# Patient Record
Sex: MALE | Race: WHITE | NOT HISPANIC OR LATINO | Employment: OTHER | URBAN - METROPOLITAN AREA
[De-identification: names, ages, dates, MRNs, and addresses within clinical notes are randomized per-mention and may not be internally consistent; named-entity substitution may affect disease eponyms.]

---

## 2017-02-17 ENCOUNTER — COMMUNICATION - HEALTHEAST (OUTPATIENT)
Dept: INTERNAL MEDICINE | Facility: CLINIC | Age: 72
End: 2017-02-17

## 2017-06-16 ENCOUNTER — COMMUNICATION - HEALTHEAST (OUTPATIENT)
Dept: INTERNAL MEDICINE | Facility: CLINIC | Age: 72
End: 2017-06-16

## 2017-12-29 ENCOUNTER — OFFICE VISIT - HEALTHEAST (OUTPATIENT)
Dept: INTERNAL MEDICINE | Facility: CLINIC | Age: 72
End: 2017-12-29

## 2017-12-29 ENCOUNTER — COMMUNICATION - HEALTHEAST (OUTPATIENT)
Dept: INTERNAL MEDICINE | Facility: CLINIC | Age: 72
End: 2017-12-29

## 2017-12-29 DIAGNOSIS — R79.89 LOW VITAMIN B12 LEVEL: ICD-10-CM

## 2017-12-29 DIAGNOSIS — Z00.00 ANNUAL PHYSICAL EXAM: ICD-10-CM

## 2017-12-29 DIAGNOSIS — Z12.5 ENCOUNTER FOR SCREENING FOR MALIGNANT NEOPLASM OF PROSTATE: ICD-10-CM

## 2017-12-29 DIAGNOSIS — E55.9 VITAMIN D DEFICIENCY: ICD-10-CM

## 2017-12-29 DIAGNOSIS — R07.9 CHEST PAIN: ICD-10-CM

## 2017-12-29 DIAGNOSIS — E11.9 DM TYPE 2 (DIABETES MELLITUS, TYPE 2) (H): ICD-10-CM

## 2017-12-29 DIAGNOSIS — I25.10 CORONARY ATHEROSCLEROSIS DUE TO CALCIFIED CORONARY LESION: ICD-10-CM

## 2017-12-29 DIAGNOSIS — K57.30 DIVERTICULOSIS OF LARGE INTESTINE WITHOUT HEMORRHAGE: ICD-10-CM

## 2017-12-29 DIAGNOSIS — R10.9 RIGHT FLANK PAIN: ICD-10-CM

## 2017-12-29 DIAGNOSIS — I25.84 CORONARY ATHEROSCLEROSIS DUE TO CALCIFIED CORONARY LESION: ICD-10-CM

## 2017-12-29 DIAGNOSIS — I25.10 CORONARY ARTERY DISEASE INVOLVING NATIVE CORONARY ARTERY OF NATIVE HEART WITHOUT ANGINA PECTORIS: ICD-10-CM

## 2017-12-29 DIAGNOSIS — Z12.11 SCREEN FOR COLON CANCER: ICD-10-CM

## 2017-12-29 LAB
CHOLEST SERPL-MCNC: 215 MG/DL
FASTING STATUS PATIENT QL REPORTED: YES
HBA1C MFR BLD: 5.9 % (ref 3.5–6)
HDLC SERPL-MCNC: 37 MG/DL
LDLC SERPL CALC-MCNC: 131 MG/DL
PSA SERPL-MCNC: 0.3 NG/ML (ref 0–6.5)
TRIGL SERPL-MCNC: 237 MG/DL

## 2017-12-29 ASSESSMENT — MIFFLIN-ST. JEOR: SCORE: 1939.41

## 2018-01-04 ENCOUNTER — COMMUNICATION - HEALTHEAST (OUTPATIENT)
Dept: INTERNAL MEDICINE | Facility: CLINIC | Age: 73
End: 2018-01-04

## 2018-01-09 ENCOUNTER — RECORDS - HEALTHEAST (OUTPATIENT)
Dept: ADMINISTRATIVE | Facility: OTHER | Age: 73
End: 2018-01-09

## 2018-08-14 ENCOUNTER — TRANSFERRED RECORDS (OUTPATIENT)
Dept: HEALTH INFORMATION MANAGEMENT | Facility: CLINIC | Age: 73
End: 2018-08-14

## 2018-08-14 ENCOUNTER — OFFICE VISIT - HEALTHEAST (OUTPATIENT)
Dept: INTERNAL MEDICINE | Facility: CLINIC | Age: 73
End: 2018-08-14

## 2018-08-14 ENCOUNTER — MEDICAL CORRESPONDENCE (OUTPATIENT)
Dept: HEALTH INFORMATION MANAGEMENT | Facility: CLINIC | Age: 73
End: 2018-08-14

## 2018-08-14 DIAGNOSIS — H35.351 CYSTOID MACULAR EDEMA OF RIGHT EYE: ICD-10-CM

## 2018-08-14 DIAGNOSIS — E11.59 TYPE 2 DIABETES MELLITUS WITH OTHER CIRCULATORY COMPLICATION, WITHOUT LONG-TERM CURRENT USE OF INSULIN (H): ICD-10-CM

## 2018-08-14 DIAGNOSIS — I25.119 ATHEROSCLEROSIS OF NATIVE CORONARY ARTERY OF NATIVE HEART WITH ANGINA PECTORIS (H): ICD-10-CM

## 2018-08-14 DIAGNOSIS — E11.9 TYPE 2 DIABETES MELLITUS WITHOUT COMPLICATION, WITHOUT LONG-TERM CURRENT USE OF INSULIN (H): ICD-10-CM

## 2018-08-14 DIAGNOSIS — F10.21 HISTORY OF ALCOHOL DEPENDENCE (H): ICD-10-CM

## 2018-08-14 DIAGNOSIS — R79.89 LOW VITAMIN B12 LEVEL: ICD-10-CM

## 2018-08-14 DIAGNOSIS — R41.3 MEMORY LOSS: ICD-10-CM

## 2018-08-14 DIAGNOSIS — E66.01 SEVERE OBESITY (BMI 35.0-39.9): ICD-10-CM

## 2018-08-14 DIAGNOSIS — R51.9 BILATERAL HEADACHES: ICD-10-CM

## 2018-08-14 DIAGNOSIS — R35.0 URINARY FREQUENCY: ICD-10-CM

## 2018-08-14 LAB — HBA1C MFR BLD: 5.9 % (ref 3.5–6)

## 2018-08-14 ASSESSMENT — MIFFLIN-ST. JEOR: SCORE: 1894.51

## 2018-08-17 ENCOUNTER — COMMUNICATION - HEALTHEAST (OUTPATIENT)
Dept: INTERNAL MEDICINE | Facility: CLINIC | Age: 73
End: 2018-08-17

## 2018-09-12 ENCOUNTER — COMMUNICATION - HEALTHEAST (OUTPATIENT)
Dept: INTERNAL MEDICINE | Facility: CLINIC | Age: 73
End: 2018-09-12

## 2018-09-13 ENCOUNTER — TELEPHONE (OUTPATIENT)
Dept: OPHTHALMOLOGY | Facility: CLINIC | Age: 73
End: 2018-09-13

## 2018-09-13 NOTE — TELEPHONE ENCOUNTER
Past 4-5 months decrease vision in right eye  No new sudden changes per pt    H/o retina detachment 4 years ago    No new floaters/flashing    Concern of new RD vs cystoid macular per message    No eye pain    [place] [facility]    [place] satisfied with appt    Note to dr. Shahida Wise RN 3:27 PM 09/13/18

## 2018-09-13 NOTE — TELEPHONE ENCOUNTER
[facility] Call Center    Phone Message    May a detailed message be left on voicemail: yes    Reason for Call: Other: Dorothea from [facility], Dr. Dillon Goldsmith's office called to schedule appt for pt for Cystoid Macular Edema in RE, however pt feels it may be a Detatched Retina. Cystoid Macular Edema does not come up on Dx List. Scheduled appt w/ Dr. Gonsales 9/20/18, gave fax for recs to be sent over. Dorothea stated she did not know if there was any rush for this as pt had been dealing with this for over a month before he went to their clinic.     Action Taken: Message routed to:  [facility] Eye

## 2018-09-15 ENCOUNTER — OFFICE VISIT (OUTPATIENT)
Dept: OPHTHALMOLOGY | Facility: CLINIC | Age: 73
End: 2018-09-15
Payer: MEDICARE

## 2018-09-15 DIAGNOSIS — H25.12 AGE-RELATED NUCLEAR CATARACT OF LEFT EYE: ICD-10-CM

## 2018-09-15 DIAGNOSIS — H35.30 AMD (AGE RELATED MACULAR DEGENERATION): Primary | ICD-10-CM

## 2018-09-15 ASSESSMENT — TONOMETRY
IOP_METHOD: ICARE
OS_IOP_MMHG: 15
OD_IOP_MMHG: 16

## 2018-09-15 ASSESSMENT — EXTERNAL EXAM - RIGHT EYE: OD_EXAM: NORMAL

## 2018-09-15 ASSESSMENT — VISUAL ACUITY
OD_PH_SC: 20/80 SLOW
METHOD: SNELLEN - LINEAR
OS_SC+: -2
OD_SC: J10
OD_SC: 20/150
OS_SC: J10
OS_SC: 20/70

## 2018-09-15 ASSESSMENT — REFRACTION_MANIFEST
OD_SPHERE: +1.00
OS_SPHERE: PLANO
OD_ADD: +3.25
OS_ADD: +3.25
OS_CYLINDER: +1.00
OD_AXIS: 165
OS_AXIS: 027
OD_CYLINDER: +1.00

## 2018-09-15 ASSESSMENT — CONF VISUAL FIELD
OS_INFERIOR_NASAL_RESTRICTION: 3
OS_INFERIOR_TEMPORAL_RESTRICTION: 3
METHOD: COUNTING FINGERS
OS_SUPERIOR_NASAL_RESTRICTION: 3
OD_SUPERIOR_TEMPORAL_RESTRICTION: 3
OD_SUPERIOR_NASAL_RESTRICTION: 3

## 2018-09-15 ASSESSMENT — EXTERNAL EXAM - LEFT EYE: OS_EXAM: NORMAL

## 2018-09-15 ASSESSMENT — SLIT LAMP EXAM - LIDS
COMMENTS: NORMAL
COMMENTS: NORMAL

## 2018-09-15 ASSESSMENT — CUP TO DISC RATIO
OD_RATIO: 0.2
OS_RATIO: 0.2

## 2018-09-15 NOTE — MR AVS SNAPSHOT
After Visit Summary   9/15/2018    Jonas Hyman    MRN: [id number]           Patient Information     Date Of Birth          1945        Visit Information        Provider Department      9/15/2018 8:20 AM Trav Pinedo MD [facility] Ophthalmology         Follow-ups after your visit        Your next 10 appointments already scheduled     Sep 17, 2018  9:00 AM CDT   RETURN RETINA with Anu Wilson MD   Eye Clinic ([facility])    [address]   [address]  [facility] [address]   [phone]              Who to contact     Please call your clinic at [phone] to:    Ask questions about your health    Make or cancel appointments    Discuss your medicines    Learn about your test results    Speak to your doctor            Additional Information About Your Visit        MyChart Information     [employer] is an electronic gateway that provides easy, online access to your medical records. With [employer], you can request a clinic appointment, read your test results, renew a prescription or communicate with your care team.     To sign up for [employer] visit the website at [email or web address]   You will be asked to enter the access code listed below, as well as some personal information. Please follow the directions to create your username and password.     Your access code is: [email or web address]  Expires: 2018  6:30 AM     Your access code will  in 90 days. If you need help or a new code, please contact your [facility] or call [phone] for assistance.        Care EveryWhere ID     This is your Care EveryWhere ID. This could be used by other organizations to access your [place] medical records  [id number]         Blood Pressure from Last 3 Encounters:   13 126/76    Weight from Last 3 Encounters:   13 126.3 kg (278 lb 8 oz)              Today, you had the following     No orders found for  display       Primary Care Provider Office Phone # Fax #    Dillon Goldsmith [phone]       [facility] [address] [id number]        Equal Access to Services     LEWISJOSE CHILANGO : Yunier jones rickey Davis, waaxda luqadaha, qaybta kaalmada susan, monalisa welsh laKvngaura kamara. So [facility] [phone].    ATENCIÓN: Si habla español, tiene a barrera disposición servicios gratuitos de asistencia lingüística. Llame al [phone].    We comply with applicable federal civil rights laws and [place] laws. We do not discriminate on the basis of race, color, national origin, age, disability, sex, sexual orientation, or gender identity.            Thank you!     Thank you for choosing [facility] OPHTHALMOLOGY  for your care. Our goal is always to provide you with excellent care. Hearing back from our patients is one way we can continue to improve our services. Please take a few minutes to complete the written survey that you may receive in the mail after your visit with us. Thank you!             Your Updated Medication List - Protect others around you: Learn how to safely use, store and throw away your medicines at www.disposemymeds.org.          This list is accurate as of 9/15/18 10:27 AM.  Always use your most recent med list.                   Brand Name Dispense Instructions for use Diagnosis    AMOXICILLIN PO      Take 500 mg by mouth        ASPIRIN PO      Take 325 mg by mouth daily        AZITHROMYCIN PO      Take 250 mg by mouth daily Take 2 tablets day one then one tablet daily        buPROPion 150 MG 12 hr tablet    ZYBAN     Take 150 mg by mouth 2 times daily        CARVEDILOL PO      Take 25 mg by mouth 2 times daily (with meals)        guaiFENesin 400 MG Tabs      Take 2 tablets twice daily        IBUPROFEN PO      Take 400 mg by mouth as needed        LISINOPRIL PO      Take 10 mg by mouth        NITROSTAT SL      Place 0.4 mg under the tongue         PRAVASTATIN SODIUM PO      Take 20 mg by mouth daily        VITAMIN B-12 ER PO      Take 1,000 mcg by mouth daily        VITAMIN D (CHOLECALCIFEROL) PO      Take 2,000 Units by mouth daily

## 2018-09-15 NOTE — NURSING NOTE
"Chief Complaints and History of Present Illnesses   Patient presents with     Eye Problem Both Eyes     vision changes OS x4mo, lost gls      HPI    Last Eye Exam:  6/23/14   Additional Referring Providers:  Jena Magdaleno   Affected eye(s):  Left   Symptoms:     Blurred vision   Decreased vision   No distorted vision   No floaters   No flashes   Glare   Halos   No foreign body sensation   Tearing   No Dryness   No itching   No burning   Photophobia      Duration:  4 months   Frequency:  Seldom       Do you have eye pain now?:  No      Comments:  Vision in the LE has been getting worse in the distance and near over past few months. Vision \"seems opaque, like a filminess over the eyes\" RE<LE.   C/o halo & glare to lights and \"the resolution is off.\"     Ocular meds: none  PoH (per EHR chart notes): ARMD, RD right eye (s/p PPV/SB/Membrane peel)    DM2, tx: diet controlled  Unknown last A1C or BS    Nita Vega COT 8:50 AM September 15, 2018                  "

## 2018-09-15 NOTE — PROGRESS NOTES
CC -   Blurry vision left eye    HPI -   September 15, 2018 - First visit: Jonas Hyman is a 73 year old year old male presenting for blurry vision left eye. He had surgery in the right eye for retinal detachment. Now vision worsening in the left eye. He was a pt of Dr. 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   - will refer to retina clinic in asap for FA and possible treatment  - no smoking, healthy diet  - will discuss AREDs next visit    2- non-neovascular AMD left eye   - no smoking, healthy diet  - will discuss AREDs next visit      3- cataract left eye   - visually significant   - will address after management of #1    4- history of RD repair right eye   - retina attached, follow    5- ACIOL right eye      return to clinic: 2 days retina clinic with dilated fundus exam, FA/ICG 55 transit right eye        Complete documentation of historical and exam elements from today's encounter can be found in the full encounter summary report (not reduplicated in this progress note).  I personally obtained the chief complaint(s) and history of present illness.  I confirmed and edited as necessary the review of systems, past medical/surgical history, family history, social history, and examination findings as documented by others; and I examined the patient myself.  I personally reviewed the relevant tests, images, and reports as documented above.  I formulated and edited as necessary the assessment and plan and discussed the findings and management plan with the patient and family      Trav Pinedo MD PhD  Vitreoretinal Surgery Fellow  [facility]

## 2018-09-17 ENCOUNTER — OFFICE VISIT (OUTPATIENT)
Dept: OPHTHALMOLOGY | Facility: CLINIC | Age: 73
End: 2018-09-17
Attending: OPHTHALMOLOGY
Payer: MEDICARE

## 2018-09-17 DIAGNOSIS — H35.3210 EXUDATIVE AGE-RELATED MACULAR DEGENERATION, RIGHT EYE, STAGE UNSPECIFIED (H): Primary | ICD-10-CM

## 2018-09-17 DIAGNOSIS — H35.3211 EXUDATIVE AGE-RELATED MACULAR DEGENERATION OF RIGHT EYE WITH ACTIVE CHOROIDAL NEOVASCULARIZATION (H): ICD-10-CM

## 2018-09-17 PROCEDURE — 92240 ICG ANGIOGRAPHY I&R UNI/BI: CPT | Mod: ZF | Performed by: OPHTHALMOLOGY

## 2018-09-17 PROCEDURE — C9257 BEVACIZUMAB INJECTION: HCPCS | Mod: ZF | Performed by: OPHTHALMOLOGY

## 2018-09-17 PROCEDURE — G0463 HOSPITAL OUTPT CLINIC VISIT: HCPCS | Mod: 25

## 2018-09-17 PROCEDURE — 25000128 H RX IP 250 OP 636: Mod: ZF | Performed by: OPHTHALMOLOGY

## 2018-09-17 PROCEDURE — 92242 FLUORESCEIN&ICG ANGIOGRAPHY: CPT

## 2018-09-17 PROCEDURE — 92134 CPTRZ OPH DX IMG PST SGM RTA: CPT | Mod: ZF | Performed by: OPHTHALMOLOGY

## 2018-09-17 PROCEDURE — 92235 FLUORESCEIN ANGRPH MLTIFRAME: CPT | Mod: ZF | Performed by: OPHTHALMOLOGY

## 2018-09-17 PROCEDURE — 67028 INJECTION EYE DRUG: CPT | Mod: ZF | Performed by: OPHTHALMOLOGY

## 2018-09-17 RX ORDER — TAMSULOSIN HYDROCHLORIDE 0.4 MG/1
CAPSULE ORAL
COMMUNITY
Start: 2018-08-14 | End: 2022-04-08

## 2018-09-17 RX ORDER — TAMSULOSIN HYDROCHLORIDE 0.4 MG/1
0.4 CAPSULE ORAL DAILY
Status: CANCELLED | OUTPATIENT
Start: 2018-09-17 | End: 2019-09-17

## 2018-09-17 RX ADMIN — Medication 1.25 MG: at 11:46

## 2018-09-17 ASSESSMENT — EXTERNAL EXAM - RIGHT EYE: OD_EXAM: NORMAL

## 2018-09-17 ASSESSMENT — CONF VISUAL FIELD
OS_NORMAL: 1
OD_NORMAL: 1

## 2018-09-17 ASSESSMENT — CUP TO DISC RATIO
OS_RATIO: 0.3
OD_RATIO: 0.45

## 2018-09-17 ASSESSMENT — VISUAL ACUITY
OS_SC+: -1
OS_SC: 20/125
METHOD: SNELLEN - LINEAR
OS_PH_SC: 20/70 -1
OD_SC: 20/100

## 2018-09-17 ASSESSMENT — TONOMETRY
OD_IOP_MMHG: 12
IOP_METHOD: TONOPEN
OS_IOP_MMHG: 12

## 2018-09-17 ASSESSMENT — EXTERNAL EXAM - LEFT EYE: OS_EXAM: NORMAL

## 2018-09-17 ASSESSMENT — SLIT LAMP EXAM - LIDS
COMMENTS: NORMAL
COMMENTS: NORMAL

## 2018-09-17 NOTE — NURSING NOTE
Chief Complaints and History of Present Illnesses   Patient presents with     Follow Up For     3 day check of AMD (age related macular degeneration) - Right Eye     HPI    Symptoms:     Glare   Halos   Tearing   No Dryness   Photophobia         Do you have eye pain now?:  No      Comments:  Pt reports no changes in right eye since last visit. Pt says he has blurry vision in the left eye. Pt still experiencing chronic headaches in an area between and above both eyes, which he's had for more than a year. He says he noticed a small floater in his right eye for the first time yesterday. He says it was moving right to left.     Donato NOBLES   September 17, 2018    9:37 AM  RAHUL Starkey 10:10 AM 09/17/2018

## 2018-09-17 NOTE — PROGRESS NOTES
CC -   ARMD    INTERVAL HISTORY - Initial visit with me.  Gradual progressive loss OD many months, no sudden changes  SISSY prior to 9/15/18 had been several years d/t lack of insuance  Caring for grandson  VA has worsened OU over years, now trouble with reading/drivign, ADLs    REGAN -   Jonas Hyman is a  73 year old year-old patient referred by Dr Pinedo for wet  AMD OD.  Had gone few years without eye exam d/t insurance lack, gradual progressive loss OU.  Prior RD repair OD 2014, unsure what BCVA was after repair.      PAST OCULAR SURGERY  PPV/SBP/FGx OD 2014 ([facility])  ACIOL OD     RETINAL IMAGING:  OCT  9-15-18  OD - SR scar with SRF  OS - mild RPE elevations, PHF attached    FA 9-17-18  OD - central leakage c/w occult CNVM  OS - no leakage    ICG 9-17-18  OD - central leakage c/w CNMV, no polyps  OS - no leakage        ASSESSMENT & PLAN    1.  Wet AMD OD   - uncertain duration by history   - uncertain vision potential given prior h/o RD   - advise Avastin today   - recheck 4 weeks     - r/b/a IVT anti-VEGF d/w patient   - infection, blindness, need for surgery   - off-label use of Avastin, resident participation      2. H/o RD repair OD   - s/p PPV/SBP/FGx  2014   - retina flat      3. syneresis OS   - advised s/sx RD    4. OAG suspect OD   - d/t CDR asymmetry   - RNFL OK       5. NS OS   - likely visually significant   - refer for eval      return to clinic: 4 weeks, OCT OU    ATTESTATION     Attending Attestation:     Complete documentation of historical and exam elements from today's encounter can be found in the full encounter summary report (not reduplicated in this progress note).  I personally obtained the chief complaint(s) and history of present illness.  I confirmed and edited as necessary the review of systems, past medical/surgical history, family history, social history, and examination findings as documented by others; and I examined the patient myself.  I personally reviewed the relevant tests,  images, and reports as documented above.  I formulated and edited as necessary the assessment and plan and discussed the findings and management plan with the patient and family    Anu Wilson MD, PhD  , Vitreoretinal Surgery  Department of Ophthalmology  [facility]

## 2018-09-17 NOTE — MR AVS SNAPSHOT
After Visit Summary   2018    Jonas Hyman    MRN: [id number]           Patient Information     Date Of Birth          1945        Visit Information        Provider Department      2018 9:00 AM Anu Wilson MD [facility] Clinic        Today's Diagnoses     Exudative age-related macular degeneration, right eye, stage unspecified (H)    -  1       Follow-ups after your visit        Follow-up notes from your care team     Return in about 4 weeks (around 10/15/2018) for OCT OU.      Your next 10 appointments already scheduled     Oct 16, 2018  1:00 PM CDT   RETURN RETINA with Anu Wilson MD   Eye Clinic ([facility])    [address]  [facility] [id number] [phone]              Who to contact     Please call your clinic at [phone] to:    Ask questions about your health    Make or cancel appointments    Discuss your medicines    Learn about your test results    Speak to your doctor            Additional Information About Your Visit        MyChart Information     [employer] is an electronic gateway that provides easy, online access to your medical records. With [employer], you can request a clinic appointment, read your test results, renew a prescription or communicate with your care team.     To sign up for [employer] visit the website at [email or web address]   You will be asked to enter the access code listed below, as well as some personal information. Please follow the directions to create your username and password.     Your access code is: [email or web address]  Expires: 2018  6:30 AM     Your access code will  in 90 days. If you need help or a new code, please contact your [facility] or call [phone] for assistance.        Care EveryWhere ID     This is your Care EveryWhere ID. This could be used by other organizations to access your [facility]  records  [id number]         Blood Pressure from Last 3 Encounters:   09/05/13 126/76    Weight from Last 3 Encounters:   08/30/13 126.3 kg (278 lb 8 oz)              We Performed the Following     Avastin (Bevacizumab) 1.25MG Intravitreal Injection OD (right eye)     Fluorescein Angiography OU (both eyes)     ICG Angiography OU (both eyes)        Primary Care Provider Office Phone # Fax #    Dillon Goldsmith [phone]       [address] [place] [address]        Equal Access to Services     [facility] : Hadii felicia jones hadasho Soomaali, waaxda luqadaha, qaybta kaalmada adelexiiyahaseeb, monalisa burns . So [facility] [phone].    ATENCIÓN: Si habla español, tiene a barrera disposición servicios gratuitos de asistencia lingüística. [facility] [phone].    We comply with applicable federal civil rights laws and [place] laws. We do not discriminate on the basis of race, color, national origin, age, disability, sex, sexual orientation, or gender identity.            Thank you!     Thank you for choosing EYE CLINIC  for your care. Our goal is always to provide you with excellent care. Hearing back from our patients is one way we can continue to improve our services. Please take a few minutes to complete the written survey that you may receive in the mail after your visit with us. Thank you!             Your Updated Medication List - Protect others around you: Learn how to safely use, store and throw away your medicines at www.disposemymeds.org.          This list is accurate as of 9/17/18 11:52 AM.  Always use your most recent med list.                   Brand Name Dispense Instructions for use Diagnosis    AMOXICILLIN PO      Take 500 mg by mouth        ASPIRIN PO      Take 325 mg by mouth daily        AZITHROMYCIN PO      Take 250 mg by mouth daily Take 2 tablets day one then one tablet daily        buPROPion 150 MG 12 hr tablet    ZYBAN     Take 150 mg by  mouth 2 times daily        CARVEDILOL PO      Take 25 mg by mouth 2 times daily (with meals)        guaiFENesin 400 MG Tabs      Take 2 tablets twice daily        IBUPROFEN PO      Take 400 mg by mouth as needed        LISINOPRIL PO      Take 10 mg by mouth        NITROSTAT SL      Place 0.4 mg under the tongue        PRAVASTATIN SODIUM PO      Take 20 mg by mouth daily        tamsulosin 0.4 MG capsule    FLOMAX          VITAMIN B-12 ER PO      Take 1,000 mcg by mouth daily        VITAMIN D (CHOLECALCIFEROL) PO      Take 2,000 Units by mouth daily

## 2018-09-19 ENCOUNTER — COMMUNICATION - HEALTHEAST (OUTPATIENT)
Dept: INTERNAL MEDICINE | Facility: CLINIC | Age: 73
End: 2018-09-19

## 2018-09-19 ENCOUNTER — HOSPITAL ENCOUNTER (OUTPATIENT)
Dept: CT IMAGING | Facility: HOSPITAL | Age: 73
Discharge: HOME | End: 2018-09-19
Attending: INTERNAL MEDICINE

## 2018-09-19 DIAGNOSIS — R10.9 FLANK PAIN: ICD-10-CM

## 2018-09-20 ENCOUNTER — COMMUNICATION - HEALTHEAST (OUTPATIENT)
Dept: INTERNAL MEDICINE | Facility: CLINIC | Age: 73
End: 2018-09-20

## 2018-09-20 DIAGNOSIS — K86.9 PANCREATIC LESION: ICD-10-CM

## 2018-09-26 ENCOUNTER — HOSPITAL ENCOUNTER (OUTPATIENT)
Dept: MRI IMAGING | Facility: HOSPITAL | Age: 73
Discharge: HOME | End: 2018-09-26
Attending: INTERNAL MEDICINE

## 2018-09-26 DIAGNOSIS — K86.9 PANCREATIC LESION: ICD-10-CM

## 2018-09-26 LAB
CREAT BLD-MCNC: 0.8 MG/DL
CREAT BLD-MCNC: 0.8 MG/DL (ref 0.7–1.3)
POC GFR AMER AF HE - HISTORICAL: >60 ML/MIN/1.73M2
POC GFR NON AMER AF HE - HISTORICAL: >60 ML/MIN/1.73M2

## 2018-09-28 ENCOUNTER — COMMUNICATION - HEALTHEAST (OUTPATIENT)
Dept: INTERNAL MEDICINE | Facility: CLINIC | Age: 73
End: 2018-09-28

## 2018-10-11 ENCOUNTER — RECORDS - HEALTHEAST (OUTPATIENT)
Dept: ADMINISTRATIVE | Facility: OTHER | Age: 73
End: 2018-10-11

## 2018-10-15 ENCOUNTER — COMMUNICATION - HEALTHEAST (OUTPATIENT)
Dept: INTERNAL MEDICINE | Facility: CLINIC | Age: 73
End: 2018-10-15

## 2018-10-16 ENCOUNTER — OFFICE VISIT (OUTPATIENT)
Dept: OPHTHALMOLOGY | Facility: CLINIC | Age: 73
End: 2018-10-16
Attending: OPHTHALMOLOGY
Payer: MEDICARE

## 2018-10-16 DIAGNOSIS — H35.3211 EXUDATIVE AGE-RELATED MACULAR DEGENERATION OF RIGHT EYE WITH ACTIVE CHOROIDAL NEOVASCULARIZATION (H): Primary | ICD-10-CM

## 2018-10-16 DIAGNOSIS — H35.3211 EXUDATIVE AGE-RELATED MACULAR DEGENERATION OF RIGHT EYE WITH ACTIVE CHOROIDAL NEOVASCULARIZATION (H): ICD-10-CM

## 2018-10-16 PROCEDURE — C9257 BEVACIZUMAB INJECTION: HCPCS | Mod: ZF | Performed by: OPHTHALMOLOGY

## 2018-10-16 PROCEDURE — 25000128 H RX IP 250 OP 636: Mod: ZF | Performed by: OPHTHALMOLOGY

## 2018-10-16 PROCEDURE — G0463 HOSPITAL OUTPT CLINIC VISIT: HCPCS | Mod: ZF

## 2018-10-16 PROCEDURE — 67028 INJECTION EYE DRUG: CPT | Mod: ZF,RT | Performed by: OPHTHALMOLOGY

## 2018-10-16 PROCEDURE — 92134 CPTRZ OPH DX IMG PST SGM RTA: CPT | Mod: ZF | Performed by: OPHTHALMOLOGY

## 2018-10-16 RX ADMIN — Medication 1.25 MG: at 14:03

## 2018-10-16 ASSESSMENT — CUP TO DISC RATIO
OD_RATIO: 0.45
OS_RATIO: 0.3

## 2018-10-16 ASSESSMENT — VISUAL ACUITY
OD_SC: 20/150
METHOD: SNELLEN - LINEAR
CORRECTION_TYPE: GLASSES
OS_SC: 20/70
OS_SC+: -2

## 2018-10-16 ASSESSMENT — TONOMETRY
OD_IOP_MMHG: 16
OS_IOP_MMHG: 17
IOP_METHOD: TONOPEN

## 2018-10-16 ASSESSMENT — EXTERNAL EXAM - LEFT EYE: OS_EXAM: NORMAL

## 2018-10-16 ASSESSMENT — SLIT LAMP EXAM - LIDS
COMMENTS: NORMAL
COMMENTS: NORMAL

## 2018-10-16 ASSESSMENT — CONF VISUAL FIELD
OS_NORMAL: 1
OD_NORMAL: 1

## 2018-10-16 ASSESSMENT — EXTERNAL EXAM - RIGHT EYE: OD_EXAM: NORMAL

## 2018-10-16 NOTE — MR AVS SNAPSHOT
After Visit Summary   10/16/2018    Jonas Hyman    MRN: [id number]           Patient Information     Date Of Birth          1945        Visit Information        Provider Department      10/16/2018 1:00 PM Anu Wilson MD Eye Clinic        Today's Diagnoses     Exudative age-related macular degeneration of right eye with active choroidal neovascularization (H)           Follow-ups after your visit        Follow-up notes from your care team     Return in about 4 weeks (around 11/13/2018) for Injection Only - No dilation, No imaging, Injection OD, Avastin.      Your next 10 appointments already scheduled     Nov 20, 2018  8:30 AM CST   NEW GENERAL with Suhail Johnson MD   Eye Clinic ([facility])    [facility]  [address] [place] [address] Clin [address] [place] [address]   [phone]            Nov 20, 2018  9:00 AM CST   RETURN RETINA with Anu Wilson MD   Eye Clinic ([facility])    [address] [place] [address]   [phone]              Who to contact     Please call your clinic at [phone] to:    Ask questions about your health    Make or cancel appointments    Discuss your medicines    Learn about your test results    Speak to your doctor            Additional Information About Your Visit        Care EveryWhere ID     This is your Care EveryWhere ID. This could be used by other organizations to access your [place] medical records  [id number]         Blood Pressure from Last 3 Encounters:   09/05/13 126/76    Weight from Last 3 Encounters:   08/30/13 126.3 kg (278 lb 8 oz)              We Performed the Following     Avastin (Bevacizumab) 1.25MG Intravitreal Injection OD (right eye)     OCT Retina Spectralis OU (both eyes)          Today's Medication Changes          These changes are accurate as of 10/16/18  2:08 PM.  If you have any questions, ask your nurse or  doctor.               Stop taking these medicines if you haven't already. Please contact your care team if you have questions.     PRAVASTATIN SODIUM PO   Stopped by:  Anu Wilson MD                    Primary Care Provider Office Phone # Fax #    Dillon Goldsmith [phone]       [facility] [address] [facility] [address]        Equal Access to Services     [facility]: Hadii aad ku hadasho Soomaali, waaxda luqadaha, qaybta kaalmada adeegyada, waxay idiin hayaan adeeg kharash la'aan ah. So [facility] [phone].    ATENCIÓN: Si cassy moise, tiene a barrera disposición servicios gratuitos de asistencia lingüística. Cesar al [phone].    We comply with applicable federal civil rights laws and [place] laws. We do not discriminate on the basis of race, color, national origin, age, disability, sex, sexual orientation, or gender identity.            Thank you!     Thank you for choosing EYE CLINIC  for your care. Our goal is always to provide you with excellent care. Hearing back from our patients is one way we can continue to improve our services. Please take a few minutes to complete the written survey that you may receive in the mail after your visit with us. Thank you!             Your Updated Medication List - Protect others around you: Learn how to safely use, store and throw away your medicines at www.disposemymeds.org.          This list is accurate as of 10/16/18  2:08 PM.  Always use your most recent med list.                   Brand Name Dispense Instructions for use Diagnosis    AMOXICILLIN PO      Take 500 mg by mouth        ASPIRIN PO      Take 325 mg by mouth daily        AZITHROMYCIN PO      Take 250 mg by mouth daily Take 2 tablets day one then one tablet daily        buPROPion 150 MG 12 hr tablet    ZYBAN     Take 150 mg by mouth 2 times daily        CARVEDILOL PO      Take 25 mg by mouth 2 times daily (with meals)        guaiFENesin 400 MG Tabs      Take 2  tablets twice daily        IBUPROFEN PO      Take 400 mg by mouth as needed        LISINOPRIL PO      Take 10 mg by mouth        NITROSTAT SL      Place 0.4 mg under the tongue        tamsulosin 0.4 MG capsule    FLOMAX          VITAMIN B-12 ER PO      Take 1,000 mcg by mouth daily        VITAMIN D (CHOLECALCIFEROL) PO      Take 2,000 Units by mouth daily

## 2018-10-16 NOTE — NURSING NOTE
Chief Complaints and History of Present Illnesses   Patient presents with     Macular Degeneration Follow Up     HPI    Affected eye(s):  Both   Symptoms:     Blurred vision   No decreased vision   No floaters   No flashes      Duration:  1 month      Do you have eye pain now?:  No      Comments:  Follow up for macular degeneration.    Patient notes he has a new dx of an aortic abdominal aneurysm and a pancreatic cyst.  RAHUL Starkey 12:59 PM 10/16/2018

## 2018-10-16 NOTE — PROGRESS NOTES
CC -   ARMD    INTERVAL HISTORY - No change after last injection vs worse  Caring for grandson  VA has worsened OU over years, now trouble with reading/drivign, ADLs    HPI -   Jonas Hyman is a  73 year old year-old patient referred by Dr Pinedo for wet  AMD OD.  Had gone few years without eye exam d/t insurance lack, gradual progressive loss OU.  Prior RD repair OD 2014, unsure what BCVA was after repair.      PAST OCULAR SURGERY  PPV/SBP/FGx OD 2014 ([facility])  ACIOL OD     RETINAL IMAGING:  OCT  10-16-18  OD - SR scar with SRF mild increase  OS - mild RPE elevations,no fluid PHF attached    FA 9-17-18  OD - central leakage c/w occult CNVM  OS - no leakage    ICG 9-17-18  OD - central leakage c/w CNMV, no polyps  OS - no leakage        ASSESSMENT & PLAN    1.  Wet AMD OD   - uncertain duration by history   - uncertain vision potential given prior h/o RD   - started Avastin 9/17/18     - last injection Avastin (#1) 9/17/18 (4 weeks)   - OCT SRF stable vs worse   - VA stable    - inject AVastin     - RTC 1 month injection only #1 of 3     - r/b/a IVT anti-VEGF d/w patient   - infection, blindness, need for surgery   - off-label use of Avastin, resident participation      2. H/o RD repair OD   - s/p PPV/SBP/FGx  2014   - retina flat      3. syneresis OS   - advised s/sx RD    4. OAG suspect OD   - d/t CDR asymmetry   - RNFL OK    - refer for eval      5. NS OS   - likely visually significant   - refer for eval      return to clinic: 4 weeks, injection only OD Avastin    ATTESTATION     Attending Attestation:     Complete documentation of historical and exam elements from today's encounter can be found in the full encounter summary report (not reduplicated in this progress note).  I personally obtained the chief complaint(s) and history of present illness.  I confirmed and edited as necessary the review of systems, past medical/surgical history, family history, social history, and examination findings as documented  by others; and I examined the patient myself.  I personally reviewed the relevant tests, images, and reports as documented above.  I formulated and edited as necessary the assessment and plan and discussed the findings and management plan with the patient and family    Anu Wilson MD, PhD  , Vitreoretinal Surgery  Department of Ophthalmology  [facility]

## 2018-10-17 ENCOUNTER — RECORDS - HEALTHEAST (OUTPATIENT)
Dept: ADMINISTRATIVE | Facility: OTHER | Age: 73
End: 2018-10-17

## 2018-10-30 ENCOUNTER — OFFICE VISIT - HEALTHEAST (OUTPATIENT)
Dept: INTERNAL MEDICINE | Facility: CLINIC | Age: 73
End: 2018-10-30

## 2018-10-30 DIAGNOSIS — Z23 FLU VACCINE NEED: ICD-10-CM

## 2018-10-30 DIAGNOSIS — R79.89 LOW VITAMIN B12 LEVEL: ICD-10-CM

## 2018-10-30 DIAGNOSIS — I71.40 ABDOMINAL AORTIC ANEURYSM (AAA) WITHOUT RUPTURE (H): ICD-10-CM

## 2018-10-30 DIAGNOSIS — K86.2 PANCREATIC CYST: ICD-10-CM

## 2018-10-30 DIAGNOSIS — E11.59 TYPE 2 DIABETES MELLITUS WITH OTHER CIRCULATORY COMPLICATION, WITHOUT LONG-TERM CURRENT USE OF INSULIN (H): ICD-10-CM

## 2018-10-30 DIAGNOSIS — H35.3211 EXUDATIVE AGE-RELATED MACULAR DEGENERATION OF RIGHT EYE WITH ACTIVE CHOROIDAL NEOVASCULARIZATION (H): ICD-10-CM

## 2018-10-30 DIAGNOSIS — K57.30 DIVERTICULOSIS OF LARGE INTESTINE WITHOUT HEMORRHAGE: ICD-10-CM

## 2018-10-30 ASSESSMENT — MIFFLIN-ST. JEOR: SCORE: 1894.05

## 2018-11-08 ENCOUNTER — TRANSFERRED RECORDS (OUTPATIENT)
Dept: HEALTH INFORMATION MANAGEMENT | Facility: CLINIC | Age: 73
End: 2018-11-08

## 2018-11-08 ENCOUNTER — RECORDS - HEALTHEAST (OUTPATIENT)
Dept: ADMINISTRATIVE | Facility: OTHER | Age: 73
End: 2018-11-08

## 2018-11-20 ENCOUNTER — OFFICE VISIT (OUTPATIENT)
Dept: OPHTHALMOLOGY | Facility: CLINIC | Age: 73
End: 2018-11-20
Attending: OPHTHALMOLOGY
Payer: MEDICARE

## 2018-11-20 DIAGNOSIS — H35.3211 EXUDATIVE AGE-RELATED MACULAR DEGENERATION OF RIGHT EYE WITH ACTIVE CHOROIDAL NEOVASCULARIZATION (H): Primary | ICD-10-CM

## 2018-11-20 DIAGNOSIS — H25.12 NUCLEAR SENILE CATARACT OF LEFT EYE: Primary | ICD-10-CM

## 2018-11-20 DIAGNOSIS — H35.30 AMD (AGE RELATED MACULAR DEGENERATION): ICD-10-CM

## 2018-11-20 DIAGNOSIS — H40.003 GLAUCOMA SUSPECT OF BOTH EYES: ICD-10-CM

## 2018-11-20 DIAGNOSIS — Z86.69 HISTORY OF RETINAL DETACHMENT: ICD-10-CM

## 2018-11-20 PROCEDURE — C9257 BEVACIZUMAB INJECTION: HCPCS | Mod: ZF | Performed by: OPHTHALMOLOGY

## 2018-11-20 PROCEDURE — G0463 HOSPITAL OUTPT CLINIC VISIT: HCPCS | Mod: ZF

## 2018-11-20 PROCEDURE — 40000269 ZZH STATISTIC NO CHARGE FACILITY FEE: Mod: ZF

## 2018-11-20 PROCEDURE — G0463 HOSPITAL OUTPT CLINIC VISIT: HCPCS | Mod: 25,ZF

## 2018-11-20 PROCEDURE — 76519 ECHO EXAM OF EYE: CPT | Mod: ZF | Performed by: OPHTHALMOLOGY

## 2018-11-20 PROCEDURE — 92133 CPTRZD OPH DX IMG PST SGM ON: CPT | Mod: ZF | Performed by: OPHTHALMOLOGY

## 2018-11-20 PROCEDURE — 76514 ECHO EXAM OF EYE THICKNESS: CPT | Mod: ZF | Performed by: OPHTHALMOLOGY

## 2018-11-20 PROCEDURE — 25000128 H RX IP 250 OP 636: Mod: ZF | Performed by: OPHTHALMOLOGY

## 2018-11-20 PROCEDURE — 67028 INJECTION EYE DRUG: CPT | Mod: ZF | Performed by: OPHTHALMOLOGY

## 2018-11-20 RX ADMIN — Medication 1.25 MG: at 11:05

## 2018-11-20 ASSESSMENT — TONOMETRY
IOP_METHOD: TONOPEN
IOP_METHOD: TONOPEN
OS_IOP_MMHG: 10
OD_IOP_MMHG: 11
OS_IOP_MMHG: 10
OD_IOP_MMHG: 11

## 2018-11-20 ASSESSMENT — VISUAL ACUITY
METHOD: SNELLEN - LINEAR
OS_SC: 20/
OS_PH_SC: 20/60
OD_SC: 20/100
OD_SC: 20/100
METHOD: SNELLEN - LINEAR
OS_SC: 20/80
OS_PH_SC: 20/60
OS_SC+: +2
OS_SC+: +2

## 2018-11-20 ASSESSMENT — CONF VISUAL FIELD
METHOD: COUNTING FINGERS
METHOD: COUNTING FINGERS
OD_NORMAL: 1
OS_NORMAL: 1
OS_NORMAL: 1
OD_NORMAL: 1

## 2018-11-20 ASSESSMENT — CUP TO DISC RATIO
OS_RATIO: 0.3
OD_RATIO: 0.5

## 2018-11-20 ASSESSMENT — PACHYMETRY
OS_CT(UM): 583
OD_CT(UM): 574

## 2018-11-20 ASSESSMENT — REFRACTION_MANIFEST
OS_AXIS: 030
OS_CYLINDER: +1.25
OS_SPHERE: PLANO

## 2018-11-20 ASSESSMENT — SLIT LAMP EXAM - LIDS
COMMENTS: NORMAL
COMMENTS: NORMAL

## 2018-11-20 ASSESSMENT — EXTERNAL EXAM - RIGHT EYE: OD_EXAM: NORMAL

## 2018-11-20 ASSESSMENT — EXTERNAL EXAM - LEFT EYE: OS_EXAM: NORMAL

## 2018-11-20 NOTE — PROGRESS NOTES
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  Had gone few years without eye exam d/t insurance lack, gradual progressive loss OU.  Prior RD repair OD 2014, unsure what BCVA was after repair.      PAST OCULAR SURGERY  PPV/SBP/FGx OD 2014 ([facility])  ACIOL OD     RETINAL IMAGING:  OCT  10-16-18  OD - SR scar with SRF mild increase  OS - mild RPE elevations,no fluid PHF attached    FA 9-17-18  OD - central leakage c/w occult CNVM  OS - no leakage    ICG 9-17-18  OD - central leakage c/w CNMV, no polyps  OS - no leakage        ASSESSMENT & PLAN    1.  Wet AMD OD   - uncertain duration by history   - uncertain vision potential given prior h/o RD   - started Avastin 9/17/18     - last injection Avastin (#2) 10/16/18 (4 weeks)   - PRIOR - OCT SRF stable vs worse   - VA stable vs better   - inject AVastin today injection only #1 of 3     - RTC 1 month injection only     - r/b/a IVT anti-VEGF d/w patient   - infection, blindness, need for surgery   - off-label use of Avastin, resident participation      2. H/o RD repair OD   - s/p PPV/SBP/FGx  2014   - retina flat      3. syneresis OS   - advised s/sx RD    4. 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 NS OS   - likely visually significant   - saw Elizabeth      return to clinic: 4 weeks, injection only OD Avastin    ATTESTATION     Attending Attestation:     Complete documentation of historical and exam elements from today's encounter can be found in the full encounter summary report (not reduplicated in this progress note).  I personally obtained the chief complaint(s) and history of present illness.  I confirmed and edited as necessary the review of systems, past medical/surgical history, family history, social history, and examination  findings as documented by others; and I examined the patient myself.  I personally reviewed the relevant tests, images, and reports as documented above.  I formulated and edited as necessary the assessment and plan and discussed the findings and management plan with the patient and family    Anu Wilson MD, PhD  , Vitreoretinal Surgery  Department of Ophthalmology  [facility]

## 2018-11-20 NOTE — PROGRESS NOTES
HPI:  Referral from Dr. Wilson for cataract left eye and possible glaucoma.  States that he recently started driving 2/2 worsening vision in his left eye. Previous complicated surgery right eye with anterior chamber intraocular lens (ACIOL) and retinal detachment after.  Now s/p buckle.  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 SALAS      - patient with history of RD in RIGHT eye, s/p RD repair and CE+ACIOL (~2013)  - Wet AMD RIGHT eye, follows with retina for injections     Assessment & Plan      Jonas Hyman is a 73 year old male with the following diagnoses:   1. Nuclear senile cataract of left eye    2. Glaucoma suspect of both eyes    3. AMD (age related macular degeneration) - Right Eye    4. History of retinal detachment         Open angle glaucoma suspect, cupping right eye > left eye   - 2/2 C:D ratio  - IOP max: unknown (16/17 in our clinic)  - IOP today: 11/10  - C:D: 0.5/0.3  - OCT RNFL:    - Today (11/20/18): no signs of nerve thinning, unchanged from last OCT RNFL   - OCT BMO   - Today (11/20/18): no signs of thinning   - Pachy: 574/583    - Plan: No glaucoma at this time.  Monitor with repeat OCT in 1 year    Cataract LEFT eye:   - BCVA 20/60, bothering bitty to drive  -Risks, benefits and alternatives to cataract extraction/IOL implantation discussed; consent obtained.  Will schedule surgery today  - IOL calcs today    Special equipment/needs:    Anesthesia:Topical  Dilation:Moderate  Iris expansion:IFIS  Pseudoexfoliation: No pseudoexfoliation  Trypan Blue: Yes  Obtain outside records to determine what happened in right eye surgery   Goal emmetropia  Dex       Humberto Zelaya MD  Ophthalmology Resident  PGY-2          Attending Physician Attestation:  Complete documentation of historical and exam elements from today's encounter can be found in the full encounter summary report (not reduplicated in this progress note).  I personally obtained the chief complaint(s) and  history of present illness.  I confirmed and edited as necessary the review of systems, past medical/surgical history, family history, social history, and examination findings as documented by others; and I examined the patient myself.  I personally reviewed the relevant tests, images, and reports as documented above.  I formulated and edited as necessary the assessment and plan and discussed the findings and management plan with the patient and family. Attending Physician Image/Tesing Attestation: I personally reviewed the ophthalmic test(s) associated with this encounter, agree with the interpretation(s) as documented by the resident/fellow, and have edited the corresponding report(s) as necessary.  . - Suhail Johnson MD

## 2018-11-20 NOTE — MR AVS SNAPSHOT
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  4th Floor  [facility] [id number] [phone]            Dec 18, 2018 12:45 PM CST   Post-Op with Suhail Johnson MD   [facility] Clinic ([facility])    [address] [address] [phone]            Dec 18, 2018  2:00 PM CST   RETURN RETINA with Anu Wilson MD   Eye Clinic ([facility])    [address] [address] [phone]              Who to contact     Please call your clinic at [phone] to:    Ask questions about your health    Make or cancel appointments    Discuss your medicines    Learn about your test results    Speak to your doctor            Additional Information About Your Visit        Care EveryWhere ID     This is your Care EveryWhere ID. This could be used by other organizations to access your [place] medical records  [id number]         Blood Pressure from Last 3 Encounters:   09/05/13 126/76    Weight from Last 3 Encounters:   08/30/13 126.3 kg (278 lb 8 oz)              We Performed the Following     IOL Biometry w/ IOL calc OU (both eye)     OCT Optic Nerve RNFL Spectralis OU (both eyes)      Sandy-Operative Worksheet        Primary Care Provider Office Phone # Fax #    Dillon Goldsmith [phone]       [facility] [address] [address]        Equal Access to Services     AHSAN KEE : Hadii aad ku hadgianlucao Sosonnyali, waaxda luqadaha, qaybta kaalmada adelexiiyada, monalisa welsh laKvngaura kamara. So [facility] [phone].    ATENCIÓN: Si habla español, tiene a barrera disposición servicios gratuitos de asistencia lingüística. Llame al [phone].    We comply with applicable federal civil rights laws and [place] laws. We do not discriminate on the basis of race, color, national origin, age, disability, sex, sexual orientation, or gender identity.            Thank you!     Thank you for choosing EYE CLINIC  for your care. Our goal is always to provide you with excellent care. Hearing back from our patients is one way we can continue to improve our services. Please take a few minutes to complete the written survey that you may receive in the mail after your visit with us. Thank you!             Your Updated Medication List - Protect others around you: Learn how to safely use, store and throw away your medicines at www.disposemymeds.org.          This list is accurate as of 11/20/18 12:48 PM.  Always use your most recent med list.                   Brand Name Dispense Instructions for use Diagnosis    AMOXICILLIN PO      Take 500 mg by mouth        ASPIRIN PO      Take 325 mg by mouth daily        AZITHROMYCIN PO      Take 250 mg by mouth daily Take 2 tablets day one then one tablet daily        buPROPion 150 MG 12 hr tablet    ZYBAN     Take 150 mg by mouth 2 times daily        CARVEDILOL PO      Take 25 mg by mouth 2 times daily (with meals)        guaiFENesin 400 MG Tabs      Take 2 tablets twice daily        IBUPROFEN PO      Take 400 mg by mouth as needed        LISINOPRIL PO      Take 10 mg by mouth        NITROSTAT SL      Place 0.4 mg under the tongue         tamsulosin 0.4 MG capsule    FLOMAX          VITAMIN B-12 ER PO      Take 1,000 mcg by mouth daily        VITAMIN D (CHOLECALCIFEROL) PO      Take 2,000 Units by mouth daily

## 2018-11-20 NOTE — MR AVS SNAPSHOT
After Visit Summary   11/20/2018    Jonas Hyman    MRN: [id number]           Patient Information     Date Of Birth          1945        Visit Information        Provider Department      11/20/2018 9:00 AM Anu Wilson MD [facility]        Today's Diagnoses     Exudative age-related macular degeneration of right eye with active choroidal neovascularization (H)    -  1       Follow-ups after your visit        Follow-up notes from your care team     Return in about 4 weeks (around 12/18/2018) for Injection Only - No dilation, No imaging, Avastin.      Your next 10 appointments already scheduled     Dec 03, 2018 12:30 PM CST   (Arrive by 12:15 PM)   Post-Op with Suhail Johnson MD   [facility] ([facility])    [address]  [address]th [facility] [address] [phone]            Dec 18, 2018 12:45 PM CST   Post-Op with Suhail Johnson MD   Eye Clinic ([facility])    [address] [address] [phone]            Dec 18, 2018  2:00 PM CST   RETURN RETINA with Anu Wilson MD   Eye Clinic ([facility])    [address] [address] [phone]              Who to contact     Please call your clinic at [phone] to:    Ask questions about your health    Make or cancel appointments    Discuss your medicines    Learn about your test results    Speak to your doctor            Additional Information About Your Visit        Care EveryWhere ID     This is your Care EveryWhere ID. This could be used by other organizations to access your [place] medical records  [id number]         Blood Pressure from Last 3 Encounters:   09/05/13 126/76    Weight from Last 3 Encounters:   08/30/13 126.3 kg (278 lb 8 oz)              We Performed the Following     Avastin (Bevacizumab)  1.25MG Intravitreal Injection OD (right eye)        Primary Care Provider Office Phone # Fax #    Dillon oGldsmith [phone]       [facility] [address] [address]        Equal Access to Services     AHSAN KEE : Hadii felicia jones anao Soomaali, waaxda luqadaha, qaybta kaalmada adelouie, monalisa holcombdaljit sealslexii welsh grant kamara. So [facility] [phone].    ATENCIÓN: Si habla español, tiene a barrera disposición servicios gratuitos de asistencia lingüística. Llame al [phone].    We comply with applicable federal civil rights laws and [place] laws. We do not discriminate on the basis of race, color, national origin, age, disability, sex, sexual orientation, or gender identity.            Thank you!     Thank you for choosing EYE CLINIC  for your care. Our goal is always to provide you with excellent care. Hearing back from our patients is one way we can continue to improve our services. Please take a few minutes to complete the written survey that you may receive in the mail after your visit with us. Thank you!             Your Updated Medication List - Protect others around you: Learn how to safely use, store and throw away your medicines at www.disposemymeds.org.          This list is accurate as of 11/20/18 11:46 AM.  Always use your most recent med list.                   Brand Name Dispense Instructions for use Diagnosis    AMOXICILLIN PO      Take 500 mg by mouth        ASPIRIN PO      Take 325 mg by mouth daily        AZITHROMYCIN PO      Take 250 mg by mouth daily Take 2 tablets day one then one tablet daily        buPROPion 150 MG 12 hr tablet    ZYBAN     Take 150 mg by mouth 2 times daily        CARVEDILOL PO      Take 25 mg by mouth 2 times daily (with meals)        guaiFENesin 400 MG Tabs      Take 2 tablets twice daily        IBUPROFEN PO      Take 400 mg by mouth as needed        LISINOPRIL PO      Take 10 mg by mouth        NITROSTAT SL      Place 0.4 mg  under the tongue        tamsulosin 0.4 MG capsule    FLOMAX          VITAMIN B-12 ER PO      Take 1,000 mcg by mouth daily        VITAMIN D (CHOLECALCIFEROL) PO      Take 2,000 Units by mouth daily

## 2018-11-20 NOTE — NURSING NOTE
Chief Complaints and History of Present Illnesses   Patient presents with     Consult For     cataracts     HPI    Affected eye(s):  Both   Symptoms:        Frequency:  Constant       Do you have eye pain now?:  No      Comments:  States va is the same since last visit  +watery occ  No F&F  Bailey Hussein COT 8:41 AM November 20, 2018

## 2018-11-20 NOTE — NURSING NOTE
Chief Complaints and History of Present Illnesses   Patient presents with     Follow Up For     Exudative age-related macular degeneration      HPI    Affected eye(s):  Both   Symptoms:        Frequency:  Constant       Do you have eye pain now?:  No      Comments:  States va is the same since last visit  +watery occ  No F&F  Bailey Hussein COT 8:41 AM November 20, 2018

## 2018-11-21 ENCOUNTER — TRANSFERRED RECORDS (OUTPATIENT)
Dept: HEALTH INFORMATION MANAGEMENT | Facility: CLINIC | Age: 73
End: 2018-11-21

## 2018-11-21 ENCOUNTER — RECORDS - HEALTHEAST (OUTPATIENT)
Dept: ADMINISTRATIVE | Facility: OTHER | Age: 73
End: 2018-11-21

## 2018-11-30 ENCOUNTER — ANESTHESIA EVENT (OUTPATIENT)
Dept: SURGERY | Facility: AMBULATORY SURGERY CENTER | Age: 73
End: 2018-11-30

## 2018-11-30 ENCOUNTER — OFFICE VISIT - HEALTHEAST (OUTPATIENT)
Dept: INTERNAL MEDICINE | Facility: CLINIC | Age: 73
End: 2018-11-30

## 2018-11-30 DIAGNOSIS — I10 ESSENTIAL HYPERTENSION: ICD-10-CM

## 2018-11-30 DIAGNOSIS — H25.9 AGE-RELATED CATARACT OF BOTH EYES, UNSPECIFIED AGE-RELATED CATARACT TYPE: ICD-10-CM

## 2018-11-30 DIAGNOSIS — I25.119 ATHEROSCLEROSIS OF NATIVE CORONARY ARTERY OF NATIVE HEART WITH ANGINA PECTORIS (H): ICD-10-CM

## 2018-11-30 DIAGNOSIS — E11.59 TYPE 2 DIABETES MELLITUS WITH OTHER CIRCULATORY COMPLICATION, WITHOUT LONG-TERM CURRENT USE OF INSULIN (H): ICD-10-CM

## 2018-11-30 DIAGNOSIS — E08.8 DIABETES MELLITUS DUE TO UNDERLYING CONDITION WITH COMPLICATION, WITHOUT LONG-TERM CURRENT USE OF INSULIN (H): ICD-10-CM

## 2018-11-30 DIAGNOSIS — E55.9 VITAMIN D DEFICIENCY: ICD-10-CM

## 2018-11-30 DIAGNOSIS — Z12.5 PROSTATE CANCER SCREENING: ICD-10-CM

## 2018-11-30 DIAGNOSIS — I71.40 ABDOMINAL AORTIC ANEURYSM (AAA) WITHOUT RUPTURE (H): ICD-10-CM

## 2018-11-30 DIAGNOSIS — Z01.818 PRE-OPERATIVE EXAMINATION: ICD-10-CM

## 2018-11-30 LAB
ALBUMIN SERPL-MCNC: 3.6 G/DL (ref 3.5–5)
ALP SERPL-CCNC: 72 U/L (ref 45–120)
ALT SERPL W P-5'-P-CCNC: 18 U/L (ref 0–45)
ANION GAP SERPL CALCULATED.3IONS-SCNC: 9 MMOL/L (ref 5–18)
AST SERPL W P-5'-P-CCNC: 12 U/L (ref 0–40)
ATRIAL RATE - MUSE: 84 BPM
BILIRUB SERPL-MCNC: 0.5 MG/DL (ref 0–1)
BUN SERPL-MCNC: 17 MG/DL (ref 8–28)
CALCIUM SERPL-MCNC: 9 MG/DL (ref 8.5–10.5)
CHLORIDE BLD-SCNC: 107 MMOL/L (ref 98–107)
CHOLEST SERPL-MCNC: 186 MG/DL
CO2 SERPL-SCNC: 22 MMOL/L (ref 22–31)
CREAT SERPL-MCNC: 1.3 MG/DL (ref 0.7–1.3)
DIASTOLIC BLOOD PRESSURE - MUSE: NORMAL MMHG
ERYTHROCYTE [DISTWIDTH] IN BLOOD BY AUTOMATED COUNT: 10.5 % (ref 11–14.5)
FASTING STATUS PATIENT QL REPORTED: ABNORMAL
GFR SERPL CREATININE-BSD FRML MDRD: 54 ML/MIN/1.73M2
GLUCOSE BLD-MCNC: 84 MG/DL (ref 70–125)
HCT VFR BLD AUTO: 43.2 % (ref 40–54)
HDLC SERPL-MCNC: 35 MG/DL
HGB BLD-MCNC: 14.6 G/DL (ref 14–18)
INTERPRETATION ECG - MUSE: NORMAL
LDLC SERPL CALC-MCNC: 97 MG/DL
MCH RBC QN AUTO: 32.2 PG (ref 27–34)
MCHC RBC AUTO-ENTMCNC: 33.7 G/DL (ref 32–36)
MCV RBC AUTO: 95 FL (ref 80–100)
P AXIS - MUSE: 73 DEGREES
PLATELET # BLD AUTO: 258 THOU/UL (ref 140–440)
PMV BLD AUTO: 7.7 FL (ref 7–10)
POTASSIUM BLD-SCNC: 4.3 MMOL/L (ref 3.5–5)
PR INTERVAL - MUSE: 176 MS
PROT SERPL-MCNC: 6.5 G/DL (ref 6–8)
PSA SERPL-MCNC: 0.2 NG/ML (ref 0–6.5)
QRS DURATION - MUSE: 140 MS
QT - MUSE: 390 MS
QTC - MUSE: 460 MS
R AXIS - MUSE: 44 DEGREES
RBC # BLD AUTO: 4.53 MILL/UL (ref 4.4–6.2)
SODIUM SERPL-SCNC: 138 MMOL/L (ref 136–145)
SYSTOLIC BLOOD PRESSURE - MUSE: NORMAL MMHG
T AXIS - MUSE: 37 DEGREES
TRIGL SERPL-MCNC: 270 MG/DL
TSH SERPL DL<=0.005 MIU/L-ACNC: 1.45 UIU/ML (ref 0.3–5)
VENTRICULAR RATE- MUSE: 84 BPM
WBC: 9.7 THOU/UL (ref 4–11)

## 2018-11-30 ASSESSMENT — MIFFLIN-ST. JEOR: SCORE: 1885.44

## 2018-12-03 ENCOUNTER — OFFICE VISIT (OUTPATIENT)
Dept: OPHTHALMOLOGY | Facility: CLINIC | Age: 73
End: 2018-12-03
Payer: MEDICARE

## 2018-12-03 ENCOUNTER — HOSPITAL ENCOUNTER (OUTPATIENT)
Facility: AMBULATORY SURGERY CENTER | Age: 73
End: 2018-12-03
Attending: OPHTHALMOLOGY
Payer: MEDICARE

## 2018-12-03 ENCOUNTER — SURGERY (OUTPATIENT)
Age: 73
End: 2018-12-03

## 2018-12-03 ENCOUNTER — PATIENT OUTREACH (OUTPATIENT)
Dept: CARE COORDINATION | Facility: CLINIC | Age: 73
End: 2018-12-03

## 2018-12-03 ENCOUNTER — ANESTHESIA (OUTPATIENT)
Dept: SURGERY | Facility: AMBULATORY SURGERY CENTER | Age: 73
End: 2018-12-03

## 2018-12-03 VITALS
DIASTOLIC BLOOD PRESSURE: 83 MMHG | SYSTOLIC BLOOD PRESSURE: 134 MMHG | BODY MASS INDEX: 39.91 KG/M2 | HEIGHT: 70 IN | OXYGEN SATURATION: 95 % | TEMPERATURE: 97.8 F | RESPIRATION RATE: 16 BRPM | WEIGHT: 278.8 LBS

## 2018-12-03 DIAGNOSIS — Z96.1 PSEUDOPHAKIA: Primary | ICD-10-CM

## 2018-12-03 DIAGNOSIS — H26.9 NUCLEAR CATARACT OF LEFT EYE, NONSENILE: Primary | ICD-10-CM

## 2018-12-03 LAB
25(OH)D3 SERPL-MCNC: 25.5 NG/ML (ref 30–80)
25(OH)D3 SERPL-MCNC: 25.5 NG/ML (ref 30–80)
GLUCOSE BLDC GLUCOMTR-MCNC: 118 MG/DL (ref 70–99)

## 2018-12-03 DEVICE — EYE IMP IOL AMO PCL TECNIS ZCB00 21.5: Type: IMPLANTABLE DEVICE | Site: EYE | Status: FUNCTIONAL

## 2018-12-03 RX ORDER — CYCLOPENTOLATE HYDROCHLORIDE 10 MG/ML
1 SOLUTION/ DROPS OPHTHALMIC
Status: COMPLETED | OUTPATIENT
Start: 2018-12-03 | End: 2018-12-03

## 2018-12-03 RX ORDER — BALANCED SALT SOLUTION 6.4; .75; .48; .3; 3.9; 1.7 MG/ML; MG/ML; MG/ML; MG/ML; MG/ML; MG/ML
SOLUTION OPHTHALMIC PRN
Status: DISCONTINUED | OUTPATIENT
Start: 2018-12-03 | End: 2018-12-03 | Stop reason: HOSPADM

## 2018-12-03 RX ORDER — TETRACAINE HYDROCHLORIDE 5 MG/ML
SOLUTION OPHTHALMIC PRN
Status: DISCONTINUED | OUTPATIENT
Start: 2018-12-03 | End: 2018-12-03 | Stop reason: HOSPADM

## 2018-12-03 RX ORDER — OFLOXACIN 3 MG/ML
1 SOLUTION/ DROPS OPHTHALMIC 3 TIMES DAILY
Qty: 5 ML | Refills: 0 | Status: SHIPPED | OUTPATIENT
Start: 2018-12-03 | End: 2019-03-12

## 2018-12-03 RX ORDER — SODIUM CHLORIDE, SODIUM LACTATE, POTASSIUM CHLORIDE, CALCIUM CHLORIDE 600; 310; 30; 20 MG/100ML; MG/100ML; MG/100ML; MG/100ML
500 INJECTION, SOLUTION INTRAVENOUS CONTINUOUS
Status: DISCONTINUED | OUTPATIENT
Start: 2018-12-03 | End: 2018-12-03 | Stop reason: HOSPADM

## 2018-12-03 RX ORDER — TROPICAMIDE 10 MG/ML
1 SOLUTION/ DROPS OPHTHALMIC
Status: COMPLETED | OUTPATIENT
Start: 2018-12-03 | End: 2018-12-03

## 2018-12-03 RX ORDER — PHENYLEPHRINE HYDROCHLORIDE 25 MG/ML
1 SOLUTION/ DROPS OPHTHALMIC
Status: COMPLETED | OUTPATIENT
Start: 2018-12-03 | End: 2018-12-03

## 2018-12-03 RX ORDER — TIMOLOL 5 MG/ML
SOLUTION/ DROPS OPHTHALMIC PRN
Status: DISCONTINUED | OUTPATIENT
Start: 2018-12-03 | End: 2018-12-03 | Stop reason: HOSPADM

## 2018-12-03 RX ORDER — ACETAMINOPHEN 325 MG/1
975 TABLET ORAL ONCE
Status: COMPLETED | OUTPATIENT
Start: 2018-12-03 | End: 2018-12-03

## 2018-12-03 RX ORDER — KETOROLAC TROMETHAMINE 4 MG/ML
1 SOLUTION/ DROPS OPHTHALMIC 4 TIMES DAILY
Qty: 5 ML | Refills: 0 | Status: SHIPPED | OUTPATIENT
Start: 2018-12-03 | End: 2019-03-12

## 2018-12-03 RX ORDER — DEXAMETHASONE SODIUM PHOSPHATE 4 MG/ML
INJECTION, SOLUTION INTRA-ARTICULAR; INTRALESIONAL; INTRAMUSCULAR; INTRAVENOUS; SOFT TISSUE PRN
Status: DISCONTINUED | OUTPATIENT
Start: 2018-12-03 | End: 2018-12-03 | Stop reason: HOSPADM

## 2018-12-03 RX ORDER — LIDOCAINE HYDROCHLORIDE 10 MG/ML
INJECTION, SOLUTION EPIDURAL; INFILTRATION; INTRACAUDAL; PERINEURAL PRN
Status: DISCONTINUED | OUTPATIENT
Start: 2018-12-03 | End: 2018-12-03 | Stop reason: HOSPADM

## 2018-12-03 RX ORDER — GABAPENTIN 300 MG/1
300 CAPSULE ORAL ONCE
Status: DISCONTINUED | OUTPATIENT
Start: 2018-12-03 | End: 2018-12-03 | Stop reason: HOSPADM

## 2018-12-03 RX ORDER — PREDNISOLONE ACETATE 10 MG/ML
1 SUSPENSION/ DROPS OPHTHALMIC 4 TIMES DAILY
Qty: 5 ML | Refills: 0 | Status: SHIPPED | OUTPATIENT
Start: 2018-12-03 | End: 2019-03-12

## 2018-12-03 RX ORDER — PRAVASTATIN SODIUM 40 MG
TABLET ORAL
COMMUNITY
Start: 2017-12-29 | End: 2021-12-22

## 2018-12-03 RX ORDER — PROPARACAINE HYDROCHLORIDE 5 MG/ML
1 SOLUTION/ DROPS OPHTHALMIC ONCE
Status: COMPLETED | OUTPATIENT
Start: 2018-12-03 | End: 2018-12-03

## 2018-12-03 RX ADMIN — SODIUM CHLORIDE, SODIUM LACTATE, POTASSIUM CHLORIDE, CALCIUM CHLORIDE 500 ML: 600; 310; 30; 20 INJECTION, SOLUTION INTRAVENOUS at 09:14

## 2018-12-03 RX ADMIN — CYCLOPENTOLATE HYDROCHLORIDE 1 DROP: 10 SOLUTION/ DROPS OPHTHALMIC at 08:55

## 2018-12-03 RX ADMIN — LIDOCAINE HYDROCHLORIDE 1 ML: 10 INJECTION, SOLUTION EPIDURAL; INFILTRATION; INTRACAUDAL; PERINEURAL at 10:19

## 2018-12-03 RX ADMIN — PHENYLEPHRINE HYDROCHLORIDE 1 DROP: 25 SOLUTION/ DROPS OPHTHALMIC at 09:10

## 2018-12-03 RX ADMIN — TROPICAMIDE 1 DROP: 10 SOLUTION/ DROPS OPHTHALMIC at 08:57

## 2018-12-03 RX ADMIN — CYCLOPENTOLATE HYDROCHLORIDE 1 DROP: 10 SOLUTION/ DROPS OPHTHALMIC at 09:09

## 2018-12-03 RX ADMIN — CYCLOPENTOLATE HYDROCHLORIDE 1 DROP: 10 SOLUTION/ DROPS OPHTHALMIC at 09:01

## 2018-12-03 RX ADMIN — DEXAMETHASONE SODIUM PHOSPHATE 0.5 ML: 4 INJECTION, SOLUTION INTRA-ARTICULAR; INTRALESIONAL; INTRAMUSCULAR; INTRAVENOUS; SOFT TISSUE at 10:28

## 2018-12-03 RX ADMIN — ACETAMINOPHEN 975 MG: 325 TABLET ORAL at 08:52

## 2018-12-03 RX ADMIN — PROPARACAINE HYDROCHLORIDE 1 DROP: 5 SOLUTION/ DROPS OPHTHALMIC at 08:53

## 2018-12-03 RX ADMIN — TETRACAINE HYDROCHLORIDE 2 DROP: 5 SOLUTION OPHTHALMIC at 10:08

## 2018-12-03 RX ADMIN — TROPICAMIDE 1 DROP: 10 SOLUTION/ DROPS OPHTHALMIC at 09:03

## 2018-12-03 RX ADMIN — BALANCED SALT SOLUTION 15 ML: 6.4; .75; .48; .3; 3.9; 1.7 SOLUTION OPHTHALMIC at 10:18

## 2018-12-03 RX ADMIN — PHENYLEPHRINE HYDROCHLORIDE 1 DROP: 25 SOLUTION/ DROPS OPHTHALMIC at 08:56

## 2018-12-03 RX ADMIN — Medication 500 ML: at 10:18

## 2018-12-03 RX ADMIN — PHENYLEPHRINE HYDROCHLORIDE 1 DROP: 25 SOLUTION/ DROPS OPHTHALMIC at 09:02

## 2018-12-03 RX ADMIN — TROPICAMIDE 1 DROP: 10 SOLUTION/ DROPS OPHTHALMIC at 09:11

## 2018-12-03 RX ADMIN — TIMOLOL 1 DROP: 5 SOLUTION/ DROPS OPHTHALMIC at 10:20

## 2018-12-03 ASSESSMENT — VISUAL ACUITY
OS_SC: 20/125
OD_SC: 20/150
METHOD: SNELLEN - LINEAR

## 2018-12-03 ASSESSMENT — SLIT LAMP EXAM - LIDS
COMMENTS: NORMAL
COMMENTS: NORMAL

## 2018-12-03 ASSESSMENT — ENCOUNTER SYMPTOMS: DYSRHYTHMIAS: 1

## 2018-12-03 ASSESSMENT — TONOMETRY
OD_IOP_MMHG: 13
OS_IOP_MMHG: 14
IOP_METHOD: TONOPEN

## 2018-12-03 ASSESSMENT — EXTERNAL EXAM - RIGHT EYE: OD_EXAM: NORMAL

## 2018-12-03 ASSESSMENT — LIFESTYLE VARIABLES: TOBACCO_USE: 1

## 2018-12-03 ASSESSMENT — EXTERNAL EXAM - LEFT EYE: OS_EXAM: NORMAL

## 2018-12-03 NOTE — ANESTHESIA POSTPROCEDURE EVALUATION
Anesthesia POST Procedure Evaluation    Patient: Jonas Hyman   MRN:     [id number] Gender:   male   Age:    73 year old :      1945        Preoperative Diagnosis: Cataract   Procedure(s):  Left Eye Phacoemulsification with Intraocular Lens   Postop Comments: No value filed.       Anesthesia Type:  MAC    Reportable Event: NO     PAIN: Uncomplicated   Sign Out status: Comfortable, Well controlled pain     PONV: No PONV   Sign Out status:  No Nausea or Vomiting     Neuro/Psych: Uneventful perioperative course   Sign Out Status: Preoperative baseline; Age appropriate mentation     Airway/Resp.: Uneventful perioperative course   Sign Out Status: Non labored breathing, age appropriate RR; Resp. Status within EXPECTED Parameters     CV: Uneventful perioperative course   Sign Out status: Appropriate BP and perfusion indices; Appropriate HR/Rhythm     Disposition:   Sign Out in:  PACU  Disposition:  Phase II; Home  Recovery Course: Uneventful  Follow-Up: Not required           Last Anesthesia Record Vitals:  CRNA VITALS  12/3/2018 1000 - 12/3/2018 1100      12/3/2018             Pulse: 71    Ht Rate: 71    SpO2: 97 %    Resp Rate (set): 10          Last PACU/Preop Vitals:  Vitals:    18 0841 18 1035   BP: (!) 148/92 134/83   Resp: 16 16   Temp: 36.9  C (98.4  F) 36.6  C (97.8  F)   SpO2: 95% 95%         Electronically Signed By: Cristian Ervin MD, December 3, 2018, 11:04 AM

## 2018-12-03 NOTE — DISCHARGE INSTRUCTIONS
[facility]       Home Care Following Cataract Surgery    If you have a gauze eye patch on, please do not remove it until it is removed by your doctor at your first appointment after your surgery.  You will start your eye drops the next day.    OR    If you only have a clear eye shield on, you may remove the eye shield when you get home and begin eye drops today as directed by your doctor.      Wear the clear eye shield for protection when sleeping for the next 5 days.      Do not rub the eye that had the operation.      Your eye might be sensitive to light.  Wearing sunglasses may be more comfortable for you.      You may have some discomfort and irritation.  Acetaminophen (Tylenol) or Ibuprofen (Advil) may be taken for discomfort. If pain persists please call your doctor s office.      Keep the eye that had the surgery dry. You may wash your hair, bathe or shower, but keep your eye closed while doing so.       Avoid bending over, strenuous activity or heavy lifting until this activity has been approved by your doctor.      You have a follow-up appointment with your doctor tomorrow at the [facility] ([phone]).  Bring all your prescribed eye drops with you to this follow-up appointment.        If you take glaucoma medications, bring them with you to your follow-up appointment tomorrow.      Use medication exactly as prescribed by your doctor. You may restart your regular home medications.       Occasional blood-tinged tears are normal the day or two after surgery. However, if there is large or persistent bleeding from the eye, that is not normal, and you should contact the clinic.      Call your doctor s office at [phone] if any of the following should occur:    - Any sudden vision changes, including decreased vision  - Nausea or severe headache  - Increase in pain that is not controlled with Acetaminophen (Tylenol) or Ibuprofen (Advil)  - Signs  of infection (pus, increasing redness or tenderness)  - Severe sensitivity to light  An increase in floaters (black spots in front of your vision)[facility]  Home Care Following Anesthesia  For 24 hours after surgery:  1. Get plenty of rest.  A responsible adult must stay with you for at least 24 hours after you leave the surgery center.  2. Do not drive or use heavy equipment.  If you have weakness or tingling, don't drive or use heavy equipment until this feeling goes away.   3. Do not drink alcohol.   4. Avoid strenuous or risky activities.  Ask for help when climbing stairs.  5. You may feel lightheaded.  IF so, sit for a few minutes before standing.  Have someone help you get up.   6. If you have nausea (feel sick to your stomach): Drink only clear liquids such as apple juice, ginger ale, broth or 7-Up.  Rest may also help.  Be sure to drink enough fluids.  Move to a regular diet as you feel able.   7. You may have a slight fever.  Call the doctor if your fever is over 100 F (37.7 C) (taken under the tongue) or lasts longer than 24 hours.  8. You may have a dry mouth, a sore throat, muscle aches or trouble sleeping. These should go away after 24 hours.  9. Do not make important or legal decisions.               Tips for taking pain medications  To get the best pain relief possible, remember these points:    Take pain medications as directed, before pain becomes severe.    Pain medication can upset your stomach: taking it with food may help.    Constipation is a common side effect of pain medication. Drink plenty of  fluids.    Eat foods high in fiber. Take a stool softener if recommended by your doctor or pharmacist.    Do not drink alcohol, drive or operate machinery while taking pain medications.    Ask about other ways to control pain, such as with heat, ice or relaxation.    Tylenol/Acetaminophen Consumption  To help encourage the safe use of acetaminophen, the makers  of TYLENOL  have lowered the maximum daily dose for single-ingredient Extra Strength TYLENOL  (acetaminophen) products sold in the [place] from 8 pills per day (4,000 mg) to 6 pills per day (3,000 mg). The dosing interval has also changed from 2 pills every 4-6 hours to 2 pills every 6 hours.    If you feel your pain relief is insufficient, you may take Tylenol/Acetaminophen in addition to your narcotic pain medication.     Be careful not to exceed 3,000 mg of Tylenol/Acetaminophen in a 24 hour period from all sources.    If you are taking extra strength Tylenol/acetaminophen (500 mg), the maximum dose is 6 tablets in 24 hours.    If you are taking regular strength acetaminophen (325 mg), the maximum dose is 9 tablets in 24 hours.    Call a doctor for any of the followin. Signs of infection (fever, growing tenderness at the surgery site, a large amount of drainage or bleeding, severe pain, foul-smelling drainage, redness, swelling).  2. It has been over 8 to 10 hours since surgery and you are still not able to urinate (pass water).  3. Headache for over 24 hours.  4. Numbness, tingling or weakness the day after surgery (if you had spinal anesthesia).  Your doctor is:       Dr. Suhail Johnson, Ophthalmology: [phone]               Or dial [phone] and ask for the resident on call for:  Ophthalmology  For emergency care, call the:  [facility]:  [phone] (TTY for hearing impaired: [phone])

## 2018-12-03 NOTE — NURSING NOTE
Chief Complaints and History of Present Illnesses   Patient presents with     Post Op (Ophthalmology) Left Eye     Left Eye Phacoemulsification with Intraocular Lens     HPI    Affected eye(s):  Left   Symptoms:     Blurred vision      Frequency:  Constant       Do you have eye pain now?:  No      Comments:  S/p Left Eye Phacoemulsification with Intraocular Lens same day. No eye pain today. Vision is blurry. Patient states they have their post op drops from the pharmacy.       Theresa Keene COT 11:56 AM December 3, 2018

## 2018-12-03 NOTE — OP NOTE
PREOPERATIVE DIAGNOSIS: Visually significant cataract, Left eye   POSTOPERATIVE DIAGNOSIS: Same   PROCEDURES:   1. Cataract extraction with intraocular lens implant Left eye.  SURGEON: Suhail Johnson M.D.  Assistant: Humberto Zelaya MD       INDICATIONS: The patient Jonas Hyman presented to the eye clinic with decreased vision secondary to cataract in the Left eye. The risks, benefits and alternatives to cataract extraction were discussed. The patient elected to proceed. All questions were answered to the patient's satisfaction.   DESCRIPTION OF PROCEDURE:   Prior to the procedure, appropriate cardiac and respiratory monitors were applied to the patient.  In the pre-operative holding area, a drop of topical tetracaine followed by lidocaine gel followed by povidone iodine.  The patient was brought to the operating room where a surgical pause was carried out to identify with all members of the surgical team the correct surgical site.  With adequate anesthesia, the Left eye was prepped and draped in the usual sterile fashion. A lid speculum was placed, and the operating microscope was rotated into position. A paracentesis was created.  Through this limbal paracentesis, the anterior chamber was filled with preservative-free lidocaine + epinephrine, followed by viscoelastic.  A temporal wound was created at the limbus using a 2.6 mm blade. A capsulorrhexis was initiated using a bent 25-gauge needle and was completed in continuous and circular fashion using the capsulorrhexis forceps. The lens nucleus was hydrodissected using balanced salt solution.  The lens nucleus was rotated and removed using phacoemulsification in a stop and chop technique.  Residual cortical material was removed using irrigation-aspiration.  The capsular bag was reinflated to its maximal extent with cohesive viscoelastic.  A 21.5 diopter ZCBOO inserted into the capsular bag.  The lens power selected was reviewed using the intraocular lens power  measurements that were obtained preoperatively to confirm that the correct lens was selected for the desired post-operative refractive state. The residual viscoelastic was removed in its entirety, the wound were hydrated and found to be self-sealing.  Tactile pressure was confirmed to be in a normal range.  Subconjunctival Dexamethasone (2 mg) was injected..  The lid speculum was removed, Maxitrol ointment followed by a patch and shield were applied.  The patient tolerated the procedure well, and there were no complications.    PLAN: The patient will be discharged to home and will follow up tomorrow morning in the eye clinic.  EBL:  None  Complications:  None    Implant Name Type Inv. Item Serial No.  Lot No. LRB No. Used   EYE IMP IOL MAKENNA PCL TECNIS ZCB00 21.5 Lens/Eye Implant EYE IMP IOL MAKENNA PCL TECNIS ZCB00 21.5 [id number] [employer]   Left 1       Attending Physician Procedure Attestation: I was present for the entire procedure     Suhail Johnson MD  , Comprehensive Ophthalmology  Department of Ophthalmology and Visual Neurosciences  [facility]

## 2018-12-03 NOTE — IP AVS SNAPSHOT
[facility]    [address] [place] [address]    Phone:  [phone]    Fax:  [phone]                                       After Visit Summary   12/3/2018    Jonas Hyman    MRN: [id number]           After Visit Summary Signature Page     I have received my discharge instructions, and my questions have been answered. I have discussed any challenges I see with this plan with the nurse or doctor.    ..........................................................................................................................................  Patient/Patient Representative Signature      ..........................................................................................................................................  Patient Representative Print Name and Relationship to Patient    ..................................................               ................................................  Date                                   Time    ..........................................................................................................................................  Reviewed by Signature/Title    ...................................................              ..............................................  Date                                               Time          22EPIC Rev 08/18

## 2018-12-03 NOTE — MR AVS SNAPSHOT
After Visit Summary   12/3/2018    Jonas Hyman    MRN: [id number]           Patient Information     Date Of Birth          1945        Visit Information        Provider Department      12/3/2018 12:30 PM Suhail Johnson MD [facility] Ophthalmology        Today's Diagnoses     Pseudophakia - Both Eyes    -  1       Follow-ups after your visit        Your next 10 appointments already scheduled     Dec 18, 2018 12:45 PM CST   Post-Op with Suhail Johnson MD   Eye Clinic ([facility])    [address] [address]   [phone]            Dec 18, 2018  2:00 PM CST   RETURN RETINA with Anu Wilson MD   Eye Clinic ([facility])    [address] [address]   [phone]              Who to contact     Please call your clinic at [phone] to:    Ask questions about your health    Make or cancel appointments    Discuss your medicines    Learn about your test results    Speak to your doctor            Additional Information About Your Visit        Care EveryWhere ID     This is your Care EveryWhere ID. This could be used by other organizations to access your [facility] medical records  [id number]         Blood Pressure from Last 3 Encounters:   12/03/18 134/83   09/05/13 126/76    Weight from Last 3 Encounters:   12/03/18 126.5 kg (278 lb 12.8 oz)   08/30/13 126.3 kg (278 lb 8 oz)              Today, you had the following     No orders found for display       Primary Care Provider Office Phone # Fax #    Dillon Goldsmith [phone]       [facility] [address] [place] [address]        Equal Access to Services     [facility]: Hadii felicia Davis, stephanie eisenberg, qamonalisa khan. [facility] [phone].    ATENCIÓN: leo Boswell  barrera disposición servicios gratuitos de asistencia lingüística. Cesar partida [phone].    We comply with applicable federal civil rights laws and [place] laws. We do not discriminate on the basis of race, color, national origin, age, disability, sex, sexual orientation, or gender identity.            Thank you!     Thank you for choosing [facility] OPHTHALMOLOGY  for your care. Our goal is always to provide you with excellent care. Hearing back from our patients is one way we can continue to improve our services. Please take a few minutes to complete the written survey that you may receive in the mail after your visit with us. Thank you!             Your Updated Medication List - Protect others around you: Learn how to safely use, store and throw away your medicines at www.disposemymeds.org.          This list is accurate as of 12/3/18  4:58 PM.  Always use your most recent med list.                   Brand Name Dispense Instructions for use Diagnosis    ASPIRIN PO      Take 325 mg by mouth daily        buPROPion 150 MG 12 hr tablet    ZYBAN     Take 150 mg by mouth 2 times daily        guaiFENesin 400 MG Tabs      Take 2 tablets twice daily        IBUPROFEN PO      Take 400 mg by mouth as needed        ketorolac tromethamine 0.4 % Soln ophthalmic solution    ACULAR-LS    5 mL    Apply 1 drop to eye 4 times daily Instill into operative eye(s) per physician instructions.    Nuclear cataract of left eye, nonsenile       LISINOPRIL PO      Take 10 mg by mouth        NITROSTAT SL      Place 0.4 mg under the tongue        ofloxacin 0.3 % ophthalmic solution    OCUFLOX    5 mL    Apply 1 drop to eye 3 times daily Instill into operative eye(s) per physician instructions.    Nuclear cataract of left eye, nonsenile       pravastatin 40 MG tablet    PRAVACHOL     TAKE 1 TABLET (40 MG) BY MOUTH ONCE DAILY        prednisoLONE acetate 1 % ophthalmic suspension    PRED FORTE    5 mL    Apply 1 drop to eye 4 times daily Instill into  operative eye(s) per physician instructions.    Nuclear cataract of left eye, nonsenile       tamsulosin 0.4 MG capsule    FLOMAX          VITAMIN B-12 ER PO      Take 1,000 mcg by mouth daily        VITAMIN D (CHOLECALCIFEROL) PO      Take 2,000 Units by mouth daily

## 2018-12-03 NOTE — ANESTHESIA CARE TRANSFER NOTE
Patient: Jonas Hyman    Procedure(s):  Left Eye Phacoemulsification with Intraocular Lens    Diagnosis: Cataract  Diagnosis Additional Information: No value filed.    Anesthesia Type:   MAC     Note:  Airway :Room Air  Patient transferred to:Phase II  Comments: Report to rN    134/83, 16, 77, 98%Handoff Report: Identifed the Patient, Identified the Reponsible Provider, Reviewed the pertinent medical history, Discussed the surgical course, Reviewed Intra-OP anesthesia mangement and issues during anesthesia, Set expectations for post-procedure period and Allowed opportunity for questions and acknowledgement of understanding      Vitals: (Last set prior to Anesthesia Care Transfer)    CRNA VITALS  12/3/2018 1000 - 12/3/2018 1037      12/3/2018             Pulse: 71    Ht Rate: 71    SpO2: 97 %    Resp Rate (set): 10                Electronically Signed By: SANTINO Mae CRNA  December 3, 2018  10:37 AM

## 2018-12-03 NOTE — IP AVS SNAPSHOT
MRN:[id number]                      After Visit Summary   12/3/2018    Jonas Hyman    MRN: [id number]           Thank you!     Thank you for choosing [facility] for your care. Our goal is always to provide you with excellent care. Hearing back from our patients is one way we can continue to improve our services. Please take a few minutes to complete the written survey that you may receive in the mail after you visit with us. Thank you!        Patient Information     Date Of Birth          1945        About your hospital stay     You were admitted on:  December 3, 2018 You last received care in the:  [facility]    You were discharged on:  December 3, 2018       Who to Call     For medical emergencies, please call 911.  For non-urgent questions about your medical care, please call your primary care provider or clinic, [phone]  For questions related to your surgery, please call your surgery clinic        Attending Provider     Provider Specialty    Suhail Johnson MD Ophthalmology       Primary Care Provider Office Phone # Fax #    Dillon Goldsmith [phone]      After Care Instructions     Activity       Avoid strenuous activities the next several days.                  Your next 10 appointments already scheduled     Dec 03, 2018 12:30 PM CST   (Arrive by 12:15 PM)   Post-Op with Suhail Johnson MD   [facility] ([facility] [place])    [address] [facility]  [address] [facility] [id number] [phone]            Dec 18, 2018 12:45 PM CST   Post-Op with Suhail Johnson MD   Eye Clinic ([facility])    [address] [address] [phone]            Dec 18, 2018  2:00 PM CST   RETURN RETINA with Anu Wilson MD   Eye Clinic ([facility])    [address]  [facility] [address]   [phone]              Further instructions from your care team       [facility]       Home Care Following Cataract Surgery    If you have a gauze eye patch on, please do not remove it until it is removed by your doctor at your first appointment after your surgery.  You will start your eye drops the next day.    OR    If you only have a clear eye shield on, you may remove the eye shield when you get home and begin eye drops today as directed by your doctor.      Wear the clear eye shield for protection when sleeping for the next 5 days.      Do not rub the eye that had the operation.      Your eye might be sensitive to light.  Wearing sunglasses may be more comfortable for you.      You may have some discomfort and irritation.  Acetaminophen (Tylenol) or Ibuprofen (Advil) may be taken for discomfort. If pain persists please call your doctor s office.      Keep the eye that had the surgery dry. You may wash your hair, bathe or shower, but keep your eye closed while doing so.       Avoid bending over, strenuous activity or heavy lifting until this activity has been approved by your doctor.      You have a follow-up appointment with your doctor tomorrow at the [facility] ([phone]).  Bring all your prescribed eye drops with you to this follow-up appointment.        If you take glaucoma medications, bring them with you to your follow-up appointment tomorrow.      Use medication exactly as prescribed by your doctor. You may restart your regular home medications.       Occasional blood-tinged tears are normal the day or two after surgery. However, if there is large or persistent bleeding from the eye, that is not normal, and you should contact the clinic.      Call your doctor s office at [phone] if any of the following should occur:    - Any sudden vision changes, including decreased vision  - Nausea or severe  headache  - Increase in pain that is not controlled with Acetaminophen (Tylenol) or Ibuprofen (Advil)  - Signs of infection (pus, increasing redness or tenderness)  - Severe sensitivity to light  An increase in floaters (black spots in front of your vision)[facility]  Home Care Following Anesthesia  For 24 hours after surgery:  1. Get plenty of rest.  A responsible adult must stay with you for at least 24 hours after you leave the surgery center.  2. Do not drive or use heavy equipment.  If you have weakness or tingling, don't drive or use heavy equipment until this feeling goes away.   3. Do not drink alcohol.   4. Avoid strenuous or risky activities.  Ask for help when climbing stairs.  5. You may feel lightheaded.  IF so, sit for a few minutes before standing.  Have someone help you get up.   6. If you have nausea (feel sick to your stomach): Drink only clear liquids such as apple juice, ginger ale, broth or 7-Up.  Rest may also help.  Be sure to drink enough fluids.  Move to a regular diet as you feel able.   7. You may have a slight fever.  Call the doctor if your fever is over 100 F (37.7 C) (taken under the tongue) or lasts longer than 24 hours.  8. You may have a dry mouth, a sore throat, muscle aches or trouble sleeping. These should go away after 24 hours.  9. Do not make important or legal decisions.               Tips for taking pain medications  To get the best pain relief possible, remember these points:    Take pain medications as directed, before pain becomes severe.    Pain medication can upset your stomach: taking it with food may help.    Constipation is a common side effect of pain medication. Drink plenty of  fluids.    Eat foods high in fiber. Take a stool softener if recommended by your doctor or pharmacist.    Do not drink alcohol, drive or operate machinery while taking pain medications.    Ask about other ways to control pain, such as with heat, ice or  relaxation.    Tylenol/Acetaminophen Consumption  To help encourage the safe use of acetaminophen, the makers of TYLENOL  have lowered the maximum daily dose for single-ingredient Extra Strength TYLENOL  (acetaminophen) products sold in the [place] from 8 pills per day (4,000 mg) to 6 pills per day (3,000 mg). The dosing interval has also changed from 2 pills every 4-6 hours to 2 pills every 6 hours.    If you feel your pain relief is insufficient, you may take Tylenol/Acetaminophen in addition to your narcotic pain medication.     Be careful not to exceed 3,000 mg of Tylenol/Acetaminophen in a 24 hour period from all sources.    If you are taking extra strength Tylenol/acetaminophen (500 mg), the maximum dose is 6 tablets in 24 hours.    If you are taking regular strength acetaminophen (325 mg), the maximum dose is 9 tablets in 24 hours.    Call a doctor for any of the followin. Signs of infection (fever, growing tenderness at the surgery site, a large amount of drainage or bleeding, severe pain, foul-smelling drainage, redness, swelling).  2. It has been over 8 to 10 hours since surgery and you are still not able to urinate (pass water).  3. Headache for over 24 hours.  4. Numbness, tingling or weakness the day after surgery (if you had spinal anesthesia).  Your doctor is:       Dr. Suhail Johnson, Ophthalmology: [phone]               Or dial [phone] and ask for the resident on call for:  Ophthalmology  For emergency care, call the:  [facility]:  [phone] (TTY for hearing impaired: [phone])                Pending Results     No orders found from 2018 to 2018.            Admission Information     Date & Time Provider Department Dept. Phone    12/3/2018 Suhail Johnson MD [facility] [phone]      Your Vitals Were     Blood Pressure Temperature Respirations Height Weight Pulse Oximetry    134/83 97.8  F (36.6  C) (Temporal) 16 1.778  "m (5' 10\") 126.5 kg (278 lb 12.8 oz) 95%    BMI (Body Mass Index)                   40 kg/m2           Care EveryWhere ID     This is your Care EveryWhere ID. This could be used by other organizations to access your [facility] medical records  [id number]        Equal Access to Services     [facility]: Hadii felicia jones hadgianlucao Soomaali, waaxda luqadaha, qaybta kaalmada adebarneyhaseeb, monalisa humphrieskeonlarry kamara. So [facility] [phone].    ATENCIÓN: Si habla español, tiene a barrera disposición servicios gratuitos de asistencia lingüística. Margieame al [phone].    We comply with applicable federal civil rights laws and [place] laws. We do not discriminate on the basis of race, color, national origin, age, disability, sex, sexual orientation, or gender identity.               Review of your medicines      START taking        Dose / Directions    ketorolac tromethamine 0.4 % Soln ophthalmic solution   Commonly known as:  ACULAR-LS   Used for:  Nuclear cataract of left eye, nonsenile        Dose:  1 drop   Apply 1 drop to eye 4 times daily Instill into operative eye(s) per physician instructions.   Quantity:  5 mL   Refills:  0       ofloxacin 0.3 % ophthalmic solution   Commonly known as:  OCUFLOX   Used for:  Nuclear cataract of left eye, nonsenile        Dose:  1 drop   Apply 1 drop to eye 3 times daily Instill into operative eye(s) per physician instructions.   Quantity:  5 mL   Refills:  0       prednisoLONE acetate 1 % ophthalmic suspension   Commonly known as:  PRED FORTE   Used for:  Nuclear cataract of left eye, nonsenile        Dose:  1 drop   Apply 1 drop to eye 4 times daily Instill into operative eye(s) per physician instructions.   Quantity:  5 mL   Refills:  0         CONTINUE these medicines which have NOT CHANGED        Dose / Directions    ASPIRIN PO        Dose:  325 mg   Take 325 mg by mouth daily   Refills:  0       buPROPion 150 MG 12 hr tablet   Commonly known as:  ZYBAN        Dose:  150 mg "   Take 150 mg by mouth 2 times daily   Refills:  0       guaiFENesin 400 MG Tabs        Take 2 tablets twice daily   Refills:  0       IBUPROFEN PO        Dose:  400 mg   Take 400 mg by mouth as needed   Refills:  0       LISINOPRIL PO        Dose:  10 mg   Take 10 mg by mouth   Refills:  0       NITROSTAT SL        Dose:  0.4 mg   Place 0.4 mg under the tongue   Refills:  0       pravastatin 40 MG tablet   Commonly known as:  PRAVACHOL        TAKE 1 TABLET (40 MG) BY MOUTH ONCE DAILY   Refills:  0       tamsulosin 0.4 MG capsule   Commonly known as:  FLOMAX        Refills:  0       VITAMIN B-12 ER PO        Dose:  1000 mcg   Take 1,000 mcg by mouth daily   Refills:  0       VITAMIN D (CHOLECALCIFEROL) PO        Dose:  2000 Units   Take 2,000 Units by mouth daily   Refills:  0            Where to get your medicines      These medications were sent to [facility] [address] [address]    Hours:  TRANSPLANT PHONE NUMBER [phone] Phone:  [phone]     ketorolac tromethamine 0.4 % Soln ophthalmic solution    ofloxacin 0.3 % ophthalmic solution    prednisoLONE acetate 1 % ophthalmic suspension                Protect others around you: Learn how to safely use, store and throw away your medicines at www.disposemymeds.org.             Medication List: This is a list of all your medications and when to take them. Check marks below indicate your daily home schedule. Keep this list as a reference.      Medications           Morning Afternoon Evening Bedtime As Needed    ASPIRIN PO   Take 325 mg by mouth daily                                buPROPion 150 MG 12 hr tablet   Commonly known as:  ZYBAN   Take 150 mg by mouth 2 times daily                                guaiFENesin 400 MG Tabs   Take 2 tablets twice daily                                IBUPROFEN PO   Take 400 mg by mouth as needed                                 ketorolac tromethamine 0.4 % Soln ophthalmic solution   Commonly known as:  ACULAR-LS   Apply 1 drop to eye 4 times daily Instill into operative eye(s) per physician instructions.                                LISINOPRIL PO   Take 10 mg by mouth                                NITROSTAT SL   Place 0.4 mg under the tongue                                ofloxacin 0.3 % ophthalmic solution   Commonly known as:  OCUFLOX   Apply 1 drop to eye 3 times daily Instill into operative eye(s) per physician instructions.                                pravastatin 40 MG tablet   Commonly known as:  PRAVACHOL   TAKE 1 TABLET (40 MG) BY MOUTH ONCE DAILY                                prednisoLONE acetate 1 % ophthalmic suspension   Commonly known as:  PRED FORTE   Apply 1 drop to eye 4 times daily Instill into operative eye(s) per physician instructions.                                tamsulosin 0.4 MG capsule   Commonly known as:  FLOMAX                                VITAMIN B-12 ER PO   Take 1,000 mcg by mouth daily                                VITAMIN D (CHOLECALCIFEROL) PO   Take 2,000 Units by mouth daily

## 2018-12-03 NOTE — PROGRESS NOTES
#1 Pseudophakia, left eye, day 0    Doing well  Keep patch in place at night for 5 days  Start post-operative drops and taper according to instructions  Post-operative do's and don'ts reviewed, questions answered    Recheck 2-3 weeks with refraction    Humberto Zelaya MD  Ophthalmology Resident  PGY-2      Attending Physician Attestation:  Complete documentation of historical and exam elements from today's encounter can be found in the full encounter summary report (not reduplicated in this progress note).  I personally obtained the chief complaint(s) and history of present illness.  I confirmed and edited as necessary the review of systems, past medical/surgical history, family history, social history, and examination findings as documented by others; and I examined the patient myself.  I personally reviewed the relevant tests, images, and reports as documented above.  I formulated and edited as necessary the assessment and plan and discussed the findings and management plan with the patient and family. . - Suhail Johnson MD

## 2018-12-03 NOTE — ANESTHESIA PREPROCEDURE EVALUATION
Anesthesia Pre-Procedure Evaluation    Patient: Jonas Hyman   MRN:     [id number] Gender:   male   Age:    73 year old :      1945        Preoperative Diagnosis: Cataract   Procedure(s):  Left Eye Phacoemulsification with Intraocular Lens, Trypan, Epinephrine, Dexamethasone     Past Medical History:   Diagnosis Date     Acute MI (H)      Bronchitis      CAD (coronary artery disease)      Cardiac arrest (H)      Cardiac arrest (H)     s/p     Coronary artery disease      Diabetes mellitus (H)     type II     Heart attack (H)      Hypertension      Mixed hyperlipidemia      Pancreatic lesion      Retinal detachment 2014    Vitrectomy Posterior 23 guage, scleral buckle, membrane peel, endolaser gase     Stented coronary artery     stints times 2      Past Surgical History:   Procedure Laterality Date     C MUSCLE-SKIN FLAP,LEG       CATARACT IOL, RT/LT Bilateral      HEART CATH STENT COR W/WO PTCA       HERNIA REPAIR       LASER YAG IRIDOTOMY  2013    Procedure: LASER YAG IRIDOTOMY;  peripheral irridotomy ;  Surgeon: Doroteo Andres MD;  Location: [facility]     PHACOEMULSIFICATION CLEAR CORNEA WITH STANDARD INTRAOCULAR LENS IMPLANT  2013    Procedure: PHACOEMULSIFICATION CLEAR CORNEA WITH STANDARD INTRAOCULAR LENS IMPLANT;   COMPLEX RIGHT PHACOEMULSIFICATION CLEAR CORNEA WITH ANTERIOR CHAMBER INTRAOCULAR LENS IMPLANT, ANTERIOR VITRECTOMY;  Surgeon: Doroteo Andres MD;  Location: [facility]     TONSILLECTOMY       VITRECTOMY ANTERIOR  2013    Procedure: VITRECTOMY ANTERIOR;;  Surgeon: Doroteo Andres MD;  Location: [facility]          Anesthesia Evaluation     .             ROS/MED HX    ENT/Pulmonary:     (+)tobacco use, , . .    Neurologic:       Cardiovascular: Comment: History of v fib arrest  in setting of acute anterior MI.  Had REG placed in LAD and CIrcumflex at that time.  No futher testing available.    (+) Dyslipidemia, hypertension--CAD, angina--stent,Drug Eluting Stent .. : . . . :.  dysrhythmias . Previous cardiac testing Echodate:2009results:Conclusions  Mild increase in left ventricular size.    LV systolic function is mildly reduced with visually estimated   ejection fraction of 45%.    Multiple regional wall motion abnormalities detected (see   comments below).  Pulmonary artery pressure is estimated at 16 mmHg + RAP.    Mild left atrial enlargement.date: results: date: results:Cath date: 5/7/2009 results:Summary/Conclusions  DIAGNOSTIC - CORONARY    90% stenosis in the proximal LAD    80% stenosis in the proximal Circumflex    The non-dominant RCA has moderate disease.  LEFT VENTRICULAR FUNCTION    Left ventricular function is mildly reduced with EF of 50%    Anterior and anterior apical wqll motion abnormality  INTERVENTION    Successful angioplasty and stenting (drug eluting) of the   proximal LAD    Successful angioplasty and stenting (drug eluting) of the   proximal circumflex  RECOMMENDATIONS & PLAN    Plavix for 1 year - minimum        Indications    Unstable Angina     Arrhythmia-VF           METS/Exercise Tolerance:     Hematologic:         Musculoskeletal:         GI/Hepatic:         Renal/Genitourinary:         Endo:     (+) Obesity, .      Psychiatric:         Infectious Disease:         Malignancy:         Other:                     JZG FV AN PHYSICAL EXAM    No results found for: WBC, HGB, HCT, PLT, CRP, SED, NA, POTASSIUM, CHLORIDE, CO2, BUN, CR, GLC, MACIE, PHOS, MAG, ALBUMIN, PROTTOTAL, ALT, AST, GGT, ALKPHOS, BILITOTAL, BILIDIRECT, LIPASE, AMYLASE, CHERRI, PTT, INR, FIBR, TSH, T4, T3, HCG, HCGS, CKTOTAL, CKMB, TROPN    Preop Vitals  BP Readings from Last 3 Encounters:   09/05/13 126/76    Pulse Readings from Last 3 Encounters:   No data found for Pulse      Resp Readings from Last 3 Encounters:   09/05/13 16    SpO2 Readings from Last 3 Encounters:   09/05/13 97%      Temp Readings from Last 1 Encounters:   09/05/13 36.6  C (97.8  F) (Tympanic)    Ht Readings from Last 1  Encounters:   No data found for Ht      Wt Readings from Last 1 Encounters:   08/30/13 126.3 kg (278 lb 8 oz)    There is no height or weight on file to calculate BMI.     LDA:            Assessment:   ASA SCORE: 4    NPO Status: > 6 hours since completed [employer]   Documentation: H&P complete; Preop Testing complete; Consents complete   Proceeding: Proceed without further delay  Tobacco Use:  NO Active use of Tobacco/UNKNOWN Tobacco use status     Plan:   Anes. Type:  MAC   Pre-Induction: Midazolam IV   Induction:  Not applicable   Airway: Native Airway   Access/Monitoring: PIV   Maintenance: N/a   Emergence: N/a   Logistics: Same Day Surgery     Postop Pain/Sedation Strategy:  Standard-Options: Opioids PRN     PONV Management:  Adult Risk Factors:, Non-Smoker, Postop Opioids  Prevention: Ondansetron; Dexamethasone     CONSENT: Direct conversation   Plan and risks discussed with: Patient   Blood Products: Consent Deferred (Minimal Blood Loss)     Comments for Plan/Consent:  Plan:  Very light sedation, routine monitors    MD Cristian Brock MD

## 2018-12-18 ENCOUNTER — OFFICE VISIT (OUTPATIENT)
Dept: OPHTHALMOLOGY | Facility: CLINIC | Age: 73
End: 2018-12-18
Attending: OPHTHALMOLOGY
Payer: MEDICARE

## 2018-12-18 DIAGNOSIS — H35.30 AMD (AGE RELATED MACULAR DEGENERATION): ICD-10-CM

## 2018-12-18 DIAGNOSIS — Z96.1 PSEUDOPHAKIA: Primary | ICD-10-CM

## 2018-12-18 DIAGNOSIS — H35.3211 EXUDATIVE AGE-RELATED MACULAR DEGENERATION OF RIGHT EYE WITH ACTIVE CHOROIDAL NEOVASCULARIZATION (H): Primary | ICD-10-CM

## 2018-12-18 DIAGNOSIS — H35.3211 EXUDATIVE AGE-RELATED MACULAR DEGENERATION OF RIGHT EYE WITH ACTIVE CHOROIDAL NEOVASCULARIZATION (H): ICD-10-CM

## 2018-12-18 PROCEDURE — G0463 HOSPITAL OUTPT CLINIC VISIT: HCPCS | Mod: ZF

## 2018-12-18 PROCEDURE — 25000128 H RX IP 250 OP 636: Mod: ZF | Performed by: OPHTHALMOLOGY

## 2018-12-18 PROCEDURE — 92015 DETERMINE REFRACTIVE STATE: CPT | Mod: GY,ZF

## 2018-12-18 PROCEDURE — 67028 INJECTION EYE DRUG: CPT | Mod: ZF | Performed by: OPHTHALMOLOGY

## 2018-12-18 PROCEDURE — C9257 BEVACIZUMAB INJECTION: HCPCS | Mod: ZF | Performed by: OPHTHALMOLOGY

## 2018-12-18 RX ADMIN — Medication 1.25 MG: at 14:50

## 2018-12-18 ASSESSMENT — EXTERNAL EXAM - LEFT EYE
OS_EXAM: NORMAL
OS_EXAM: NORMAL

## 2018-12-18 ASSESSMENT — REFRACTION_MANIFEST
OD_ADD: +2.75
OS_CYLINDER: +0.75
OS_SPHERE: -0.75
OD_AXIS: 164
OS_ADD: +2.75
OD_CYLINDER: +0.75
OD_SPHERE: +0.25
OS_AXIS: 026

## 2018-12-18 ASSESSMENT — SLIT LAMP EXAM - LIDS
COMMENTS: NORMAL

## 2018-12-18 ASSESSMENT — TONOMETRY
IOP_METHOD: TONOPEN
OD_IOP_MMHG: 16
OS_IOP_MMHG: 10
OD_IOP_MMHG: 16
OS_IOP_MMHG: 10
IOP_METHOD: TONOPEN

## 2018-12-18 ASSESSMENT — EXTERNAL EXAM - RIGHT EYE
OD_EXAM: NORMAL
OD_EXAM: NORMAL

## 2018-12-18 ASSESSMENT — VISUAL ACUITY
METHOD: SNELLEN - LINEAR
OS_SC: 20/25
OD_SC: 20/150
OD_SC: 20/150
METHOD: SNELLEN - LINEAR
OS_SC: 20/25
OS_SC+: -2
OS_SC+: -2

## 2018-12-18 ASSESSMENT — CUP TO DISC RATIO: OS_RATIO: 0.3

## 2018-12-18 NOTE — PROGRESS NOTES
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  Had gone few years without eye exam d/t insurance lack, gradual progressive loss OU.  Prior RD repair OD 2014, unsure what BCVA was after repair.      PAST OCULAR SURGERY  PPV/SBP/FGx OD 2014 ([facility])  ACIOL OD   CE/IOL OS 12/3/17    RETINAL IMAGING:  OCT  10-16-18  OD - SR scar with SRF mild increase  OS - mild RPE elevations,no fluid PHF attached    FA 9-17-18  OD - central leakage c/w occult CNVM  OS - no leakage    ICG 9-17-18  OD - central leakage c/w CNMV, no polyps  OS - no leakage        ASSESSMENT & PLAN  1.  Wet AMD OD   - uncertain duration by history   - uncertain vision potential given prior h/o RD   - started Avastin 9/17/18     - last injection Avastin (#3) 11/20/18 (4 weeks)   - PRIOR - OCT SRF stable vs worse   - VA stable vs better   - inject AVastin today injection only #2 of 3     - RTC 1 month injection only     - r/b/a IVT anti-VEGF d/w patient   - infection, blindness, need for surgery   - off-label use of Avastin, resident participation      2. H/o RD repair OD   - s/p PPV/SBP/FGx  2014   - retina flat      3. syneresis OS   - advised s/sx RD    4. 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 Pseudophakia OU   - ACIOL OD   - CE/IOL OS 12/7/18 (Elizabeth)      return to clinic: 4 weeks, injection only OD Avastin    Gamaliel Gonzalez M.D.  PGY-3, Ophthalmology      ATTESTATION     Attending Attestation:     Complete documentation of historical and exam elements from today's encounter can be found in the full encounter summary report (not reduplicated in this progress note).  I personally obtained the chief complaint(s) and history of present illness.  I confirmed and edited as necessary the review of systems, past  medical/surgical history, family history, social history, and examination findings as documented by others; and I examined the patient myself.  I personally reviewed the relevant tests, images, and reports as documented above.  I formulated and edited as necessary the assessment and plan and discussed the findings and management plan with the patient and family and I was present for the entire procedure performed by the resident/fellow.    Anu Wilson MD, PhD  , Vitreoretinal Surgery  Department of Ophthalmology  [facility]

## 2018-12-18 NOTE — NURSING NOTE
Chief Complaints and History of Present Illnesses   Patient presents with     Post Op (Ophthalmology) Both Eyes

## 2018-12-18 NOTE — PROGRESS NOTES
Chief Complaint(s) and History of Present Illness(es)     Post Op (Ophthalmology) Both Eyes     Laterality: both eyes              Comments     Pt. states that VA has improved since surgery.  No pain BE.  Dianna Pavon COT 1:00 PM December 18, 2018               Review of systems for the eyes was negative other than the pertinent positives/negatives listed in the HPI.      Assessment & Plan      Jonas Hyman is a 73 year old male with the following diagnoses:   1. Pseudophakia - Both Eyes    2. AMD (age related macular degeneration) - Right Eye         Very happy with outcome.  Has resumed driving  Doing well  Ok to resume normal activities  Taper Predforte as directed  Glasses prescription updated  Artificial tears as needed      Continue to follow with Dr. Crowell as scheduled.  Return to [facility] as needed          Attending Physician Attestation:  Complete documentation of historical and exam elements from today's encounter can be found in the full encounter summary report (not reduplicated in this progress note).  I personally obtained the chief complaint(s) and history of present illness.  I confirmed and edited as necessary the review of systems, past medical/surgical history, family history, social history, and examination findings as documented by others; and I examined the patient myself.  I personally reviewed the relevant tests, images, and reports as documented above.  I formulated and edited as necessary the assessment and plan and discussed the findings and management plan with the patient and family. . - Suhail Johnson MD

## 2018-12-18 NOTE — PATIENT INSTRUCTIONS
Drop instructions    Ofloxacin (tan) STOP    Prednisolone (pink) twice a day for 1 week, then daily for 1 week, then stop    Ketorolac (gray) 2-3x daily until bottle is empty

## 2019-01-03 ENCOUNTER — TRANSFERRED RECORDS (OUTPATIENT)
Dept: HEALTH INFORMATION MANAGEMENT | Facility: CLINIC | Age: 74
End: 2019-01-03

## 2019-01-07 ENCOUNTER — COMMUNICATION - HEALTHEAST (OUTPATIENT)
Dept: INTERNAL MEDICINE | Facility: CLINIC | Age: 74
End: 2019-01-07

## 2019-01-11 ENCOUNTER — TRANSFERRED RECORDS (OUTPATIENT)
Dept: HEALTH INFORMATION MANAGEMENT | Facility: CLINIC | Age: 74
End: 2019-01-11

## 2019-01-14 ENCOUNTER — COMMUNICATION - HEALTHEAST (OUTPATIENT)
Dept: INTERNAL MEDICINE | Facility: CLINIC | Age: 74
End: 2019-01-14

## 2019-01-14 ENCOUNTER — TELEPHONE (OUTPATIENT)
Dept: OPHTHALMOLOGY | Facility: CLINIC | Age: 74
End: 2019-01-14

## 2019-01-14 DIAGNOSIS — I71.40 ABDOMINAL AORTIC ANEURYSM (AAA) WITHOUT RUPTURE (H): ICD-10-CM

## 2019-01-14 DIAGNOSIS — I25.119 ATHEROSCLEROSIS OF NATIVE CORONARY ARTERY OF NATIVE HEART WITH ANGINA PECTORIS (H): ICD-10-CM

## 2019-01-14 NOTE — TELEPHONE ENCOUNTER
[facility] Call Center    Phone Message    May a detailed message be left on voicemail: yes    Reason for Call: Other: Pt was not able to make it to his appt this morning. The next available appt is not until mid February. He said he cannot wait that long for his injection. Please give him a call back.     Action Taken: Message routed to:  [facility] Eye

## 2019-01-15 ENCOUNTER — OFFICE VISIT (OUTPATIENT)
Dept: OPHTHALMOLOGY | Facility: CLINIC | Age: 74
End: 2019-01-15
Attending: OPHTHALMOLOGY
Payer: MEDICARE

## 2019-01-15 DIAGNOSIS — H35.3211 EXUDATIVE AGE-RELATED MACULAR DEGENERATION OF RIGHT EYE WITH ACTIVE CHOROIDAL NEOVASCULARIZATION (H): Primary | ICD-10-CM

## 2019-01-15 PROCEDURE — 67028 INJECTION EYE DRUG: CPT | Mod: RT,ZF | Performed by: OPHTHALMOLOGY

## 2019-01-15 PROCEDURE — C9257 BEVACIZUMAB INJECTION: HCPCS | Mod: ZF | Performed by: OPHTHALMOLOGY

## 2019-01-15 PROCEDURE — 25000128 H RX IP 250 OP 636: Mod: ZF | Performed by: OPHTHALMOLOGY

## 2019-01-15 PROCEDURE — 40000269 ZZH STATISTIC NO CHARGE FACILITY FEE: Mod: ZF

## 2019-01-15 PROCEDURE — G0463 HOSPITAL OUTPT CLINIC VISIT: HCPCS | Mod: ZF,25

## 2019-01-15 RX ADMIN — Medication 1.25 MG: at 11:25

## 2019-01-15 ASSESSMENT — VISUAL ACUITY
OD_SC: 20/150
OS_SC+: +2
OS_SC: 20/30
OD_SC+: ECC
METHOD: SNELLEN - LINEAR

## 2019-01-15 ASSESSMENT — TONOMETRY
OD_IOP_MMHG: 17
IOP_METHOD: TONOPEN
OS_IOP_MMHG: 15

## 2019-01-15 NOTE — NURSING NOTE
Patient presents for Exudative age-related macular degeneration of right eye with active choroidal neovascularization. Since the last visit the vision has remained stable. Using OTC readers only. No pain or discomfort, no redness itching or tears. NO flashes or floaters. Taking pred and steroid, no antibiotic drops. Avastin injection only. [place] JULIAN 10:35 AM January 15, 2019

## 2019-01-15 NOTE — PROGRESS NOTES
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  Had gone few years without eye exam d/t insurance lack, gradual progressive loss OU.  Prior RD repair OD 2014, unsure what BCVA was after repair.      PAST OCULAR SURGERY  PPV/SBP/FGx OD 2014 ([facility])  ACIOL OD   CE/IOL OS 12/3/17    RETINAL IMAGING:  OCT  10-16-18  OD - SR scar with SRF mild increase  OS - mild RPE elevations,no fluid PHF attached    FA 9-17-18  OD - central leakage c/w occult CNVM  OS - no leakage    ICG 9-17-18  OD - central leakage c/w CNMV, no polyps  OS - no leakage        ASSESSMENT & PLAN  1.  Wet AMD OD   - uncertain duration by history   - uncertain vision potential given prior h/o RD   - started Avastin 9/17/18     - last injection Avastin (#4) 12/18/18 (4 weeks)   - PRIOR - OCT SRF stable vs worse   - VA stable vs better   - inject AVastin today injection only #3 of 3     - RTC 1 month DFE + OCT     - r/b/a IVT anti-VEGF d/w patient   - infection, blindness, need for surgery   - off-label use of Avastin, resident participation      2. H/o RD repair OD   - s/p PPV/SBP/FGx  2014   - retina flat      3. syneresis OS   - advised s/sx RD    4. 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 Pseudophakia OU   - ACIOL OD   - CE/IOL OS 12/7/18 (Elizabeth)      return to clinic: 4 weeks, DFE +OCT     Gamaliel Gonzalez M.D.  PGY-3, Ophthalmology      ATTESTATION     Attending Attestation:     Complete documentation of historical and exam elements from today's encounter can be found in the full encounter summary report (not reduplicated in this progress note).  I personally obtained the chief complaint(s) and history of present illness.  I confirmed and edited as necessary the review of systems, past medical/surgical history,  family history, social history, and examination findings as documented by others; and I examined the patient myself.  I personally reviewed the relevant tests, images, and reports as documented above.  I formulated and edited as necessary the assessment and plan and discussed the findings and management plan with the patient and family and I was present for the entire procedure performed by the resident/fellow.    Anu Wilson MD, PhD  , Vitreoretinal Surgery  Department of Ophthalmology  [facility]

## 2019-02-06 ENCOUNTER — RECORDS - HEALTHEAST (OUTPATIENT)
Dept: ADMINISTRATIVE | Facility: OTHER | Age: 74
End: 2019-02-06

## 2019-02-06 ENCOUNTER — TRANSFERRED RECORDS (OUTPATIENT)
Dept: HEALTH INFORMATION MANAGEMENT | Facility: CLINIC | Age: 74
End: 2019-02-06

## 2019-02-07 ENCOUNTER — RECORDS - HEALTHEAST (OUTPATIENT)
Dept: RADIOLOGY | Facility: CLINIC | Age: 74
End: 2019-02-07

## 2019-02-11 DIAGNOSIS — H35.3211 EXUDATIVE AGE-RELATED MACULAR DEGENERATION OF RIGHT EYE WITH ACTIVE CHOROIDAL NEOVASCULARIZATION (H): Primary | ICD-10-CM

## 2019-02-12 ENCOUNTER — AMBULATORY - HEALTHEAST (OUTPATIENT)
Dept: MULTI SPECIALTY CLINIC | Facility: CLINIC | Age: 74
End: 2019-02-12

## 2019-02-12 ENCOUNTER — OFFICE VISIT (OUTPATIENT)
Dept: OPHTHALMOLOGY | Facility: CLINIC | Age: 74
End: 2019-02-12
Attending: OPHTHALMOLOGY
Payer: MEDICARE

## 2019-02-12 DIAGNOSIS — H35.3211 EXUDATIVE AGE-RELATED MACULAR DEGENERATION OF RIGHT EYE WITH ACTIVE CHOROIDAL NEOVASCULARIZATION (H): ICD-10-CM

## 2019-02-12 PROCEDURE — 67028 INJECTION EYE DRUG: CPT | Mod: RT,ZF | Performed by: OPHTHALMOLOGY

## 2019-02-12 PROCEDURE — G0463 HOSPITAL OUTPT CLINIC VISIT: HCPCS | Mod: ZF

## 2019-02-12 PROCEDURE — 92134 CPTRZ OPH DX IMG PST SGM RTA: CPT | Mod: ZF | Performed by: OPHTHALMOLOGY

## 2019-02-12 PROCEDURE — C9257 BEVACIZUMAB INJECTION: HCPCS | Mod: ZF | Performed by: OPHTHALMOLOGY

## 2019-02-12 PROCEDURE — 25000128 H RX IP 250 OP 636: Mod: ZF | Performed by: OPHTHALMOLOGY

## 2019-02-12 RX ADMIN — Medication 1.25 MG: at 12:16

## 2019-02-12 ASSESSMENT — TONOMETRY
OS_IOP_MMHG: 10
OD_IOP_MMHG: 16
IOP_METHOD: TONOPEN

## 2019-02-12 ASSESSMENT — CUP TO DISC RATIO
OS_RATIO: 0.3
OD_RATIO: 0.5

## 2019-02-12 ASSESSMENT — CONF VISUAL FIELD
OS_NORMAL: 1
OD_NORMAL: 1

## 2019-02-12 ASSESSMENT — VISUAL ACUITY
METHOD: SNELLEN - LINEAR
OD_SC: 20/200
OS_SC: 20/20

## 2019-02-12 ASSESSMENT — SLIT LAMP EXAM - LIDS
COMMENTS: NORMAL
COMMENTS: NORMAL

## 2019-02-12 ASSESSMENT — EXTERNAL EXAM - LEFT EYE: OS_EXAM: NORMAL

## 2019-02-12 ASSESSMENT — EXTERNAL EXAM - RIGHT EYE: OD_EXAM: NORMAL

## 2019-02-12 NOTE — NURSING NOTE
Chief Complaints and History of Present Illnesses   Patient presents with     Macular Degeneration Follow Up     Chief Complaint(s) and History of Present Illness(es)     Macular Degeneration Follow Up     Laterality: both eyes              Comments     Pt. States that VA seems to have worsened BE.  No pain BE.  Dianna Pavon COT 10:40 AM February 12, 2019

## 2019-02-12 NOTE — PROGRESS NOTES
CC -   ARMD    INTERVAL HISTORY - [place] that vision in right eye was getting worse.   Just finishing Pred forte and ketorolac left eye after cataract surgery.  Being treated for Aortic aneurysm at [place] artery? As well as liposarcoma    HPI -   Jonas Hyman is a  73 year old year-old patient referred by Dr Pinedo for wet  AMD OD.  Had gone few years without eye exam d/t insurance lack, gradual progressive loss OU.  Prior RD repair OD 2014, unsure what BCVA was after repair.  Caring for grandson  VA has worsened OU over years, now trouble with reading/drivign, ADLs.    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  Wet AMD OD   - uncertain duration by history   - uncertain vision potential given prior h/o RD   - started Avastin 9/17/18     - last injection Avastin (#5)  1/15/19 (4 weeks)    - OCT SRF stable vs worse   - VA slightly worse today   - inject Avastin today      - RTC 1 month injection only #1 of 3  Avastin vs Eylea   - will try to get Eylea approval        2. H/o RD repair OD   - s/p PPV/SBP/FGx  2014   - retina flat      3. syneresis OS   - advised s/sx RD    4. 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 Pseudophakia OU   - ACIOL OD   - CE/IOL OS 12/7/18 ([facility])      return to clinic: 4 weeks, injection only Avastin vs Eylea    Jennie Corey MD  PGY-3 Ophthalmology        ATTESTATION     Attending Attestation:     Complete documentation of historical and exam elements from today's encounter can be found in the full encounter summary report (not reduplicated in this progress note).  I personally obtained the chief complaint(s) and  history of present illness.  I confirmed and edited as necessary the review of systems, past medical/surgical history, family history, social history, and examination findings as documented by others; and I examined the patient myself.  I personally reviewed the relevant tests, images, and reports as documented above.  I personally reviewed the ophthalmic test(s) associated with this encounter, agree with the interpretation(s) as documented by the resident/fellow, and have edited the corresponding report(s) as necessary.   I formulated and edited as necessary the assessment and plan and discussed the findings and management plan with the patient and family and I was present for the entire procedure performed by the resident/fellow.    Anu Wilson MD, PhD  , Vitreoretinal Surgery  Department of Ophthalmology  [facility]

## 2019-02-20 ENCOUNTER — COMMUNICATION - HEALTHEAST (OUTPATIENT)
Dept: INTERNAL MEDICINE | Facility: CLINIC | Age: 74
End: 2019-02-20

## 2019-02-20 DIAGNOSIS — I10 ESSENTIAL HYPERTENSION: ICD-10-CM

## 2019-03-01 ENCOUNTER — RECORDS - HEALTHEAST (OUTPATIENT)
Dept: ADMINISTRATIVE | Facility: OTHER | Age: 74
End: 2019-03-01

## 2019-03-04 ENCOUNTER — HOSPITAL ENCOUNTER (OUTPATIENT)
Dept: MRI IMAGING | Facility: CLINIC | Age: 74
Discharge: HOME | End: 2019-03-04
Attending: SURGERY

## 2019-03-04 ENCOUNTER — TRANSFERRED RECORDS (OUTPATIENT)
Dept: HEALTH INFORMATION MANAGEMENT | Facility: CLINIC | Age: 74
End: 2019-03-04

## 2019-03-04 DIAGNOSIS — R19.00 MASS OF PELVIS: ICD-10-CM

## 2019-03-04 LAB
CREAT BLD-MCNC: 0.9 MG/DL
POC GFR AMER AF HE - HISTORICAL: >60 ML/MIN/1.73M2
POC GFR NON AMER AF HE - HISTORICAL: >60 ML/MIN/1.73M2

## 2019-03-06 ENCOUNTER — OFFICE VISIT - HEALTHEAST (OUTPATIENT)
Dept: INTERNAL MEDICINE | Facility: CLINIC | Age: 74
End: 2019-03-06

## 2019-03-06 DIAGNOSIS — E66.01 SEVERE OBESITY WITH BODY MASS INDEX (BMI) OF 35.0 TO 39.9 WITH SERIOUS COMORBIDITY (H): ICD-10-CM

## 2019-03-06 DIAGNOSIS — E55.9 VITAMIN D DEFICIENCY: ICD-10-CM

## 2019-03-06 DIAGNOSIS — I71.40 ABDOMINAL AORTIC ANEURYSM (AAA) WITHOUT RUPTURE (H): ICD-10-CM

## 2019-03-06 DIAGNOSIS — I25.2 HISTORY OF ACUTE ANTERIOR WALL MI: ICD-10-CM

## 2019-03-06 DIAGNOSIS — C48.2 LIPOSARCOMA OF PERITONEUM (H): ICD-10-CM

## 2019-03-06 DIAGNOSIS — Z01.818 PRE-OP EXAM: ICD-10-CM

## 2019-03-06 DIAGNOSIS — F10.21 HISTORY OF ALCOHOL DEPENDENCE (H): ICD-10-CM

## 2019-03-06 DIAGNOSIS — E11.59 TYPE 2 DIABETES MELLITUS WITH OTHER CIRCULATORY COMPLICATION, WITHOUT LONG-TERM CURRENT USE OF INSULIN (H): ICD-10-CM

## 2019-03-06 DIAGNOSIS — H35.3210 EXUDATIVE AGE-RELATED MACULAR DEGENERATION OF RIGHT EYE, UNSPECIFIED STAGE (H): ICD-10-CM

## 2019-03-06 DIAGNOSIS — I25.119 ATHEROSCLEROSIS OF NATIVE CORONARY ARTERY OF NATIVE HEART WITH ANGINA PECTORIS (H): ICD-10-CM

## 2019-03-06 LAB
ALBUMIN SERPL-MCNC: 3.9 G/DL (ref 3.5–5)
ALBUMIN UR-MCNC: NEGATIVE MG/DL
ALP SERPL-CCNC: 71 U/L (ref 45–120)
ALT SERPL W P-5'-P-CCNC: 13 U/L (ref 0–45)
ANION GAP SERPL CALCULATED.3IONS-SCNC: 9 MMOL/L (ref 5–18)
APPEARANCE UR: CLEAR
AST SERPL W P-5'-P-CCNC: 11 U/L (ref 0–40)
BILIRUB SERPL-MCNC: 0.7 MG/DL (ref 0–1)
BILIRUB UR QL STRIP: NEGATIVE
BUN SERPL-MCNC: 19 MG/DL (ref 8–28)
CALCIUM SERPL-MCNC: 9.5 MG/DL (ref 8.5–10.5)
CHLORIDE BLD-SCNC: 106 MMOL/L (ref 98–107)
CO2 SERPL-SCNC: 25 MMOL/L (ref 22–31)
COLOR UR AUTO: YELLOW
CREAT SERPL-MCNC: 1.01 MG/DL (ref 0.7–1.3)
CREAT UR-MCNC: 56.6 MG/DL
ERYTHROCYTE [DISTWIDTH] IN BLOOD BY AUTOMATED COUNT: 10.5 % (ref 11–14.5)
GFR SERPL CREATININE-BSD FRML MDRD: >60 ML/MIN/1.73M2
GLUCOSE BLD-MCNC: 102 MG/DL (ref 70–125)
GLUCOSE UR STRIP-MCNC: NEGATIVE MG/DL
HBA1C MFR BLD: 5.9 % (ref 3.5–6)
HCT VFR BLD AUTO: 47.4 % (ref 40–54)
HGB BLD-MCNC: 15.7 G/DL (ref 14–18)
HGB UR QL STRIP: NEGATIVE
KETONES UR STRIP-MCNC: NEGATIVE MG/DL
LEUKOCYTE ESTERASE UR QL STRIP: NEGATIVE
MCH RBC QN AUTO: 32.9 PG (ref 27–34)
MCHC RBC AUTO-ENTMCNC: 33.2 G/DL (ref 32–36)
MCV RBC AUTO: 99 FL (ref 80–100)
MICROALBUMIN UR-MCNC: 1.01 MG/DL (ref 0–1.99)
MICROALBUMIN/CREAT UR: 17.8 MG/G
NITRATE UR QL: NEGATIVE
PH UR STRIP: 5.5 [PH] (ref 5–8)
PLATELET # BLD AUTO: 264 THOU/UL (ref 140–440)
PMV BLD AUTO: 8.1 FL (ref 7–10)
POTASSIUM BLD-SCNC: 5.1 MMOL/L (ref 3.5–5)
PROT SERPL-MCNC: 6.9 G/DL (ref 6–8)
RBC # BLD AUTO: 4.77 MILL/UL (ref 4.4–6.2)
SODIUM SERPL-SCNC: 140 MMOL/L (ref 136–145)
SP GR UR STRIP: 1.01 (ref 1–1.03)
UROBILINOGEN UR STRIP-ACNC: NORMAL
WBC: 9.3 THOU/UL (ref 4–11)

## 2019-03-06 ASSESSMENT — MIFFLIN-ST. JEOR: SCORE: 1857.77

## 2019-03-08 ENCOUNTER — COMMUNICATION - HEALTHEAST (OUTPATIENT)
Dept: INTERNAL MEDICINE | Facility: CLINIC | Age: 74
End: 2019-03-08

## 2019-03-12 ENCOUNTER — OFFICE VISIT (OUTPATIENT)
Dept: OPHTHALMOLOGY | Facility: CLINIC | Age: 74
End: 2019-03-12
Attending: OPHTHALMOLOGY
Payer: MEDICARE

## 2019-03-12 DIAGNOSIS — H35.3211 EXUDATIVE AGE-RELATED MACULAR DEGENERATION OF RIGHT EYE WITH ACTIVE CHOROIDAL NEOVASCULARIZATION (H): Primary | ICD-10-CM

## 2019-03-12 PROCEDURE — 40000269 ZZH STATISTIC NO CHARGE FACILITY FEE: Mod: ZF

## 2019-03-12 PROCEDURE — 67028 INJECTION EYE DRUG: CPT | Mod: RT,ZF | Performed by: OPHTHALMOLOGY

## 2019-03-12 PROCEDURE — 25000128 H RX IP 250 OP 636: Mod: ZF | Performed by: OPHTHALMOLOGY

## 2019-03-12 RX ORDER — NITROGLYCERIN 0.4 MG/1
TABLET SUBLINGUAL PRN
COMMUNITY
Start: 2017-12-31 | End: 2021-09-21

## 2019-03-12 RX ORDER — CYANOCOBALAMIN 1000 UG/ML
1000 INJECTION, SOLUTION INTRAMUSCULAR; SUBCUTANEOUS
COMMUNITY
Start: 2018-08-14 | End: 2019-03-12

## 2019-03-12 RX ORDER — LISINOPRIL 10 MG/1
TABLET ORAL
COMMUNITY
Start: 2016-10-21 | End: 2022-02-28

## 2019-03-12 RX ORDER — UBIDECARENONE 75 MG
2000 CAPSULE ORAL DAILY
COMMUNITY

## 2019-03-12 RX ORDER — PRAVASTATIN SODIUM 40 MG
TABLET ORAL
COMMUNITY
Start: 2017-12-29 | End: 2019-03-12

## 2019-03-12 RX ORDER — IBUPROFEN 200 MG
200 TABLET ORAL
COMMUNITY
Start: 2017-12-29 | End: 2022-04-08

## 2019-03-12 RX ORDER — ASPIRIN 325 MG
325 TABLET ORAL
COMMUNITY
Start: 2017-12-29 | End: 2019-07-22 | Stop reason: ALTCHOICE

## 2019-03-12 RX ADMIN — AFLIBERCEPT 2 MG: 40 INJECTION, SOLUTION INTRAVITREAL at 11:33

## 2019-03-12 ASSESSMENT — TONOMETRY
IOP_METHOD: TONOPEN
OS_IOP_MMHG: 15
OD_IOP_MMHG: 15

## 2019-03-12 ASSESSMENT — VISUAL ACUITY
OS_SC: 20/20-3
METHOD: SNELLEN - LINEAR
OD_PH_SC: 20/70
OD_SC: 20/150

## 2019-03-12 ASSESSMENT — CONF VISUAL FIELD
OS_NORMAL: 1
OD_NORMAL: 1
METHOD: COUNTING FINGERS

## 2019-03-12 NOTE — PROGRESS NOTES
CC -   ARMD    INTERVAL HISTORY - [place] that vision in right eye was getting worse.   Just finishing Pred forte and ketorolac left eye after cataract surgery.  Being treated [facility] [place] artery? As well as liposarcoma    HPI -   Jonas Hyman is a  73 year old year-old patient referred by Dr Pinedo for wet  AMD OD.  Had gone few years without eye exam d/t insurance lack, gradual progressive loss OU.  Prior RD repair OD 2014, unsure what BCVA was after repair.  Caring for grandson  VA has worsened OU over years, now trouble with reading/drivign, ADLs.    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  Wet AMD OD   - uncertain duration by history   - uncertain vision potential given prior h/o RD   - started Avastin 9/17/18   - changed to Eylea 3/12/19     - last injection Avastin (#6)  2/12/19 (4 weeks)     - PRIOR - OCT SRF stable vs worse   - VA stable today   - inject Eylea today injection only #1 of 3 (changing from Avastin)     - RTC 1 month injection only #1 of 3   Eylea   - will try to get Eylea approval        2. H/o RD repair OD   - s/p PPV/SBP/FGx  2014   - retina flat      3. syneresis OS   - advised s/sx RD    4. 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 Pseudophakia OU   - ACIOL OD   - CE/IOL OS 12/7/18 (Elizabeth)      return to clinic: 4 weeks, injection only Eylea            ATTESTATION     Attending Attestation:     Complete documentation of historical and exam elements from today's encounter can be found in the full encounter summary report (not reduplicated in this progress note).  I personally obtained the chief  complaint(s) and history of present illness.  I confirmed and edited as necessary the review of systems, past medical/surgical history, family history, social history, and examination findings as documented by others; and I examined the patient myself.  I personally reviewed the relevant tests, images, and reports as documented above.  I personally reviewed the ophthalmic test(s) associated with this encounter, agree with the interpretation(s) as documented by the resident/fellow, and have edited the corresponding report(s) as necessary.   I formulated and edited as necessary the assessment and plan and discussed the findings and management plan with the patient and family and I was present for the entire procedure performed by the resident/fellow.    Anu Wilson MD, PhD  , Vitreoretinal Surgery  Department of Ophthalmology  [facility]

## 2019-03-13 ENCOUNTER — DOCUMENTATION ONLY (OUTPATIENT)
Dept: ONCOLOGY | Facility: CLINIC | Age: 74
End: 2019-03-13

## 2019-03-13 NOTE — PROGRESS NOTES
RECORDS STATUS - ALL OTHER DIAGNOSIS      [facility], [facility].    DATE RECEIVED: Ongoing   NOTES STATUS DETAILS   OFFICE NOTE from referring provider None    OFFICE NOTE from medical oncologist None    DISCHARGE SUMMARY from hospital Received CE/[facility]   DISCHARGE REPORT from the ER Received CE/[facility]   OPERATIVE REPORT Received CE/[facility]   MEDICATION LIST Received /[facility]   CLINICAL TRIAL TREATMENTS TO DATE     LABS     PATHOLOGY REPORTS none    ANYTHING RELATED TO DIAGNOSIS Received /[facility]   GENONOMIC TESTING     TYPE:     IMAGING (NEED IMAGES & REPORT)     CT SCANS Received PACS   MRI REceived PACS   MAMMO     ULTRASOUND Received PACS   PET

## 2019-03-13 NOTE — PROGRESS NOTES
Spoke with Pt has quite a few outside images and a few outside records. I have initiated the gathering process from [place], [facility], and [facility].     Staff Message was sent to Nurse Jennie detailing above information.     Requests for Imaging was sent to [place] and [facility].

## 2019-03-19 ENCOUNTER — TELEPHONE (OUTPATIENT)
Dept: ONCOLOGY | Facility: CLINIC | Age: 74
End: 2019-03-19

## 2019-03-22 NOTE — PROGRESS NOTES
Spoke w/ Micaela @  [facility], they will push images to PACS soon    Images resolved to PACS.    8 pages (including cover sheet) rec'd from [facility] reports &  14 pages (including cover sheet) of img reports [facility] - both faxed to HIM  10:16 AM

## 2019-03-25 ENCOUNTER — ONCOLOGY VISIT (OUTPATIENT)
Dept: ONCOLOGY | Facility: CLINIC | Age: 74
End: 2019-03-25
Attending: SURGERY
Payer: MEDICARE

## 2019-03-25 VITALS
WEIGHT: 249.9 LBS | HEART RATE: 95 BPM | SYSTOLIC BLOOD PRESSURE: 159 MMHG | DIASTOLIC BLOOD PRESSURE: 96 MMHG | OXYGEN SATURATION: 96 % | HEIGHT: 70 IN | RESPIRATION RATE: 16 BRPM | TEMPERATURE: 98 F | BODY MASS INDEX: 35.78 KG/M2

## 2019-03-25 DIAGNOSIS — R19.00 PELVIC MASS: ICD-10-CM

## 2019-03-25 PROCEDURE — G0463 HOSPITAL OUTPT CLINIC VISIT: HCPCS | Mod: ZF

## 2019-03-25 ASSESSMENT — PAIN SCALES - GENERAL: PAINLEVEL: NO PAIN (0)

## 2019-03-25 ASSESSMENT — MIFFLIN-ST. JEOR: SCORE: 1879.79

## 2019-03-25 NOTE — NURSING NOTE
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    Weight as of this encounter: 113.4 kg (249 lb 14.4 oz). Body surface area is 2.37 meters squared.  No Pain (0) Comment: Data Unavailable   No LMP for male patient.  Allergies reviewed: Yes  Medications reviewed: Yes    Medications: Medication refills not needed today.  Pharmacy name entered into [facility]: [id number] - [place], [place] - [facility] [address]    Clinical concerns: No new concerns. Tanisha was notified.      Kehinde Fonseca LPN            "

## 2019-03-25 NOTE — PROGRESS NOTES
Jonas Hyman is a 73-year-old man I was asked to see at the request of Dr. Darren Fernández for evaluation of a right pelvic mass.  The patient initially complained of some vague right-sided abdominal pain.  This led to an MRI which demonstrated a large aortic aneurysm.  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  He was asymptomatic.  He has gone to the [facility] and he is getting a special graft made for his aneurysm and he is here now for me to evaluate this mass in his right pelvis.  He has not had a biopsy of this mass. He does not have any symptoms from this mass.  He tells me he is scheduled for his aneurysm repair sometime next month.      PAST MEDICAL HISTORY:  Coronary artery disease that led to an MI and cardiac arrest, hypertension, cigarette smoking and detached retina.      PHYSICAL EXAMINATION:  Not performed.      IMPRESSION:  A 6 cm mass in his right pelvis.  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  I am going to speak to his vascular surgeon, Dr. Stevenson, today to talk about the timing and sequence of his aneurysm repair in regards to a biopsy and possible resection of this pelvic mass.  I told Jonas that the most life-threatening problem is his aneurysm.  If his aneurysm is going to be delayed until we fully address his pelvic mass, then I would probably get a CT-guided needle biopsy if this is feasible by Interventional Radiology.  I talked about the differential diagnosis which could include liposarcoma, lipoma, lymphoma or other entities.      TT:  50 minutes.  CT:  50 minutes.      cc:   Darren Fernández MD   [facility]    [address] [place] [address], [place] [phone]

## 2019-03-29 DIAGNOSIS — R19.00 PELVIC MASS: Primary | ICD-10-CM

## 2019-04-01 ENCOUNTER — HOSPITAL ENCOUNTER (INPATIENT)
Dept: GENERAL RADIOLOGY | Facility: CLINIC | Age: 74
End: 2019-04-01
Attending: CLINICAL NURSE SPECIALIST

## 2019-04-01 ENCOUNTER — OFFICE VISIT (OUTPATIENT)
Dept: INTERVENTIONAL RADIOLOGY/VASCULAR | Facility: CLINIC | Age: 74
End: 2019-04-01
Payer: MEDICARE

## 2019-04-01 ENCOUNTER — RECORDS - HEALTHEAST (OUTPATIENT)
Dept: ADMINISTRATIVE | Facility: OTHER | Age: 74
End: 2019-04-01

## 2019-04-01 VITALS
DIASTOLIC BLOOD PRESSURE: 89 MMHG | WEIGHT: 250.3 LBS | SYSTOLIC BLOOD PRESSURE: 148 MMHG | HEART RATE: 71 BPM | OXYGEN SATURATION: 96 % | BODY MASS INDEX: 35.91 KG/M2

## 2019-04-01 DIAGNOSIS — R19.00 PELVIC MASS: ICD-10-CM

## 2019-04-01 DIAGNOSIS — D49.9 NEOPLASM: ICD-10-CM

## 2019-04-01 DIAGNOSIS — R19.00 PELVIC MASS: Primary | ICD-10-CM

## 2019-04-01 PROBLEM — K76.89 HEPATIC CYST: Status: ACTIVE | Noted: 2019-04-01

## 2019-04-01 PROBLEM — E66.812 CLASS 2 SEVERE OBESITY DUE TO EXCESS CALORIES WITH SERIOUS COMORBIDITY IN ADULT (H): Status: ACTIVE | Noted: 2019-04-01

## 2019-04-01 PROBLEM — F17.200 TOBACCO DEPENDENCE SYNDROME: Status: ACTIVE | Noted: 2019-04-01

## 2019-04-01 PROBLEM — I45.10 RIGHT BUNDLE BRANCH BLOCK: Status: ACTIVE | Noted: 2019-04-01

## 2019-04-01 PROBLEM — E66.01 CLASS 2 SEVERE OBESITY DUE TO EXCESS CALORIES WITH SERIOUS COMORBIDITY IN ADULT (H): Status: ACTIVE | Noted: 2019-04-01

## 2019-04-01 PROBLEM — I71.40 ABDOMINAL AORTIC ANEURYSM (AAA) WITHOUT RUPTURE (H): Status: ACTIVE | Noted: 2019-04-01

## 2019-04-01 PROBLEM — K86.2 PANCREATIC CYST: Status: ACTIVE | Noted: 2019-04-01

## 2019-04-01 LAB
ERYTHROCYTE [DISTWIDTH] IN BLOOD BY AUTOMATED COUNT: 12.3 % (ref 10–15)
HCT VFR BLD AUTO: 48.8 % (ref 40–53)
HGB BLD-MCNC: 15.5 G/DL (ref 13.3–17.7)
INR PPP: 1.1 (ref 0.86–1.14)
MCH RBC QN AUTO: 31.3 PG (ref 26.5–33)
MCHC RBC AUTO-ENTMCNC: 31.8 G/DL (ref 31.5–36.5)
MCV RBC AUTO: 99 FL (ref 78–100)
PLATELET # BLD AUTO: 227 10E9/L (ref 150–450)
RBC # BLD AUTO: 4.95 10E12/L (ref 4.4–5.9)
WBC # BLD AUTO: 10.3 10E9/L (ref 4–11)

## 2019-04-01 ASSESSMENT — ENCOUNTER SYMPTOMS
HEMATURIA: 0
DYSURIA: 0
DIFFICULTY URINATING: 1

## 2019-04-01 NOTE — PROGRESS NOTES
First Name: Jonas   Age: 73 year old   Referring Physician: Dr. Gallegos  REASON FOR REFERRAL: pelvic mass biopsy    Assessment:  Jonas is a 73 yr old with a significant cardiac hx, who underwent a CT in September 2018 to evaluate for kidney stones was found to have a 6.2 cm AAA and a right pelvic sidewall mass.  This mass is undergoing further evaluation prior to treating the AAA.  Biopsy is requested to evaluate for malignancy.    Plan  Image guided biopsy of right pelvic sidewall mass  Hold aspirin for 5 days (last dose 3/31/19)  Blood work today    HPI: This is a pt with a PMH of coronary artery disease, cardiac arrest, MI with heart stents in 2009, hypertension, hyperlipidemia, tobacco dependence since age 11, no alcohol since 1975 and obesity.  The patient was sent for a CT scan of the abdomen without contrast for suspected kidney stones in September 2018.  He was found to have a 6.2 cm infrarenal AAA, ?pancreatic lesion, ?heaptic lesion, and right pelvic sidewall mass.  Pancreatic lesions is a cyst and hepatic lesions are cysts.  The patient was referred to a surgeon in [place] for the AAA, and he was referred to [place] and he is part of a research study and the graft is being made.  They would like the right pelvic sidewall mass evaluated first.  So the surgeon from [place] referred the patient to Dr. Garay.  Dr. Garay is requesting a biopsy.  Dr. Linder from  reviewed the imaging and approved the biopsy.    PAST MEDICAL HISTORY:   Past Medical History:   Diagnosis Date     Acute MI (H) 2009     Bronchitis      Cardiac arrest (H) 2009     Chemical dependency (H)     Has been in recovery 44 years     Coronary artery disease      Diabetes mellitus (H)     type II     Diverticulosis of large intestine      Hemorrhoids      Hypertension      Macular degeneration of right eye      Mixed hyperlipidemia      Retinal detachment 06/23/2014    Vitrectomy Posterior 23 guage, scleral buckle, membrane peel,  endolaser gase     Stented coronary artery 2009    stints times 2     Vitamin B12 deficiency      Vitamin D deficiency      PAST SURGICAL HISTORY:   Past Surgical History:   Procedure Laterality Date     C MUSCLE-SKIN FLAP,LEG       CATARACT IOL, RT/LT Bilateral      HEART CATH STENT COR W/WO PTCA  05/07/2009    Proximal Left Anterior Descending Artery, Proximal Circumflex       HERNIA REPAIR       LASER YAG IRIDOTOMY  9/6/2013    Procedure: LASER YAG IRIDOTOMY;  peripheral irridotomy ;  Surgeon: Doroteo Andres MD;  Location: [facility]     PHACOEMULSIFICATION CLEAR CORNEA WITH STANDARD INTRAOCULAR LENS IMPLANT  9/5/2013    Procedure: PHACOEMULSIFICATION CLEAR CORNEA WITH STANDARD INTRAOCULAR LENS IMPLANT;   COMPLEX RIGHT PHACOEMULSIFICATION CLEAR CORNEA WITH ANTERIOR CHAMBER INTRAOCULAR LENS IMPLANT, ANTERIOR VITRECTOMY;  Surgeon: Doroteo Andres MD;  Location: [facility]     PHACOEMULSIFICATION WITH STANDARD INTRAOCULAR LENS IMPLANT Left 12/3/2018    Procedure: Left Eye Phacoemulsification with Intraocular Lens;  Surgeon: Suhail Johnson MD;  [facility]     TONSILLECTOMY  5/24/200/9     VITRECTOMY ANTERIOR  9/5/2013    Procedure: VITRECTOMY ANTERIOR;;  Surgeon: Doroteo Andres MD;  Location: [facility]     FAMILY HISTORY:   Family History   Problem Relation Age of Onset     Obesity Father      Glaucoma No family hx of      Macular Degeneration No family hx of      Retinal detachment No family hx of      SOCIAL HISTORY:   Social History     Tobacco Use     Smoking status: Current Every Day Smoker     Packs/day: 1.00     Years: 40.00     Pack years: 40.00     Types: Cigarettes     Start date: 6/1/1956     Smokeless tobacco: Never Used     Tobacco comment: 20 yrs ago-mid 80's, quit again 2007, started again 2012   Substance Use Topics     Alcohol use: No     Comment: from Jan 1975     PROBLEM LIST:   Patient Active Problem List    Diagnosis Date Noted     Abdominal aortic aneurysm (AAA) without rupture (H) 04/01/2019      Priority: Medium     6.2 cm, graft is being made at [place], first getting pelvic mass worked up       Pancreatic cyst 04/01/2019     Priority: Medium     Class 2 severe obesity due to excess calories with serious comorbidity in adult (H) 04/01/2019     Priority: Medium     Tobacco dependence syndrome 04/01/2019     Priority: Medium     Right bundle branch block 04/01/2019     Priority: Medium     Hepatic cyst 04/01/2019     Priority: Medium     Pelvic mass 03/25/2019     Priority: Medium     MEDICATIONS:   Prescription Medications as of 4/1/2019       Rx Number Disp Refills Start End Last Dispensed Date Next Fill Date Owning Pharmacy    aspirin (ASA) 325 MG tablet    12/29/2017        Sig: Take 325 mg by mouth    Class: Historical    Route: Oral    buPROPion (ZYBAN) 150 MG 12 hr tablet            Sig: Take 150 mg by mouth 2 times daily    Class: Historical    Route: Oral    cyanocobalamin (VITAMIN B-12) 100 MCG tablet        [id number] [address]     Sig: Take 2,000 mcg by mouth daily    Class: Historical    Route: Oral    ibuprofen (ADVIL/MOTRIN) 200 MG tablet    12/29/2017        Sig: Take 200 mg by mouth    Class: Historical    Route: Oral    lisinopril (PRINIVIL/ZESTRIL) 10 MG tablet    10/21/2016        Sig: Take 1 tablet (10 mg) by mouth once daily    Class: Historical    nitroGLYcerin (NITROSTAT) 0.4 MG sublingual tablet    12/31/2017        Sig: Place under the tongue as needed    Class: Historical    Route: Sublingual    pravastatin (PRAVACHOL) 40 MG tablet    12/29/2017        Sig: TAKE 1 TABLET (40 MG) BY MOUTH ONCE DAILY    Class: Historical    tamsulosin (FLOMAX) 0.4 MG capsule    8/14/2018        Class: Historical    vitamin B complex with vitamin C (VITAMIN  B COMPLEX) tablet            Sig: Take 1 tablet by mouth daily    Class: Historical    Route: Oral    VITAMIN D, CHOLECALCIFEROL, PO            Sig: Take 2,000 Units by mouth daily    Class: Historical     Route: Oral      Clinic-Administered Medications as of 2019       Dose Frequency Start End    Aflibercept (EYLEA) injection 2 mg 2 mg EVERY 28 DAYS 2019    Si.05 mLs (2 mg) by Intravitreal route every 28 days    Route: Intravitreal    bevacizumab (AVASTIN) intravitreal inj 1.25 mg 1.25 mg EVERY 28 DAYS 2018    Si.05 mLs (1.25 mg) by Intravitreal route every 28 days    Class: E-Prescribe    Route: Intravitreal        ALLERGIES: Patient has no known allergies.  VITALS: /89   Pulse 71   Wt 113.5 kg (250 lb 4.8 oz)   SpO2 96%   BMI 35.91 kg/m      ROS:  Answers for HPI/ROS submitted by the patient on 2019   General Symptoms: No  Skin Symptoms: No  HENT Symptoms: No  EYE SYMPTOMS: No  HEART SYMPTOMS: No  LUNG SYMPTOMS: No  INTESTINAL SYMPTOMS: No  URINARY SYMPTOMS: Yes  REPRODUCTIVE SYMPTOMS: No  SKELETAL SYMPTOMS: No  BLOOD SYMPTOMS: No  NERVOUS SYSTEM SYMPTOMS: No  MENTAL HEALTH SYMPTOMS: No  Trouble holding urine or incontinence: No  Pain or burning: No  Trouble starting or stopping: Yes  Increased frequency of urination: Yes  Blood in urine: No  Decreased frequency of urination: No  Frequent nighttime urination: No  Difficulty emptying bladder: Yes    Physical Examination: Vital signs are reviewed and they are stable  Constitutional: Pleasant, older gentleman, in no acute physical distress, came alone to his appt  Cardiovascular: negative, RRR  Respiratory: negative findings: normal respiratory rate and rhythm, lungs clear to auscultation  Musculoskeletal: extremities normal- no gross deformities noted, gait normal and normal muscle tone  Skin: no suspicious lesions or rashes  Neurologic: negative  Psychiatric: affect normal/bright and mentation appears normal.    BMP RESULTS:  No results found for: NA, POTASSIUM, CHLORIDE, CO2, ANIONGAP, GLC, BUN, CR, GFRESTIMATED, GFRESTBLACK, MACIE     CBC RESULTS:  Lab Results   Component Value Date    WBC 10.3 2019     RBC 4.95 04/01/2019    HGB 15.5 04/01/2019    HCT 48.8 04/01/2019    MCV 99 04/01/2019    MCH 31.3 04/01/2019    MCHC 31.8 04/01/2019    RDW 12.3 04/01/2019     04/01/2019       INR/PTT:  Lab Results   Component Value Date    INR 1.10 04/01/2019       Diagnostic studies: see outside MRI and CT scan    PROVIDER NOTE:  I explained the procedure to Jonas  This included:  Preparing for the procedure, the actual procedure and recovery.  I explained the risks of the  biopsy:  Bleeding, infection, hitting an unintended organ (vessel or nerve)  I explained that usually the results return after two to four business days.    I explained that he/she would be contacted by  office following this to determine a future plan.  Thank you for involving us in the care of your patient.    CC  Patient Care Team:  Dillon Goldsmith MD as PCP - General (Internal Medicine)  Anu Wilson MD as MD (Ophthalmology)  Jennie Mcelroy RN as Specialty Care Coordinator (Surgery Surgical Oncology)  ERIN CENTENO

## 2019-04-01 NOTE — LETTER
4/1/2019       RE: Jonas Hyman  [address]  [place] [address]     Dear Colleague,    Thank you for referring your patient, Jonas Hyman, to the [facility] INTERVENTIONAL RADIOLOGY at [facility]. Please see a copy of my visit note below.    First Name: Jonas   Age: 73 year old   Referring Physician: Dr. Gallegos  REASON FOR REFERRAL: pelvic mass biopsy    Assessment:  Jonas is a 73 yr old with a significant cardiac hx, who underwent a CT in September 2018 to evaluate for kidney stones was found to have a 6.2 cm AAA and a right pelvic sidewall mass.  This mass is undergoing further evaluation prior to treating the AAA.  Biopsy is requested to evaluate for malignancy.    Plan  Image guided biopsy of right pelvic sidewall mass  Hold aspirin for 5 days (last dose 3/31/19)  Blood work today    HPI: This is a pt with a PMH of coronary artery disease, cardiac arrest, MI with heart stents in 2009, hypertension, hyperlipidemia, tobacco dependence since age 11, no alcohol since 1975 and obesity.  The patient was sent for a CT scan of the abdomen without contrast for suspected kidney stones in September 2018.  He was found to have a 6.2 cm infrarenal AAA, ?pancreatic lesion, ?heaptic lesion, and right pelvic sidewall mass.  Pancreatic lesions is a cyst and hepatic lesions are cysts.  The patient was referred to a surgeon in [place] for the AAA, and he was referred to [place] and he is part of a research study and the graft is being made.  They would like the right pelvic sidewall mass evaluated first.  So the surgeon from [place] referred the patient to Dr. Garay.  Dr. Garay is requesting a biopsy.  Dr. Linder from  reviewed the imaging and approved the biopsy.    PAST MEDICAL HISTORY:   Past Medical History:   Diagnosis Date     Acute MI (H) 2009     Bronchitis      Cardiac arrest (H) 2009     Chemical dependency (H)     Has been in recovery 44 years     Coronary  artery disease      Diabetes mellitus (H)     type II     Diverticulosis of large intestine      Hemorrhoids      Hypertension      Macular degeneration of right eye      Mixed hyperlipidemia      Retinal detachment 06/23/2014    Vitrectomy Posterior 23 guage, scleral buckle, membrane peel, endolaser gase     Stented coronary artery 2009    stints times 2     Vitamin B12 deficiency      Vitamin D deficiency      PAST SURGICAL HISTORY:   Past Surgical History:   Procedure Laterality Date     C MUSCLE-SKIN FLAP,LEG       CATARACT IOL, RT/LT Bilateral      HEART CATH STENT COR W/WO PTCA  05/07/2009    Proximal Left Anterior Descending Artery, Proximal Circumflex       HERNIA REPAIR       LASER YAG IRIDOTOMY  9/6/2013    Procedure: LASER YAG IRIDOTOMY;  peripheral irridotomy ;  Surgeon: Doroteo Andres MD;  Location: [facility]     PHACOEMULSIFICATION CLEAR CORNEA WITH STANDARD INTRAOCULAR LENS IMPLANT  9/5/2013    Procedure: PHACOEMULSIFICATION CLEAR CORNEA WITH STANDARD INTRAOCULAR LENS IMPLANT;   COMPLEX RIGHT PHACOEMULSIFICATION CLEAR CORNEA WITH ANTERIOR CHAMBER INTRAOCULAR LENS IMPLANT, ANTERIOR VITRECTOMY;  Surgeon: Doroteo Andres MD;  Location: [facility]     PHACOEMULSIFICATION WITH STANDARD INTRAOCULAR LENS IMPLANT Left 12/3/2018    Procedure: Left Eye Phacoemulsification with Intraocular Lens;  Surgeon: Suhail Johnson MD;  [facility]     TONSILLECTOMY  5/24/200/9     VITRECTOMY ANTERIOR  9/5/2013    Procedure: VITRECTOMY ANTERIOR;;  Surgeon: Doroteo Andres MD;  Location: [facility]     FAMILY HISTORY:   Family History   Problem Relation Age of Onset     Obesity Father      Glaucoma No family hx of      Macular Degeneration No family hx of      Retinal detachment No family hx of      SOCIAL HISTORY:   Social History     Tobacco Use     Smoking status: Current Every Day Smoker     Packs/day: 1.00     Years: 40.00     Pack years: 40.00     Types: Cigarettes     Start date: 6/1/1956     Smokeless tobacco: Never  Used     Tobacco comment: 20 yrs ago-mid 80's, quit again 2007, started again 2012   Substance Use Topics     Alcohol use: No     Comment: from Jan 1975     PROBLEM LIST:   Patient Active Problem List    Diagnosis Date Noted     Abdominal aortic aneurysm (AAA) without rupture (H) 04/01/2019     Priority: Medium     6.2 cm, graft is being made at [place], first getting pelvic mass worked up       Pancreatic cyst 04/01/2019     Priority: Medium     Class 2 severe obesity due to excess calories with serious comorbidity in adult (H) 04/01/2019     Priority: Medium     Tobacco dependence syndrome 04/01/2019     Priority: Medium     Right bundle branch block 04/01/2019     Priority: Medium     Hepatic cyst 04/01/2019     Priority: Medium     Pelvic mass 03/25/2019     Priority: Medium     MEDICATIONS:   Prescription Medications as of 4/1/2019       Rx Number Disp Refills Start End Last Dispensed Date Next Fill Date Owning Pharmacy    aspirin (ASA) 325 MG tablet    12/29/2017        Sig: Take 325 mg by mouth    Class: Historical    Route: Oral    buPROPion (ZYBAN) 150 MG 12 hr tablet            Sig: Take 150 mg by mouth 2 times daily    Class: Historical    Route: Oral    cyanocobalamin (VITAMIN B-12) 100 MCG tablet        [id number] [address]     Sig: Take 2,000 mcg by mouth daily    Class: Historical    Route: Oral    ibuprofen (ADVIL/MOTRIN) 200 MG tablet    12/29/2017        Sig: Take 200 mg by mouth    Class: Historical    Route: Oral    lisinopril (PRINIVIL/ZESTRIL) 10 MG tablet    10/21/2016        Sig: Take 1 tablet (10 mg) by mouth once daily    Class: Historical    nitroGLYcerin (NITROSTAT) 0.4 MG sublingual tablet    12/31/2017        Sig: Place under the tongue as needed    Class: Historical    Route: Sublingual    pravastatin (PRAVACHOL) 40 MG tablet    12/29/2017        Sig: TAKE 1 TABLET (40 MG) BY MOUTH ONCE DAILY    Class: Historical    tamsulosin (FLOMAX) 0.4 MG  capsule    2018        Class: Historical    vitamin B complex with vitamin C (VITAMIN  B COMPLEX) tablet            Sig: Take 1 tablet by mouth daily    Class: Historical    Route: Oral    VITAMIN D, CHOLECALCIFEROL, PO            Sig: Take 2,000 Units by mouth daily    Class: Historical    Route: Oral      Clinic-Administered Medications as of 2019       Dose Frequency Start End    Aflibercept (EYLEA) injection 2 mg 2 mg EVERY 28 DAYS 2019    Si.05 mLs (2 mg) by Intravitreal route every 28 days    Route: Intravitreal    bevacizumab (AVASTIN) intravitreal inj 1.25 mg 1.25 mg EVERY 28 DAYS 2018    Si.05 mLs (1.25 mg) by Intravitreal route every 28 days    Class: E-Prescribe    Route: Intravitreal        ALLERGIES: Patient has no known allergies.  VITALS: /89   Pulse 71   Wt 113.5 kg (250 lb 4.8 oz)   SpO2 96%   BMI 35.91 kg/m         Physical Examination: Vital signs are reviewed and they are stable  Constitutional: Pleasant, older gentleman, in no acute physical distress, came alone to his appt  Cardiovascular: negative, RRR  Respiratory: negative findings: normal respiratory rate and rhythm, lungs clear to auscultation  Musculoskeletal: extremities normal- no gross deformities noted, gait normal and normal muscle tone  Skin: no suspicious lesions or rashes  Neurologic: negative  Psychiatric: affect normal/bright and mentation appears normal.    BMP RESULTS:  No results found for: NA, POTASSIUM, CHLORIDE, CO2, ANIONGAP, GLC, BUN, CR, GFRESTIMATED, GFRESTBLACK, MACIE     CBC RESULTS:  Lab Results   Component Value Date    WBC 10.3 2019    RBC 4.95 2019    HGB 15.5 2019    HCT 48.8 2019    MCV 99 2019    MCH 31.3 2019    MCHC 31.8 2019    RDW 12.3 2019     2019       INR/PTT:  Lab Results   Component Value Date    INR 1.10 2019       Diagnostic studies: see outside MRI and CT scan    PROVIDER  NOTE:  I explained the procedure to Jonas  This included:  Preparing for the procedure, the actual procedure and recovery.  I explained the risks of the  biopsy:  Bleeding, infection, hitting an unintended organ (vessel or nerve)  I explained that usually the results return after two to four business days.    I explained that he/she would be contacted by  office following this to determine a future plan.    Thank you for involving us in the care of your patient.    SANTINO Bagley CNS    CC  Patient Care Team:  Dillon Goldsmith MD as PCP - General (Internal Medicine)  Anu Wilson MD as MD (Ophthalmology)  Jennie Mcelroy, RN as Specialty Care Coordinator (Surgery Surgical Oncology)  ERIN CENTENO

## 2019-04-01 NOTE — PATIENT INSTRUCTIONS
You are scheduled for your biopsy on Friday, April 5, 222019  Report at 9:00 AM  [address] [facility], 2nd floor (which is street level), Imaging   Your procedure will start approximately at  10:00 AM    No solid foods or milk products for 6 hours prior to the procedure  4:00 AM  You may have clear liquids until 2 hours prior to the procedure (this includes water, apple juice, broth, coffee or tea without milk or sugar, jello-o that is not red, white grape juice)  8:00 AM    Hold the aspirin until after the biopsy    You will need a       If you have any questions you may call the nurse at [phone]

## 2019-04-04 RX ORDER — LIDOCAINE 40 MG/G
CREAM TOPICAL
Status: CANCELLED | OUTPATIENT
Start: 2019-04-04

## 2019-04-05 ENCOUNTER — HOSPITAL ENCOUNTER (OUTPATIENT)
Dept: GENERAL RADIOLOGY | Facility: CLINIC | Age: 74
End: 2019-04-05
Attending: SURGERY | Admitting: SURGERY
Payer: MEDICARE

## 2019-04-05 ENCOUNTER — HOSPITAL ENCOUNTER (OUTPATIENT)
Dept: CT IMAGING | Facility: CLINIC | Age: 74
End: 2019-04-05
Attending: CLINICAL NURSE SPECIALIST | Admitting: SURGERY
Payer: MEDICARE

## 2019-04-05 ENCOUNTER — HOSPITAL ENCOUNTER (OUTPATIENT)
Facility: CLINIC | Age: 74
Discharge: HOME | End: 2019-04-05
Attending: SURGERY | Admitting: SURGERY
Payer: MEDICARE

## 2019-04-05 VITALS
DIASTOLIC BLOOD PRESSURE: 84 MMHG | RESPIRATION RATE: 18 BRPM | BODY MASS INDEX: 35.91 KG/M2 | OXYGEN SATURATION: 97 % | TEMPERATURE: 98.2 F | WEIGHT: 250.29 LBS | SYSTOLIC BLOOD PRESSURE: 161 MMHG

## 2019-04-05 DIAGNOSIS — R19.00 PELVIC MASS: ICD-10-CM

## 2019-04-05 LAB — COPATH REPORT: NORMAL

## 2019-04-05 PROCEDURE — 88185 FLOWCYTOMETRY/TC ADD-ON: CPT | Performed by: SURGERY

## 2019-04-05 PROCEDURE — 88305 TISSUE EXAM BY PATHOLOGIST: CPT | Performed by: SURGERY

## 2019-04-05 PROCEDURE — 88342 IMHCHEM/IMCYTCHM 1ST ANTB: CPT | Mod: 26 | Performed by: SURGERY

## 2019-04-05 PROCEDURE — 25000128 H RX IP 250 OP 636

## 2019-04-05 PROCEDURE — 40001004 ZZHCL STATISTIC FLOW INT 9-15 ABY TC 88188: Performed by: SURGERY

## 2019-04-05 PROCEDURE — 88342 IMHCHEM/IMCYTCHM 1ST ANTB: CPT | Performed by: SURGERY

## 2019-04-05 PROCEDURE — 88184 FLOWCYTOMETRY/ TC 1 MARKER: CPT | Performed by: SURGERY

## 2019-04-05 PROCEDURE — 88341 IMHCHEM/IMCYTCHM EA ADD ANTB: CPT | Performed by: SURGERY

## 2019-04-05 PROCEDURE — 49180 BIOPSY ABDOMINAL MASS: CPT

## 2019-04-05 PROCEDURE — 88341 IMHCHEM/IMCYTCHM EA ADD ANTB: CPT | Mod: 26 | Performed by: SURGERY

## 2019-04-05 RX ORDER — FLUMAZENIL 0.1 MG/ML
0.2 INJECTION, SOLUTION INTRAVENOUS
Status: DISCONTINUED | OUTPATIENT
Start: 2019-04-05 | End: 2019-04-06 | Stop reason: HOSPADM

## 2019-04-05 RX ORDER — NICOTINE POLACRILEX 4 MG
15-30 LOZENGE BUCCAL
Status: CANCELLED | OUTPATIENT
Start: 2019-04-05

## 2019-04-05 RX ORDER — FENTANYL CITRATE 50 UG/ML
25-50 INJECTION, SOLUTION INTRAMUSCULAR; INTRAVENOUS EVERY 5 MIN PRN
Status: DISCONTINUED | OUTPATIENT
Start: 2019-04-05 | End: 2019-04-06 | Stop reason: HOSPADM

## 2019-04-05 RX ORDER — NALOXONE HYDROCHLORIDE 0.4 MG/ML
.1-.4 INJECTION, SOLUTION INTRAMUSCULAR; INTRAVENOUS; SUBCUTANEOUS
Status: DISCONTINUED | OUTPATIENT
Start: 2019-04-05 | End: 2019-04-06 | Stop reason: HOSPADM

## 2019-04-05 RX ORDER — DEXTROSE MONOHYDRATE 25 G/50ML
25-50 INJECTION, SOLUTION INTRAVENOUS
Status: CANCELLED | OUTPATIENT
Start: 2019-04-05

## 2019-04-05 RX ADMIN — FENTANYL CITRATE 50 MCG: 50 INJECTION INTRAMUSCULAR; INTRAVENOUS at 09:58

## 2019-04-05 RX ADMIN — MIDAZOLAM 1 MG: 1 INJECTION INTRAMUSCULAR; INTRAVENOUS at 09:58

## 2019-04-05 RX ADMIN — MIDAZOLAM 1 MG: 1 INJECTION INTRAMUSCULAR; INTRAVENOUS at 10:05

## 2019-04-05 RX ADMIN — FENTANYL CITRATE 50 MCG: 50 INJECTION INTRAMUSCULAR; INTRAVENOUS at 10:05

## 2019-04-05 NOTE — PROCEDURES
Interventional Radiology Brief Post Procedure Note    Procedure: CT guided RP mass biopsy    Proceduralist: Krishna Soliz MD and Kai Simons MD    Assistant: None    Time Out: Prior to the start of the procedure and with procedural staff participation, I verbally confirmed the patient s identity using two indicators, relevant allergies, that the procedure was appropriate and matched the consent or emergent situation, and that the correct equipment/implants were available. Immediately prior to starting the procedure I conducted the Time Out with the procedural staff and re-confirmed the patient s name, procedure, and site/side. (The Joint Commission universal protocol was followed.)  Yes    Medications   Medication Event Details Admin User Admin Time       Sedation: IR Nurse Monitored Care   Post Procedure Summary:  Prior to the start of the procedure and with procedural staff participation, I verbally confirmed the patient s identity using two indicators, relevant allergies, that the procedure was appropriate and matched the consent or emergent situation, and that the correct equipment/implants were available. Immediately prior to starting the procedure I conducted the Time Out with the procedural staff and re-confirmed the patient s name, procedure, and site/side. (The Joint Commission universal protocol was followed.)  Yes       Sedatives: Fentanyl and Midazolam (Versed)    Vital signs, airway and pulse oximetry were monitored and remained stable throughout the procedure and sedation was maintained until the procedure was complete.  The patient was monitored by staff until sedation discharge criteria were met.    Patient tolerance: Patient tolerated the procedure well with no immediate complications.    Time of sedation in minutes: 30 Minutes minutes from beginning to end of physician one to one monitoring.          Findings: Routine CT guided R RP mass biopsy.  4 18g cores in formalin and 1 in  RPMI, path not present.     Estimated Blood Loss: None    Fluoroscopy Time:  minute(s)    SPECIMENS: Core needle biopsy specimens sent for pathological analysis    Complications: 1. None     Condition: Stable    Plan:   -Pt to recover for 2 hours obs then can discharge with  if otherwise back to baseline.       Comments: See dictated procedure note for full details.    Krishna Soliz MD

## 2019-04-05 NOTE — IR NOTE
Interventional Radiology Pre-Procedure Sedation Assessment   Time of Assessment: 9:40 AM    Expected Level: Moderate Sedation    Indication: Sedation is required for the following type of Procedure: Biopsy    Sedation and procedural consent: Risks, benefits and alternatives were discussed with Patient    PO Intake: Appropriately NPO for procedure    ASA Class: Class 1 - HEALTHY PATIENT    Mallampati: Grade 2:  Soft palate, base of uvula, tonsillar pillars, and portion of posterior pharyngeal wall visible    Lungs: Lungs Clear with good breath sounds bilaterally    Heart: Normal heart sounds and rate    History and physical reviewed and no updates needed. I have reviewed the lab findings, diagnostic data, medications, and the plan for sedation. I have determined this patient to be an appropriate candidate for the planned sedation and procedure and have reassessed the patient IMMEDIATELY PRIOR to sedation and procedure.    Krishna Soliz MD

## 2019-04-05 NOTE — DISCHARGE INSTRUCTIONS
Invasive Radiology Procedures Discharge Instructions         _____________________________________________________________________    Patient Name:  Jonas Hyman  Today's Date:  April 5, 2019    The doctor who did your procedure today was Dr. Soliz and Dr Simons.    Procedure:  Biopsy of  Abdominal Peritoneal biopsy    If you have not received your results after 5 days, please call the doctor who ordered your test.  Diet and medicines    Go back to your normal diet.    You may start taking your normal medicines again (including Coumadin, or warfarin), as shown on your medicine sheet.    If you take aspirin, Plavix or other anti-platelet drugs: Start taking it tomorrow.    If take Coumadin (warfarin): Ask your doctor when to have your INR checked.    For minor pain, you may take Tylenol (acetaminophen) or Advil (ibuprofen).    Activity and puncture site     You may go back to normal activity in 24 hours. Wait 48 hours before lifting,   straining, exercise or other strenuous activity.    For the next day or two, check your puncture site often while you are awake.    Change the Band-Aid or bandage tomorrow.    You may shower tomorrow morning. No bathing or swimming until your   puncture site has fully healed.    If you received IV medicine to sedate you:  You were given: Versed  and Fentanyl  You may feel drowsy, forgetful or unsteady. For the next 24 hours, do not drive, drink alcohol or make any important decisions.    Know when to call for help  Call your doctor if you have:    A fever greater over 101 F (38.3 C), taken under the tongue.    A lot of bleeding or swelling at your puncture site.    Pain that is getting worse.     Shortness of breath.  Call 911 or go the emergency room if you have:    Severe chest pain or trouble breathing.    A tube that falls out.     Increased blood in your sputum (phlegm).    Bleeding that you cannot control.   Important phone numbers:  [facility]  [facility] at [phone]

## 2019-04-05 NOTE — IP AVS SNAPSHOT
MRN:[id number]                      After Visit Summary   4/5/2019    Jonas Hyman    MRN: [id number]           Visit Information        Provider Department      4/5/2019 10:00 AM URCT2; UR IR RAD; URIRCT [facility], [place], Radiology           Review of your medicines      Notice    This visit is during an admission. Changes to the med list made in this visit will be reflected in the After Visit Summary of the admission.           Protect others around you: Learn how to safely use, store and throw away your medicines at www.disposemymeds.org.       Follow-ups after your visit       Your next 10 appointments already scheduled    Apr 08, 2019 10:40 AM CDT  RETURN RETINA with Anu Wilson MD  Eye Clinic ([facility]) [address]  9[facility] [address] [place] [id number] [phone]      Care Instructions       Further instructions from your care team         Invasive Radiology Procedures Discharge Instructions         _____________________________________________________________________    Patient Name:  Jonas Hyman  Today's Date:  April 5, 2019    The doctor who did your procedure today was Dr. Soliz and Dr Simons.    Procedure:  Biopsy of  Abdominal Peritoneal biopsy    If you have not received your results after 5 days, please call the doctor who ordered your test.  Diet and medicines    Go back to your normal diet.    You may start taking your normal medicines again (including Coumadin, or warfarin), as shown on your medicine sheet.    If you take aspirin, Plavix or other anti-platelet drugs: Start taking it tomorrow.    If take Coumadin (warfarin): Ask your doctor when to have your INR checked.    For minor pain, you may take Tylenol (acetaminophen) or Advil (ibuprofen).    Activity and puncture site     You may go back to normal activity in 24 hours. Wait 48 hours before lifting,   straining, exercise or other strenuous  "activity.    For the next day or two, check your puncture site often while you are awake.    Change the Band-Aid or bandage tomorrow.    You may shower tomorrow morning. No bathing or swimming until your   puncture site has fully healed.    If you received IV medicine to sedate you:  You were given: Versed  and Fentanyl  You may feel drowsy, forgetful or unsteady. For the next 24 hours, do not drive, drink alcohol or make any important decisions.    Know when to call for help  Call your doctor if you have:    A fever greater over 101 F (38.3 C), taken under the tongue.    A lot of bleeding or swelling at your puncture site.    Pain that is getting worse.     Shortness of breath.  Call 911 or go the emergency room if you have:    Severe chest pain or trouble breathing.    A tube that falls out.     Increased blood in your sputum (phlegm).    Bleeding that you cannot control.   Important phone numbers:  [facility] at [phone]    Additional Information About Your Visit       [employer] Information    [employer] lets you send messages to your doctor, view your test results, renew your prescriptions, schedule appointments and more. To sign up, go to [email or web address] . Click on \"Log in\" on the left side of the screen, which will take you to the Welcome page. Then click on \"Sign up Now\" on the right side of the page.     You will be asked to enter the access code listed below, as well as some personal information. Please follow the directions to create your username and password.     Your access code is: [email or web address]  Expires: 2019  1:33 PM     Your access code will  in 60 days. If you need help or a new code, please call your [place] clinic or [phone].       Care EveryWhere ID    This is your Care EveryWhere ID. This could be used by other organizations to access your [place] medical records  [id number]       Your Vitals Were     Blood Pressure "   161/84 (BP Location: Right arm)          Temperature   98.2  F (36.8  C) (Oral)          Respirations   18          Weight   113.5 kg (250 lb 4.6 oz)          Pulse Oximetry   97%             BMI (Body Mass Index)   35.91 kg/m           Primary Care Provider Office Phone # Fax #    Dillon Goldsmith [phone]      Equal Access to Services    [facility]: Hadii aad ku hadasho Soomaali, waaxda luqadaha, qaybta kaalmada adeegyada, waxay hosseinin hayaan adeeg kristelkeonlarry lamarcon . So [facility] [phone].    ATENCIÓN: Si habla español, tiene a barrera disposición servicios gratuitos de asistencia lingüística. [facility] [phone].    We comply with applicable federal civil rights laws and [place] laws. We do not discriminate on the basis of race, color, national origin, age, disability, sex, sexual orientation, or gender identity.           Thank you!    Thank you for choosing [facility] for your care. Our goal is always to provide you with excellent care. Hearing back from our patients is one way we can continue to improve our services. Please take a few minutes to complete the written survey that you may receive in the mail after you visit with us. Thank you!         Medication List     Notice    This visit is during an admission. Changes to the med list made in this visit will be reflected in the After Visit Summary of the admission.

## 2019-04-05 NOTE — IP AVS SNAPSHOT
[facility], [place], Radiology  [address] [address]  Phone:  [phone]                                    After Visit Summary   4/5/2019    Jonas Hyman    MRN: [id number]           After Visit Summary Signature Page    I have received my discharge instructions, and my questions have been answered. I have discussed any challenges I see with this plan with the nurse or doctor.    ..........................................................................................................................................  Patient/Patient Representative Signature      ..........................................................................................................................................  Patient Representative Print Name and Relationship to Patient    ..................................................               ................................................  Date                                   Time    ..........................................................................................................................................  Reviewed by Signature/Title    ...................................................              ..............................................  Date                                               Time          22EPIC Rev 08/18

## 2019-04-08 ENCOUNTER — OFFICE VISIT (OUTPATIENT)
Dept: OPHTHALMOLOGY | Facility: CLINIC | Age: 74
End: 2019-04-08
Attending: OPHTHALMOLOGY
Payer: MEDICARE

## 2019-04-08 DIAGNOSIS — H35.3211 EXUDATIVE AGE-RELATED MACULAR DEGENERATION OF RIGHT EYE WITH ACTIVE CHOROIDAL NEOVASCULARIZATION (H): Primary | ICD-10-CM

## 2019-04-08 PROBLEM — F32.A DEPRESSIVE DISORDER: Status: ACTIVE | Noted: 2019-04-08

## 2019-04-08 PROBLEM — H33.059 TOTAL RETINAL DETACHMENT: Status: ACTIVE | Noted: 2019-04-08

## 2019-04-08 PROBLEM — Z95.5 STATUS POST CORONARY ARTERY STENT PLACEMENT: Status: ACTIVE | Noted: 2019-01-03

## 2019-04-08 PROBLEM — E78.5 HYPERLIPIDEMIA: Status: ACTIVE | Noted: 2019-04-08

## 2019-04-08 PROBLEM — F19.11 OTHER, MIXED, OR UNSPECIFIED NONDEPENDENT DRUG ABUSE, IN REMISSION: Status: ACTIVE | Noted: 2019-04-08

## 2019-04-08 PROBLEM — I25.119 ATHEROSCLEROSIS OF NATIVE CORONARY ARTERY OF NATIVE HEART WITH ANGINA PECTORIS (H): Status: ACTIVE | Noted: 2019-04-08

## 2019-04-08 PROBLEM — I10 ESSENTIAL HYPERTENSION: Status: ACTIVE | Noted: 2019-04-08

## 2019-04-08 PROBLEM — H26.9 CATARACT: Status: ACTIVE | Noted: 2019-04-08

## 2019-04-08 PROBLEM — F10.11 ALCOHOL ABUSE, IN REMISSION: Status: ACTIVE | Noted: 2019-04-08

## 2019-04-08 PROBLEM — H35.351 CYSTOID MACULAR EDEMA OF RIGHT EYE: Status: ACTIVE | Noted: 2018-08-14

## 2019-04-08 PROBLEM — H35.3210 EXUDATIVE AGE-RELATED MACULAR DEGENERATION OF RIGHT EYE (H): Status: ACTIVE | Noted: 2018-10-30

## 2019-04-08 PROBLEM — E11.59 TYPE 2 DIABETES MELLITUS WITH CIRCULATORY DISORDER, WITHOUT LONG-TERM CURRENT USE OF INSULIN (H): Status: ACTIVE | Noted: 2019-01-03

## 2019-04-08 PROBLEM — I25.10 CHRONIC CORONARY ARTERY DISEASE: Status: ACTIVE | Noted: 2019-01-03

## 2019-04-08 PROBLEM — K57.30 DIVERTICULOSIS OF LARGE INTESTINE WITHOUT HEMORRHAGE: Status: ACTIVE | Noted: 2018-10-30

## 2019-04-08 PROBLEM — E55.9 VITAMIN D DEFICIENCY: Status: ACTIVE | Noted: 2019-04-08

## 2019-04-08 LAB — COPATH REPORT: NORMAL

## 2019-04-08 PROCEDURE — 25000128 H RX IP 250 OP 636: Mod: ZF | Performed by: OPHTHALMOLOGY

## 2019-04-08 PROCEDURE — 67028 INJECTION EYE DRUG: CPT | Mod: RT,ZF | Performed by: OPHTHALMOLOGY

## 2019-04-08 PROCEDURE — 40000269 ZZH STATISTIC NO CHARGE FACILITY FEE: Mod: ZF

## 2019-04-08 RX ADMIN — AFLIBERCEPT 2 MG: 40 INJECTION, SOLUTION INTRAVITREAL at 11:30

## 2019-04-08 ASSESSMENT — TONOMETRY
IOP_METHOD: TONOPEN
OS_IOP_MMHG: 12
OD_IOP_MMHG: 15

## 2019-04-08 ASSESSMENT — VISUAL ACUITY
OD_SC: 20/150
METHOD: SNELLEN - LINEAR
OS_SC: 20/20+2
OD_PH_SC: 20/100-

## 2019-04-08 ASSESSMENT — CONF VISUAL FIELD
OS_NORMAL: 1
METHOD: COUNTING FINGERS
OD_NORMAL: 1

## 2019-04-08 NOTE — PROGRESS NOTES
CC -   Wet ARMD OD    INTERVAL HISTORY - [place] that vision in right eye was getting worse.   Just finishing Pred forte and ketorolac left eye after cataract surgery.  Being treated for Aortic aneurysm at [facility]/ [facility] artery? As well as liposarcoma    HPI -   Jonas Hyman is a  73 year old year-old patient referred by Dr Pinedo for wet  AMD OD.  Had gone few years without eye exam d/t insurance lack, gradual progressive loss OU.  Prior RD repair OD 2014, unsure what BCVA was after repair.  Caring for grandson  VA has worsened OU over years, now trouble with reading/drivign, ADLs.    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  Wet AMD OD   - uncertain duration by history   - uncertain vision potential given prior h/o RD   - started Avastin 9/17/18 (#6 total)   - changed to Eylea 3/12/19     - last injection Eylea  (#1)  3/12/19 (4 -5weeks)     - PRIOR - OCT SRF stable vs worse   - VA stable today   - inject Eylea today injection only #2 of 3 (changing from Avastin)     - RTC 1 month injection only Eylea   - will try to get Eylea approval        2. H/o RD repair OD   - s/p PPV/SBP/FGx  2014   - retina flat      3. syneresis OS   - advised s/sx RD    4. 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 Pseudophakia OU   - ACIOL OD   - CE/IOL OS 12/7/18 ([facility])      return to clinic: 4 weeks, injection only Eylea            ATTESTATION     Attending Attestation:     Complete documentation of historical and exam elements from today's encounter can be found in the full encounter summary report (not reduplicated in this progress note).  I personally obtained the  chief complaint(s) and history of present illness.  I confirmed and edited as necessary the review of systems, past medical/surgical history, family history, social history, and examination findings as documented by others; and I examined the patient myself.  I personally reviewed the relevant tests, images, and reports as documented above.  I formulated and edited as necessary the assessment and plan and discussed the findings and management plan with the patient and family    Anu Wilson MD, PhD  , Vitreoretinal Surgery  Department of Ophthalmology  [facility]

## 2019-04-25 ENCOUNTER — COMMUNICATION - HEALTHEAST (OUTPATIENT)
Dept: INTERNAL MEDICINE | Facility: CLINIC | Age: 74
End: 2019-04-25

## 2019-04-25 ENCOUNTER — TELEPHONE (OUTPATIENT)
Dept: OPHTHALMOLOGY | Facility: CLINIC | Age: 74
End: 2019-04-25

## 2019-05-06 DIAGNOSIS — H35.3211 EXUDATIVE AGE-RELATED MACULAR DEGENERATION OF RIGHT EYE WITH ACTIVE CHOROIDAL NEOVASCULARIZATION (H): Primary | ICD-10-CM

## 2019-05-20 ENCOUNTER — COMMUNICATION - HEALTHEAST (OUTPATIENT)
Dept: INTERNAL MEDICINE | Facility: CLINIC | Age: 74
End: 2019-05-20

## 2019-05-20 ENCOUNTER — OFFICE VISIT (OUTPATIENT)
Dept: OPHTHALMOLOGY | Facility: CLINIC | Age: 74
End: 2019-05-20
Attending: OPHTHALMOLOGY
Payer: MEDICARE

## 2019-05-20 DIAGNOSIS — H35.3211 EXUDATIVE AGE-RELATED MACULAR DEGENERATION OF RIGHT EYE WITH ACTIVE CHOROIDAL NEOVASCULARIZATION (H): Primary | ICD-10-CM

## 2019-05-20 PROCEDURE — 25000128 H RX IP 250 OP 636: Mod: ZF | Performed by: OPHTHALMOLOGY

## 2019-05-20 PROCEDURE — 40000269 ZZH STATISTIC NO CHARGE FACILITY FEE: Mod: ZF

## 2019-05-20 PROCEDURE — 67028 INJECTION EYE DRUG: CPT | Mod: RT,ZF | Performed by: OPHTHALMOLOGY

## 2019-05-20 RX ADMIN — AFLIBERCEPT 2 MG: 40 INJECTION, SOLUTION INTRAVITREAL at 11:50

## 2019-05-20 ASSESSMENT — REFRACTION_WEARINGRX
OS_SPHERE: -0.75
OS_CYLINDER: +0.75
OD_AXIS: 164
OS_AXIS: 026
OD_SPHERE: +0.25
OS_ADD: +2.75
OD_ADD: +2.75
OD_CYLINDER: +0.75

## 2019-05-20 ASSESSMENT — TONOMETRY
OS_IOP_MMHG: 12
OD_IOP_MMHG: 15
IOP_METHOD: TONOPEN

## 2019-05-20 ASSESSMENT — CONF VISUAL FIELD
OS_NORMAL: 1
METHOD: COUNTING FINGERS
OD_NORMAL: 1

## 2019-05-20 ASSESSMENT — VISUAL ACUITY
OS_SC: 20/20
METHOD: SNELLEN - LINEAR
OS_SC+: -3
OD_SC: 20/150
OD_PH_SC: 20/125

## 2019-05-20 NOTE — NURSING NOTE
Chief Complaints and History of Present Illnesses   Patient presents with     Follow Up     1 month follow up  Wet AMD OD     Chief Complaint(s) and History of Present Illness(es)     Follow Up     Comments: 1 month follow up  Wet AMD OD              Comments     Pt states vision is the same as last visit. No eye pain today. No flashes or floaters.  DM2 BS: Pt doesn't check sugars daily.  A1C: Unknown to pt, taken 2 months ago.  No results found for: A1C    Gerson MORALES May 20, 2019 11:25 AM

## 2019-05-20 NOTE — PROGRESS NOTES
CC -   Wet ARMD OD    INTERVAL HISTORY - VA stable since SISSY    [facility] -   Jonas Hyman is a  73 year old year-old patient referred by Dr Pinedo for wet  AMD OD.  Had gone few years without eye exam d/t insurance lack, gradual progressive loss OU.  Prior RD repair OD 2014, unsure what BCVA was after repair.  Caring for grandson  VA has worsened OU over years, now trouble with reading/drivign, ADLs.    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  Wet AMD OD   - uncertain duration by history   - uncertain vision potential given prior h/o RD   - started Avastin 9/17/18 (#6 total)   - changed to Eylea 3/12/19     - last injection Eylea  (#2)  4/8/19 (6 weeks, 4 week interval)     - PRIOR - OCT SRF stable vs worse   - VA stable today   - inject Eylea today injection only #3 of 3      - RTC 1 month OCT + DFE          2. H/o RD repair OD   - s/p PPV/SBP/FGx  2014   - retina flat      3. syneresis OS   - advised s/sx RD    4. 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 Pseudophakia OU   - ACIOL OD   - CE/IOL OS 12/7/18 (Elizabeth)      return to clinic: 4 weeks, OCT+DFE            ATTESTATION     Attending Attestation:     Complete documentation of historical and exam elements from today's encounter can be found in the full encounter summary report (not reduplicated in this progress note).  I personally obtained the chief complaint(s) and history of present illness.  I confirmed and edited as necessary the review of systems, past medical/surgical history, family history, social history, and examination findings as documented by others; and I examined the patient  myself.  I personally reviewed the relevant tests, images, and reports as documented above.  I formulated and edited as necessary the assessment and plan and discussed the findings and management plan with the patient and family    Anu Wilson MD, PhD  , Vitreoretinal Surgery  Department of Ophthalmology  [facility]

## 2019-06-17 NOTE — PROGRESS NOTES
CC -   Wet ARMD OD    INTERVAL HISTORY - VA stable since SISSY    [facility] -   Jonas Hyman is a  73 year old year-old patient referred by Dr Pinedo for wet  AMD OD.  Had gone few years without eye exam d/t insurance lack, gradual progressive loss OU.  Prior RD repair OD 2014, unsure what BCVA was after repair.  Caring for grandson  VA has worsened OU over years, now trouble with reading/drivign, ADLs.    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  Wet AMD OD   - uncertain duration by history   - uncertain vision potential given prior h/o RD   - started Avastin 9/17/18 (#6 total)   - changed to Eylea 3/12/19     - last injection Eylea  (#3)  5/20/19 (4 weeks, 4 week interval)     -- OCT SRF stable vs worse   - VA stable today   - inject Eylea today injection only #1 of 3     - RTC 1 month           2. H/o RD repair OD   - s/p PPV/SBP/FGx  2014   - retina flat      3. syneresis OS   - advised s/sx RD 6/2019    4. 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 Pseudophakia OU   - ACIOL OD   - CE/IOL OS 12/7/18 (Elizabeth)      return to clinic: 4 weeks injection only            ATTESTATION     Attending Attestation:     Complete documentation of historical and exam elements from today's encounter can be found in the full encounter summary report (not reduplicated in this progress note).  I personally obtained the chief complaint(s) and history of present illness.  I confirmed and edited as necessary the review of systems, past medical/surgical history, family history, social history, and examination findings as documented by others; and I examined the patient  myself.  I personally reviewed the relevant tests, images, and reports as documented above.  I formulated and edited as necessary the assessment and plan and discussed the findings and management plan with the patient and family    Anu Wilson MD, PhD  , Vitreoretinal Surgery  Department of Ophthalmology  [facility]

## 2019-06-18 ENCOUNTER — OFFICE VISIT (OUTPATIENT)
Dept: OPHTHALMOLOGY | Facility: CLINIC | Age: 74
End: 2019-06-18
Attending: OPHTHALMOLOGY
Payer: MEDICARE

## 2019-06-18 DIAGNOSIS — H35.3211 EXUDATIVE AGE-RELATED MACULAR DEGENERATION OF RIGHT EYE WITH ACTIVE CHOROIDAL NEOVASCULARIZATION (H): ICD-10-CM

## 2019-06-18 PROCEDURE — G0463 HOSPITAL OUTPT CLINIC VISIT: HCPCS | Mod: ZF

## 2019-06-18 PROCEDURE — 92134 CPTRZ OPH DX IMG PST SGM RTA: CPT | Mod: ZF | Performed by: OPHTHALMOLOGY

## 2019-06-18 PROCEDURE — 67028 INJECTION EYE DRUG: CPT | Mod: RT,ZF | Performed by: OPHTHALMOLOGY

## 2019-06-18 PROCEDURE — G0463 HOSPITAL OUTPT CLINIC VISIT: HCPCS | Mod: 25

## 2019-06-18 PROCEDURE — 25000128 H RX IP 250 OP 636: Mod: ZF | Performed by: OPHTHALMOLOGY

## 2019-06-18 RX ADMIN — AFLIBERCEPT 2 MG: 40 INJECTION, SOLUTION INTRAVITREAL at 11:46

## 2019-06-18 ASSESSMENT — CONF VISUAL FIELD
OD_SUPERIOR_NASAL_RESTRICTION: 3
OS_INFERIOR_NASAL_RESTRICTION: 3

## 2019-06-18 ASSESSMENT — TONOMETRY
OD_IOP_MMHG: 18
IOP_METHOD: TONOPEN
OS_IOP_MMHG: 15

## 2019-06-18 ASSESSMENT — REFRACTION_WEARINGRX
OD_ADD: +2.75
OS_SPHERE: -0.75
OS_AXIS: 026
OD_SPHERE: +0.25
OS_ADD: +2.75
OD_AXIS: 164
OD_CYLINDER: +0.75
OS_CYLINDER: +0.75

## 2019-06-18 ASSESSMENT — SLIT LAMP EXAM - LIDS
COMMENTS: NORMAL
COMMENTS: NORMAL

## 2019-06-18 ASSESSMENT — CUP TO DISC RATIO
OS_RATIO: 0.3
OD_RATIO: 0.5

## 2019-06-18 ASSESSMENT — VISUAL ACUITY
METHOD: SNELLEN - LINEAR
OD_PH_SC+: +2
OS_SC+: -2
OD_SC: 20/125
OS_SC: 20/25
OD_PH_SC: 20/80

## 2019-06-18 ASSESSMENT — EXTERNAL EXAM - LEFT EYE: OS_EXAM: NORMAL

## 2019-06-18 ASSESSMENT — EXTERNAL EXAM - RIGHT EYE: OD_EXAM: NORMAL

## 2019-06-18 NOTE — NURSING NOTE
Chief Complaints and History of Present Illnesses   Patient presents with     Follow Up     1 month follow up, Wet AMD OD     Chief Complaint(s) and History of Present Illness(es)     Follow Up     Comments: 1 month follow up, Wet AMD OD              Comments     Reports stable VA, denies pain, discharge, redness, and flashes.   DM2  BS: Unknown, Pt does not check reguraly   A1C: Unknown, April, 2019   No results found for: A1C    Rishabh NOBLES, June 18, 2019 10:33 AM  Gerson MORALES June 18, 2019 10:42 AM

## 2019-07-08 ENCOUNTER — COMMUNICATION - HEALTHEAST (OUTPATIENT)
Dept: SCHEDULING | Facility: CLINIC | Age: 74
End: 2019-07-08

## 2019-07-08 ENCOUNTER — COMMUNICATION - HEALTHEAST (OUTPATIENT)
Dept: INTERNAL MEDICINE | Facility: CLINIC | Age: 74
End: 2019-07-08

## 2019-07-08 DIAGNOSIS — I25.10 CORONARY ARTERY DISEASE INVOLVING NATIVE CORONARY ARTERY OF NATIVE HEART WITHOUT ANGINA PECTORIS: ICD-10-CM

## 2019-07-08 DIAGNOSIS — I10 ESSENTIAL HYPERTENSION WITH GOAL BLOOD PRESSURE LESS THAN 140/90: ICD-10-CM

## 2019-07-22 ENCOUNTER — ALLIED HEALTH/NURSE VISIT (OUTPATIENT)
Dept: OPHTHALMOLOGY | Facility: CLINIC | Age: 74
End: 2019-07-22
Attending: OPHTHALMOLOGY
Payer: MEDICARE

## 2019-07-22 DIAGNOSIS — H35.3211 EXUDATIVE AGE-RELATED MACULAR DEGENERATION OF RIGHT EYE WITH ACTIVE CHOROIDAL NEOVASCULARIZATION (H): Primary | ICD-10-CM

## 2019-07-22 PROCEDURE — 40000269 ZZH STATISTIC NO CHARGE FACILITY FEE: Mod: ZF

## 2019-07-22 PROCEDURE — 25000128 H RX IP 250 OP 636: Mod: ZF | Performed by: OPHTHALMOLOGY

## 2019-07-22 PROCEDURE — 67028 INJECTION EYE DRUG: CPT | Mod: RT,ZF | Performed by: OPHTHALMOLOGY

## 2019-07-22 RX ORDER — FUROSEMIDE 40 MG
40 TABLET ORAL DAILY
COMMUNITY
Start: 2019-07-08 | End: 2023-07-24

## 2019-07-22 RX ADMIN — AFLIBERCEPT 2 MG: 40 INJECTION, SOLUTION INTRAVITREAL at 11:41

## 2019-07-22 ASSESSMENT — TONOMETRY
OS_IOP_MMHG: 12
IOP_METHOD: TONOPEN
OD_IOP_MMHG: 14

## 2019-07-22 ASSESSMENT — VISUAL ACUITY
OD_PH_SC+: -1
OS_SC+: -3
OS_SC: 20/20
METHOD: SNELLEN - LINEAR
OD_SC: 20/250
OD_PH_SC: 20/125

## 2019-07-22 NOTE — PROGRESS NOTES
CC -   Wet ARMD OD    INTERVAL HISTORY - Missed last appt d/t clinic visit.  Trying to reduce smoking    HPI -   Jonas Hyman is a  73 year old year-old patient referred by Dr Pinedo for wet  AMD OD.  Had gone few years without eye exam d/t insurance lack, gradual progressive loss OU.  Prior RD repair OD 2014, unsure what BCVA was after repair.  Caring for grandson  VA has worsened OU over years, now trouble with reading/drivign, ADLs.    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  Wet AMD OD   - uncertain duration by history   - uncertain vision potential given prior h/o RD   - started Avastin 9/17/18 (#6 total)   - changed to Eylea 3/12/19     - last injection Eylea  (#4)  6/18/19 (5 weeks, 4 week interval)     -- PRIOR OCT SRF stable vs worse   - VA stable today   - inject Eylea today injection only #2 of 3   - RTC 4 weeks        2. H/o RD repair OD   - s/p PPV/SBP/FGx  2014   - retina flat      3. syneresis OS   - advised s/sx RD 6/2019    4. 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  I personally obtained the chief complaint(s) and history of present illness.  I confirmed and edited as necessary the review of systems, past medical/surgical history, family history, social history, and examination findings as documented by others; and I examined the patient myself.  I personally  reviewed the relevant tests, images, and reports as documented above.  I formulated and edited as necessary the assessment and plan and discussed the findings and management plan with the patient and family    Anu Wilson MD, PhD  , Vitreoretinal Surgery  Department of Ophthalmology  [facility]

## 2019-07-22 NOTE — NURSING NOTE
Chief Complaints and History of Present Illnesses   Patient presents with     Follow Up     Chief Complaint(s) and History of Present Illness(es)     Follow Up     Laterality: right eye    Onset: 1 month ago    Associated symptoms: Negative for flashes and floaters    Pain scale: 0/10              Comments     Pt here for 1 month f/u wet ARMD right eye  Pt did have some severe headaches a couple weeks ago - this has since resolved and is now on lasix    Cesilia KAY 11:19 AM July 22, 2019

## 2019-07-29 ENCOUNTER — COMMUNICATION - HEALTHEAST (OUTPATIENT)
Dept: INTERNAL MEDICINE | Facility: CLINIC | Age: 74
End: 2019-07-29

## 2019-07-29 DIAGNOSIS — I25.10 CORONARY ATHEROSCLEROSIS DUE TO CALCIFIED CORONARY LESION: ICD-10-CM

## 2019-07-29 DIAGNOSIS — R07.9 CHEST PAIN: ICD-10-CM

## 2019-07-29 DIAGNOSIS — I25.84 CORONARY ATHEROSCLEROSIS DUE TO CALCIFIED CORONARY LESION: ICD-10-CM

## 2019-08-02 ENCOUNTER — COMMUNICATION - HEALTHEAST (OUTPATIENT)
Dept: INTERNAL MEDICINE | Facility: CLINIC | Age: 74
End: 2019-08-02

## 2019-08-12 ENCOUNTER — COMMUNICATION - HEALTHEAST (OUTPATIENT)
Dept: INTERNAL MEDICINE | Facility: CLINIC | Age: 74
End: 2019-08-12

## 2019-08-26 ENCOUNTER — OFFICE VISIT (OUTPATIENT)
Dept: OPHTHALMOLOGY | Facility: CLINIC | Age: 74
End: 2019-08-26
Attending: OPHTHALMOLOGY
Payer: MEDICARE

## 2019-08-26 DIAGNOSIS — H35.3211 EXUDATIVE AGE-RELATED MACULAR DEGENERATION OF RIGHT EYE WITH ACTIVE CHOROIDAL NEOVASCULARIZATION (H): Primary | ICD-10-CM

## 2019-08-26 PROCEDURE — 67028 INJECTION EYE DRUG: CPT | Mod: RT,ZF | Performed by: OPHTHALMOLOGY

## 2019-08-26 PROCEDURE — 25000128 H RX IP 250 OP 636: Mod: ZF | Performed by: OPHTHALMOLOGY

## 2019-08-26 PROCEDURE — G0463 HOSPITAL OUTPT CLINIC VISIT: HCPCS | Mod: ZF,25

## 2019-08-26 RX ADMIN — AFLIBERCEPT 2 MG: 40 INJECTION, SOLUTION INTRAVITREAL at 10:55

## 2019-08-26 ASSESSMENT — VISUAL ACUITY
OS_SC+: -2
METHOD: SNELLEN - LINEAR
OD_PH_SC: 20/100
OD_PH_SC+: +1
OD_SC: 20/300
OS_SC: 20/20

## 2019-08-26 ASSESSMENT — TONOMETRY
OS_IOP_MMHG: 11
OD_IOP_MMHG: 13
IOP_METHOD: TONOPEN

## 2019-08-26 ASSESSMENT — CONF VISUAL FIELD
OS_INFERIOR_NASAL_RESTRICTION: 3
METHOD: COUNTING FINGERS
OD_SUPERIOR_NASAL_RESTRICTION: 3

## 2019-08-26 NOTE — PROGRESS NOTES
CC -   Wet AMD OD    INTERVAL HISTORY - VA stable.  Trying to reduce smoking    HPI -   Jonas Hyman is a  73 year old year-old patient referred by Dr Pinedo for wet  AMD OD.  Had gone few years without eye exam d/t insurance lack, gradual progressive loss OU.  Prior RD repair OD 2014, unsure what BCVA was after repair.  Caring for grandson  VA has worsened OU over years, now trouble with reading/drivign, ADLs.    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  Wet AMD OD   - uncertain duration by history   - uncertain vision potential given prior h/o RD   - started Avastin 9/17/18 (#6 total)   - changed to Eylea 3/12/19     - last injection Eylea  (#5) 7/22/19 ( 4 weeks, 4 week interval)     -- PRIOR OCT SRF stable vs worse   - VA stable today   - inject Eylea today injection only #3 of 3   - RTC 4 weeks OCT + DFE        2. H/o RD repair OD   - s/p PPV/SBP/FGx  2014   - retina flat      3. syneresis OS   - advised s/sx RD 6/2019    4. 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  I personally obtained the chief complaint(s) and history of present illness.  I confirmed and edited as necessary the review of systems, past medical/surgical history, family history, social history, and examination findings as documented by others; and I examined the patient myself.  I personally reviewed the relevant  tests, images, and reports as documented above.  I formulated and edited as necessary the assessment and plan and discussed the findings and management plan with the patient and family    Anu Wilson MD, PhD  , Vitreoretinal Surgery  Department of Ophthalmology  [facility]

## 2019-08-26 NOTE — NURSING NOTE
Chief Complaints and History of Present Illnesses   Patient presents with     Follow Up     1 month follow-up Wet AMD OD     Chief Complaint(s) and History of Present Illness(es)     Follow Up     Comments: 1 month follow-up Wet AMD OD              Comments     Pt denies any significant vision changes in either eye since last visit.  Complains of frequent tearing BE.  Denies any flashes, floaters, pain, pressure, irritation, and discharge.    Christi Kolb OT 10:31 AM August 26, 2019

## 2019-09-23 ENCOUNTER — OFFICE VISIT (OUTPATIENT)
Dept: OPHTHALMOLOGY | Facility: CLINIC | Age: 74
End: 2019-09-23
Attending: OPHTHALMOLOGY
Payer: MEDICARE

## 2019-09-23 DIAGNOSIS — H35.3211 EXUDATIVE AGE-RELATED MACULAR DEGENERATION OF RIGHT EYE WITH ACTIVE CHOROIDAL NEOVASCULARIZATION (H): Primary | ICD-10-CM

## 2019-09-23 DIAGNOSIS — H35.3211 EXUDATIVE AGE-RELATED MACULAR DEGENERATION OF RIGHT EYE WITH ACTIVE CHOROIDAL NEOVASCULARIZATION (H): ICD-10-CM

## 2019-09-23 PROCEDURE — 92134 CPTRZ OPH DX IMG PST SGM RTA: CPT | Mod: ZF | Performed by: OPHTHALMOLOGY

## 2019-09-23 PROCEDURE — G0463 HOSPITAL OUTPT CLINIC VISIT: HCPCS | Mod: ZF

## 2019-09-23 PROCEDURE — 25000128 H RX IP 250 OP 636: Mod: ZF | Performed by: OPHTHALMOLOGY

## 2019-09-23 PROCEDURE — 67028 INJECTION EYE DRUG: CPT | Mod: RT,ZF | Performed by: OPHTHALMOLOGY

## 2019-09-23 RX ADMIN — AFLIBERCEPT 2 MG: 40 INJECTION, SOLUTION INTRAVITREAL at 12:59

## 2019-09-23 ASSESSMENT — EXTERNAL EXAM - LEFT EYE: OS_EXAM: NORMAL

## 2019-09-23 ASSESSMENT — VISUAL ACUITY
OD_SC: 20/125 SLOW
OD_PH_SC: 20/80 SLOW
OS_SC: 20/25
OS_SC+: +1
OD_PH_SC+: -1
METHOD: SNELLEN - LINEAR

## 2019-09-23 ASSESSMENT — CUP TO DISC RATIO
OS_RATIO: 0.3
OD_RATIO: 0.5

## 2019-09-23 ASSESSMENT — CONF VISUAL FIELD
OS_SUPERIOR_TEMPORAL_RESTRICTION: 3
OD_NORMAL: 1

## 2019-09-23 ASSESSMENT — TONOMETRY
OD_IOP_MMHG: 16
IOP_METHOD: TONOPEN
OS_IOP_MMHG: 13

## 2019-09-23 ASSESSMENT — EXTERNAL EXAM - RIGHT EYE: OD_EXAM: NORMAL

## 2019-09-23 ASSESSMENT — SLIT LAMP EXAM - LIDS
COMMENTS: NORMAL
COMMENTS: NORMAL

## 2019-09-23 NOTE — NURSING NOTE
"Chief Complaints and History of Present Illnesses   Patient presents with     Follow Up     Chief Complaint(s) and History of Present Illness(es)     Follow Up     Laterality: right eye    Onset: 1 month ago    Associated symptoms: Negative for flashes and floaters    Pain scale: 0/10              Comments     Pt here for f/u wet AMD right eye - possible injection today  Pt feels there may be a little less vision in the left eye (his \"good eye\")    Cesilia Garvey, COA COA 10:24 AM September 23, 2019                 "

## 2019-09-23 NOTE — PROGRESS NOTES
CC -   Wet AMD OD    INTERVAL HISTORY - VA stable.  Trying to reduce smoking    HPI -   Jonas Hyman is a  73 year old year-old patient referred by Dr Pinedo for wet  AMD OD.  Had gone few years without eye exam d/t insurance lack, gradual progressive loss OU.  Prior RD repair OD 2014, unsure what BCVA was after repair.  Caring for grandson  VA has worsened OU over years, now trouble with reading/drivign, ADLs.    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  Wet AMD OD   - uncertain duration by history   - uncertain vision potential given prior h/o RD   - started Avastin 9/17/18 (#6 total)   - changed to Eylea 3/12/19     - last injection Eylea  (#6) 8/23/19 ( 4 weeks, 4 week interval)     -OCT SRF stable   - VA stable today vs better   - inject Eylea today    - RTC 4 weeks injection only #1 of 3     - consider extending interval in future        2. H/o RD repair OD   - s/p PPV/SBP/FGx  2014   - retina flat      3. syneresis OS   - advised s/sx RD 6/2019    4. 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  I personally obtained the chief complaint(s) and history of present illness.  I confirmed and edited as necessary the review of systems, past medical/surgical history, family history, social history, and examination findings as documented by others; and I examined the patient  myself.  I personally reviewed the relevant tests, images, and reports as documented above.  I formulated and edited as necessary the assessment and plan and discussed the findings and management plan with the patient and family    Anu Wilson MD, PhD  , Vitreoretinal Surgery  Department of Ophthalmology  [facility]

## 2019-10-21 ENCOUNTER — OFFICE VISIT (OUTPATIENT)
Dept: OPHTHALMOLOGY | Facility: CLINIC | Age: 74
End: 2019-10-21
Attending: OPHTHALMOLOGY
Payer: MEDICARE

## 2019-10-21 DIAGNOSIS — H35.3211 EXUDATIVE AGE-RELATED MACULAR DEGENERATION OF RIGHT EYE WITH ACTIVE CHOROIDAL NEOVASCULARIZATION (H): Primary | ICD-10-CM

## 2019-10-21 PROCEDURE — 67028 INJECTION EYE DRUG: CPT | Mod: RT,ZF | Performed by: OPHTHALMOLOGY

## 2019-10-21 PROCEDURE — 25000128 H RX IP 250 OP 636: Mod: ZF | Performed by: OPHTHALMOLOGY

## 2019-10-21 PROCEDURE — G0463 HOSPITAL OUTPT CLINIC VISIT: HCPCS | Mod: ZF,25

## 2019-10-21 RX ADMIN — AFLIBERCEPT 2 MG: 40 INJECTION, SOLUTION INTRAVITREAL at 10:28

## 2019-10-21 ASSESSMENT — CONF VISUAL FIELD
OS_SUPERIOR_TEMPORAL_RESTRICTION: 3
OD_NORMAL: 1

## 2019-10-21 ASSESSMENT — TONOMETRY
OS_IOP_MMHG: 12
OD_IOP_MMHG: 12
IOP_METHOD: ICARE

## 2019-10-21 ASSESSMENT — VISUAL ACUITY
METHOD: SNELLEN - LINEAR
OS_SC: 20/20
OD_SC: 20/200
OD_PH_SC: 20/70

## 2019-10-21 NOTE — NURSING NOTE
Chief Complaints and History of Present Illnesses   Patient presents with     Follow Up     Exudative age-related macular degeneration of right eye with active choroidal neovascularization     Injection only RE.     Chief Complaint(s) and History of Present Illness(es)     Follow Up     Associated symptoms: Negative for eye pain, dryness, tearing, floaters and flashes    Comments: Exudative age-related macular degeneration of right eye with active choroidal neovascularization     Injection only RE.              Comments     Pt states that VA is stable.  Pt has no pain or other concerns at this time.    IESHA Arenas October 21, 2019 10:09 AM

## 2019-10-21 NOTE — PROGRESS NOTES
CC -   Wet AMD OD    INTERVAL HISTORY - VA stable.  Trying to reduce smoking    HPI -   Jonas Hyman is a  74 year old year-old patient referred by Dr Pinedo for wet  AMD OD.  Had gone few years without eye exam d/t insurance lack, gradual progressive loss OU.  Prior RD repair OD 2014, unsure what BCVA was after repair.  Caring for grandson  VA has worsened OU over years, now trouble with reading/drivign, ADLs.    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  Wet AMD OD   - uncertain duration by history   - uncertain vision potential given prior h/o RD   - started Avastin 9/17/18 (#6 total)   - changed to Eylea 3/12/19     - last injection Eylea  (#7) 9/23/19 ( 4 weeks, 4 week interval)     -OCT SRF stable   - VA stable today vs better   - inject Eylea today injectino only #1 of 3   - RTC 4 weeks injection only     - consider extending interval after this series        2. H/o RD repair OD   - s/p PPV/SBP/FGx  2014   - retina flat      3. syneresis OS   - advised s/sx RD 6/2019    4. OAG suspect OD   - d/t CDR asymmetry   - RNFL OK    - saw Elizabeth          return to clinic: 4 weeks  Injection only             ATTESTATION     Attending Attestation:     Complete documentation of historical and exam elements from today's encounter can be found in the full encounter summary report (not reduplicated in this progress note).  I personally obtained the chief complaint(s) and history of present illness.  I confirmed and edited as necessary the review of systems, past medical/surgical history, family history, social history, and examination findings as documented by others; and I examined  the patient myself.  I personally reviewed the relevant tests, images, and reports as documented above.  I formulated and edited as necessary the assessment and plan and discussed the findings and management plan with the patient and family    Anu Wilson MD, PhD  , Vitreoretinal Surgery  Department of Ophthalmology  [facility]

## 2019-11-22 ENCOUNTER — OFFICE VISIT (OUTPATIENT)
Dept: OPHTHALMOLOGY | Facility: CLINIC | Age: 74
End: 2019-11-22
Attending: OPHTHALMOLOGY
Payer: MEDICARE

## 2019-11-22 DIAGNOSIS — H35.3211 EXUDATIVE AGE-RELATED MACULAR DEGENERATION OF RIGHT EYE WITH ACTIVE CHOROIDAL NEOVASCULARIZATION (H): Primary | ICD-10-CM

## 2019-11-22 PROCEDURE — 67028 INJECTION EYE DRUG: CPT | Mod: RT,ZF | Performed by: OPHTHALMOLOGY

## 2019-11-22 PROCEDURE — 25000128 H RX IP 250 OP 636: Mod: ZF | Performed by: OPHTHALMOLOGY

## 2019-11-22 PROCEDURE — 40000269 ZZH STATISTIC NO CHARGE FACILITY FEE: Mod: ZF

## 2019-11-22 RX ADMIN — AFLIBERCEPT 2 MG: 40 INJECTION, SOLUTION INTRAVITREAL at 11:31

## 2019-11-22 ASSESSMENT — CONF VISUAL FIELD
OD_SUPERIOR_TEMPORAL_RESTRICTION: 3
OS_NORMAL: 1
METHOD: COUNTING FINGERS

## 2019-11-22 ASSESSMENT — REFRACTION_WEARINGRX
OS_AXIS: 026
OS_SPHERE: -0.75
OD_CYLINDER: +0.75
OS_ADD: +2.75
OD_SPHERE: +0.25
OS_CYLINDER: +0.75
OD_ADD: +2.75
OD_AXIS: 164

## 2019-11-22 ASSESSMENT — TONOMETRY
OD_IOP_MMHG: 16
IOP_METHOD: TONOPEN
OS_IOP_MMHG: 10

## 2019-11-22 ASSESSMENT — VISUAL ACUITY
OD_PH_SC+: +1
OS_SC+: +2
OD_PH_SC: 20/80
OD_SC: 20/200
OS_SC: 20/20
METHOD: SNELLEN - LINEAR

## 2019-11-22 NOTE — PROGRESS NOTES
CC -   Wet AMD OD    INTERVAL HISTORY - VA stable.  Trying to reduce smoking    HPI -   Jonas Hyman is a  74 year old year-old patient referred by Dr Pinedo for wet  AMD OD.  Had gone few years without eye exam d/t insurance lack, gradual progressive loss OU.  Prior RD repair OD 2014, unsure what BCVA was after repair.  Caring for grandson  VA has worsened OU over years, now trouble with reading/drivign, ADLs.    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  Wet AMD OD   - uncertain duration by history   - uncertain vision potential given prior h/o RD   - started Avastin 9/17/18 (#6 total)   - changed to Eylea 3/12/19     - last injection Eylea  (#8) 10/21/19 ( 4 weeks, 4 week interval)     - PRIOR OCT SRF stable   - VA stable today vs better   - inject Eylea today injectino only #2  of 3   - RTC 4 weeks injection only     - consider extending interval after this series        2. H/o RD repair OD   - s/p PPV/SBP/FGx  2014   - retina flat      3. syneresis OS   - advised s/sx RD 6/2019    4. OAG suspect OD   - d/t CDR asymmetry   - RNFL OK    - saw Elizabeth          return to clinic: 4 weeks  Injection only             ATTESTATION     Attending Attestation:     Complete documentation of historical and exam elements from today's encounter can be found in the full encounter summary report (not reduplicated in this progress note).  I personally obtained the chief complaint(s) and history of present illness.  I confirmed and edited as necessary the review of systems, past medical/surgical history, family history, social history, and examination findings as documented by others; and I  examined the patient myself.  I personally reviewed the relevant tests, images, and reports as documented above.  I formulated and edited as necessary the assessment and plan and discussed the findings and management plan with the patient and family    Anu Wilson MD, PhD  , Vitreoretinal Surgery  Department of Ophthalmology  [facility]

## 2019-11-22 NOTE — NURSING NOTE
Chief Complaints and History of Present Illnesses   Patient presents with     Exudative Macular Degeneration Follow Up     4 week follow up, injection only today.     Chief Complaint(s) and History of Present Illness(es)     Exudative Macular Degeneration Follow Up     Comments: 4 week follow up, injection only today.              Comments     Pt states vision is the same as last visit. No eye pain today.  No flashes or floaters.  DM2 Diet controlled.  No results found for: A1C    CARMEN Baca  November 22, 2019 10:38 AM

## 2019-12-20 ENCOUNTER — ALLIED HEALTH/NURSE VISIT (OUTPATIENT)
Dept: OPHTHALMOLOGY | Facility: CLINIC | Age: 74
End: 2019-12-20
Attending: OPHTHALMOLOGY
Payer: MEDICARE

## 2019-12-20 DIAGNOSIS — H35.3211 EXUDATIVE AGE-RELATED MACULAR DEGENERATION OF RIGHT EYE WITH ACTIVE CHOROIDAL NEOVASCULARIZATION (H): Primary | ICD-10-CM

## 2019-12-20 PROCEDURE — 67028 INJECTION EYE DRUG: CPT | Mod: RT,ZF | Performed by: OPHTHALMOLOGY

## 2019-12-20 PROCEDURE — 40000269 ZZH STATISTIC NO CHARGE FACILITY FEE: Mod: ZF

## 2019-12-20 PROCEDURE — 25000128 H RX IP 250 OP 636: Mod: ZF | Performed by: OPHTHALMOLOGY

## 2019-12-20 RX ADMIN — AFLIBERCEPT 2 MG: 40 INJECTION, SOLUTION INTRAVITREAL at 10:11

## 2019-12-20 ASSESSMENT — TONOMETRY
OD_IOP_MMHG: 14
OS_IOP_MMHG: 15
IOP_METHOD: TONOPEN

## 2019-12-20 ASSESSMENT — EXTERNAL EXAM - LEFT EYE: OS_EXAM: NORMAL

## 2019-12-20 ASSESSMENT — VISUAL ACUITY
OS_SC+: -1
METHOD: SNELLEN - LINEAR
OS_SC: 20/20
OD_SC: 20/200

## 2019-12-20 ASSESSMENT — EXTERNAL EXAM - RIGHT EYE: OD_EXAM: NORMAL

## 2019-12-20 NOTE — PROGRESS NOTES
CC -   Wet AMD OD    INTERVAL HISTORY - VA stable.  Trying to reduce smoking    HPI -   Jonas Hyman is a  74 year old year-old patient referred by Dr Pinedo for wet  AMD OD.  Had gone few years without eye exam d/t insurance lack, gradual progressive loss OU.  Prior RD repair OD 2014, unsure what BCVA was after repair.  Caring for grandson  VA has worsened OU over years, now trouble with reading/drivign, ADLs.    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  Wet AMD OD   - uncertain duration by history   - uncertain vision potential given prior h/o RD   - started Avastin 9/17/18 (#6 total)   - changed to Eylea 3/12/19     - last injection Eylea  (#2) 11/22/19 ( 4 weeks, 4 week interval)     - PRIOR OCT SRF stable   - VA stable today vs better   - inject Eylea today injectino only #3  of 3   - RTC 4 weeks DFE + OCT     - consider extending interval after this series        2. H/o RD repair OD   - s/p PPV/SBP/FGx  2014   - retina flat      3. syneresis OS   - advised s/sx RD 6/2019    4. OAG suspect OD   - d/t CDR asymmetry   - RNFL OK    - saw Elizabeth          return to clinic: 4 weeks DFE + OCT            ATTESTATION     Attending Attestation:     Complete documentation of historical and exam elements from today's encounter can be found in the full encounter summary report (not reduplicated in this progress note).  I personally obtained the chief complaint(s) and history of present illness.  I confirmed and edited as necessary the review of systems, past medical/surgical history, family history, social history, and examination findings as documented by others; and I examined the  patient myself.  I personally reviewed the relevant tests, images, and reports as documented above.  I formulated and edited as necessary the assessment and plan and discussed the findings and management plan with the patient and family    Anu Wilson MD, PhD  , Vitreoretinal Surgery  Department of Ophthalmology  [facility]

## 2019-12-20 NOTE — NURSING NOTE
Chief Complaints and History of Present Illnesses   Patient presents with     Macular Degeneration Follow Up

## 2020-01-17 ENCOUNTER — COMMUNICATION - HEALTHEAST (OUTPATIENT)
Dept: INTERNAL MEDICINE | Facility: CLINIC | Age: 75
End: 2020-01-17

## 2020-01-17 DIAGNOSIS — I10 ESSENTIAL HYPERTENSION: ICD-10-CM

## 2020-02-03 ENCOUNTER — OFFICE VISIT (OUTPATIENT)
Dept: OPHTHALMOLOGY | Facility: CLINIC | Age: 75
End: 2020-02-03
Attending: OPHTHALMOLOGY
Payer: MEDICARE

## 2020-02-03 ENCOUNTER — AMBULATORY - HEALTHEAST (OUTPATIENT)
Dept: MULTI SPECIALTY CLINIC | Facility: CLINIC | Age: 75
End: 2020-02-03

## 2020-02-03 DIAGNOSIS — H35.3211 EXUDATIVE AGE-RELATED MACULAR DEGENERATION OF RIGHT EYE WITH ACTIVE CHOROIDAL NEOVASCULARIZATION (H): ICD-10-CM

## 2020-02-03 LAB — RETINOPATHY: NORMAL

## 2020-02-03 PROCEDURE — 92134 CPTRZ OPH DX IMG PST SGM RTA: CPT | Mod: ZF | Performed by: OPHTHALMOLOGY

## 2020-02-03 PROCEDURE — 25000128 H RX IP 250 OP 636: Mod: ZF | Performed by: OPHTHALMOLOGY

## 2020-02-03 PROCEDURE — 67028 INJECTION EYE DRUG: CPT | Mod: RT,ZF | Performed by: OPHTHALMOLOGY

## 2020-02-03 PROCEDURE — G0463 HOSPITAL OUTPT CLINIC VISIT: HCPCS | Mod: 25

## 2020-02-03 RX ADMIN — AFLIBERCEPT 2 MG: 40 INJECTION, SOLUTION INTRAVITREAL at 12:32

## 2020-02-03 ASSESSMENT — EXTERNAL EXAM - LEFT EYE: OS_EXAM: NORMAL

## 2020-02-03 ASSESSMENT — CUP TO DISC RATIO: OD_RATIO: 0.5

## 2020-02-03 ASSESSMENT — TONOMETRY
OD_IOP_MMHG: 14
OS_IOP_MMHG: 11
IOP_METHOD: TONOPEN

## 2020-02-03 ASSESSMENT — SLIT LAMP EXAM - LIDS: COMMENTS: NORMAL

## 2020-02-03 ASSESSMENT — EXTERNAL EXAM - RIGHT EYE: OD_EXAM: NORMAL

## 2020-02-03 ASSESSMENT — VISUAL ACUITY
OD_PH_SC+: +2
OD_PH_SC: 20/70-
OS_SC+: -2
OS_SC: 20/20
METHOD: SNELLEN - LINEAR
OD_SC: 20/125ECC
METHOD_MR: DEFERRED BY PT

## 2020-02-03 NOTE — PROGRESS NOTES
CC -   Wet AMD OD    INTERVAL HISTORY - VA stable.  Trying to reduce smoking  Last DFE OS 9/2019      HPI -   Jonas Hyman is a  74 year old year-old patient referred by Dr Pinedo for wet  AMD OD.  Had gone few years without eye exam d/t insurance lack, gradual progressive loss OU.  Prior RD repair OD 2014, unsure what BCVA was after repair.  Caring for grandson  VA has worsened OU over years, now trouble with reading/drivign, ADLs.    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  Wet AMD OD   - uncertain duration by history   - uncertain vision potential given prior h/o RD   - started Avastin 9/17/18 (#6 total)   - changed to Eylea 3/12/19     - last injection Eylea  (#3) 12/20/19 (6 weeks, 4 week interval)     - OCT SRF stable vs better   - VA stable today vs better   - inject Eylea today    - check on coverage for [employer]   - RTC 4 weeks DFE + OCT     - consider extending interval after this series        2. H/o RD repair OD   - s/p PPV/SBP/FGx  2014   - retina flat      3. syneresis OS   - advised s/sx RD 6/2019    4. OAG suspect OD   - d/t CDR asymmetry   - RNFL OK    - saw Elizabeth          return to clinic: 4 weeks DFE + OCT            ATTESTATION     Attending Attestation:     Complete documentation of historical and exam elements from today's encounter can be found in the full encounter summary report (not reduplicated in this progress note).  I personally obtained the chief complaint(s) and history of present illness.  I confirmed and edited as necessary the review of systems, past medical/surgical history, family history, social history, and examination findings as  documented by others; and I examined the patient myself.  I personally reviewed the relevant tests, images, and reports as documented above.  I formulated and edited as necessary the assessment and plan and discussed the findings and management plan with the patient and family    Anu Wilson MD, PhD  , Vitreoretinal Surgery  Department of Ophthalmology  [facility]

## 2020-02-03 NOTE — NURSING NOTE
Chief Complaints and History of Present Illnesses   Patient presents with     Macular Degeneration Follow Up     Chief Complaint(s) and History of Present Illness(es)     Macular Degeneration Follow Up     Laterality: right eye    Quality: blurred vision    Course: stable    Associated symptoms: Negative for eye pain and floaters    Treatments tried: artificial tears    Pain scale: 0/10              Comments     2mo f/u AMD right eye + possible injection    Vision stable since LV, no additional comments/concerns.     Nita Vega COT 10:45 AM February 3, 2020

## 2020-02-27 ENCOUNTER — COMMUNICATION - HEALTHEAST (OUTPATIENT)
Dept: INTERNAL MEDICINE | Facility: CLINIC | Age: 75
End: 2020-02-27

## 2020-03-02 ENCOUNTER — OFFICE VISIT (OUTPATIENT)
Dept: OPHTHALMOLOGY | Facility: CLINIC | Age: 75
End: 2020-03-02
Attending: OPHTHALMOLOGY
Payer: MEDICARE

## 2020-03-02 DIAGNOSIS — H35.3211 EXUDATIVE AGE-RELATED MACULAR DEGENERATION OF RIGHT EYE WITH ACTIVE CHOROIDAL NEOVASCULARIZATION (H): ICD-10-CM

## 2020-03-02 PROCEDURE — 92134 CPTRZ OPH DX IMG PST SGM RTA: CPT | Mod: ZF | Performed by: OPHTHALMOLOGY

## 2020-03-02 PROCEDURE — 67028 INJECTION EYE DRUG: CPT | Mod: RT,ZF | Performed by: OPHTHALMOLOGY

## 2020-03-02 PROCEDURE — 25000128 H RX IP 250 OP 636: Mod: ZF | Performed by: OPHTHALMOLOGY

## 2020-03-02 PROCEDURE — G0463 HOSPITAL OUTPT CLINIC VISIT: HCPCS | Mod: ZF

## 2020-03-02 RX ADMIN — AFLIBERCEPT 2 MG: 40 INJECTION, SOLUTION INTRAVITREAL at 13:19

## 2020-03-02 ASSESSMENT — TONOMETRY
OD_IOP_MMHG: 17
IOP_METHOD: TONOPEN
OS_IOP_MMHG: 13

## 2020-03-02 ASSESSMENT — REFRACTION_WEARINGRX
OS_ADD: +2.75
OD_CYLINDER: +0.75
OS_SPHERE: -0.75
OS_AXIS: 026
OD_SPHERE: +0.25
OD_ADD: +2.75
OD_AXIS: 164
OS_CYLINDER: +0.75

## 2020-03-02 ASSESSMENT — CONF VISUAL FIELD
OS_NORMAL: 1
OD_NORMAL: 1

## 2020-03-02 ASSESSMENT — VISUAL ACUITY
OS_SC: 20/20
OD_SC: 20/250
METHOD: SNELLEN - LINEAR

## 2020-03-02 NOTE — NURSING NOTE
Chief Complaints and History of Present Illnesses   Patient presents with     Macular Degeneration Follow Up     Chief Complaint(s) and History of Present Illness(es)     Macular Degeneration Follow Up     Laterality: both eyes    Onset: 1 month ago              Comments     Pt. States that he is doing well. Not seeing any floaters any more. No change in VA BE.  Dianna Pavon COT 10:54 AM March 2, 2020

## 2020-03-02 NOTE — PROGRESS NOTES
CC -   Wet AMD OD    INTERVAL HISTORY - VA stable.  Trying to reduce smoking  Last DFE OD 2/2020  Last DFE OS 9/2019      HPI -   Jonas Hyman is a  74 year old year-old patient referred by Dr Pinedo for wet  AMD OD.  Had gone few years without eye exam d/t insurance lack, gradual progressive loss OU.  Prior RD repair OD 2014, unsure what BCVA was after repair.  Caring for grandson  VA has worsened OU over years, now trouble with reading/drivign, ADLs.    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  Wet AMD OD   - uncertain duration by history   - uncertain vision potential given prior h/o RD   - started Avastin 9/17/18 (#6 total)   - changed to Eylea 3/12/19     - last injection Eylea  (#11) 2/3/20 (4 weeks, 4 week interval)     - OCT SRF stable vs worse   - VA stable today    - inject Eylea today    - RTC 4 weeks injection only #1 of 3     - consider extending interval after this series        2. H/o RD repair OD   - s/p PPV/SBP/FGx  2014   - retina flat      3. syneresis OS   - advised s/sx RD 6/2019    4. OAG suspect OD   - d/t CDR asymmetry   - RNFL OK    - saw Elizabeth          return to clinic: 4 weeks injection only #1 3            ATTESTATION     Attending Attestation:     Complete documentation of historical and exam elements from today's encounter can be found in the full encounter summary report (not reduplicated in this progress note).  I personally obtained the chief complaint(s) and history of present illness.  I confirmed and edited as necessary the review of systems, past medical/surgical history, family history, social history, and examination findings as  documented by others; and I examined the patient myself.  I personally reviewed the relevant tests, images, and reports as documented above.  I formulated and edited as necessary the assessment and plan and discussed the findings and management plan with the patient and family    Anu Wilson MD, PhD  , Vitreoretinal Surgery  Department of Ophthalmology  [facility]

## 2020-03-30 ENCOUNTER — OFFICE VISIT (OUTPATIENT)
Dept: OPHTHALMOLOGY | Facility: CLINIC | Age: 75
End: 2020-03-30
Attending: OPHTHALMOLOGY
Payer: MEDICARE

## 2020-03-30 DIAGNOSIS — H35.3211 EXUDATIVE AGE-RELATED MACULAR DEGENERATION OF RIGHT EYE WITH ACTIVE CHOROIDAL NEOVASCULARIZATION (H): Primary | ICD-10-CM

## 2020-03-30 PROCEDURE — 40000269 ZZH STATISTIC NO CHARGE FACILITY FEE: Mod: ZF

## 2020-03-30 PROCEDURE — 67028 INJECTION EYE DRUG: CPT | Mod: RT,ZF | Performed by: OPHTHALMOLOGY

## 2020-03-30 PROCEDURE — 25000128 H RX IP 250 OP 636: Mod: ZF | Performed by: OPHTHALMOLOGY

## 2020-03-30 RX ADMIN — AFLIBERCEPT 2 MG: 40 INJECTION, SOLUTION INTRAVITREAL at 10:42

## 2020-03-30 ASSESSMENT — REFRACTION_WEARINGRX
OS_CYLINDER: +0.75
OS_ADD: +2.75
OS_SPHERE: -0.75
OD_AXIS: 164
OD_SPHERE: +0.25
OS_AXIS: 026
OD_ADD: +2.75
OD_CYLINDER: +0.75

## 2020-03-30 ASSESSMENT — VISUAL ACUITY
OD_PH_SC: 20/100
METHOD: SNELLEN - LINEAR
OD_SC+: -1
OD_SC: 20/125
OS_SC: 20/20

## 2020-03-30 ASSESSMENT — TONOMETRY
OD_IOP_MMHG: 15
OS_IOP_MMHG: 14
IOP_METHOD: TONOPEN

## 2020-03-30 ASSESSMENT — CONF VISUAL FIELD
OD_NORMAL: 1
OS_NORMAL: 1

## 2020-03-30 NOTE — PROGRESS NOTES
Mr. Hyman is here for Injection only visit for Eylea right eye for AMD.     Informed written consent was obtained for the following procedure Eylea right eye. Plan determined at a separate E/M visit by Dr. Wilson. See 3/2/20 note for details.     Return 4 weeks as scheduled for Eylea only right eye AMD.     I have noted the ability for the patient to comprehend all aspects of the discussion, the need for any visual hearing or literacy impairments, and the need for any  services which were agreed to by the patient. If the patient was a minor, informed consent was obtained by an adult parent or guardian.     The nature, risks, benefits, alternatives to the procedure explained to the patient by myself as well as any consequences of not undergoing the procedure which include: pain, bleeding, infection, retinal detachment.    Involvement by any Resident or Fellow physicians was also noted as well as any potential postoperative management by any Resident or Fellow physicians or Colleagues.  The following were also discussed with the patient prior to obtaining informed consent as well as any typical post procedure course and any warning signs and symptoms such as those potential complications noted above. Post procedure instructions were also reviewed as well as a follow up plan.    I have noted that the patient had all questions answered and was able to explain the above back to this Provider. A copy of this consent form was made available to the patient and also scanned into the medical medical record.

## 2020-03-30 NOTE — NURSING NOTE
Chief Complaints and History of Present Illnesses   Patient presents with     Follow Up     Chief Complaint(s) and History of Present Illness(es)     Follow Up     Laterality: right eye    Associated symptoms: Negative for eye pain, floaters and flashes    Pain scale: 0/10              Comments     William is here for a follow up of Exudative age-related macular degeneration of right eye with active choroidal neovascularization (H). He feels vision is about the same as last visit.     Ashwin Freire COT 10:25 AM March 30, 2020

## 2020-04-28 ENCOUNTER — OFFICE VISIT (OUTPATIENT)
Dept: OPHTHALMOLOGY | Facility: CLINIC | Age: 75
End: 2020-04-28
Attending: OPHTHALMOLOGY
Payer: MEDICARE

## 2020-04-28 DIAGNOSIS — H35.3211 EXUDATIVE AGE-RELATED MACULAR DEGENERATION OF RIGHT EYE WITH ACTIVE CHOROIDAL NEOVASCULARIZATION (H): Primary | ICD-10-CM

## 2020-04-28 PROCEDURE — 25000128 H RX IP 250 OP 636: Mod: ZF | Performed by: OPHTHALMOLOGY

## 2020-04-28 PROCEDURE — G0463 HOSPITAL OUTPT CLINIC VISIT: HCPCS | Mod: ZF

## 2020-04-28 PROCEDURE — 40000269 ZZH STATISTIC NO CHARGE FACILITY FEE: Mod: ZF

## 2020-04-28 PROCEDURE — 67028 INJECTION EYE DRUG: CPT | Mod: RT,ZF | Performed by: OPHTHALMOLOGY

## 2020-04-28 RX ADMIN — AFLIBERCEPT 2 MG: 40 INJECTION, SOLUTION INTRAVITREAL at 10:25

## 2020-04-28 ASSESSMENT — TONOMETRY
IOP_METHOD: ICARE
OS_IOP_MMHG: 11
OD_IOP_MMHG: 13

## 2020-04-28 ASSESSMENT — VISUAL ACUITY
OS_SC: 20/20
OD_SC: 20/125
METHOD: SNELLEN - LINEAR

## 2020-04-28 ASSESSMENT — REFRACTION_WEARINGRX
OS_CYLINDER: +0.75
OD_SPHERE: +0.25
OS_AXIS: 026
OD_AXIS: 164
OD_CYLINDER: +0.75
OS_ADD: +2.75
OD_ADD: +2.75
OS_SPHERE: -0.75

## 2020-04-28 ASSESSMENT — CONF VISUAL FIELD
OS_NORMAL: 1
OD_NORMAL: 1

## 2020-04-28 NOTE — PROGRESS NOTES
CC -   Wet AMD OD    INTERVAL HISTORY - VA stable.  Trying to reduce smoking  Last DFE OD 2/2020  Last DFE OS 9/2019      HPI -   Jonas Hyman is a  74 year old year-old patient referred by Dr Pinedo for wet  AMD OD.  Had gone few years without eye exam d/t insurance lack, gradual progressive loss OU.  Prior RD repair OD 2014, unsure what BCVA was after repair.  Caring for grandson  VA has worsened OU over years, now trouble with reading/drivign, ADLs.    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  Wet AMD OD   - uncertain duration by history   - uncertain vision potential given prior h/o RD   - started Avastin 9/17/18 (#6 total)   - changed to Eylea 3/12/19     - last injection Eylea  (#13) 3/30/20 (4 weeks, 4 week interval)     - PRIOR OCT SRF stable vs worse   - VA stable today    - inject Eylea today    - RTC 4 weeks injection only #3 of 3     - consider extending interval after this series        2. H/o RD repair OD   - s/p PPV/SBP/FGx  2014   - retina flat      3. syneresis OS   - advised s/sx RD 6/2019    4. OAG suspect OD   - d/t CDR asymmetry   - RNFL OK    - saw Elizabeth          return to clinic: 4 weeks injection only             ATTESTATION     Attending Attestation:     Complete documentation of historical and exam elements from today's encounter can be found in the full encounter summary report (not reduplicated in this progress note).  I personally obtained the chief complaint(s) and history of present illness.  I confirmed and edited as necessary the review of systems, past medical/surgical history, family history, social history, and examination findings as  documented by others; and I examined the patient myself.  I personally reviewed the relevant tests, images, and reports as documented above.  I formulated and edited as necessary the assessment and plan and discussed the findings and management plan with the patient and family    Anu Wilson MD, PhD  , Vitreoretinal Surgery  Department of Ophthalmology  [facility]

## 2020-04-28 NOTE — NURSING NOTE
Chief Complaints and History of Present Illnesses   Patient presents with     amd     Chief Complaint(s) and History of Present Illness(es)     amd               Comments     Jonas Hyman is a 74 year old male who presents today for  1.  Wet AMD OD              - last injection Eylea  (#11) 3/30/20     2. H/o RD repair OD              - s/p PPV/SBP/FGx  2014  3. syneresis OS    4. OAG suspect OD      Here for Eylea injection right eye.     Has had some eye fatigue from looking at the screen all day. Otherwise, no vision changes.     Kameron HENDERSON 10:08 AM April 28, 2020

## 2020-05-26 ENCOUNTER — OFFICE VISIT (OUTPATIENT)
Dept: OPHTHALMOLOGY | Facility: CLINIC | Age: 75
End: 2020-05-26
Attending: OPHTHALMOLOGY
Payer: MEDICARE

## 2020-05-26 DIAGNOSIS — H35.3211 EXUDATIVE AGE-RELATED MACULAR DEGENERATION OF RIGHT EYE WITH ACTIVE CHOROIDAL NEOVASCULARIZATION (H): Primary | ICD-10-CM

## 2020-05-26 PROBLEM — E66.01 SEVERE OBESITY WITH BODY MASS INDEX (BMI) OF 35.0 TO 39.9 WITH SERIOUS COMORBIDITY (H): Status: ACTIVE | Noted: 2018-08-14

## 2020-05-26 PROCEDURE — G0463 HOSPITAL OUTPT CLINIC VISIT: HCPCS | Mod: 25,ZF

## 2020-05-26 PROCEDURE — 25000128 H RX IP 250 OP 636: Mod: ZF | Performed by: OPHTHALMOLOGY

## 2020-05-26 PROCEDURE — 40000269 ZZH STATISTIC NO CHARGE FACILITY FEE: Mod: ZF

## 2020-05-26 PROCEDURE — 67028 INJECTION EYE DRUG: CPT | Mod: RT,ZF | Performed by: OPHTHALMOLOGY

## 2020-05-26 RX ADMIN — AFLIBERCEPT 2 MG: 40 INJECTION, SOLUTION INTRAVITREAL at 14:36

## 2020-05-26 ASSESSMENT — VISUAL ACUITY
OD_SC: 20/100ECC
METHOD: SNELLEN - LINEAR
CORRECTION_TYPE: GLASSES
OS_SC: 20/20

## 2020-05-26 ASSESSMENT — TONOMETRY
OS_IOP_MMHG: 11
IOP_METHOD: ICARE
OD_IOP_MMHG: 13

## 2020-05-26 NOTE — NURSING NOTE
"Chief Complaints and History of Present Illnesses   Patient presents with     Macular Degeneration Follow Up     Chief Complaint(s) and History of Present Illness(es)     Macular Degeneration Follow Up     Laterality: left eye    Course: stable    Associated symptoms: Negative for floaters, flashes and eye pain    Treatments tried: no treatments    Response to treatment: issue resolved    Pain scale: 0/10              Comments     4wk f/u EARMD w/ Eylea injection only OD    Vision \"is okay and mostly stable\" since LV.   C/o residual pain, blurriness, and the appearance of some new floaters for 2 days after last injection. Have since resolved.     No additional comments or concerns.     DMII (diet controlled)  Last BGL \"unknown\"   Last A1c  5.8  on 1/11/2019    Nita Vega COT 2:14 PM May 26, 2020                   "

## 2020-05-26 NOTE — PROGRESS NOTES
CC -   Wet AMD OD    INTERVAL HISTORY - VA stable since SISSY.  Trying to reduce smoking  Last DFE OD 2/2020  Last DFE OS 9/2019      HPI -   Jonas Hyman is a  74 year old year-old patient referred by Dr Pinedo for wet  AMD OD.  Had gone few years without eye exam d/t insurance lack, gradual progressive loss OU.  Prior RD repair OD 2014, unsure what BCVA was after repair.  Caring for grandson  VA has worsened OU over years, now trouble with reading/drivign, ADLs.    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  Wet AMD OD   - uncertain duration by history   - uncertain vision potential given prior h/o RD   - started Avastin 9/17/18 (#6 total)   - changed to Eylea 3/12/19     - last injection Eylea  (#14) 4/28/20 (4 weeks, 4 week interval)     - PRIOR OCT SRF stable vs worse   - VA stable today    - inject Eylea today injection only #3   - RTC 4 weeks injection only      - consider extending interval after this series        2. H/o RD repair OD   - s/p PPV/SBP/FGx  2014   - retina flat      3. syneresis OS   - advised s/sx RD 6/2019    4. OAG suspect OD   - d/t CDR asymmetry   - RNFL OK    - saw Elizabeth          return to clinic: 4 weeks injection only     Vic Diaz MD  Department of Ophthalmology - PGY3              ATTESTATION     Attending Attestation:     Complete documentation of historical and exam elements from today's encounter can be found in the full encounter summary report (not reduplicated in this progress note).  I personally obtained the chief complaint(s) and history of present illness.  I confirmed and edited as necessary the review of systems, past  medical/surgical history, family history, social history, and examination findings as documented by others; and I examined the patient myself.  I personally reviewed the relevant tests, images, and reports as documented above.  I formulated and edited as necessary the assessment and plan and discussed the findings and management plan with the patient and family and I was present for the entire procedure performed by the resident/fellow.    nAu Wilsno MD, PhD  , Vitreoretinal Surgery  Department of Ophthalmology  [facility]

## 2020-06-07 ENCOUNTER — COMMUNICATION - HEALTHEAST (OUTPATIENT)
Dept: INTERNAL MEDICINE | Facility: CLINIC | Age: 75
End: 2020-06-07

## 2020-06-07 DIAGNOSIS — I10 ESSENTIAL HYPERTENSION: ICD-10-CM

## 2020-06-23 ENCOUNTER — OFFICE VISIT (OUTPATIENT)
Dept: OPHTHALMOLOGY | Facility: CLINIC | Age: 75
End: 2020-06-23
Attending: OPHTHALMOLOGY
Payer: MEDICARE

## 2020-06-23 DIAGNOSIS — H35.3211 EXUDATIVE AGE-RELATED MACULAR DEGENERATION OF RIGHT EYE WITH ACTIVE CHOROIDAL NEOVASCULARIZATION (H): Primary | ICD-10-CM

## 2020-06-23 PROCEDURE — 40000269 ZZH STATISTIC NO CHARGE FACILITY FEE: Mod: ZF

## 2020-06-23 PROCEDURE — 67028 INJECTION EYE DRUG: CPT | Mod: RT,ZF | Performed by: OPHTHALMOLOGY

## 2020-06-23 PROCEDURE — 25000128 H RX IP 250 OP 636: Mod: ZF | Performed by: OPHTHALMOLOGY

## 2020-06-23 RX ADMIN — AFLIBERCEPT 2 MG: 40 INJECTION, SOLUTION INTRAVITREAL at 15:17

## 2020-06-23 ASSESSMENT — TONOMETRY
OD_IOP_MMHG: 15
OS_IOP_MMHG: 17
IOP_METHOD: TONOPEN

## 2020-06-23 ASSESSMENT — VISUAL ACUITY
OD_PH_SC: 20/100
METHOD: SNELLEN - LINEAR
OS_SC+: -2
OS_SC: 20/20
OD_SC: 20/125 ECC

## 2020-06-23 ASSESSMENT — CONF VISUAL FIELD
OD_NORMAL: 1
OS_NORMAL: 1
METHOD: COUNTING FINGERS

## 2020-06-23 NOTE — PROGRESS NOTES
CC -   Wet AMD OD    INTERVAL HISTORY - VA stable since SISSY.  Trying to reduce smoking  Last DFE OD 2/2020  Last DFE OS 9/2019    Prefers attending injection     HPI -   Jonas Hyman is a  74 year old year-old patient referred by Dr Pinedo for wet  AMD OD.  Had gone few years without eye exam d/t insurance lack, gradual progressive loss OU.  Prior RD repair OD 2014, unsure what BCVA was after repair.  Caring for grandson  VA has worsened OU over years, now trouble with reading/drivign, ADLs.    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  Wet AMD OD   - uncertain duration by history   - uncertain vision potential given prior h/o RD   - started Avastin 9/17/18 (#6 total)   - changed to Eylea 3/12/19     - last injection Eylea  (#15) 5/26/20 (4 weeks, 4 week interval)     - PRIOR OCT SRF stable vs worse   - VA stable today    - inject Eylea today injection only #4   - RTC 4 weeks DFE + OCT     - consider extending interval after this series        2. H/o RD repair OD   - s/p PPV/SBP/FGx  2014   - retina flat      3. syneresis OS   - advised s/sx RD 6/2019    4. OAG suspect OD   - d/t CDR asymmetry   - RNFL OK    - saw Elizaebth          return to clinic: 4 weeks DFE+OCT OU      ATTESTATION     Attending Attestation:     Complete documentation of historical and exam elements from today's encounter can be found in the full encounter summary report (not reduplicated in this progress note).  I personally obtained the chief complaint(s) and history of present illness.  I confirmed and edited as necessary the review of systems, past medical/surgical history, family history, social history,  and examination findings as documented by others; and I examined the patient myself.  I personally reviewed the relevant tests, images, and reports as documented above.  I formulated and edited as necessary the assessment and plan and discussed the findings and management plan with the patient and family    Anu Wilson MD, PhD  , Vitreoretinal Surgery  Department of Ophthalmology  [facility]

## 2020-06-23 NOTE — NURSING NOTE
Chief Complaints and History of Present Illnesses   Patient presents with     Exudative Macular Degeneration Follow Up     Exudative age-related macular degeneration of right eye with active choroidal neovascularization      Chief Complaint(s) and History of Present Illness(es)     Exudative Macular Degeneration Follow Up     Laterality: right eye    Course: stable    Associated symptoms: floaters (stable, rare) and dryness.  Negative for eye pain and flashes    Treatments tried: artificial tears    Pain scale: 0/10    Comments: Exudative age-related macular degeneration of right eye with active choroidal neovascularization               Comments     He states that his vision has seemed stable in both eyes, since his last eye exam.   Patient denies having any eye discomfort.  HE does use artificial tears about once a day.    Jennie Echevarria, COT 2:55 PM  June 23, 2020

## 2020-07-17 ENCOUNTER — COMMUNICATION - HEALTHEAST (OUTPATIENT)
Dept: INTERNAL MEDICINE | Facility: CLINIC | Age: 75
End: 2020-07-17

## 2020-07-17 ENCOUNTER — COMMUNICATION - HEALTHEAST (OUTPATIENT)
Dept: SCHEDULING | Facility: CLINIC | Age: 75
End: 2020-07-17

## 2020-07-17 DIAGNOSIS — I25.10 CORONARY ARTERY DISEASE INVOLVING NATIVE CORONARY ARTERY OF NATIVE HEART WITHOUT ANGINA PECTORIS: ICD-10-CM

## 2020-07-23 ENCOUNTER — COMMUNICATION - HEALTHEAST (OUTPATIENT)
Dept: INTERNAL MEDICINE | Facility: CLINIC | Age: 75
End: 2020-07-23

## 2020-07-23 DIAGNOSIS — I10 ESSENTIAL HYPERTENSION WITH GOAL BLOOD PRESSURE LESS THAN 140/90: ICD-10-CM

## 2020-07-29 ENCOUNTER — COMMUNICATION - HEALTHEAST (OUTPATIENT)
Dept: SCHEDULING | Facility: CLINIC | Age: 75
End: 2020-07-29

## 2020-07-29 DIAGNOSIS — I25.10 CORONARY ARTERY DISEASE INVOLVING NATIVE CORONARY ARTERY OF NATIVE HEART WITHOUT ANGINA PECTORIS: ICD-10-CM

## 2020-07-31 ENCOUNTER — OFFICE VISIT (OUTPATIENT)
Dept: OPHTHALMOLOGY | Facility: CLINIC | Age: 75
End: 2020-07-31
Attending: OPHTHALMOLOGY
Payer: MEDICARE

## 2020-07-31 DIAGNOSIS — H35.3211 EXUDATIVE AGE-RELATED MACULAR DEGENERATION OF RIGHT EYE WITH ACTIVE CHOROIDAL NEOVASCULARIZATION (H): Primary | ICD-10-CM

## 2020-07-31 PROCEDURE — 25000128 H RX IP 250 OP 636: Mod: ZF | Performed by: OPHTHALMOLOGY

## 2020-07-31 PROCEDURE — G0463 HOSPITAL OUTPT CLINIC VISIT: HCPCS | Mod: ZF

## 2020-07-31 PROCEDURE — 67028 INJECTION EYE DRUG: CPT | Mod: RT,ZF | Performed by: OPHTHALMOLOGY

## 2020-07-31 PROCEDURE — 92134 CPTRZ OPH DX IMG PST SGM RTA: CPT | Mod: ZF | Performed by: OPHTHALMOLOGY

## 2020-07-31 RX ADMIN — AFLIBERCEPT 2 MG: 40 INJECTION, SOLUTION INTRAVITREAL at 14:59

## 2020-07-31 ASSESSMENT — CUP TO DISC RATIO: OD_RATIO: 0.5

## 2020-07-31 ASSESSMENT — CONF VISUAL FIELD
OS_NORMAL: 1
OD_INFERIOR_NASAL_RESTRICTION: 3
METHOD: COUNTING FINGERS

## 2020-07-31 ASSESSMENT — EXTERNAL EXAM - RIGHT EYE: OD_EXAM: NORMAL

## 2020-07-31 ASSESSMENT — EXTERNAL EXAM - LEFT EYE: OS_EXAM: NORMAL

## 2020-07-31 ASSESSMENT — VISUAL ACUITY
OD_SC: 20/200
OS_SC: 20/20
OD_PH_SC: 20/100
METHOD: SNELLEN - LINEAR

## 2020-07-31 ASSESSMENT — TONOMETRY
OD_IOP_MMHG: 14
IOP_METHOD: TONOPEN
OS_IOP_MMHG: 10

## 2020-07-31 ASSESSMENT — SLIT LAMP EXAM - LIDS: COMMENTS: NORMAL

## 2020-07-31 NOTE — PROGRESS NOTES
CC -   Wet AMD OD    INTERVAL HISTORY - VA stable since SISSY.  Trying to reduce smoking, missed last appt  Last DFE OD 7/31/2020   Last DFE OS 9/2019    Prefers attending injection     HPI -   Jonas Hyman is a  74 year old year-old patient referred by Dr Pinedo for wet  AMD OD.  Had gone few years without eye exam d/t insurance lack, gradual progressive loss OU.  Prior RD repair OD 2014, unsure what BCVA was after repair.  Caring for grandson  VA has worsened OU over years, now trouble with reading/drivign, ADLs.    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  Wet AMD OD   - uncertain duration by history   - uncertain vision potential given prior h/o RD   - started Avastin 9/17/18 (#6 total)   - changed to Eylea 3/12/19     - last injection Eylea  (#16) 6/23/20 (5 weeks, 4 week interval)     - OCT SRF stable   - VA stable today    - inject Eylea toda   - RTC 5 weeks DFE + OCT     - tolerated 5 weeks on 7/31/20   - consider extending interval after this series    2. H/o RD repair OD   - s/p PPV/SBP/FGx  2014   - retina flat      3. syneresis OS   - advised s/sx RD 6/2019    4. OAG suspect OD   - d/t CDR asymmetry   - RNFL OK    - saw Elizabeth      return to clinic: 5 weeks DFE+OCT OU    Florian Goodman MD  Vitreoretinal Surgery Fellow  [facility]      ATTESTATION     Attending Attestation:     Complete documentation of historical and exam elements from today's encounter can be found in the full encounter summary report (not reduplicated in this progress note).  I personally obtained the chief complaint(s) and history of present illness.  I  confirmed and edited as necessary the review of systems, past medical/surgical history, family history, social history, and examination findings as documented by others; and I examined the patient myself.  I personally reviewed the relevant tests, images, and reports as documented above.  I personally reviewed the ophthalmic test(s) associated with this encounter, agree with the interpretation(s) as documented by the resident/fellow, and have edited the corresponding report(s) as necessary.   I formulated and edited as necessary the assessment and plan and discussed the findings and management plan with the patient and family and No resident or fellow assisted with the procedures performed.  I performed the procedures myself.    Anu Wilson MD, PhD  , Vitreoretinal Surgery  Department of Ophthalmology  [facility]

## 2020-07-31 NOTE — NURSING NOTE
Chief Complaints and History of Present Illnesses   Patient presents with     Follow Up     Chief Complaint(s) and History of Present Illness(es)     Follow Up     Laterality: right eye    Associated symptoms: Negative for eye pain, redness, dryness, floaters and flashes    Pain scale: 0/10              Comments     William is here to continue care for Exudative age-related macular degeneration of right eye with active choroidal neovascularization (H). He says his vision is about the same as last visit.    Ashwin Freire COT 1:36 PM July 31, 2020

## 2020-09-02 DIAGNOSIS — H35.3211 EXUDATIVE AGE-RELATED MACULAR DEGENERATION OF RIGHT EYE WITH ACTIVE CHOROIDAL NEOVASCULARIZATION (H): Primary | ICD-10-CM

## 2020-09-07 ENCOUNTER — NURSE TRIAGE (OUTPATIENT)
Dept: NURSING | Facility: CLINIC | Age: 75
End: 2020-09-07

## 2020-09-07 NOTE — TELEPHONE ENCOUNTER
Patient calling just to confirm time of his appointment tomorrow.    Per chart review appointment is scheduled 9/8/2020 at 1210pm    Patient stated understanding.     Sheba Anderson RN 09/07/20 12:09 PM  [facility] Nurse Advisor    Additional Information    Negative: [1] Caller is not with the adult (patient) AND [2] reporting urgent symptoms    Negative: Lab result questions    Negative: Medication questions    Negative: Caller can't be reached by phone    Negative: Caller has already spoken to PCP or another triager    Negative: RN needs further essential information from caller in order to complete triage    Negative: Requesting regular office appointment    Negative: [1] Caller requesting NON-URGENT health information AND [2] PCP's office is the best resource    General information question, no triage required and triager able to answer question    Negative: Health Information question, no triage required and triager able to answer question    Protocols used: INFORMATION ONLY CALL-A-

## 2020-09-08 ENCOUNTER — OFFICE VISIT (OUTPATIENT)
Dept: OPHTHALMOLOGY | Facility: CLINIC | Age: 75
End: 2020-09-08
Attending: OPHTHALMOLOGY
Payer: MEDICARE

## 2020-09-08 DIAGNOSIS — H35.3211 EXUDATIVE AGE-RELATED MACULAR DEGENERATION OF RIGHT EYE WITH ACTIVE CHOROIDAL NEOVASCULARIZATION (H): Primary | ICD-10-CM

## 2020-09-08 PROCEDURE — 92134 CPTRZ OPH DX IMG PST SGM RTA: CPT | Mod: ZF | Performed by: OPHTHALMOLOGY

## 2020-09-08 PROCEDURE — 67028 INJECTION EYE DRUG: CPT | Mod: RT,ZF | Performed by: OPHTHALMOLOGY

## 2020-09-08 PROCEDURE — G0463 HOSPITAL OUTPT CLINIC VISIT: HCPCS | Mod: 25

## 2020-09-08 PROCEDURE — 25000128 H RX IP 250 OP 636: Mod: ZF | Performed by: OPHTHALMOLOGY

## 2020-09-08 RX ADMIN — AFLIBERCEPT 2 MG: 40 INJECTION, SOLUTION INTRAVITREAL at 13:26

## 2020-09-08 ASSESSMENT — SLIT LAMP EXAM - LIDS
COMMENTS: NORMAL
COMMENTS: NORMAL

## 2020-09-08 ASSESSMENT — REFRACTION_WEARINGRX
OD_CYLINDER: +0.75
OD_SPHERE: +0.25
OS_AXIS: 026
OS_CYLINDER: +0.75
OD_AXIS: 164
OS_SPHERE: -0.75
OS_ADD: +2.75
OD_ADD: +2.75

## 2020-09-08 ASSESSMENT — CONF VISUAL FIELD
METHOD: COUNTING FINGERS
OS_NORMAL: 1
OD_NORMAL: 1

## 2020-09-08 ASSESSMENT — VISUAL ACUITY
OD_SC: 20/250
METHOD: SNELLEN - LINEAR
OS_SC: 20/20
OS_SC+: -1

## 2020-09-08 ASSESSMENT — TONOMETRY
IOP_METHOD: TONOPEN
OD_IOP_MMHG: 13
OS_IOP_MMHG: 14

## 2020-09-08 ASSESSMENT — CUP TO DISC RATIO
OD_RATIO: 0.6
OS_RATIO: 0.3

## 2020-09-08 ASSESSMENT — EXTERNAL EXAM - LEFT EYE: OS_EXAM: NORMAL

## 2020-09-08 ASSESSMENT — EXTERNAL EXAM - RIGHT EYE: OD_EXAM: NORMAL

## 2020-09-08 NOTE — NURSING NOTE
Chief Complaints and History of Present Illnesses   Patient presents with     Macular Degeneration Follow Up     Chief Complaint(s) and History of Present Illness(es)     Macular Degeneration Follow Up     Associated symptoms: Negative for flashes and floaters    Pain scale: 0/10              Comments     Macular degeneration follow up.    The patient states he noticed a few floaters in the right eye after the last injection.  Helga Swift COA, COA 12:17 PM 09/08/2020

## 2020-09-08 NOTE — PROGRESS NOTES
CC -   Wet AMD OD    INTERVAL HISTORY - VA stable since SISSY.  Trying to reduce smoking,   Last DFE OU 9/2020    Prefers attending injection     HPI -   Jonas Hyman is a  75  year old year-old patient referred by Dr Pinedo for wet  AMD OD.  Had gone few years without eye exam d/t insurance lack, gradual progressive loss OU.  Prior RD repair OD 2014, unsure what BCVA was after repair.  Caring for grandson  VA has worsened OU over years, now trouble with reading/drivign, ADLs.    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  Wet AMD OD   - uncertain duration by history   - uncertain vision potential given prior h/o RD   - started Avastin 9/17/18 (#6 total)   - changed to Eylea 3/12/19     - last injection Eylea  (#17) 7/31/20  (5 weeks, 5 week interval)     - OCT SRF stable   - VA stable today    - inject Eylea toda   - RTC 5 weeks      - tolerated 5 weeks on 7/31/20, increased to 5 weeks, stable mild SRF & VA   - consider extending interval after this series    2. H/o RD repair OD   - s/p PPV/SBP/FGx  2014   - retina flat      3. syneresis OS   - advised s/sx RD 6/2019    4. OAG suspect OD   - d/t CDR asymmetry   - RNFL OK    - saw Elizabeth      return to clinic: 5 weeks no dilation, OCT OU        ATTESTATION     Attending Attestation:     Complete documentation of historical and exam elements from today's encounter can be found in the full encounter summary report (not reduplicated in this progress note).  I personally obtained the chief complaint(s) and history of present illness.  I confirmed and edited as necessary the review of systems, past medical/surgical history, family history,  social history, and examination findings as documented by others; and I examined the patient myself.  I personally reviewed the relevant tests, images, and reports as documented above.  I formulated and edited as necessary the assessment and plan and discussed the findings and management plan with the patient and family and No resident or fellow assisted with the procedures performed.  I performed the procedures myself.    Anu Wilson MD, PhD  , Vitreoretinal Surgery  Department of Ophthalmology  [facility]

## 2020-10-13 ENCOUNTER — OFFICE VISIT (OUTPATIENT)
Dept: OPHTHALMOLOGY | Facility: CLINIC | Age: 75
End: 2020-10-13
Attending: OPHTHALMOLOGY
Payer: MEDICARE

## 2020-10-13 DIAGNOSIS — H35.3211 EXUDATIVE AGE-RELATED MACULAR DEGENERATION OF RIGHT EYE WITH ACTIVE CHOROIDAL NEOVASCULARIZATION (H): ICD-10-CM

## 2020-10-13 PROCEDURE — 92134 CPTRZ OPH DX IMG PST SGM RTA: CPT | Performed by: OPHTHALMOLOGY

## 2020-10-13 PROCEDURE — 67028 INJECTION EYE DRUG: CPT | Mod: RT | Performed by: OPHTHALMOLOGY

## 2020-10-13 PROCEDURE — G0463 HOSPITAL OUTPT CLINIC VISIT: HCPCS | Mod: 25

## 2020-10-13 PROCEDURE — 250N000011 HC RX IP 250 OP 636: Performed by: OPHTHALMOLOGY

## 2020-10-13 PROCEDURE — 99207 PR NO CHARGE LOS: CPT | Performed by: OPHTHALMOLOGY

## 2020-10-13 RX ADMIN — AFLIBERCEPT 2 MG: 40 INJECTION, SOLUTION INTRAVITREAL at 13:57

## 2020-10-13 ASSESSMENT — TONOMETRY
OS_IOP_MMHG: 12
IOP_METHOD: TONOPEN
OD_IOP_MMHG: 14

## 2020-10-13 ASSESSMENT — VISUAL ACUITY
OS_SC: 20/20
METHOD: SNELLEN - LINEAR
OD_SC: 20/250

## 2020-10-13 ASSESSMENT — CONF VISUAL FIELD
METHOD: COUNTING FINGERS
OS_NORMAL: 1
OD_NORMAL: 1

## 2020-10-13 NOTE — PROGRESS NOTES
CC -   Wet AMD OD    INTERVAL HISTORY - VA stable since SISSY.  Trying to reduce smoking,   Last DFE OU 9/2020    Prefers attending injection     HPI -   Jonas Hyman is a  75  year old year-old patient referred by Dr Pinedo for wet  AMD OD.  Had gone few years without eye exam d/t insurance lack, gradual progressive loss OU.  Prior RD repair OD 2014, unsure what BCVA was after repair.  Caring for grandson  VA has worsened OU over years, now trouble with reading/drivign, ADLs.    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  Wet AMD OD   - uncertain duration by history   - uncertain vision potential given prior h/o RD   - started Avastin 9/17/18 (#6 total)   - changed to Eylea 3/12/19     - last injection Eylea  (#18) 9/8/20  (5 weeks, 5 week interval)     - OCT SRF stable   - VA stable today    - inject Eylea today   - increase interval to 6 weeks   - RTC 6 weeks      - tolerated 5 weeks on 7/31/20, increased to 5 weeks, stable mild SRF & VA   - given severe AMD will increase interval    - was typically 20/80-20/125 in past @ 4 weeks but OCT shows no real change @ 5     2. H/o RD repair OD   - s/p PPV/SBP/FGx  2014   - retina flat      3. syneresis OS   - advised s/sx RD 6/2019    4. OAG suspect OD   - d/t CDR asymmetry   - RNFL OK    - saw Elizabeth      return to clinic: 5 weeks no dilation, OCT OU        ATTESTATION     Attending Attestation:     Complete documentation of historical and exam elements from today's encounter can be found in the full encounter summary report (not reduplicated in this progress note).  I personally obtained the chief complaint(s) and history of present illness.  I  confirmed and edited as necessary the review of systems, past medical/surgical history, family history, social history, and examination findings as documented by others; and I examined the patient myself.  I personally reviewed the relevant tests, images, and reports as documented above.  I formulated and edited as necessary the assessment and plan and discussed the findings and management plan with the patient and family    Anu Wilson MD, PhD  , Vitreoretinal Surgery  Department of Ophthalmology  [facility]

## 2020-11-20 ENCOUNTER — OFFICE VISIT (OUTPATIENT)
Dept: OPHTHALMOLOGY | Facility: CLINIC | Age: 75
End: 2020-11-20
Attending: OPHTHALMOLOGY
Payer: MEDICARE

## 2020-11-20 DIAGNOSIS — H35.3211 EXUDATIVE AGE-RELATED MACULAR DEGENERATION OF RIGHT EYE WITH ACTIVE CHOROIDAL NEOVASCULARIZATION (H): ICD-10-CM

## 2020-11-20 PROCEDURE — 250N000011 HC RX IP 250 OP 636: Performed by: OPHTHALMOLOGY

## 2020-11-20 PROCEDURE — G0463 HOSPITAL OUTPT CLINIC VISIT: HCPCS | Mod: 25

## 2020-11-20 PROCEDURE — 92134 CPTRZ OPH DX IMG PST SGM RTA: CPT | Performed by: OPHTHALMOLOGY

## 2020-11-20 PROCEDURE — 67028 INJECTION EYE DRUG: CPT | Mod: RT | Performed by: OPHTHALMOLOGY

## 2020-11-20 RX ADMIN — AFLIBERCEPT 2 MG: 40 INJECTION, SOLUTION INTRAVITREAL at 11:45

## 2020-11-20 ASSESSMENT — TONOMETRY
OS_IOP_MMHG: 14
IOP_METHOD: TONOPEN
OD_IOP_MMHG: 10

## 2020-11-20 ASSESSMENT — VISUAL ACUITY
OS_SC: 20/20
OD_PH_SC: 20/80
METHOD: SNELLEN - LINEAR
OD_SC: 20/200

## 2020-11-20 ASSESSMENT — CONF VISUAL FIELD
OD_NORMAL: 1
METHOD: COUNTING FINGERS
OS_NORMAL: 1

## 2020-11-20 NOTE — PROGRESS NOTES
CC -   Wet AMD OD    INTERVAL HISTORY - VA stable since SISSY.  Trying to reduce smoking,   Last DFE OU 9/2020    Prefers attending injection     HPI -   Jonas Hyman is a  75  year old year-old patient referred by Dr Pinedo for wet  AMD OD.  Had gone few years without eye exam d/t insurance lack, gradual progressive loss OU.  Prior RD repair OD 2014, unsure what BCVA was after repair.  Caring for grandson  VA has worsened OU over years, now trouble with reading/drivign, ADLs.    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  Wet AMD OD   - uncertain duration by history   - uncertain vision potential given prior h/o RD   - started Avastin 9/17/18 (#6 total)   - changed to Eylea 3/12/19     - last injection Eylea  (#19) 10/13/20  (5 weeks, 5 week interval)     - OCT SRF stable   - VA improved today    - inject Eylea today   - increase interval to 6 weeks   - RTC 6 weeks      - tolerated 5 weeks on 7/31/20, increased to 5 weeks, stable mild SRF & VA   - given severe AMD will increase interval    - was typically 20/80-20/125 in past @ 4 weeks     2. H/o RD repair OD   - s/p PPV/SBP/FGx  2014   - retina flat      3. syneresis OS   - advised s/sx RD 11/2020    4. OAG suspect OD   - d/t CDR asymmetry   - RNFL OK    - saw Elizabeth      return to clinic: 6 weeks no dilation, OCT OU      Cheryl Quinn MD  Ophthalmology PGY-3      ATTESTATION     Attending Attestation:     Complete documentation of historical and exam elements from today's encounter can be found in the full encounter summary report (not reduplicated in this progress note).  I personally obtained the chief complaint(s) and history of  present illness.  I confirmed and edited as necessary the review of systems, past medical/surgical history, family history, social history, and examination findings as documented by others; and I examined the patient myself.  I personally reviewed the relevant tests, images, and reports as documented above.  I personally reviewed the ophthalmic test(s) associated with this encounter, agree with the interpretation(s) as documented by the resident/fellow, and have edited the corresponding report(s) as necessary.   I formulated and edited as necessary the assessment and plan and discussed the findings and management plan with the patient and family and I was present for the entire procedure performed by the resident/fellow.    Anu Wilson MD, PhD  , Vitreoretinal Surgery  Department of Ophthalmology  [facility]

## 2020-11-20 NOTE — NURSING NOTE
.  Chief Complaints and History of Present Illnesses   Patient presents with     Macular Degeneration Follow Up     1 week follow up AMD, right eye     Chief Complaint(s) and History of Present Illness(es)     Macular Degeneration Follow Up     Laterality: right eye    Quality: blurred vision    Course: stable    Associated symptoms: Negative for floaters, flashes, eye pain, tearing and discharge    Treatments tried: no treatments    Pain scale: 0/10    Comments: 1 week follow up AMD, right eye              Comments     Pt denies any significant vision changes in either eye since last visit.  Denies any flashes, floaters, pain, pressure, irritation, discharge, and tearing.  Ocular Meds: None    Christi Vargas OT 10:27 AM November 20, 2020

## 2020-11-23 ENCOUNTER — COMMUNICATION - HEALTHEAST (OUTPATIENT)
Dept: INTERNAL MEDICINE | Facility: CLINIC | Age: 75
End: 2020-11-23

## 2020-11-23 DIAGNOSIS — I25.10 CORONARY ARTERY DISEASE INVOLVING NATIVE CORONARY ARTERY OF NATIVE HEART WITHOUT ANGINA PECTORIS: ICD-10-CM

## 2020-11-30 ENCOUNTER — OFFICE VISIT - HEALTHEAST (OUTPATIENT)
Dept: INTERNAL MEDICINE | Facility: CLINIC | Age: 75
End: 2020-11-30

## 2020-11-30 DIAGNOSIS — D17.9 MYELOLIPOMA: ICD-10-CM

## 2020-11-30 DIAGNOSIS — H35.3211 EXUDATIVE AGE-RELATED MACULAR DEGENERATION OF RIGHT EYE WITH ACTIVE CHOROIDAL NEOVASCULARIZATION (H): ICD-10-CM

## 2020-11-30 DIAGNOSIS — E11.22 TYPE 2 DIABETES MELLITUS WITH STAGE 3B CHRONIC KIDNEY DISEASE, WITHOUT LONG-TERM CURRENT USE OF INSULIN (H): ICD-10-CM

## 2020-11-30 DIAGNOSIS — D64.9 ANEMIA, UNSPECIFIED TYPE: ICD-10-CM

## 2020-11-30 DIAGNOSIS — Z12.5 PROSTATE CANCER SCREENING: ICD-10-CM

## 2020-11-30 DIAGNOSIS — N18.32 STAGE 3B CHRONIC KIDNEY DISEASE (H): ICD-10-CM

## 2020-11-30 DIAGNOSIS — I25.119 CORONARY ARTERY DISEASE INVOLVING NATIVE CORONARY ARTERY OF NATIVE HEART WITH ANGINA PECTORIS (H): ICD-10-CM

## 2020-11-30 DIAGNOSIS — R35.0 URINARY FREQUENCY: ICD-10-CM

## 2020-11-30 DIAGNOSIS — Z86.79 S/P REPAIR OF ABDOMINAL AORTIC ANEURYSM USING BIFURCATION GRAFT: ICD-10-CM

## 2020-11-30 DIAGNOSIS — I10 ESSENTIAL HYPERTENSION: ICD-10-CM

## 2020-11-30 DIAGNOSIS — E55.9 VITAMIN D DEFICIENCY: ICD-10-CM

## 2020-11-30 DIAGNOSIS — G62.9 NEUROPATHY: ICD-10-CM

## 2020-11-30 DIAGNOSIS — N18.32 TYPE 2 DIABETES MELLITUS WITH STAGE 3B CHRONIC KIDNEY DISEASE, WITHOUT LONG-TERM CURRENT USE OF INSULIN (H): ICD-10-CM

## 2020-11-30 DIAGNOSIS — E66.01 SEVERE OBESITY WITH BODY MASS INDEX (BMI) OF 35.0 TO 39.9 WITH SERIOUS COMORBIDITY (H): ICD-10-CM

## 2020-11-30 DIAGNOSIS — I71.40 ABDOMINAL AORTIC ANEURYSM (AAA) WITHOUT RUPTURE (H): ICD-10-CM

## 2020-11-30 DIAGNOSIS — F10.21 HISTORY OF ALCOHOL DEPENDENCE (H): ICD-10-CM

## 2020-11-30 DIAGNOSIS — Z95.828 S/P REPAIR OF ABDOMINAL AORTIC ANEURYSM USING BIFURCATION GRAFT: ICD-10-CM

## 2020-12-16 DIAGNOSIS — H35.3211 EXUDATIVE AGE-RELATED MACULAR DEGENERATION OF RIGHT EYE WITH ACTIVE CHOROIDAL NEOVASCULARIZATION (H): Primary | ICD-10-CM

## 2021-01-04 ENCOUNTER — OFFICE VISIT (OUTPATIENT)
Dept: OPHTHALMOLOGY | Facility: CLINIC | Age: 76
End: 2021-01-04
Attending: OPHTHALMOLOGY
Payer: MEDICARE

## 2021-01-04 DIAGNOSIS — H35.3211 EXUDATIVE AGE-RELATED MACULAR DEGENERATION OF RIGHT EYE WITH ACTIVE CHOROIDAL NEOVASCULARIZATION (H): ICD-10-CM

## 2021-01-04 PROCEDURE — 250N000011 HC RX IP 250 OP 636: Performed by: OPHTHALMOLOGY

## 2021-01-04 PROCEDURE — 92134 CPTRZ OPH DX IMG PST SGM RTA: CPT | Performed by: OPHTHALMOLOGY

## 2021-01-04 PROCEDURE — G0463 HOSPITAL OUTPT CLINIC VISIT: HCPCS

## 2021-01-04 PROCEDURE — 67028 INJECTION EYE DRUG: CPT | Mod: RT | Performed by: OPHTHALMOLOGY

## 2021-01-04 RX ADMIN — AFLIBERCEPT 2 MG: 40 INJECTION, SOLUTION INTRAVITREAL at 11:13

## 2021-01-04 ASSESSMENT — CONF VISUAL FIELD
OS_NORMAL: 1
OD_NORMAL: 1
METHOD: COUNTING FINGERS

## 2021-01-04 ASSESSMENT — VISUAL ACUITY
OD_SC: 20/200
OS_SC: 20/20
OD_PH_SC: 20/70
METHOD: SNELLEN - LINEAR

## 2021-01-04 ASSESSMENT — TONOMETRY
OS_IOP_MMHG: 10
OD_IOP_MMHG: 12
IOP_METHOD: TONOPEN

## 2021-01-04 NOTE — PROGRESS NOTES
CC -   Wet AMD OD    INTERVAL HISTORY - VA stable since SISSY.    Still Trying to reduce smoking,   Last DFE OU 9/2020    Prefers attending injection     PMH-   Jonas Hyman is a  75  year old year-old patient referred by Dr Pinedo for wet  AMD OD.  Had gone few years without eye exam d/t insurance lack, gradual progressive loss OU.  Prior RD repair OD 2014, unsure what BCVA was after repair.  Caring for grandson  VA has worsened OU over years, now trouble with reading/drivign, ADLs.    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  Wet AMD OD   - uncertain duration by history   - uncertain vision potential given prior h/o RD   - started Avastin 9/17/18 (#6 total)   - changed to Eylea 3/12/19     - last injection Eylea  (#20) 11/20//20  (6 weeks, 5 week interval)     - OCT SRF stable   - VA stable    - inject Eylea today   - increase interval to 7 weeks today 1/4/21   - RTC 67 weeks      - tolerated 6 weeks on 1/2021   - tolerated 5 weeks on 7/31/20, increased to 5 weeks, stable mild SRF & VA   - given severe AMD will increase interval    - was typically 20/80-20/125 in past @ 4 weeks     #. H/o RD repair OD   - s/p PPV/SBP/FGx  2014   - retina flat last DFE      #. syneresis OS   - advised s/sx RD 11/2020    #. OAG suspect OD   - d/t CDR asymmetry   - RNFL OK    - saw Elizabeth      return to clinic: 7 weeks no dilation, OCT OU            ATTESTATION     Attending Attestation:     Complete documentation of historical and exam elements from today's encounter can be found in the full encounter summary report (not reduplicated in this progress note).  I personally obtained the chief complaint(s) and  history of present illness.  I confirmed and edited as necessary the review of systems, past medical/surgical history, family history, social history, and examination findings as documented by others; and I examined the patient myself.  I personally reviewed the relevant tests, images, and reports as documented above.  I formulated and edited as necessary the assessment and plan and discussed the findings and management plan with the patient and family    Anu Wilson MD, PhD  , Vitreoretinal Surgery  Department of Ophthalmology  [facility]

## 2021-01-04 NOTE — NURSING NOTE
Chief Complaints and History of Present Illnesses   Patient presents with     Exudative Macular Degeneration Follow Up     Chief Complaint(s) and History of Present Illness(es)     Exudative Macular Degeneration Follow Up     Laterality: right eye    Associated symptoms: dryness.  Negative for eye pain, floaters and flashes    Pain scale: 0/10              Comments     Exudative age-related macular degeneration of right eye with active choroidal neovascularization (H)    Ashwin Freire COT 9:37 AM January 4, 2021

## 2021-02-01 ENCOUNTER — AMBULATORY - HEALTHEAST (OUTPATIENT)
Dept: LAB | Facility: CLINIC | Age: 76
End: 2021-02-01

## 2021-02-01 DIAGNOSIS — E55.9 VITAMIN D DEFICIENCY: ICD-10-CM

## 2021-02-01 DIAGNOSIS — I10 ESSENTIAL HYPERTENSION: ICD-10-CM

## 2021-02-01 DIAGNOSIS — I25.119 CORONARY ARTERY DISEASE INVOLVING NATIVE CORONARY ARTERY OF NATIVE HEART WITH ANGINA PECTORIS (H): ICD-10-CM

## 2021-02-01 DIAGNOSIS — N18.32 TYPE 2 DIABETES MELLITUS WITH STAGE 3B CHRONIC KIDNEY DISEASE, WITHOUT LONG-TERM CURRENT USE OF INSULIN (H): ICD-10-CM

## 2021-02-01 DIAGNOSIS — E11.22 TYPE 2 DIABETES MELLITUS WITH STAGE 3B CHRONIC KIDNEY DISEASE, WITHOUT LONG-TERM CURRENT USE OF INSULIN (H): ICD-10-CM

## 2021-02-01 DIAGNOSIS — D64.9 ANEMIA, UNSPECIFIED TYPE: ICD-10-CM

## 2021-02-01 DIAGNOSIS — G62.9 NEUROPATHY: ICD-10-CM

## 2021-02-01 DIAGNOSIS — Z12.5 PROSTATE CANCER SCREENING: ICD-10-CM

## 2021-02-01 DIAGNOSIS — R35.0 URINARY FREQUENCY: ICD-10-CM

## 2021-02-01 DIAGNOSIS — I71.40 ABDOMINAL AORTIC ANEURYSM (AAA) WITHOUT RUPTURE (H): ICD-10-CM

## 2021-02-01 LAB
ALBUMIN SERPL-MCNC: 3.6 G/DL (ref 3.5–5)
ALBUMIN UR-MCNC: NEGATIVE MG/DL
ALP SERPL-CCNC: 69 U/L (ref 45–120)
ALT SERPL W P-5'-P-CCNC: 10 U/L (ref 0–45)
ANION GAP SERPL CALCULATED.3IONS-SCNC: 11 MMOL/L (ref 5–18)
APPEARANCE UR: CLEAR
AST SERPL W P-5'-P-CCNC: 14 U/L (ref 0–40)
BACTERIA #/AREA URNS HPF: ABNORMAL HPF
BILIRUB SERPL-MCNC: 0.4 MG/DL (ref 0–1)
BILIRUB UR QL STRIP: NEGATIVE
BUN SERPL-MCNC: 25 MG/DL (ref 8–28)
CALCIUM SERPL-MCNC: 9 MG/DL (ref 8.5–10.5)
CHLORIDE BLD-SCNC: 108 MMOL/L (ref 98–107)
CHOLEST SERPL-MCNC: 155 MG/DL
CO2 SERPL-SCNC: 20 MMOL/L (ref 22–31)
COLOR UR AUTO: YELLOW
CREAT SERPL-MCNC: 1.45 MG/DL (ref 0.7–1.3)
CREAT UR-MCNC: 79.2 MG/DL
ERYTHROCYTE [DISTWIDTH] IN BLOOD BY AUTOMATED COUNT: 12.4 % (ref 11–14.5)
FASTING STATUS PATIENT QL REPORTED: NO
GFR SERPL CREATININE-BSD FRML MDRD: 47 ML/MIN/1.73M2
GLUCOSE BLD-MCNC: 108 MG/DL (ref 70–125)
GLUCOSE UR STRIP-MCNC: NEGATIVE MG/DL
HBA1C MFR BLD: 5.6 %
HCT VFR BLD AUTO: 43.4 % (ref 40–54)
HDLC SERPL-MCNC: 35 MG/DL
HGB BLD-MCNC: 14.3 G/DL (ref 14–18)
HGB UR QL STRIP: ABNORMAL
KETONES UR STRIP-MCNC: NEGATIVE MG/DL
LDLC SERPL CALC-MCNC: 75 MG/DL
LEUKOCYTE ESTERASE UR QL STRIP: NEGATIVE
MCH RBC QN AUTO: 31.9 PG (ref 27–34)
MCHC RBC AUTO-ENTMCNC: 32.9 G/DL (ref 32–36)
MCV RBC AUTO: 97 FL (ref 80–100)
MICROALBUMIN UR-MCNC: 1.08 MG/DL (ref 0–1.99)
MICROALBUMIN/CREAT UR: 13.6 MG/G
NITRATE UR QL: NEGATIVE
PH UR STRIP: 6 [PH] (ref 5–8)
PLATELET # BLD AUTO: 219 THOU/UL (ref 140–440)
PMV BLD AUTO: 8.2 FL (ref 7–10)
POTASSIUM BLD-SCNC: 4.8 MMOL/L (ref 3.5–5)
PROT SERPL-MCNC: 6.6 G/DL (ref 6–8)
PSA SERPL-MCNC: 0.7 NG/ML (ref 0–6.5)
RBC # BLD AUTO: 4.47 MILL/UL (ref 4.4–6.2)
RBC #/AREA URNS AUTO: ABNORMAL HPF
SODIUM SERPL-SCNC: 139 MMOL/L (ref 136–145)
SP GR UR STRIP: 1.02 (ref 1–1.03)
SQUAMOUS #/AREA URNS AUTO: ABNORMAL LPF
TRIGL SERPL-MCNC: 225 MG/DL
TSH SERPL DL<=0.005 MIU/L-ACNC: 1.28 UIU/ML (ref 0.3–5)
UROBILINOGEN UR STRIP-ACNC: ABNORMAL
VIT B12 SERPL-MCNC: 891 PG/ML (ref 213–816)
WBC #/AREA URNS AUTO: ABNORMAL HPF
WBC: 9 THOU/UL (ref 4–11)

## 2021-02-02 LAB
25(OH)D3 SERPL-MCNC: 40.7 NG/ML (ref 30–80)
25(OH)D3 SERPL-MCNC: 40.7 NG/ML (ref 30–80)

## 2021-02-23 ENCOUNTER — OFFICE VISIT (OUTPATIENT)
Dept: OPHTHALMOLOGY | Facility: CLINIC | Age: 76
End: 2021-02-23
Attending: OPHTHALMOLOGY
Payer: MEDICARE

## 2021-02-23 DIAGNOSIS — H35.3211 EXUDATIVE AGE-RELATED MACULAR DEGENERATION OF RIGHT EYE WITH ACTIVE CHOROIDAL NEOVASCULARIZATION (H): Primary | ICD-10-CM

## 2021-02-23 PROCEDURE — 250N000011 HC RX IP 250 OP 636: Performed by: OPHTHALMOLOGY

## 2021-02-23 PROCEDURE — G0463 HOSPITAL OUTPT CLINIC VISIT: HCPCS

## 2021-02-23 PROCEDURE — 67028 INJECTION EYE DRUG: CPT | Mod: RT | Performed by: OPHTHALMOLOGY

## 2021-02-23 RX ADMIN — AFLIBERCEPT 2 MG: 40 INJECTION, SOLUTION INTRAVITREAL at 10:42

## 2021-02-23 ASSESSMENT — CONF VISUAL FIELD
OD_NORMAL: 1
OS_NORMAL: 1
METHOD: COUNTING FINGERS

## 2021-02-23 ASSESSMENT — VISUAL ACUITY
METHOD: SNELLEN - LINEAR
OS_SC: 20/20
OD_SC: 20/150
OD_SC+: -1

## 2021-02-23 ASSESSMENT — PATIENT HEALTH QUESTIONNAIRE - PHQ9: SUM OF ALL RESPONSES TO PHQ QUESTIONS 1-9: 0

## 2021-02-23 ASSESSMENT — TONOMETRY
OD_IOP_MMHG: 10
IOP_METHOD: TONOPEN
OS_IOP_MMHG: 12

## 2021-02-23 NOTE — PROGRESS NOTES
CC -   Wet AMD OD    INTERVAL HISTORY - VA stable since SISSY.    Still Trying to reduce smoking,   Last DFE OU 9/2020    Prefers attending injection     PMH-   Jonas Hyman is a  75  year old year-old patient referred by Dr Pinedo for wet  AMD OD.  Had gone few years without eye exam d/t insurance lack, gradual progressive loss OU.  Prior RD repair OD 2014, unsure what BCVA was after repair.  Caring for grandson  VA has worsened OU over years, now trouble with reading/drivign, ADLs.    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  Wet AMD OD   - uncertain duration by history   - uncertain vision potential given prior h/o RD   - started Avastin 9/17/18 (#6 total)   - changed to Eylea 3/12/19     - last injection Eylea  (#21) 1/4/21  (7 weeks, 7 week interval)     - prior OCT SRF stable   - VA stable    - inject Eylea today   - RTC 7 weeks      - tolerated 6 weeks on 1/2021   - tolerated 5 weeks on 7/31/20, increased to 5 weeks, stable mild SRF & VA   - given severe AMD will increase interval    - was typically 20/80-20/125 in past @ 4 weeks     #. H/o RD repair OD   - s/p PPV/SBP/FGx  2014   - retina flat last DFE      #. syneresis OS   - advised s/sx RD 11/2020    #. 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  I personally obtained the chief complaint(s) and history of  present illness.  I confirmed and edited as necessary the review of systems, past medical/surgical history, family history, social history, and examination findings as documented by others; and I examined the patient myself.  I personally reviewed the relevant tests, images, and reports as documented above.  I formulated and edited as necessary the assessment and plan and discussed the findings and management plan with the patient and family and I was present for the entire procedure performed by the resident/fellow.    Anu Wilson MD, PhD  , Vitreoretinal Surgery  Department of Ophthalmology  [facility]

## 2021-02-23 NOTE — NURSING NOTE
Chief Complaints and History of Present Illnesses   Patient presents with     Macular Degeneration Follow Up     Chief Complaint(s) and History of Present Illness(es)     Macular Degeneration Follow Up     Laterality: both eyes    Quality: blurred vision    Frequency: constantly    Timing: throughout the day    Course: stable    Associated symptoms: dryness (mild).  Negative for burning, redness, eye pain and tearing    Pain scale: 0/10              Comments     Follow up for AMD / posible injection today.  Pt denies any VA changes and states BE are comfortable / rare use of art tears.  Carola Garner, IESHA COT 9:28 AM 02/23/2021

## 2021-03-13 ENCOUNTER — HEALTH MAINTENANCE LETTER (OUTPATIENT)
Age: 76
End: 2021-03-13

## 2021-04-13 ENCOUNTER — OFFICE VISIT (OUTPATIENT)
Dept: OPHTHALMOLOGY | Facility: CLINIC | Age: 76
End: 2021-04-13
Attending: OPHTHALMOLOGY
Payer: MEDICARE

## 2021-04-13 DIAGNOSIS — H35.3211 EXUDATIVE AGE-RELATED MACULAR DEGENERATION OF RIGHT EYE WITH ACTIVE CHOROIDAL NEOVASCULARIZATION (H): Primary | ICD-10-CM

## 2021-04-13 PROCEDURE — 99207 PR BUNDLED PROCEDURE IN GLOBAL PKG: CPT | Performed by: OPHTHALMOLOGY

## 2021-04-13 PROCEDURE — 92134 CPTRZ OPH DX IMG PST SGM RTA: CPT | Performed by: OPHTHALMOLOGY

## 2021-04-13 PROCEDURE — G0463 HOSPITAL OUTPT CLINIC VISIT: HCPCS | Mod: 25

## 2021-04-13 PROCEDURE — 250N000011 HC RX IP 250 OP 636: Performed by: OPHTHALMOLOGY

## 2021-04-13 PROCEDURE — 67028 INJECTION EYE DRUG: CPT | Mod: RT | Performed by: OPHTHALMOLOGY

## 2021-04-13 RX ADMIN — AFLIBERCEPT 2 MG: 40 INJECTION, SOLUTION INTRAVITREAL at 11:19

## 2021-04-13 ASSESSMENT — EXTERNAL EXAM - LEFT EYE: OS_EXAM: NORMAL

## 2021-04-13 ASSESSMENT — VISUAL ACUITY
METHOD: SNELLEN - LINEAR
OD_PH_SC: 20/100
OD_SC: 20/200
OD_PH_SC+: -1
OS_SC+: -1
OS_SC: 20/20

## 2021-04-13 ASSESSMENT — TONOMETRY
OS_IOP_MMHG: 13
IOP_METHOD: TONOPEN
OD_IOP_MMHG: 14

## 2021-04-13 ASSESSMENT — CONF VISUAL FIELD
METHOD: COUNTING FINGERS
OS_NORMAL: 1
OD_NORMAL: 1

## 2021-04-13 ASSESSMENT — CUP TO DISC RATIO
OD_RATIO: 0.6
OS_RATIO: 0.3

## 2021-04-13 ASSESSMENT — EXTERNAL EXAM - RIGHT EYE: OD_EXAM: NORMAL

## 2021-04-13 ASSESSMENT — SLIT LAMP EXAM - LIDS
COMMENTS: NORMAL
COMMENTS: NORMAL

## 2021-04-13 NOTE — NURSING NOTE
Chief Complaints and History of Present Illnesses   Patient presents with     Exudative Macular Degeneration Follow Up     Chief Complaint(s) and History of Present Illness(es)     Exudative Macular Degeneration Follow Up     Laterality: right eye    Associated symptoms: dryness.  Negative for eye pain, floaters, flashes and itching    Pain scale: 0/10              Comments     William is here to continue care for Exudative age-related macular degeneration of right eye with active choroidal neovascularization (H). He states no vision change since last visit.     Ashwin Freire COT 10:13 AM April 13, 2021

## 2021-04-13 NOTE — PROGRESS NOTES
CC -   Wet AMD OD    INTERVAL HISTORY - VA stable since SISSY.    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  Had gone few years without eye exam d/t insurance lack, gradual progressive loss OU.  Prior RD repair OD 2014, unsure what BCVA was after repair.  Caring for grandson  VA has worsened OU over years, now trouble with reading/drivign, ADLs.    PAST OCULAR SURGERY  PPV/SBP/FGx OD 2014 ([facility])  ACIOL OD   CE/IOL OS 12/3/18    RETINAL IMAGING:  OCT 4-13-21  OD - SR scar mild SRF stable  OS - mild RPE elevations, no fluid , tr ERM, PHF partly attached    FA 9-17-18  OD - central leakage c/w occult CNVM  OS - no leakage    ICG 9-17-18  OD - central leakage c/w CNMV, no polyps  OS - no leakage      ASSESSMENT & PLAN  #.  Wet AMD OD   - uncertain duration by history   - uncertain vision potential given prior h/o RD   - started Avastin 9/17/18 (#6 total)   - changed to Eylea 3/12/19     - last injection Eylea  (#22) 2/23/21 (7 weeks, 7 week interval)     - OCT SRF stable   - VA stable    - inject Eylea today   - RTC 7 weeks      - stable SRF & VA @ 7 weeks on 4/2021   - given severe AMD will increase interval    - was typically 20/80-20/125 in past @ 4 weeks     #. H/o RD repair OD   - s/p PPV/SBP/FGx  2014   - retina flat last DFE      #. syneresis OS   - advised s/sx RD 4/2021    #. 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  I personally obtained the chief complaint(s) and history of present illness.  I confirmed and edited as necessary the review of  systems, past medical/surgical history, family history, social history, and examination findings as documented by others; and I examined the patient myself.  I personally reviewed the relevant tests, images, and reports as documented above.  I formulated and edited as necessary the assessment and plan and discussed the findings and management plan with the patient and family    Anu Wilson MD, PhD  , Vitreoretinal Surgery  Department of Ophthalmology  [facility]

## 2021-05-28 NOTE — TELEPHONE ENCOUNTER
Contacted patient and he states he just received a fenestrated stent at [facility] and has an appt to f/u with them on 6/2/2019 and he is not going to make an appt with Dr Goldsmith considering he is strapped for cash and time. Patient also want Dr oGldsmith to know that the tumor was benign. No further questions or concerns at this time.

## 2021-05-28 NOTE — TELEPHONE ENCOUNTER
Who is calling:  patient  Reason for Call:  Patient received a phone call from [facility] regarding follow up & is unsure if it's necessary. Patient would like to speak with the nurse about it.  Date of last appointment with primary care: 3/6/19  Okay to leave a detailed message: Yes

## 2021-05-30 NOTE — TELEPHONE ENCOUNTER
Spoke with pt and relayed PCP message.  Pt understanding.  Pt will call back with weight. Pt states he also needs a refill of pravastatin.

## 2021-05-30 NOTE — TELEPHONE ENCOUNTER
RN cannot approve Refill Request    RN can NOT refill this medication med is not covered by policy/route to provider     . Last office visit: 10/30/2018 Dillon Goldsmith MD Last Physical: 3/6/2019 Last MTM visit: Visit date not found Last visit same specialty: 10/30/2018 Dillon Goldsmith MD.  Next visit within 3 mo: Visit date not found  Next physical within 3 mo: Visit date not found      Jennie Pretty, [facility] Triage/Med Refill 7/29/2019    Requested Prescriptions   Pending Prescriptions Disp Refills     nitroglycerin (NITROSTAT) 0.4 MG SL tablet [Pharmacy Med Name: Nitroglycerin Sublingual Tablet Sublingual 0.4 MG]  2     Sig: DISSOLVE 1 TABLET UNDER THE TONGUE EVERY 5 MINUTES. MAX OF 3 DOSES IN 15 MINUTES. IF CHEST PAIN CONTINUES CALL 911       There is no refill protocol information for this order

## 2021-05-30 NOTE — TELEPHONE ENCOUNTER
"Recently had [facility] surgery for abdominal stent.    Stopped taking his lisinopril. Significant pain, halo of pain around eyes and back of head.  Also treated at [facility] for macular degeneration, detached retina.    Last 3 days feet and hands have swollen.  Also capillary leakage on back of right hand. Can hardly get shoes on.  Headache feels like he \"is going to have a stroke\". Persistent HA.  One of the worst he has had.  Rates HA 8/-9/10.  Reordered refill of lisinopril and is asking if he starts taking it again today, will he feel better?.  No sleep past few days. Cannot take BP at home.    Patient wants to be seen by Dr. Goldsmith and worked into his schedule.     He does not want to go to ED.  Has outstanding bills from there.    Patient asking for Dr. Goldsmith or his team to call him from the clinic.    Eleanor Galloway, RN, [facility] Nurse Triage/Med Refills RN     Reason for Disposition    SEVERE headache, states 'worst headache' of life    Protocols used: HEADACHE-A-OH      "

## 2021-05-30 NOTE — TELEPHONE ENCOUNTER
Who is calling:  Patient   Reason for Call:  Patient calls to update his weight today 280 lbs.  Date of last appointment with primary care: 3/6/19  Okay to leave a detailed message: No

## 2021-05-30 NOTE — TELEPHONE ENCOUNTER
Usually after a procedure, this is excess fluid, and high blood pressure from that    Add Furosemide 40 mgs daily    Weigh daily     Tell us how much your weight is up, and tell us when the weight is down 5 lbs    Resume lisinopril daily

## 2021-05-31 VITALS — HEIGHT: 70 IN | WEIGHT: 267 LBS | BODY MASS INDEX: 38.22 KG/M2

## 2021-06-01 ENCOUNTER — OFFICE VISIT (OUTPATIENT)
Dept: OPHTHALMOLOGY | Facility: CLINIC | Age: 76
End: 2021-06-01
Attending: OPHTHALMOLOGY
Payer: MEDICARE

## 2021-06-01 VITALS — WEIGHT: 257.1 LBS | BODY MASS INDEX: 36.81 KG/M2 | HEIGHT: 70 IN

## 2021-06-01 DIAGNOSIS — H35.3211 EXUDATIVE AGE-RELATED MACULAR DEGENERATION OF RIGHT EYE WITH ACTIVE CHOROIDAL NEOVASCULARIZATION (H): ICD-10-CM

## 2021-06-01 PROCEDURE — 250N000011 HC RX IP 250 OP 636: Performed by: OPHTHALMOLOGY

## 2021-06-01 PROCEDURE — 92134 CPTRZ OPH DX IMG PST SGM RTA: CPT | Performed by: OPHTHALMOLOGY

## 2021-06-01 PROCEDURE — 67028 INJECTION EYE DRUG: CPT | Mod: RT | Performed by: OPHTHALMOLOGY

## 2021-06-01 PROCEDURE — G0463 HOSPITAL OUTPT CLINIC VISIT: HCPCS

## 2021-06-01 PROCEDURE — 99207 PR BUNDLED PROCEDURE IN GLOBAL PKG: CPT | Performed by: OPHTHALMOLOGY

## 2021-06-01 RX ADMIN — AFLIBERCEPT 2 MG: 40 INJECTION, SOLUTION INTRAVITREAL at 11:04

## 2021-06-01 ASSESSMENT — CONF VISUAL FIELD
OD_NORMAL: 1
OS_NORMAL: 1
METHOD: COUNTING FINGERS

## 2021-06-01 ASSESSMENT — VISUAL ACUITY
OS_SC: 20/25
METHOD: SNELLEN - LINEAR
OD_SC: 20/150

## 2021-06-01 ASSESSMENT — CUP TO DISC RATIO
OS_RATIO: 0.3
OD_RATIO: 0.6

## 2021-06-01 ASSESSMENT — SLIT LAMP EXAM - LIDS
COMMENTS: NORMAL
COMMENTS: NORMAL

## 2021-06-01 ASSESSMENT — EXTERNAL EXAM - LEFT EYE: OS_EXAM: NORMAL

## 2021-06-01 ASSESSMENT — TONOMETRY
IOP_METHOD: TONOPEN
OD_IOP_MMHG: 13
OS_IOP_MMHG: 12

## 2021-06-01 ASSESSMENT — EXTERNAL EXAM - RIGHT EYE: OD_EXAM: NORMAL

## 2021-06-01 NOTE — NURSING NOTE
Chief Complaints and History of Present Illnesses   Patient presents with     Macular Degeneration Follow Up     Chief Complaint(s) and History of Present Illness(es)     Macular Degeneration Follow Up     Laterality: both eyes    Associated symptoms: Negative for floaters and flashes    Treatments tried: artificial tears    Pain scale: 0/10              Comments     Macular degeneration follow up.    The patient notes the vision seems the same as last visit.  RAHUL Starkey, COA 10:09 AM 06/01/2021

## 2021-06-01 NOTE — PROGRESS NOTES
CC -   Wet AMD OD    INTERVAL HISTORY - VA stable since SISSY.    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  Had gone few years without eye exam d/t insurance lack, gradual progressive loss OU.  Prior RD repair OD 2014, unsure what BCVA was after repair.  Caring for grandson  VA has worsened OU over years, now trouble with reading/drivign, ADLs.    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  Wet AMD OD   - uncertain duration by history   - uncertain vision potential given prior h/o RD   - started Avastin 9/17/18 (#6 total)   - changed to Eylea 3/12/19     - last injection Eylea  (#23) 4/13/21 (7 weeks, 7 week interval)     - OCT SRF stable   - VA stable    - inject Eylea today   - increase to 8 weeks   - RTC 8  weeks       - increased to 8 weeks on 6/1/21     - stable SRF & VA @ 7 weeks on 6/2021   - was typically 20/80-20/125 in past @ 4 weeks     #. H/o RD repair OD   - s/p PPV/SBP/FGx  2014   - retina flat last DFE      #. syneresis OS   - advised s/sx RD 4/2021    #. 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  I personally obtained the chief complaint(s) and history of present  illness.  I confirmed and edited as necessary the review of systems, past medical/surgical history, family history, social history, and examination findings as documented by others; and I examined the patient myself.  I personally reviewed the relevant tests, images, and reports as documented above.  I personally reviewed the ophthalmic test(s) associated with this encounter, agree with the interpretation(s) as documented by the resident/fellow, and have edited the corresponding report(s) as necessary.   I formulated and edited as necessary the assessment and plan and discussed the findings and management plan with the patient and family and No resident or fellow assisted with the procedures performed.  I performed the procedures myself.    Anu Wilson MD, PhD  , Vitreoretinal Surgery  Department of Ophthalmology  [facility]

## 2021-06-02 VITALS — WEIGHT: 255.1 LBS | HEIGHT: 70 IN | BODY MASS INDEX: 36.52 KG/M2

## 2021-06-02 VITALS — BODY MASS INDEX: 36.79 KG/M2 | WEIGHT: 257 LBS | HEIGHT: 70 IN

## 2021-06-02 VITALS — WEIGHT: 249 LBS | HEIGHT: 70 IN | BODY MASS INDEX: 35.65 KG/M2

## 2021-06-05 NOTE — TELEPHONE ENCOUNTER
Refill Approved    Rx renewed per Medication Renewal Policy. Medication was last renewed on 2/22/19, last OV 3/6/19.    Annette Corado, [facility] Triage/Med Refill 1/18/2020     Requested Prescriptions   Pending Prescriptions Disp Refills     lisinopril (PRINIVIL,ZESTRIL) 10 MG tablet [Pharmacy Med Name: Lisinopril Oral Tablet 10 MG] 90 tablet 1     Sig: Take 1 tablet (10 mg) by mouth once a day       Ace Inhibitors Refill Protocol Passed - 1/18/2020  1:25 PM        Passed - PCP or prescribing provider visit in past 12 months       Last office visit with prescriber/PCP: 10/30/2018 Dillon Goldsmith MD OR same dept: Visit date not found OR same specialty: 10/30/2018 Dillon Goldsmith MD  Last physical: 3/6/2019 Last MTM visit: Visit date not found   Next visit within 3 mo: Visit date not found  Next physical within 3 mo: Visit date not found  Prescriber OR PCP: Dillon Goldsmith MD  Last diagnosis associated with med order: 1. Essential hypertension  - lisinopril (PRINIVIL,ZESTRIL) 10 MG tablet [Pharmacy Med Name: Lisinopril Oral Tablet 10 MG]; Take 1 tablet (10 mg) by mouth once a day  Dispense: 90 tablet; Refill: 1    If protocol passes may refill for 12 months if within 3 months of last provider visit (or a total of 15 months).             Passed - Serum Potassium in past 12 months     Lab Results   Component Value Date    Potassium 5.1 (H) 03/06/2019             Passed - Blood pressure filed in past 12 months     BP Readings from Last 1 Encounters:   03/06/19 118/64             Passed - Serum Creatinine in past 12 months     Creatinine   Date Value Ref Range Status   03/06/2019 1.01 0.70 - 1.30 mg/dL Final

## 2021-06-08 NOTE — TELEPHONE ENCOUNTER
RN cannot approve Refill Request    RN can NOT refill this medication Protocol failed and NO refill given.       Jennie Pretty, [facility] Triage/Med Refill 6/9/2020    Requested Prescriptions   Pending Prescriptions Disp Refills     lisinopriL (PRINIVIL,ZESTRIL) 10 MG tablet [Pharmacy Med Name: Lisinopril Oral Tablet 10 MG] 90 tablet 3     Sig: Take 1 tablet (10 mg) by mouth once a day       Ace Inhibitors Refill Protocol Failed - 6/7/2020  1:30 PM        Failed - PCP or prescribing provider visit in past 12 months       Last office visit with prescriber/PCP: 10/30/2018 Dillon Goldsmith MD OR same dept: Visit date not found OR same specialty: 10/30/2018 Dillon Goldsmith MD  Last physical: 3/6/2019 Last MTM visit: Visit date not found   Next visit within 3 mo: Visit date not found  Next physical within 3 mo: Visit date not found  Prescriber OR PCP: Dillno Goldsmith MD  Last diagnosis associated with med order: 1. Essential hypertension  - lisinopriL (PRINIVIL,ZESTRIL) 10 MG tablet [Pharmacy Med Name: Lisinopril Oral Tablet 10 MG]; Take 1 tablet (10 mg) by mouth once a day  Dispense: 90 tablet; Refill: 0    If protocol passes may refill for 12 months if within 3 months of last provider visit (or a total of 15 months).             Failed - Serum Potassium in past 12 months     No results found for: LN-POTASSIUM          Failed - Blood pressure filed in past 12 months     BP Readings from Last 1 Encounters:   03/06/19 118/64             Failed - Serum Creatinine in past 12 months     Creatinine   Date Value Ref Range Status   03/06/2019 1.01 0.70 - 1.30 mg/dL Final

## 2021-06-09 NOTE — TELEPHONE ENCOUNTER
Medication Question or Clarification  Who is calling: pharmacy   What medication are you calling about (include dose and sig)?: furosemide (LASIX) 40 MG tablet   Who prescribed the medication?: Dillon Goldsmith MD    What is your question/concern?: Would like a Quantity of 90 instead of 30.   [facility]ed [facility]acy: [facility]  Okay to leave a detailed message?: Yes

## 2021-06-09 NOTE — TELEPHONE ENCOUNTER
RN cannot approve Refill Request    RN can NOT refill this medication PCP messaged that patient is overdue for Office Visit. Last office visit: 10/30/2018 Dillon Goldsmith MD Last Physical: 3/6/2019 Last MTM visit: Visit date not found Last visit same specialty: Visit date not found.  Next visit within 3 mo: Visit date not found  Next physical within 3 mo: Visit date not found      Annette Corado, [facility] Triage/Med Refill 7/18/2020    Requested Prescriptions   Pending Prescriptions Disp Refills     pravastatin (PRAVACHOL) 40 MG tablet [Pharmacy Med Name: Pravastatin Sodium Oral Tablet 40 MG] 90 tablet 0     Sig: TAKE 1 TABLET (40 MG) BY MOUTH ONCE DAILY       Statins Refill Protocol (Hmg CoA Reductase Inhibitors) Failed - 7/17/2020  2:07 PM        Failed - PCP or prescribing provider visit in past 12 months      Last office visit with prescriber/PCP: 10/30/2018 Dillon Goldsmith MD OR same dept: Visit date not found OR same specialty: Visit date not found  Last physical: 3/6/2019 Last MTM visit: Visit date not found   Next visit within 3 mo: Visit date not found  Next physical within 3 mo: Visit date not found  Prescriber OR PCP: Dillon Goldsmith MD  Last diagnosis associated with med order: 1. Coronary artery disease involving native coronary artery of native heart without angina pectoris  - pravastatin (PRAVACHOL) 40 MG tablet [Pharmacy Med Name: Pravastatin Sodium Oral Tablet 40 MG]; TAKE 1 TABLET (40 MG) BY MOUTH ONCE DAILY  Dispense: 90 tablet; Refill: 0    If protocol passes may refill for 12 months if within 3 months of last provider visit (or a total of 15 months).

## 2021-06-10 NOTE — TELEPHONE ENCOUNTER
Refill Request  Did you contact pharmacy: Yes  Medication name:   Requested Prescriptions     Pending Prescriptions Disp Refills     pravastatin (PRAVACHOL) 40 MG tablet [Pharmacy Med Name: Pravastatin Sodium Oral Tablet 40 MG] 90 tablet 0     Sig: TAKE 1 TABLET (40 MG) BY MOUTH ONCE DAILY     Who prescribed the medication: Dillon Goldsmith MD   Requested Pharmacy: [facility]  Is patient out of medication: Yes  Patient notified refills processed in 3 business days:  no  Okay to leave a detailed message: no    FYI: Pharmacy stated that the patient was given additional 3 pills to get him through while waiting for this prescription to come through. Pharmacy stated that this prescription is no longer an active prescription and that Dillon Goldsmith MD will need to send another prescription with a quantity subtracting the 3 additional pill that was given to the patient. Writer spoke with [place], Intern Pharmacist. Patient is out of medication.

## 2021-06-10 NOTE — TELEPHONE ENCOUNTER
RN cannot approve Refill Request    RN can NOT refill this medication Protocol failed and NO refill given. Last office visit: 10/30/2018 Dillon Goldsmith MD Last Physical: 3/6/2019 Last MTM visit: Visit date not found Last visit same specialty: Visit date not found.  Next visit within 3 mo: Visit date not found  Next physical within 3 mo: Visit date not found      Jennie Pretty, [facility] Triage/Med Refill 7/29/2020    Requested Prescriptions   Pending Prescriptions Disp Refills     pravastatin (PRAVACHOL) 40 MG tablet [Pharmacy Med Name: Pravastatin Sodium Oral Tablet 40 MG] 90 tablet 0     Sig: TAKE 1 TABLET (40 MG) BY MOUTH ONCE DAILY       Statins Refill Protocol (Hmg CoA Reductase Inhibitors) Failed - 7/29/2020  3:32 PM        Failed - PCP or prescribing provider visit in past 12 months      Last office visit with prescriber/PCP: 10/30/2018 Dillon Goldsmith MD OR same dept: Visit date not found OR same specialty: Visit date not found  Last physical: 3/6/2019 Last MTM visit: Visit date not found   Next visit within 3 mo: Visit date not found  Next physical within 3 mo: Visit date not found  Prescriber OR PCP: Dillon Goldsmith MD  Last diagnosis associated with med order: 1. Coronary artery disease involving native coronary artery of native heart without angina pectoris  - pravastatin (PRAVACHOL) 40 MG tablet [Pharmacy Med Name: Pravastatin Sodium Oral Tablet 40 MG]; TAKE 1 TABLET (40 MG) BY MOUTH ONCE DAILY  Dispense: 90 tablet; Refill: 0    If protocol passes may refill for 12 months if within 3 months of last provider visit (or a total of 15 months).

## 2021-06-13 NOTE — PROGRESS NOTES
Patient would like the video invitation sent by: Other e-mail: [email or web address]  or text Cell [phone]       ASSESSMENT:  1. Type 2 diabetes mellitus with stage 3b chronic kidney disease, without long-term current use of insulin (H)  Update labs.  No medication.  Update Pneumovax  - Comprehensive Metabolic Panel; Future  - Glycosylated Hemoglobin A1c; Future  - Lipid Cascade; Future  - Thyroid Stimulating Hormone (TSH); Future  - Microalbumin, Random Urine; Future  - Pneumococcal polysaccharide vaccine 23-valent greater than or equal to 3yo subcutaneous/IM; Future    2. S/P repair of abdominal aortic aneurysm using bifurcation graft  Reviewed complicated graft placement at [facility].  May have lost some degree of renal function.  Otherwise has healed    3. Abdominal aortic aneurysm (AAA) without rupture (H)  Status post graft replacement using 5 pronged graft  - HM2(CBC w/o Differential); Future    4. Exudative age-related macular degeneration of right eye with active choroidal neovascularization (H)  Continues aggressive treatment.  Reviewed ophthalmology notes    5. Essential hypertension  Takes furosemide twice per week.  Trial of more chronic suppression of fluid retention  - furosemide (LASIX) 40 MG tablet; Take 1 tablet (40 mg total) by mouth daily.  Dispense: 90 tablet; Refill: 3  - lisinopriL (PRINIVIL,ZESTRIL) 10 MG tablet; Take 1 tablet (10 mg) by mouth once a day  Dispense: 90 tablet; Refill: 3  - Comprehensive Metabolic Panel; Future  - triamterene-hydrochlorothiazide (MAXZIDE-25) 37.5-25 mg per tablet; Take 1 tablet by mouth daily.  Dispense: 90 tablet; Refill: 3    6. Coronary artery disease involving native coronary artery of native heart with angina pectoris (H)  - pravastatin (PRAVACHOL) 40 MG tablet; TAKE 1 TABLET BY MOUTH ONCE DAILY  Dispense: 90 tablet; Refill: 3  - Lipid Cascade; Future    7. Myelolipoma  Biopsy in April through the [facility] revealed benign disease    8.  Vitamin D deficiency  - Vitamin D, Total (25-Hydroxy); Future    9. Prostate cancer screening  - PSA (Prostatic-Specific Antigen), Annual Screen; Future    10. Neuropathy  Check labs  - Vitamin B12; Future  - Thyroid Stimulating Hormone (TSH); Future    11. Stage 3b chronic kidney disease  Recheck after graft placement    12. Anemia, unspecified type  Recheck after graft placement  - HM2(CBC w/o Differential); Future    13. Urinary frequency  Consider prostatism versus prostatitis.  - Urinalysis-UC if Indicated; Future    14. History of alcohol dependence (H)  Sober for many years and is still active in Alcoholics Anonymous via [employer]    15. Severe obesity with body mass index (BMI) of 35.0 to 39.9 with serious comorbidity (H)  Encouraged exercise.  Reports losing some weight    Preventive Health Care: Flu shot up-to-date.  Update labs.  Update Pneumovax 23    PLAN:  Patient Instructions   Refill meds    Consider Flomax    Evaluate for possible prostate infection    Update labs    Pneumovax 23    Triamterene HCTZ 37.5/25 once daily    Decrease furosemide to as needed    Labs to evaluate neuropathy and anemia and fatigue    Orders Placed This Encounter   Procedures     Pneumococcal polysaccharide vaccine 23-valent greater than or equal to 1yo subcutaneous/IM     Standing Status:   Future     Standing Expiration Date:   11/30/2021     Comprehensive Metabolic Panel     Standing Status:   Future     Standing Expiration Date:   11/30/2021     Glycosylated Hemoglobin A1c     Standing Status:   Future     Standing Expiration Date:   11/30/2021     HM2(CBC w/o Differential)     Standing Status:   Future     Standing Expiration Date:   11/30/2021     Lipid Cascade     Standing Status:   Future     Standing Expiration Date:   11/30/2021     Order Specific Question:   Fasting is required?     Answer:   Unknown     Vitamin D, Total (25-Hydroxy)     Standing Status:   Future     Standing Expiration Date:   11/30/2021     Vitamin  B12     Standing Status:   Future     Standing Expiration Date:   11/30/2021     Thyroid Stimulating Hormone (TSH)     Standing Status:   Future     Standing Expiration Date:   11/30/2021     PSA (Prostatic-Specific Antigen), Annual Screen     Standing Status:   Future     Standing Expiration Date:   11/30/2021     Urinalysis-UC if Indicated     Standing Status:   Future     Standing Expiration Date:   11/30/2021     Microalbumin, Random Urine     Standing Status:   Future     Standing Expiration Date:   11/30/2021     Return in about 4 months (around 3/30/2021) for a phone/video follow up visit.      CHIEF COMPLAINT:  Chief Complaint   Patient presents with     Medication Management       HISTORY OF PRESENT ILLNESS:  Jonas is a 75 y.o. male contacting the clinic today via video for general review.  He has not been seen since April 2019.  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  This was paused and he was referred to the [facility].  Biopsy there showed myelo lipoma noncancerous.  He was then able to proceed with grafting    He has an abdominal aortic aneurysm.  He was seen at [place].  After proof of benign disease he underwent placement of a 5 pronged graft in May 2019.  He reports that it was unpleasant but he healed.  Follow-up studies suggest renal function may have worsened.  He was also anemia postop    Diabetes has been under stable control.  He does not check sugars nor does he take medication    He takes furosemide 2-3 times weekly for edema.  He was originally on lisinopril HCTZ years ago    REVIEW OF SYSTEMS:   Recurrent peripheral edema.  Occasional chest pain angina.  No acid reflux.  No myalgias.  Occasional memory lapses.  All other systems are negative.    PFSH:  Social History     Social History Narrative    History of alcohol abuse.  since 1991, after 24 years, 2 sons.         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  Wt Readings from Last 3 Encounters:   03/06/19 (!) 249 lb (112.9 kg)   11/30/18 (!) 255 lb 1.6 oz (115.7 kg)   10/30/18 (!) 257 lb (116.6 kg)       PHYSICAL EXAM:  (observations via Video)  Color good.  Comfortable.  Grandchild runs through living room      ADDITIONAL HISTORY SUMMARIZED (2): Reviewed operative report and local and [facility] surgical consultation  CARE EVERYWHERE/ EXTRA INFORMATION (1):   RADIOLOGY TESTS (1): Follow-up CT abdomen showing graft in good alignment  LABS (1): Ordered  CARDIOLOGY/MEDICINE TESTS (1):   INDEPENDENT REVIEW (2 each):     Total data points: 4    Video Start time: 3:23 PM  Video End time: 4:08 PM    The visit lasted a total of 45 minutes     CA Intake Time: 4 min     I have reviewed and updated the patient's Past Medical History, Social History, Family History as appropriate.    MEDICATIONS: Reviewed and updated per CA and MD  Current Outpatient Medications   Medication Sig Dispense Refill     aspirin 81 MG EC tablet Take 81 mg by mouth daily.       brolucizumab-dblL 6 mg/0.05 mL Soln 6 mg by Intravitreal route. Every 5 weeks       cholecalciferol, vitamin D3, 1,000 unit tablet Take 4 tablets (4,000 Units total) by mouth daily.       cyanocobalamin 100 MCG tablet Take 2,000 mcg by mouth.       furosemide (LASIX) 40 MG tablet Take 1 tablet (40 mg total) by mouth daily. 90 tablet 3     ibuprofen (ADVIL,MOTRIN) 200 MG tablet Take 1 tablet (200 mg total) by mouth every 6 (six) hours as needed for pain.  0     lisinopriL (PRINIVIL,ZESTRIL) 10 MG tablet Take 1 tablet (10 mg) by mouth once a day 90 tablet 3     nitroglycerin (NITROSTAT) 0.4 MG SL tablet DISSOLVE 1  "TABLET UNDER THE TONGUE EVERY 5 MINUTES. MAX OF 3 DOSES IN 15 MINUTES. IF CHEST PAIN CONTINUES CALL 911 20 tablet 2     pravastatin (PRAVACHOL) 40 MG tablet TAKE 1 TABLET BY MOUTH ONCE DAILY 90 tablet 3     triamterene-hydrochlorothiazide (MAXZIDE-25) 37.5-25 mg per tablet Take 1 tablet by mouth daily. 90 tablet 3     No current facility-administered medications for this visit.          The patient has been notified of following:     \"This video visit will be conducted via a call between you and your physician/provider. We have found that certain health care needs can be provided without the need for an in-person physical exam.  This service lets us provide the care you need with a video conversation.  If a prescription is necessary we can send it directly to your pharmacy.  If lab work is needed we can place an order for that and you can then stop by our lab to have the test done at a later time.    Video visits are billed at different rates depending on your insurance coverage. Please reach out to your insurance provider with any questions.    If during the course of the call the physician/provider feels a video visit is not appropriate, you will not be charged for this service.\"    Patient has given verbal consent to a Video visit? Yes  How would you like to obtain your AVS? AVS Preference: [employer].  If dropped by the video visit, the video invitation should be sent to: Text to cell phone: [phone]  Will anyone else be joining your video visit? No     Video-Visit Details    Type of service:  Video Visit    Originating Location (pt. Location): Home    Distant Location (provider location):  [facility] INTER[facility] MED[facility]     Platform used for Video Visit: Pam or Abelardo Goldsmith MD    "

## 2021-06-13 NOTE — TELEPHONE ENCOUNTER
Who is calling:  Patient  Reason for Call:    Patient is requesting status of medication.  Date of last appointment with primary care: 3/6/2019  Okay to leave a detailed message: Yes

## 2021-06-13 NOTE — PATIENT INSTRUCTIONS - HE
Refill meds    Consider Flomax    Evaluate for possible prostate infection    Update labs    Pneumovax 23    Triamterene HCTZ 37.5/25 once daily    Decrease furosemide to as needed    Labs to evaluate neuropathy and anemia and fatigue

## 2021-06-13 NOTE — TELEPHONE ENCOUNTER
RN cannot approve Refill Request    RN can NOT refill this medication Protocol failed and NO refill given. Last office visit: 10/30/2018 Dillon Goldsmith MD Last Physical: 3/6/2019 Last MTM visit: Visit date not found Last visit same specialty: 10/30/2018 Dillon Goldsmith MD.  Next visit within 3 mo: Visit date not found  Next physical within 3 mo: Visit date not found      Jennie Pretty, [facility] Triage/Med Refill 11/24/2020    Requested Prescriptions   Pending Prescriptions Disp Refills     pravastatin (PRAVACHOL) 40 MG tablet [Pharmacy Med Name: Pravastatin Sodium Oral Tablet 40 MG] 90 tablet 0     Sig: TAKE 1 TABLET BY MOUTH ONCE DAILY       Statins Refill Protocol (Hmg CoA Reductase Inhibitors) Failed - 11/23/2020 11:40 AM        Failed - PCP or prescribing provider visit in past 12 months      Last office visit with prescriber/PCP: 10/30/2018 Dillon Goldsmith MD OR same dept: Visit date not found OR same specialty: 10/30/2018 Dillon Goldsmith MD  Last physical: 3/6/2019 Last MTM visit: Visit date not found   Next visit within 3 mo: Visit date not found  Next physical within 3 mo: Visit date not found  Prescriber OR PCP: Dillon Goldsmith MD  Last diagnosis associated with med order: 1. Coronary artery disease involving native coronary artery of native heart without angina pectoris  - pravastatin (PRAVACHOL) 40 MG tablet [Pharmacy Med Name: Pravastatin Sodium Oral Tablet 40 MG]; TAKE 1 TABLET BY MOUTH ONCE DAILY  Dispense: 90 tablet; Refill: 0    If protocol passes may refill for 12 months if within 3 months of last provider visit (or a total of 15 months).

## 2021-06-15 NOTE — PROGRESS NOTES
Assessment and Plan:     1. Annual physical exam  Colonoscopy last done 2009.  Agrees to low intensity colon cancer screening.  Previous diverticulosis noted.  Update labs and shots  - PSA, Annual Screen (Prostatic-Specific Antigen)  - Elizabeth    2. Coronary artery disease involving native coronary artery of native heart without angina pectoris  Stable.  Continues to smoke.  Does take other medications as directed  - pravastatin (PRAVACHOL) 40 MG tablet; TAKE 1 TABLET (40 MG) BY MOUTH ONCE DAILY  Dispense: 90 tablet; Refill: 3    3. Screen for colon cancer  Conservative colon cancer follow-up screening    4. DM type 2 (diabetes mellitus, type 2)  No hypoglycemia.  Diet controlled  - Lipid Cascade  - Microalbumin, Random Urine  - Glycosylated Hemoglobin A1c    5. Low vitamin B12 level  Recheck level on supplements.  Abstinent from alcohol  - Vitamin B12    6. Vitamin D deficiency  - Vitamin D, Total (25-Hydroxy)    7. Right flank pain  Abrupt occurrence last week could suggest cholelithiasis, nephrolithiasis, or intra-abdominal process such as diverticulitis.  Check urine  - Urinalysis-UC if Indicated  - HM2(CBC w/o Differential)  - Comprehensive Metabolic Panel    8. Encounter for screening for malignant neoplasm of prostate   - PSA, Annual Screen (Prostatic-Specific Antigen)    9. Diverticulosis of large intestine without hemorrhage  Diverticulosis confirmed by colonoscopy review.  Pain a few days ago could be diverticulitis but has resolved.  Check white count.  Discussed signs to watch for    The patient's current medical problems were reviewed.    I have had an Advance Directives discussion with the patient.  The following health maintenance schedule was reviewed with the patient and provided in printed form in the after visit summary:   Health Maintenance   Topic Date Due     DIABETES FOOT EXAM  07/30/1955     DIABETES OPHTHALMOLOGY EXAM  07/30/1955     ZOSTER VACCINE  07/30/2004     FALL RISK ASSESSMENT   11/04/2016     DIABETES URINE MICROALBUMIN  10/21/2017     COLONOSCOPY  01/31/2018 (Originally 8/11/2012)     DIABETES HEMOGLOBIN A1C  06/29/2018     DIABETES FOLLOW-UP  06/29/2018     TD 18+ HE  11/07/2021     ADVANCE DIRECTIVES DISCUSSED WITH PATIENT  12/29/2022     PNEUMOCOCCAL POLYSACCHARIDE VACCINE AGE 65 AND OVER  Completed     INFLUENZA VACCINE RULE BASED  Completed     PNEUMOCOCCAL CONJUGATE VACCINE FOR ADULTS (PCV13 OR PREVNAR)  Completed        Subjective:   Chief Complaint: Jonas Hyman is an 72 y.o. male here for an Annual Wellness visit.   Chief Complaint   Patient presents with     Annual Wellness Visit     Flank Pain     26th pt complained of pain and nausea      Mass     on back, raised and irregular      Headache     daily      HPI:   Flank Pain: He has severe pain in his right lower back around 12/23/17, close to his spine. He notes that he was cleaning his garage and believes that it did not feel musculoskeletal in nature. The pain lasted for 1.5 days. He kept his eyes out for kidney stones but does not remember seeing anything pass in his urine.     Abdominal Pain: On 12/26/17 he had lower abdominal pain with nausea- he believes this was secondary to something he ate. It spanned his entire abdomen. He ate shredded beef with a spicy sauce. The shredded beef had been in his refrigerator for quite some time. The pain has now completely resolved.     He has been unable to keep a steady stream since he was last stream. He also dribbles more frequently. Symptoms are manageable at this time.     Health Maintenance: He had a colonoscopy completed on 8/11/09 and it showed a sessile polyp and diverticulosis. He states that he cannot have a colonoscopy completed at this time because they are too expensive. They have discussed Cologuard in the past and he is amenable to do this today.       Review of Systems:  He has a seborrheic keratosis on his back that he questions. He does not have good hearing but  cannot afford hearing aids.  He denies any acid or indigestion, swelling in his legs.   Please see above.  The rest of the review of systems are negative for all systems.    Patient Care Team:  Dillon Goldsmith MD as PCP - General     Social: He has a 6 yo grandson that he takes responsibility for. He enjoys watching the [place] [employer] games.     Patient Active Problem List   Diagnosis     Alcohol Abuse     Acute Myocardial Infarction     Type 2 diabetes mellitus     Vitamin D Deficiency     Cataract     Hypertension     Chest Pain     Hyperlipidemia     Coronary Artery Disease     Total Retinal Detachment     Low vitamin B12 level     Unstable angina pectoris     Morbid obesity     Hyperglycemia     Diverticulosis of large intestine     Past Medical History:   Diagnosis Date     Cardiac arrest      Diabetes mellitus type 2, noninsulin dependent      Diverticula of colon      Hemorrhoids       Past Surgical History:   Procedure Laterality Date     Cath Stent 1 Proximal Left Anterior Descending Artery        INGUINAL HERNIA REPAIR       SD REMOVE TONSILS/ADENOIDS,<10 Y/O      Description: Tonsillectomy With Adenoidectomy;  Recorded: 05/29/2009;     RETINAL DETACHMENT SURGERY        Family History   Problem Relation Age of Onset     Kidney cancer Father      Heart disease Brother       Social History     Social History     Marital status: Single     Spouse name: N/A     Number of children: N/A     Years of education: N/A     Occupational History     Not on file.     Social History Main Topics     Smoking status: Current Every Day Smoker     Packs/day: 1.00     Types: Cigarettes     Smokeless tobacco: Not on file     Alcohol use No     Drug use: No     Sexual activity: Not on file     Other Topics Concern     Not on file     Social History Narrative    History of alcohol abuse.  since 1991, after 24 years, 2 sons. 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       Current Outpatient Prescriptions  "  Medication Sig Dispense Refill     aspirin 325 MG tablet Take 1 tablet (325 mg total) by mouth daily.  0     cholecalciferol, vitamin D3, 1,000 unit tablet Take 1 tablet (1,000 Units total) by mouth daily.  0     ibuprofen (ADVIL,MOTRIN) 200 MG tablet Take 1 tablet (200 mg total) by mouth every 6 (six) hours as needed for pain.  0     lisinopril (PRINIVIL,ZESTRIL) 10 MG tablet Take 1 tablet (10 mg total) by mouth daily. 90 tablet 3     nitroglycerin (NITROSTAT) 0.4 MG SL tablet DISSOLVE 1 TABLET UNDER THE TONGUE AS NEEDED FOR THE CHEST PAIN 25 tablet 4     pravastatin (PRAVACHOL) 40 MG tablet TAKE 1 TABLET (40 MG) BY MOUTH ONCE DAILY 90 tablet 3     lisinopril (PRINIVIL,ZESTRIL) 10 MG tablet Take 1 tablet (10 mg total) by mouth daily. 90 tablet 3     No current facility-administered medications for this visit.       Objective:   Vital Signs:   Visit Vitals     /80 (Patient Site: Left Arm, Patient Position: Sitting, Cuff Size: Adult Regular)     Pulse 72     Ht 5' 9.5\" (1.765 m)     Wt (!) 267 lb (121.1 kg)     BMI 38.86 kg/m2        PHYSICAL EXAM  Constitutional:  Reveals an alert, talkative, pleasant male who smells of significant smoke. Does not appear in acute distress.  Vitals:  Per nursing notes.  Ears:  Clear.  Oropharynx:  Without posterior nasal drainage or thrush.  Neck:  Supple, thyroid not palpable.  Cardiac:  Regular rate and rhythm without murmurs, rubs, or gallops. Carotids without bruits. Legs without edema. Palpation of the radial pulse regular.  Lungs: Clear.  Respiratory effort normal.  Abdomen:   Bowel sounds positive, nondistended.  Neither liver nor spleen palpable. Tenderness throughout lower abdomen.   Skin:   Without rash, bruise, or palpable lesions. Multiple seborrheic keratoses on back.   Rheumatologic: Normal joints and nails of the hands.  Neurologic:  Cranial nerves II-XII intact.     Psychiatric:  Mood appropriate, memory intact.    Assessment Results 12/29/2017   Activities of " Daily Living No help needed   Instrumental Activities of Daily Living No help needed   Mini Cog Total Score 2   Some recent data might be hidden     A Mini-Cog score of 0-2 suggests the possibility of dementia, score of 3-5 suggests no dementia    Identified Health Risks:     The patient was provided with suggestions to help him develop a healthy lifestyle.   He is at risk for lack of exercise and has been provided with information to increase physical activity for the benefit of his well-being.  The patient was counseled and encouraged to consider modifying their diet and eating habits. He was provided with information on recommended healthy diet options.  The patient was provided with written information regarding signs of hearing loss.  Information regarding advance directives (living [place]), including where he can download the appropriate form, was provided to the patient via the AVS.     ADDITIONAL HISTORY SUMMARIZED (2): Reviewed colonoscopy from 8/11/09; diverticulosis, polyp.   DECISION TO OBTAIN EXTRA INFORMATION (1): None.   RADIOLOGY TESTS (1): None.  LABS (1): Ordered labs today.   MEDICINE TESTS (1): None.  INDEPENDENT REVIEW (2 each): None.     The visit lasted a total of 22 minutes face to face with the patient. Over 50% of the time was spent counseling and educating the patient about flank pain and abdominal pain. An additional 1 minute was spent counseling the patient on ACD.     INaomy, am scribing for and in the presence of, Dr. Goldsmith.  IDr. Dr. Goldsmith, personally performed the services described in this documentation, as scribed by Naomy Worthy in my presence, and it is both accurate and complete.  Total data points: 3

## 2021-06-19 NOTE — PROGRESS NOTES
ASSESSMENT:  1. Urinary frequency  Discussed CT scanning or urology referral.  Last urine and PSA in December normal.  Empiric trial of Flomax  - tamsulosin (FLOMAX) 0.4 mg cap; Take 1 capsule (0.4 mg total) by mouth daily after supper.  Dispense: 90 capsule; Refill: 3    2. Cystoid macular edema of right eye  Reviewed ophthalmology note.  Reviewed last visit.  Due for follow-up.  No retinopathy  - Ambulatory referral to Ophthalmology    3. History of alcohol dependence (H)  Reviewed cessation of alcohol in 1975.  Abstinent since    4. Low vitamin B12 level  Reviewed low B12 level and advice to begin supplements in December.  Now with decline in memory and word finding recommend shot and oral supplements  - cyanocobalamin injection 1,000 mcg; Inject 1 mL (1,000 mcg total) into the shoulder, thigh, or buttocks every 30 (thirty) days.  - cyanocobalamin, vitamin B-12, 2,000 mcg Tab; Take 2,000 mcg by mouth daily.  Dispense: 100 tablet; Refill: 11    5. Type 2 diabetes mellitus without complication, without long-term current use of insulin (H)  Update lab.  He continues to smoke  - Glycosylated Hemoglobin A1c    6. BMI 35.0-39.9 (H)  Weight decreased since last visit.  Monitor    7.  Headaches.  Doubt migrainous with bilateral nature.  No sinusitis.  Empiric trial of prednisone.  Coordinate with blood pressure monitoring.  Referral back to ophthalmology    8.  Coronary artery disease.  Reviewed myocardial infarction in 2009 with angiogram and 2 drug-eluting stents to the proximal LAD and proximal circumflex.  He reports taking nitroglycerin every so often but not regularly.    PLAN:  Patient Instructions   Headaches on both sides do not sound like migraine    Sinus, like frontal     Eye problems with the macular edema    High blood pressure    Glaucoma    Referred from the neck        The combination of poor memory and low vitamin B 12 can be helped by Vitamin B 12 shots.  We can try one today    And you can add vitamin  B 12 pills 2000 mcg every day      We can try Flomax/Tamsulosin one pill at bedtime to help urination.  If it helps, we can go to 2.          For the back pain and headaches, try stopping the Pravastatin for one month.  See if back pain, or any pain, or headache gets better    We can try prednisone 20 mgs once a day for one week for inflammation of neck, and sinus, and back, and prostate    You should see the eye doctor      Orders Placed This Encounter   Procedures     Glycosylated Hemoglobin A1c     Ambulatory referral to Ophthalmology     Referral Priority:   Routine     Referral Type:   Consultation     Referral Reason:   Evaluation and Treatment     Requested Specialty:   Ophthalmology     Number of Visits Requested:   1     Medications Discontinued During This Encounter   Medication Reason     lisinopril (PRINIVIL,ZESTRIL) 10 MG tablet Duplicate order     cyanocobalamin, vitamin B-12, 2,000 mcg Tab Reorder     Administrations This Visit     cyanocobalamin injection 1,000 mcg     Admin Date Action Dose Route Administered By             08/14/2018 Given 1000 mcg Intramuscular Zinmin VIKY Thompson, KARINA                        Return in about 4 months (around 12/14/2018) for B 12 shot.      CHIEF COMPLAINT:  Chief Complaint   Patient presents with     Migraine     several years     Flank Pain     right and left; x 35 days     Referral      opthalmology       HISTORY OF PRESENT ILLNESS:  Jonas is a 73 y.o. male presenting to the clinic today with complaints of headaches, flank pain, and urinary frequency.     Headaches: He has had an issue with headaches for the past six years or so, but they have been persistent and more severe in the past six months. The pain is on both sides of the forehead above and behind the eyes. He is sensitive to light, which makes the headaches worse. He self diagnosed them as migraines. He has never seen a neurologist, has not had imaging done of his head, and has not tried taking migraine  medications. He has taken aspirin and ibuprofen but that is about the extent of it. Of note, he had surgery for a detached retina five years ago and has macular edema and acknowledges he should see an eye doctor again.     Flank Pain: He has been experiencing periodic pain in his bilateral flanks. He notes that his bed is not the best, so that could have something to do with it. The pain is not constant. It can be in both sides simultaneously or just in one side at a time. The pain fluctuates in severity. He suspects that it is musculoskeletal because it tends to hurt more when he tries to move after sitting still for a while.     Urinary Frequency: He has had more trouble with urination lately. He has a weak stream, stopping and starting, dribbling, and occasionally has to return to the bathroom after five minutes of just going. His brother has issues with BPH. He would like to try starting a medication like tamsulosin.     Diminished Eyesight: His eyesight is deteriorating. He has macular edema and has a history of retinal detachment. He acknowledges he should be seen by an eye doctor again soon. He sees halos when driving at night.     Hyperlipidemia: He has not taken pravastatin for the past two weeks because he ran out and did not get a new prescription yet. He did not notice any improvement in his aches and pains during these past two weeks.     Vitamin B12 Deficiency: His vitamin B12 level was on the low end the last time it was checked in December, but he has not restarted his supplement yet. He would like to try a B12 injection today.     Diabetes: He has been diagnosed with diabetes, but his A1c has been in a good range without medications in recent years. His last hemoglobin A1c was 5.9% on 12/29/2017.     Hx CAD: He had an acute myocardial infarction in 2008 or 2009. He has taken nitroglycerin on four occasions in the past year for angina.     REVIEW OF SYSTEMS:   He has had more trouble with his memory  "in the past year or so. He describes having trouble remembering names and words, in particular. He does not have any sinus symptoms on a regular basis. He does not usually have any neck pain or problems. He has had more trouble urinating lately. He does not check his blood pressure at all at home, but he does have a cuff. It is generally in a good range when he is in the clinic, so he does not bother. He has a small amount of peripheral neuropathy. All other systems are negative.    PFSH:  He quit drinking on Super Bowl Sunday in January 1975. He continues to smoke cigarettes.     TOBACCO USE:  History   Smoking Status     Current Every Day Smoker     Packs/day: 1.00     Types: Cigarettes   Smokeless Tobacco     Not on file       VITALS:  Vitals:    08/14/18 1413   BP: 138/86   Patient Site: Left Arm   Patient Position: Sitting   Cuff Size: Adult Regular   Pulse: 90   SpO2: 95%   Weight: (!) 257 lb 1.6 oz (116.6 kg)   Height: 5' 9.5\" (1.765 m)     Wt Readings from Last 3 Encounters:   08/14/18 (!) 257 lb 1.6 oz (116.6 kg)   12/29/17 (!) 267 lb (121.1 kg)   10/21/16 (!) 281 lb (127.5 kg)     Body mass index is 37.42 kg/(m^2).    PHYSICAL EXAM:  Constitutional:  Reveals an alert, talkative man. Affect appropriate.  Vitals:  Per nursing notes.  Cardiac:  Regular rate and rhythm without murmurs, rubs, or gallops.  Palpation of the radial pulse regular.  Psychiatric:  Mood appropriate, memory intact.     MEDICATIONS:  Current Outpatient Prescriptions   Medication Sig Dispense Refill     aspirin 325 MG tablet Take 1 tablet (325 mg total) by mouth daily.  0     cholecalciferol, vitamin D3, 1,000 unit tablet Take 1 tablet (1,000 Units total) by mouth daily.  0     ibuprofen (ADVIL,MOTRIN) 200 MG tablet Take 1 tablet (200 mg total) by mouth every 6 (six) hours as needed for pain.  0     lisinopril (PRINIVIL,ZESTRIL) 10 MG tablet Take 1 tablet (10 mg total) by mouth daily. 90 tablet 3     nitroglycerin (NITROSTAT) 0.4 MG SL " tablet DISSOLVE 1 TABLET UNDER THE TONGUE EVERY 5 MINUTES. MAX OF 3 DOSES IN 15 MINUTES. IF CHEST PAIN CONTINUES CALL 911  25 tablet 3     pravastatin (PRAVACHOL) 40 MG tablet TAKE 1 TABLET (40 MG) BY MOUTH ONCE DAILY 90 tablet 3     cyanocobalamin, vitamin B-12, 2,000 mcg Tab Take 2,000 mcg by mouth daily. 100 tablet 11     predniSONE (DELTASONE) 20 MG tablet Take 1 tablet (20 mg total) by mouth daily for 7 days. 7 tablet 0     tamsulosin (FLOMAX) 0.4 mg cap Take 1 capsule (0.4 mg total) by mouth daily after supper. 90 capsule 3     Current Facility-Administered Medications   Medication Dose Route Frequency Provider Last Rate Last Dose     cyanocobalamin injection 1,000 mcg  1,000 mcg Intramuscular Q30 Days Dillon Goldsmith MD   1,000 mcg at 08/14/18 1455       ADDITIONAL HISTORY SUMMARIZED (2): Reviewed January 2015 vitreoretinal surgery note regarding renal detachment and macular edema.   DECISION TO OBTAIN EXTRA INFORMATION (1): None.   RADIOLOGY TESTS (1): None.  LABS (1): Labs from 12/29/2017 reviewed. Reviewed negative Cologuard. Labs ordered.   MEDICINE TESTS (1): Reviewed angiogram performed in 2009  INDEPENDENT REVIEW (2 each): None.     The visit lasted a total of 30 minutes face to face with the patient. Over 50% of the time was spent counseling and educating the patient about his headaches, flank pain, and urinary frequency.    IPeewee, am scribing for and in the presence of, Dr. Goldsmith.    I, Dr. Goldsmith, personally performed the services described in this documentation, as scribed by Peewee Ram in my presence, and it is both accurate and complete.    Total data points: 3

## 2021-06-20 NOTE — LETTER
Letter by Dillon Goldsmith MD at      Author: Dillon Goldsmith MD Service: -- Author Type: --    Filed:  Encounter Date: 2/27/2020 Status: (Other)       Jonas Hyman  [address] [facility] [address]    February 27, 2020    Dear Jonas    In reviewing your records, we have determined a gap in your preventive services. Based on your age and health history, we recommend the follow service.     ? General Physical  ? Diabetic check  ? Blood pressure/cardiovascular check  ? Lab work  ? Med check    If you have had the service elsewhere, please contact us so we can update our records. Please let us know if you have transferred your care to another clinic.    Please call [phone] to schedule this appointment.    We believe that a strong preventive care program, including regular physicals and follow-up care is an important part of a healthy lifestyle and we are committed to helping you maintain your health.    Thank you for choosing us as your health care provider.    Sincerely,   [facility]  [address] [facility] [address]  Phone Number:  [phone]

## 2021-06-21 NOTE — PROGRESS NOTES
ASSESSMENT:  1. Abdominal aortic aneurysm (AAA) without rupture (H)  Reviewed CT scan showing aneurysm.  Reviewed MRI.  Reviewed pancreatic cyst.  Aneurysm 6.5 cm should be addressed.  Will refer to general surgery.  Will probably require intervention through interventional radiology, hopefully rather than surgery.  Discussed with his brother, retired physician Gabriel Hyman by phone  - Ambulatory referral to General Surgery    2. Low vitamin B12 level  Continue B12 shots    3. Pancreatic cyst  Probably benign.  Reviewed CT scan, MRI, and GI consult.  Will check every 6 months.  Second opinion by general surgery also    4. Type 2 diabetes mellitus with other circulatory complication, without long-term current use of insulin (H)  Stable.  A1c controlled.  Encouraged to quit smoking    5. Diverticulosis of large intestine without hemorrhage  Noted by CT scan without symptoms    6. 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  Planning to have eye surgery upcoming    7.  Abdominal pain.  With back pain.  Etiology unclear.  Repeat trial of prednisone    PLAN:  Patient Instructions   Back on Pravastatin    Try flomax/Tamsulosin 2 per day before you give up    See Dr Darren Fernández to coordinate the aneurysm repair probably thru interventional radiology with a stent    Jolene will give a second opinion on the pancreas cyst.  Repeat MRI in 6 months      Orders Placed This Encounter   Procedures     Influenza High Dose, Seasonal     Ambulatory referral to General Surgery     Referral Priority:   Routine     Referral Type:   Surgical     Referral Reason:   Evaluation and Treatment     Referred to Provider:   Darren Fernández MD     Requested Specialty:   General Surgery     Number of Visits Requested:   1     There are no discontinued medications.  Administrations This Visit     cyanocobalamin injection 1,000 mcg     Admin Date Action Dose Route Administered By  "            10/30/2018 Given 1000 mcg Intramuscular Tiffanie Phillips CMA                        No Follow-up on file.      CHIEF COMPLAINT:  Chief Complaint   Patient presents with     consultation     Medication Management     flowmax       HISTORY OF PRESENT ILLNESS:  Jonas is a 73 y.o. male presenting to the clinic today to discuss 6.2 cm AAA, and 1.6 cm pancreatic cyst.     AAA: Patient has a 6.2cm AAA and is worried about it and how it will be treated. He has had stents put in his heart before, and understands the basis of the surgery.       Pancreatic cyst: 1.6 cm pancreatic cyst: He is worried about this cysts potential to grow quickly specifically relating to pancreatic cancer. He thinks 6 months is a reasonable follow up on this since the radiologist said it is not worrisome.     Pain and headaches: He was having pain in abdomen, but it is not as severe anymore as it was before when the whole workup that led to discovering the AAA and pancreatic cyst began. He did experience some right flank pain, and it was radiating around the right side of his body. Stopping prevastatin did not change headaches or pain. He thought prednisone did help with the pain    Incontinence: He has started having weak urine stream, and leakage of urine and has begun taking Tamsulosin and Flomax without making a difference.    Macular degeneration, beginning glaucoma and cataracts: He has an appointement on the 20th of November to meet with a  to talk about a lens replacement for the cataract. He has had two shots into his eye without making a difference.      Inguinal hernias: Patient has inguinal hernias that do not bother him. He states \"I don't do the [place] Jig\" anymore, and so it does not affect me.     Health maintenance: Flu shot, B12 shot today.     REVIEW OF SYSTEMS:   His vision is getting worse and \"is going blind.\"   He does have hip pain, with difficult ambulation.   All other systems are negative.    PFSH:  Reviewed " "as below.    TOBACCO USE:  History   Smoking Status     Current Every Day Smoker     Packs/day: 1.00     Types: Cigarettes   Smokeless Tobacco     Never Used       VITALS:  Vitals:    10/30/18 1546   BP: 136/80   Patient Site: Left Arm   Patient Position: Sitting   Cuff Size: Adult Regular   Pulse: (!) 108   Resp: 14   Weight: (!) 257 lb (116.6 kg)   Height: 5' 9.5\" (1.765 m)     Wt Readings from Last 3 Encounters:   10/30/18 (!) 257 lb (116.6 kg)   08/14/18 (!) 257 lb 1.6 oz (116.6 kg)   12/29/17 (!) 267 lb (121.1 kg)     Body mass index is 37.41 kg/(m^2).    PHYSICAL EXAM:  Constitutional:  Reveals an alert and oriented man in no acute distress with affect appropriate.  Vitals:  Per nursing notes.  Eyes watering  Oropharynx:  Without posterior nasal drainage or thrush.  Neck:  Supple, thyroid not palpable.  Cardiac:  Regular rate and rhythm without murmurs, rubs, or gallops. Carotids without bruits. Legs without edema. Palpation of the radial pulse regular.  Lungs: Clear.  Respiratory effort normal.    Skin:   Without rash, bruise, or palpable lesions.  Rheumatologic: Normal joints and nails of the hands.  Neurologic:  Cranial nerves II-XII intact.     Psychiatric:  Mood appropriate, memory intact.       MEDICATIONS:  Current Outpatient Prescriptions   Medication Sig Dispense Refill     aspirin 325 MG tablet Take 1 tablet (325 mg total) by mouth daily.  0     cholecalciferol, vitamin D3, 1,000 unit tablet Take 1 tablet (1,000 Units total) by mouth daily.  0     cyanocobalamin, vitamin B-12, 2,000 mcg Tab Take 2,000 mcg by mouth daily. 100 tablet 11     ibuprofen (ADVIL,MOTRIN) 200 MG tablet Take 1 tablet (200 mg total) by mouth every 6 (six) hours as needed for pain.  0     nitroglycerin (NITROSTAT) 0.4 MG SL tablet DISSOLVE 1 TABLET UNDER THE TONGUE EVERY 5 MINUTES. MAX OF 3 DOSES IN 15 MINUTES. IF CHEST PAIN CONTINUES CALL 911  25 tablet 3     pravastatin (PRAVACHOL) 40 MG tablet TAKE 1 TABLET (40 MG) BY MOUTH " "ONCE DAILY 90 tablet 3     tamsulosin (FLOMAX) 0.4 mg cap Take 1 capsule (0.4 mg total) by mouth daily after supper. 90 capsule 3     lisinopril (PRINIVIL,ZESTRIL) 10 MG tablet Take 1 tablet (10 mg total) by mouth daily. 90 tablet 3     predniSONE (DELTASONE) 20 MG tablet Take 1 tablet (20 mg total) by mouth daily for 7 days. 7 tablet 0     Current Facility-Administered Medications   Medication Dose Route Frequency Provider Last Rate Last Dose     cyanocobalamin injection 1,000 mcg  1,000 mcg Intramuscular Q30 Days Dillon Goldsmith MD   1,000 mcg at 10/30/18 2757       ADDITIONAL HISTORY SUMMARIZED (2):  \"Jose Antonio\" a friend/brother of the patient called during visit to discuss treatment options for the patient's AAA.    DECISION TO OBTAIN EXTRA INFORMATION (1): None.   RADIOLOGY TESTS (1): Reviewed MR abdomen showing 1.6 cm simple unilocular cyst in the body of the pancreas, and 6.2 cm AAA,  Diverticulosis, and inguinal hernias.   LABS (1): 10/17/18, 9/26/18, 8/14/18 labs reviewed.   MEDICINE TESTS (1): None.  INDEPENDENT REVIEW (2 each): None.     The visit lasted a total of 25 minutes face to face with the patient. Over 50% of the time was spent counseling and educating the patient about AAA  And pancreatic cyst management.    I, Júnior Smyth, am scribing for and in the presence of, Dr. Goldsmith.    I, Dr. Goldsmith, personally performed the services described in this documentation, as scribed by Júnior Smyth in my presence, and it is both accurate and complete.    Total data points:4          "

## 2021-06-22 NOTE — PROGRESS NOTES
Preoperative Exam    Scheduled Procedure: left eye   Surgery Date:  12/03/2018  Surgery Location: [facility] fax [phone]    Surgeon:  Dr. Johnson    Assessment/Plan:     1. Pre-operative examination  Stable for surgery.  Right bundle branch block is chronic  - Electrocardiogram Perform and Read    2. Age-related cataract of both eyes, unspecified age-related cataract type  Stable for surgery    3. Type 2 diabetes mellitus with other circulatory complication, without long-term current use of insulin (H)  Stable.  Encouraged to quit smoking    4. Atherosclerosis of native coronary artery of native heart with angina pectoris (H)  Stable.  Rare chest pain.  Encouraged to quit smoking  - Thyroid Stimulating Hormone (TSH)  - Lipid Cascade    5. Essential hypertension  Stable  - HM2(CBC w/o Differential)  - Comprehensive Metabolic Panel    6. Abdominal aortic aneurysm (AAA) without rupture (H)  Reviewed surgical consultation and CT scan.  He appears to be in adequate candidate for stenting.  He will call surgical office.  Stable for surgery from the standpoint also    7. Vitamin D deficiency  Poorly compliant  - Vitamin D, Total (25-Hydroxy)    8. Prostate cancer screening  Update yearly screening  - PSA (Prostatic-Specific Antigen), Annual Screen    9. Diabetes mellitus due to underlying condition with complication, without long-term current use of insulin (H)   - Thyroid Stimulating Hormone (TSH)      Surgical Procedure Risk: Low (reported cardiac risk generally < 1%)  Have you had prior anesthesia?: Yes  Have you or any family members had a previous anesthesia reaction:  No  Do you or any family members have a history of a clotting or bleeding disorder?: No  Cardiac Risk Assessment: no increased risk for major cardiac complications    Patient approved for surgery with general or local anesthesia.    Please Note:    Functional Status: Independent  Patient plans to recover at home with home care.     Subjective:       Jonas Hyman is a 73 y.o. male who presents for a preoperative consultation. The exam is requested by Dr. Johnson in preparation for cataract extraction from the left eye to be performed at [facility] on December 3, 2018. Today s examination on 11/30/2018 is done to review the underlying surgical condition of cataract of the left eye, clear for anesthesia, and review medical problems with appropriate changes in medications. He has a thick cataract in his left eye and will have a lens replaced. He gets monthly shots in his right eye for macular degeneration.     Jonas has tolerated previous surgeries well without bleeding or anesthesia difficulty.     AAA: He just had a repeat CT of the abdomen that showed his abdominal aortic aneurysm to be 6.1 x 6.6 cm. He has been evaluated by Dr. Fernández and was told that he is a candidate for stenting at this point.     BPH: He used to struggle with urinary urgency, incontinence, and dribbling, but this has been much improved with Flomax.     ROS:   He is not having any cardiac symptoms in months. He has stopped all of his antiinflammatory medications. He rarely gets any lower extremity edema. He has not been taking his vitamin D supplement.   All other systems reviewed and are negative, other than those listed in the HPI.    Pertinent History  Do you have difficulty breathing or chest pain after walking up a flight of stairs: No  History of obstructive sleep apnea: No  Steroid use in the last 6 months: Yes: 1 week burst  Frequent Aspirin/NSAID use: Yes: On hold now  Prior Blood Transfusion: No  Prior Blood Transfusion Reaction: No  If for some reason prior to, during or after the procedure, if it is medically indicated, would you be willing to have a blood transfusion?:  There is no transfusion refusal.     PFSH:  He has had a number of surgeries in the past.     Current Outpatient Medications   Medication Sig Dispense Refill     aspirin 325 MG tablet Take 1  tablet (325 mg total) by mouth daily.  0     cholecalciferol, vitamin D3, 1,000 unit tablet Take 1 tablet (1,000 Units total) by mouth daily.  0     cyanocobalamin, vitamin B-12, 2,000 mcg Tab Take 2,000 mcg by mouth daily. 100 tablet 11     ibuprofen (ADVIL,MOTRIN) 200 MG tablet Take 1 tablet (200 mg total) by mouth every 6 (six) hours as needed for pain.  0     lisinopril (PRINIVIL,ZESTRIL) 10 MG tablet Take 1 tablet (10 mg total) by mouth daily. 90 tablet 3     nitroglycerin (NITROSTAT) 0.4 MG SL tablet DISSOLVE 1 TABLET UNDER THE TONGUE EVERY 5 MINUTES. MAX OF 3 DOSES IN 15 MINUTES. IF CHEST PAIN CONTINUES CALL 911  25 tablet 3     pravastatin (PRAVACHOL) 40 MG tablet TAKE 1 TABLET (40 MG) BY MOUTH ONCE DAILY 90 tablet 3     tamsulosin (FLOMAX) 0.4 mg cap Take 1 capsule (0.4 mg total) by mouth daily after supper. 90 capsule 3     Current Facility-Administered Medications   Medication Dose Route Frequency Provider Last Rate Last Dose     cyanocobalamin injection 1,000 mcg  1,000 mcg Intramuscular Q30 Days Dillon Goldsmith MD   1,000 mcg at 10/30/18 2332        No Known Allergies    Patient Active Problem List   Diagnosis     History of alcohol dependence (H)     History of acute anterior wall MI     Type 2 diabetes mellitus with circulatory disorder, without long-term current use of insulin (H)     Vitamin D Deficiency     Cataract     Essential hypertension     Hyperlipidemia     Atherosclerosis of native coronary artery of native heart with angina pectoris (H)     Total Retinal Detachment     Low vitamin B12 level     BMI 35.0-39.9 (H)     Cystoid macular edema of right eye     Abdominal aortic aneurysm (AAA) without rupture (H)     Pancreatic cyst     Diverticulosis of large intestine without hemorrhage     Exudative age-related macular degeneration of right eye (H)       Past Medical History:   Diagnosis Date     Cardiac arrest (H)      Diabetes mellitus type 2, noninsulin dependent (H)      Diverticula of  "colon      Hemorrhoids      History of alcohol dependence (H) 1/12/1975    Created by [facility] Annotation: May 29 2009  5:52PM - Dillon Goldsmith: dry since  1975, also used drugs and marijuana  Replacement Utility updated for latest IMO load       Past Surgical History:   Procedure Laterality Date     Cath Stent 1 Proximal Left Anterior Descending Artery        INGUINAL HERNIA REPAIR       CT REMOVE TONSILS/ADENOIDS,<12 Y/O      Description: Tonsillectomy With Adenoidectomy;  Recorded: 05/29/2009;     RETINAL DETACHMENT SURGERY         Social History     Socioeconomic History     Marital status: Single     Spouse name: Not on file     Number of children: Not on file     Years of education: Not on file     Highest education level: Not on file   Social Needs     Financial resource strain: Not on file     Food insecurity - worry: Not on file     Food insecurity - inability: Not on file     Transportation needs - medical: Not on file     Transportation needs - non-medical: Not on file   Occupational History     Not on file   Tobacco Use     Smoking status: Current Every Day Smoker     Packs/day: 1.00     Types: Cigarettes     Smokeless tobacco: Never Used   Substance and Sexual Activity     Alcohol use: No     Drug use: No     Sexual activity: Not on file   Other Topics Concern     Not on file   Social History Narrative    History of alcohol abuse.  since 1991, after 24 years, 2 sons. 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        Patient Care Team:  Dillon Goldsmith MD as PCP - General      Objective:     Vitals:    11/30/18 1513   BP: 120/72   Pulse: 92   Resp: 16   SpO2: 96%   Weight: (!) 255 lb 1.6 oz (115.7 kg)   Height: 5' 9.5\" (1.765 m)         Physical Exam:  Constitutional:  Reveals an alert, pleasant, talkative man. Affect appropriate.  Vitals:  Per nursing notes.  Cardiac:  Regular rate and rhythm without murmurs, rubs, or gallops. Legs without edema. Palpation of the radial " pulse regular.  Lungs: Clear.  Respiratory effort normal.  Neurologic:  Cranial nerves II-XII intact.     Psychiatric:  Mood appropriate, memory intact.    Patient Instructions   The CT scan confirms the aneurysm, and it looks like you can have the stent        EKG:  Sinus rhythm, right bundle branch block.     Labs:  Labs pending at this time.  Results will be reviewed when available.    Immunization History   Administered Date(s) Administered     Influenza P9u3-07, 02/17/2010     Influenza high dose, seasonal 11/04/2015, 10/24/2016, 12/29/2017, 10/30/2018     Influenza, inj, historic,unspecified 11/30/2007, 10/02/2009, 01/12/2011, 11/07/2011, 01/01/2013     Influenza,W5M1-90,Pandemic,split,IM 02/17/2010     Influenza,seasonal, Inj IIV3 11/30/2007, 10/02/2009, 01/12/2011, 11/07/2011, 01/01/2013     Influenza,seasonal,intradermal PF IIV3 12/10/2012     Pneumo Conj 13-V (2010&after) 11/04/2015     Pneumo Polysac 23-V 05/09/2009     Tdap 11/07/2011       ADDITIONAL HISTORY SUMMARIZED (2): Reviewed 11/8/2018 Dr. Fernández note regarding AAA  DECISION TO OBTAIN EXTRA INFORMATION (1): None.   RADIOLOGY TESTS (1): Reviewed 11/21/2018 CT abdomen regarding AAA  LABS (1): Labs from December 2017 and August 2018 reviewed. Labs ordered.   MEDICINE TESTS (1): EKG ordered.   INDEPENDENT REVIEW (2 each): EKG from today personally read.     The visit lasted a total of 17 minutes face to face with the patient. Over 50% of the time was spent counseling and educating the patient about his upcoming eye surgery.    I, Peewee Ram, am scribing for and in the presence of, Dr. Goldsmith.    I, Dr. Goldsmith, personally performed the services described in this documentation, as scribed by Peewee Ram in my presence, and it is both accurate and complete.    Total data points: 7    Electronically signed by Dillon Goldsmith MD 11/30/18 3:14 PM

## 2021-06-22 NOTE — TELEPHONE ENCOUNTER
Medication Request  Medication name: Wellbutrin and Nicotine Patch  Pharmacy Name and Location: [facility] [place]  Reason for request: Patient is attempting to quite smoking.  When did you use medication last?:  Unknown  Okay to leave a detailed message: yes      Patient is requesting a return phone call to discuss his visit at [facility] in greater detail. Please reach out to patient and advise.

## 2021-06-22 NOTE — TELEPHONE ENCOUNTER
If he wishes medications to quit smoking I would begin with Wellbutrin  mg once daily and nicotine patch 14 mg daily #30

## 2021-06-23 NOTE — TELEPHONE ENCOUNTER
Who is calling:  Patient  Reason for Call:  I was told by [facility] that the CT scan last Friday of the abdomen  needs to be further investigated by my primary due to a seeing a possible liposarcoma. Please return call to patient to discuss  Date of last appointment with primary care: 11/30/18  Has the patient been recently seen:  Yes  Okay to leave a detailed message: Yes

## 2021-06-23 NOTE — TELEPHONE ENCOUNTER
Not sure, but I would recommend he discuss this with Dr Fernández, and discuss possible need for surgery

## 2021-06-24 NOTE — TELEPHONE ENCOUNTER
Not sure.  Sorry for the delay.  Can someone please contact Dr. Duval's office and ask what the abnormality was and why the surgery was canceled?

## 2021-06-24 NOTE — TELEPHONE ENCOUNTER
Who is calling:   Patient  Reason for Call:   Patient had a pre op yesterday but now has been told BY surgeon Darren Zamarripa that he is not able to do the surgery due to some things found on the MRI.  Patient has now been referred to Dr Darren Garay at the [facility].  [facility] base with Dr Goldsmith in regards to if this being the right process or if he has a different recommendation.    Please advise sometime within the next 24 hours    [phone]  Date of last appointment with primary care:  3/7/2019  Has the patient been recently seen:  No  Okay to leave a detailed message: Yes

## 2021-06-24 NOTE — TELEPHONE ENCOUNTER
Refill Approved    Rx renewed per Medication Renewal Policy. Medication was last renewed on 10/21/16.    Ov: 11/30/18    Yamilka Spencer, [facility] Triage/Med Refill 2/22/2019     Requested Prescriptions   Pending Prescriptions Disp Refills     lisinopril (PRINIVIL,ZESTRIL) 10 MG tablet [Pharmacy Med Name: Lisinopril Oral Tablet 10 MG] 90 tablet 2     Sig: Take 1 tablet (10 mg) by mouth once daily    Ace Inhibitors Refill Protocol Passed - 2/20/2019  3:44 PM       Passed - PCP or prescribing provider visit in past 12 months      Last office visit with prescriber/PCP: 10/30/2018 Dillon Goldsmith MD OR same dept: 10/30/2018 Dillon Goldsmith MD OR same specialty: 10/30/2018 Dillon Goldsmith MD  Last physical: 11/30/2018 Last MTM visit: Visit date not found   Next visit within 3 mo: Visit date not found  Next physical within 3 mo: Visit date not found  Prescriber OR PCP: Dillon Goldsmith MD  Last diagnosis associated with med order: There are no diagnoses linked to this encounter.  If protocol passes may refill for 12 months if within 3 months of last provider visit (or a total of 15 months).            Passed - Serum Potassium in past 12 months    Lab Results   Component Value Date    Potassium 4.3 11/30/2018            Passed - Blood pressure filed in past 12 months    BP Readings from Last 1 Encounters:   11/30/18 120/72            Passed - Serum Creatinine in past 12 months    Creatinine   Date Value Ref Range Status   11/30/2018 1.30 0.70 - 1.30 mg/dL Final
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
## 2021-06-24 NOTE — PATIENT INSTRUCTIONS - HE
Quit smoking if you can    Stop Ibuprofen and aspirin 4 days before surgery    Morning of surgery take:  nothing

## 2021-06-24 NOTE — TELEPHONE ENCOUNTER
Dr Fernández or his staff will be calling back with reason for cxl of surgery and referral to Dr Garay instead at the [facility].

## 2021-06-25 NOTE — TELEPHONE ENCOUNTER
Per Dr. Gilmore: [facility] going [facility] He is also going to the  for his abdominal mass. Dr. PERKINS requested PCP to page him back to discuss further.

## 2021-06-25 NOTE — TELEPHONE ENCOUNTER
Spoke to Dr Fernández today    Discussed sarcoma of abdominal area    [facility]

## 2021-07-27 ENCOUNTER — OFFICE VISIT (OUTPATIENT)
Dept: OPHTHALMOLOGY | Facility: CLINIC | Age: 76
End: 2021-07-27
Attending: OPHTHALMOLOGY
Payer: MEDICARE

## 2021-07-27 DIAGNOSIS — H35.3211 EXUDATIVE AGE-RELATED MACULAR DEGENERATION OF RIGHT EYE WITH ACTIVE CHOROIDAL NEOVASCULARIZATION (H): ICD-10-CM

## 2021-07-27 PROCEDURE — 99207 PR BUNDLED PROCEDURE IN GLOBAL PKG: CPT | Performed by: OPHTHALMOLOGY

## 2021-07-27 PROCEDURE — 67028 INJECTION EYE DRUG: CPT | Mod: RT | Performed by: OPHTHALMOLOGY

## 2021-07-27 PROCEDURE — 92134 CPTRZ OPH DX IMG PST SGM RTA: CPT | Performed by: OPHTHALMOLOGY

## 2021-07-27 PROCEDURE — G0463 HOSPITAL OUTPT CLINIC VISIT: HCPCS

## 2021-07-27 PROCEDURE — 250N000011 HC RX IP 250 OP 636: Performed by: OPHTHALMOLOGY

## 2021-07-27 RX ADMIN — AFLIBERCEPT 2 MG: 40 INJECTION, SOLUTION INTRAVITREAL at 11:31

## 2021-07-27 ASSESSMENT — TONOMETRY
OS_IOP_MMHG: 10
OD_IOP_MMHG: 10
IOP_METHOD: TONOPEN

## 2021-07-27 ASSESSMENT — SLIT LAMP EXAM - LIDS
COMMENTS: NORMAL
COMMENTS: NORMAL

## 2021-07-27 ASSESSMENT — VISUAL ACUITY
OD_PH_SC: 20/100
OS_SC: 20/25
METHOD: SNELLEN - LINEAR
OD_PH_SC+: -1
OS_SC+: -1+1
OD_SC: 20/200

## 2021-07-27 ASSESSMENT — CONF VISUAL FIELD
OS_NORMAL: 1
METHOD: COUNTING FINGERS
OD_NORMAL: 1

## 2021-07-27 ASSESSMENT — EXTERNAL EXAM - RIGHT EYE: OD_EXAM: NORMAL

## 2021-07-27 ASSESSMENT — EXTERNAL EXAM - LEFT EYE: OS_EXAM: NORMAL

## 2021-07-27 ASSESSMENT — CUP TO DISC RATIO: OS_RATIO: 0.3

## 2021-07-27 NOTE — PROGRESS NOTES
CC -   Wet AMD OD    INTERVAL HISTORY - New floaters OS, single flash, no trauma,  Still Trying to reduce smoking,   Last DFE OU 6/1/21    Prefers attending injection     [facility]-   Jonas Hyman is a  75  year old year-old patient referred by Dr Pineod for wet  AMD OD.  Had gone few years without eye exam d/t insurance lack, gradual progressive loss OU.  Prior RD repair OD 2014, unsure what BCVA was after repair.  Caring for grandson  VA has worsened OU over years, now trouble with reading/drivign, ADLs.    PAST OCULAR SURGERY  PPV/SBP/FGx OD 2014 ([facility])  ACIOL OD   CE/IOL OS 12/3/18    RETINAL IMAGING:  OCT 6-1-21  OD - SR scar mild SRF stable  OS - mild RPE elevations, no fluid , tr ERM, PVD    FA 9-17-18  OD - central leakage c/w occult CNVM  OS - no leakage    ICG 9-17-18  OD - central leakage c/w CNMV, no polyps  OS - no leakage      ASSESSMENT & PLAN  #.  Wet AMD OD   - uncertain duration by history   - uncertain vision potential given prior h/o RD   - started Avastin 9/17/18 (#6 total)   - changed to Eylea 3/12/19     - last injection Eylea  (#24) 6/1/21 (8 weeks, 8 week interval)     - OCT SRF stable   - VA stable    - inject Eylea today   - increase to 9 weeks   - next injection in 9 weeks (9 week interval)      - increased to 9 weeks on 7/27/21   - increased to 8 weeks on 6/1/21     - stable SRF & VA @ 7 weeks on 6/2021   - was typically 20/80-20/125 in past @ 4 weeks     #. H/o RD repair OD   - s/p PPV/SBP/FGx  2014   - retina flat last DFE      #. PVD OS   - onset ~ 7/15/21 by history   - advised s/sx RD 7/2021        - recheck 1 month      #. 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   I personally obtained the chief complaint(s) and history of present illness.  I confirmed and edited as necessary the review of systems, past medical/surgical history, family history, social history, and examination findings as documented by others; and I examined the patient myself.  I personally reviewed the relevant tests, images, and reports as documented above.  I formulated and edited as necessary the assessment and plan and discussed the findings and management plan with the patient and family    Anu Wilson MD, PhD  , Vitreoretinal Surgery  Department of Ophthalmology  [facility]

## 2021-07-27 NOTE — NURSING NOTE
Chief Complaints and History of Present Illnesses   Patient presents with     Macular Degeneration Follow Up     8 week follow up AMD, right eye     Chief Complaint(s) and History of Present Illness(es)     Macular Degeneration Follow Up     Laterality: right eye    Quality: blurred vision    Course: stable    Associated symptoms: floaters.  Negative for eye pain and flashes    Pain scale: 0/10    Comments: 8 week follow up AMD, right eye              Comments     Pt denies any vision changes BE since last visit.  Complains of developing a floater LE since last visit.  Denies any pain or irritation.  Ocular meds: AT's PRN BE    Christi Vargas OT 10:35 AM July 27, 2021

## 2021-08-24 ENCOUNTER — OFFICE VISIT (OUTPATIENT)
Dept: OPHTHALMOLOGY | Facility: CLINIC | Age: 76
End: 2021-08-24
Attending: OPHTHALMOLOGY
Payer: MEDICARE

## 2021-08-24 DIAGNOSIS — H35.3211 EXUDATIVE AGE-RELATED MACULAR DEGENERATION OF RIGHT EYE WITH ACTIVE CHOROIDAL NEOVASCULARIZATION (H): Primary | ICD-10-CM

## 2021-08-24 PROCEDURE — 92134 CPTRZ OPH DX IMG PST SGM RTA: CPT | Performed by: OPHTHALMOLOGY

## 2021-08-24 PROCEDURE — G0463 HOSPITAL OUTPT CLINIC VISIT: HCPCS

## 2021-08-24 PROCEDURE — 99213 OFFICE O/P EST LOW 20 MIN: CPT | Performed by: OPHTHALMOLOGY

## 2021-08-24 ASSESSMENT — TONOMETRY
IOP_METHOD: TONOPEN
OD_IOP_MMHG: 14
OS_IOP_MMHG: 14

## 2021-08-24 ASSESSMENT — VISUAL ACUITY
OD_PH_SC: 20/125
OS_SC: 20/20
METHOD: SNELLEN - LINEAR
OD_SC: 20/250

## 2021-08-24 ASSESSMENT — CONF VISUAL FIELD
OD_NORMAL: 1
OS_NORMAL: 1
METHOD: COUNTING FINGERS

## 2021-08-24 NOTE — NURSING NOTE
Chief Complaints and History of Present Illnesses   Patient presents with     Macular Degeneration Follow Up     Chief Complaint(s) and History of Present Illness(es)     Macular Degeneration Follow Up     Laterality: right eye    Onset: gradual    Onset: months ago    Quality: States va is the same since last visit      Severity: moderate    Frequency: intermittently    Associated symptoms: Negative for floaters, flashes and photophobia    Treatments tried: artificial tears    Pain scale: 0/10              Comments     Here for Exudative age-related macular degeneration of right eye with active choroidal neovascularization  [facility] 11[facility]23 AM August 24, 2021

## 2021-08-24 NOTE — PROGRESS NOTES
CC -   Wet AMD OD    INTERVAL HISTORY - Flashes resolved, floaters resolved  Still Trying to reduce smoking,   Last DFE OU 6/1/21    Prefers attending injection     [facility]-   Jonas Hyman is a  76  year old year-old patient referred by Dr Pinedo for wet  AMD OD.  Had gone few years without eye exam d/t insurance lack, gradual progressive loss OU.  Prior RD repair OD 2014, unsure what BCVA was after repair.  Caring for grandson  VA has worsened OU over years, now trouble with reading/drivign, ADLs.    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  Wet AMD OD   - uncertain duration by history   - uncertain vision potential given prior h/o RD   - started Avastin 9/17/18 (#6 total)   - changed to Eylea 3/12/19     - increased to 9 weeks on 7/27/21     - stable SRF & VA @ 7 weeks on 6/2021   - was typically 20/80-20/125 in past @ 4 weeks        - last injection Eylea  (#25) 7/27/21 (4 weeks, 9 week interval)     - prior OCT SRF stable   - VA stable    - observe   - next injection in 5 weeks (9 week interval)         # intermediate dry AMD OS   - single large druse on OCT   - AREDS/L[facility]er    #. H/o RD repair OD   - s/p PPV/SBP/FGx  2014   - retina flat last DFE      #. PVD OS   - onset ~ 7/15/21 by history   - advised s/sx RD 7/2021        - recheck with next injection      #. 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  I personally obtained the chief complaint(s) and history of present illness.  I confirmed and edited as necessary the review of systems, past medical/surgical history, family history, social history, and examination findings as documented by others; and I examined the patient myself.  I personally reviewed the relevant tests, images, and reports as documented above.  I formulated and edited as necessary the assessment and plan and discussed the findings and management plan with the patient and family    Anu Wilson MD, PhD  , Vitreoretinal Surgery  Department of Ophthalmology  [facility]

## 2021-08-28 ENCOUNTER — HEALTH MAINTENANCE LETTER (OUTPATIENT)
Age: 76
End: 2021-08-28

## 2021-09-20 DIAGNOSIS — I25.119 CORONARY ARTERY DISEASE INVOLVING NATIVE CORONARY ARTERY OF NATIVE HEART WITH ANGINA PECTORIS (H): Primary | ICD-10-CM

## 2021-09-21 RX ORDER — NITROGLYCERIN 0.4 MG/1
0.4 TABLET SUBLINGUAL EVERY 5 MIN PRN
Qty: 30 TABLET | Refills: 5 | Status: SHIPPED | OUTPATIENT
Start: 2021-09-21 | End: 2022-02-28

## 2021-09-28 ENCOUNTER — OFFICE VISIT (OUTPATIENT)
Dept: OPHTHALMOLOGY | Facility: CLINIC | Age: 76
End: 2021-09-28
Attending: OPHTHALMOLOGY
Payer: MEDICARE

## 2021-09-28 DIAGNOSIS — H35.3211 EXUDATIVE AGE-RELATED MACULAR DEGENERATION OF RIGHT EYE WITH ACTIVE CHOROIDAL NEOVASCULARIZATION (H): ICD-10-CM

## 2021-09-28 PROCEDURE — 99207 PR BUNDLED PROCEDURE IN GLOBAL PKG: CPT | Performed by: OPHTHALMOLOGY

## 2021-09-28 PROCEDURE — 92134 CPTRZ OPH DX IMG PST SGM RTA: CPT | Performed by: OPHTHALMOLOGY

## 2021-09-28 PROCEDURE — 67028 INJECTION EYE DRUG: CPT | Mod: RT | Performed by: OPHTHALMOLOGY

## 2021-09-28 PROCEDURE — 250N000011 HC RX IP 250 OP 636: Performed by: OPHTHALMOLOGY

## 2021-09-28 PROCEDURE — G0463 HOSPITAL OUTPT CLINIC VISIT: HCPCS

## 2021-09-28 RX ADMIN — AFLIBERCEPT 2 MG: 40 INJECTION, SOLUTION INTRAVITREAL at 14:23

## 2021-09-28 ASSESSMENT — TONOMETRY
OS_IOP_MMHG: 13
IOP_METHOD: TONOPEN
OD_IOP_MMHG: 12

## 2021-09-28 ASSESSMENT — SLIT LAMP EXAM - LIDS
COMMENTS: NORMAL
COMMENTS: NORMAL

## 2021-09-28 ASSESSMENT — CONF VISUAL FIELD
OS_NORMAL: 1
METHOD: COUNTING FINGERS
OD_NORMAL: 1

## 2021-09-28 ASSESSMENT — VISUAL ACUITY
METHOD: SNELLEN - LINEAR
OS_SC: 20/20-
OD_SC: 20/200
OD_PH_SC: 20/70+

## 2021-09-28 ASSESSMENT — CUP TO DISC RATIO: OS_RATIO: 0.3

## 2021-09-28 ASSESSMENT — EXTERNAL EXAM - LEFT EYE: OS_EXAM: NORMAL

## 2021-09-28 ASSESSMENT — EXTERNAL EXAM - RIGHT EYE: OD_EXAM: NORMAL

## 2021-09-28 NOTE — PROGRESS NOTES
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  Had gone few years without eye exam d/t insurance lack, gradual progressive loss OU.  Prior RD repair OD 2014, unsure what BCVA was after repair.  Caring for grandson  VA has worsened OU over years, now trouble with reading/drivign, ADLs.    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  Wet AMD OD   - uncertain duration by history   - uncertain vision potential given prior h/o RD   - started Avastin 9/17/18 (#6 total)   - changed to Eylea 3/12/19     - increased to 9 weeks on 7/27/21     - stable SRF & VA @ 7 weeks on 6/2021   - was typically 20/80-20/125 in past @ 4 weeks        - last injection Eylea  (#25) 7/27/21 (9 weeks, 9 week interval)     -  OCT SRF stable   - VA stable    - Eylea today   - increase to 10 weeks   - next injection in 10 weeks (10 week interval)         # intermediate dry AMD OS   - single large druse on OCT   - AREDS/L[place]er    #. H/o RD repair OD   - s/p PPV/SBP/FGx  2014   - retina flat last DFE      #. PVD OS   - onset ~ 7/15/21 by history   - advised s/sx RD 7/2021     - last DFE 9/28/21   - recheck in 6 months (~ 3/2022)      #. 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  I personally obtained the chief complaint(s) and history of present illness.  I confirmed and edited as necessary the review of systems, past medical/surgical history, family history, social history, and examination findings as documented by others; and I examined the patient myself.  I personally reviewed the relevant tests, images, and reports as documented above.  I formulated and edited as necessary the assessment and plan and discussed the findings and management plan with the patient and family    Anu Wilson MD, PhD  , Vitreoretinal Surgery  Department of Ophthalmology  [facility]

## 2021-12-14 ENCOUNTER — OFFICE VISIT (OUTPATIENT)
Dept: OPHTHALMOLOGY | Facility: CLINIC | Age: 76
End: 2021-12-14
Attending: OPHTHALMOLOGY
Payer: MEDICARE

## 2021-12-14 DIAGNOSIS — H35.3211 EXUDATIVE AGE-RELATED MACULAR DEGENERATION OF RIGHT EYE WITH ACTIVE CHOROIDAL NEOVASCULARIZATION (H): Primary | ICD-10-CM

## 2021-12-14 PROCEDURE — G0463 HOSPITAL OUTPT CLINIC VISIT: HCPCS | Mod: 25

## 2021-12-14 PROCEDURE — 67028 INJECTION EYE DRUG: CPT | Mod: RT | Performed by: OPHTHALMOLOGY

## 2021-12-14 PROCEDURE — 92134 CPTRZ OPH DX IMG PST SGM RTA: CPT | Performed by: OPHTHALMOLOGY

## 2021-12-14 PROCEDURE — 250N000011 HC RX IP 250 OP 636: Performed by: OPHTHALMOLOGY

## 2021-12-14 RX ADMIN — AFLIBERCEPT 2 MG: 40 INJECTION, SOLUTION INTRAVITREAL at 11:27

## 2021-12-14 ASSESSMENT — TONOMETRY
IOP_METHOD: TONOPEN
OD_IOP_MMHG: 12
OS_IOP_MMHG: 13

## 2021-12-14 ASSESSMENT — REFRACTION_WEARINGRX
OS_ADD: +2.75
OD_ADD: +2.75
OS_SPHERE: -0.75
OD_AXIS: 164
OS_CYLINDER: +0.75
OS_AXIS: 026
OD_SPHERE: +0.25
OD_CYLINDER: +0.75

## 2021-12-14 ASSESSMENT — VISUAL ACUITY
OD_PH_SC+: -1
METHOD: SNELLEN - LINEAR
OD_PH_SC: 20/100
OS_SC: 20/20
OS_SC+: -2
OD_SC: 20/125

## 2021-12-14 ASSESSMENT — CONF VISUAL FIELD
OS_NORMAL: 1
METHOD: COUNTING FINGERS
OD_NORMAL: 1

## 2021-12-14 NOTE — NURSING NOTE
Chief Complaints and History of Present Illnesses   Patient presents with     Macular Degeneration Follow Up     10 week follow up both eyes.     Chief Complaint(s) and History of Present Illness(es)     Macular Degeneration Follow Up     Comments: 10 week follow up both eyes.              Comments     Pt states vision is about the same as last visit. No eye pain today.  No new flashes or floaters. No redness or dryness.    DM2 BS: Pt does not check blood sugars, diet controlled.  Lab Results       Component                Value               Date                       A1C                      5.6                 02/01/2021                 A1C                      5.9                 03/06/2019                 A1C                      5.9                 08/14/2018                 A1C                      5.9                 12/29/2017                 A1C                      6.1                 10/21/2016              CARMEN Baca December 14, 2021 10:16 AM

## 2021-12-14 NOTE — PROGRESS NOTES
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  Had gone few years without eye exam d/t insurance lack, gradual progressive loss OU.  Prior RD repair OD 2014, unsure what BCVA was after repair.  Caring for grandson  VA has worsened OU over years, now trouble with reading/drivign, ADLs.    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  Wet AMD OD   - uncertain duration by history   - uncertain vision potential given prior h/o RD   - started Avastin 9/17/18 (#6 total)   - changed to Eylea 3/12/19     - increased to 9 weeks on 7/27/21     - stable SRF & VA @ 7 weeks on 6/2021   - was typically 20/80-20/125 in past @ 4 weeks        - last injection Eylea  (#26) 9/28/21 (11 weeks, 10 week interval)     -  OCT SRF stable   - VA stable    - Eylea today   - increase to 12 weeks   - next injection in 12 weeks (12 week interval)         # intermediate dry AMD OS   - single large druse on OCT   - AREDS/Amsler    #. H/o RD repair OD   - s/p PPV/SBP/FGx  2014   - retina flat last DFE      #. PVD OS   - onset ~ 7/15/21 by history   - advised s/sx RD 7/2021     - last DFE 9/28/21        #. 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  I personally obtained the chief complaint(s) and history of present illness.  I confirmed and edited as necessary the review of systems, past medical/surgical history, family history, social history, and examination findings as documented by others; and I examined the patient myself.  I personally reviewed the relevant tests, images, and reports as documented above.  I formulated and edited as necessary the assessment and plan and discussed the findings and management plan with the patient and family    Anu Wilson MD, PhD  , Vitreoretinal Surgery  Department of Ophthalmology  [facility]

## 2021-12-22 ENCOUNTER — TELEPHONE (OUTPATIENT)
Dept: INTERNAL MEDICINE | Facility: CLINIC | Age: 76
End: 2021-12-22
Payer: MEDICARE

## 2021-12-22 DIAGNOSIS — E78.5 HYPERLIPIDEMIA: Primary | ICD-10-CM

## 2021-12-22 DIAGNOSIS — I25.119 CORONARY ARTERY DISEASE INVOLVING NATIVE CORONARY ARTERY OF NATIVE HEART WITH ANGINA PECTORIS (H): ICD-10-CM

## 2021-12-22 RX ORDER — PRAVASTATIN SODIUM 40 MG
TABLET ORAL
Qty: 90 TABLET | Refills: 3 | Status: SHIPPED | OUTPATIENT
Start: 2021-12-22 | End: 2022-02-28

## 2021-12-22 NOTE — TELEPHONE ENCOUNTER
Pending Prescriptions:                       Disp   Refills    pravastatin (PRAVACHOL) 40 MG tablet      90 tab*3            Sig: TAKE 1 TABLET (40 MG) BY MOUTH ONCE DAILY

## 2021-12-22 NOTE — TELEPHONE ENCOUNTER
Reason for Call:  Other call back    Detailed comments: pt out of medication for:  Pravastatin    Pharm:  [facility] [place] [facility]    Phone Number Patient can be reached at: Cell number on file:    Telephone Information:   Mobile [phone]       Best Time: anytime    Can we leave a detailed message on this number? YES    Call taken on 12/22/2021 at 10:58 AM by Yazmin Doty     Benzoyl Peroxide Counseling: Patient counseled that medicine may cause skin irritation and bleach clothing.  In the event of skin irritation, the patient was advised to reduce the amount of the drug applied or use it less frequently.   The patient verbalized understanding of the proper use and possible adverse effects of benzoyl peroxide.  All of the patient's questions and concerns were addressed.

## 2022-02-24 DIAGNOSIS — H35.3211 EXUDATIVE AGE-RELATED MACULAR DEGENERATION OF RIGHT EYE WITH ACTIVE CHOROIDAL NEOVASCULARIZATION (H): Primary | ICD-10-CM

## 2022-02-25 DIAGNOSIS — I10 ESSENTIAL HYPERTENSION: Primary | ICD-10-CM

## 2022-02-26 ENCOUNTER — TELEPHONE (OUTPATIENT)
Dept: INTERNAL MEDICINE | Facility: CLINIC | Age: 77
End: 2022-02-26
Payer: MEDICARE

## 2022-02-26 DIAGNOSIS — I10 ESSENTIAL HYPERTENSION: Primary | ICD-10-CM

## 2022-02-26 DIAGNOSIS — I25.119 CORONARY ARTERY DISEASE INVOLVING NATIVE CORONARY ARTERY OF NATIVE HEART WITH ANGINA PECTORIS (H): ICD-10-CM

## 2022-02-26 NOTE — TELEPHONE ENCOUNTER
Reason for Call:  Other call back    Detailed comments: PHARM TOLD PT HIS LISINOPRIL AND STATIN NEED PRIOR AUTH BEFORE THEY CAN FILL. THEY DID GIVE HIM THREE PILLS EACH TO GET THROUGH WEEKEND.    Phone Number Patient can be reached at: Home number on file [phone] (home)    Best Time: ANYTIME    Can we leave a detailed message on this number? YES    Call taken on 2/26/2022 at 10:42 AM by Isela Cancino

## 2022-02-27 NOTE — TELEPHONE ENCOUNTER
"Routing refill request to provider for review/approval because:  Labs not current:  BP and Labs  Patient needs to be seen because it has been more than 1 year since last office visit.    Last Written Prescription Date:  11/30/2020  Last Fill Quantity: 90,  # refills: 3   Last office visit provider:  11/30/2020 with PCP Dr ANAID Goldsmith      Requested Prescriptions   Pending Prescriptions Disp Refills     lisinopril (ZESTRIL) 10 MG tablet [Pharmacy Med Name: Lisinopril Oral Tablet 10 MG] 90 tablet 0     Sig: Take 1 tablet (10 mg) by mouth once a day       ACE Inhibitors (Including Combos) Protocol Failed - 2/25/2022  1:40 PM        Failed - Blood pressure under 140/90 in past 12 months     BP Readings from Last 3 Encounters:   04/05/19 161/84   04/01/19 148/89   03/25/19 (!) 159/96                 Failed - Recent (12 mo) or future (30 days) visit within the authorizing provider's specialty     Patient has had an office visit with the authorizing provider or a provider within the authorizing providers department within the previous 12 mos or has a future within next 30 days. See \"Patient Info\" tab in inbasket, or \"Choose Columns\" in Meds & Orders section of the refill encounter.              Failed - Normal serum creatinine on file in past 12 months     Recent Labs   Lab Test 02/01/21  1316   CR 1.45*       Ok to refill medication if creatinine is low          Failed - Normal serum potassium on file in past 12 months     Recent Labs   Lab Test 02/01/21  1316   POTASSIUM 4.8             Passed - Medication is active on med list        Passed - Patient is age 18 or older             Preeti Duque RN 02/27/22 4:02 PM  "

## 2022-02-28 RX ORDER — LISINOPRIL 10 MG/1
10 TABLET ORAL DAILY
Qty: 90 TABLET | Refills: 3 | Status: SHIPPED | OUTPATIENT
Start: 2022-02-28 | End: 2023-03-20

## 2022-02-28 RX ORDER — LISINOPRIL 10 MG/1
TABLET ORAL
Qty: 90 TABLET | Refills: 0 | Status: SHIPPED | OUTPATIENT
Start: 2022-02-28 | End: 2022-03-08 | Stop reason: ALTCHOICE

## 2022-02-28 RX ORDER — NITROGLYCERIN 0.4 MG/1
0.4 TABLET SUBLINGUAL EVERY 5 MIN PRN
Qty: 30 TABLET | Refills: 5 | Status: SHIPPED | OUTPATIENT
Start: 2022-02-28 | End: 2023-07-24

## 2022-02-28 RX ORDER — PRAVASTATIN SODIUM 40 MG
TABLET ORAL
Qty: 90 TABLET | Refills: 3 | Status: SHIPPED | OUTPATIENT
Start: 2022-02-28 | End: 2023-03-20

## 2022-02-28 NOTE — TELEPHONE ENCOUNTER
Patient's last office visit with Dr. Goldsmith was 11/30/20.  Writer spoke to William and scheduled him an office visit with Dr. Goldsmith on 4/8/22.   He needs to have his cholesterol checked and blood pressure follow up.    He is taking lisinopril 10 mg and pravastatin 40 mg and needs refills on these as well as his nitroglycerin.     Secondly, he is asking if he should get a 4th Covid vaccine given his history of heart complications, surgery and his age.

## 2022-02-28 NOTE — TELEPHONE ENCOUNTER
[facility] is calling and needs clarification dosage for   nitroGLYcerin (NITROSTAT) 0.4 MG sublingual tablet 30 tablet 5 2/28/2022  No   Sig - Route: Place 1 tablet (0.4 mg) under the tongue every 5 minutes as needed for chest pain - Sublingual     Is there a max dosage and then when to call 911 aftyer so many pills.    Also Lisinopril was sent in 2 times.  Which is correct one please?    Please call Mary [phone]. Can take over the phone.

## 2022-03-01 ENCOUNTER — TELEPHONE (OUTPATIENT)
Dept: INTERNAL MEDICINE | Facility: CLINIC | Age: 77
End: 2022-03-01
Payer: MEDICARE

## 2022-03-01 NOTE — TELEPHONE ENCOUNTER
Reason for Call:  Other call back    Detailed comments: Pt was told by pharmacy they did not receive the Rx for Lisinopril  Please resend    Pharm:  [facility] [place]    Phone Number Patient can be reached at: Cell number on file:    Telephone Information:   Mobile [phone]       Best Time: anytime    Can we leave a detailed message on this number? YES    Call taken on 3/1/2022 at 9:36 AM by Yazmin Doty

## 2022-03-01 NOTE — TELEPHONE ENCOUNTER
[facility] does have the prescription and will get it ready for the patient.    Patient notified of this.

## 2022-03-11 ENCOUNTER — OFFICE VISIT (OUTPATIENT)
Dept: OPHTHALMOLOGY | Facility: CLINIC | Age: 77
End: 2022-03-11
Attending: OPHTHALMOLOGY
Payer: MEDICARE

## 2022-03-11 DIAGNOSIS — H35.3211 EXUDATIVE AGE-RELATED MACULAR DEGENERATION OF RIGHT EYE WITH ACTIVE CHOROIDAL NEOVASCULARIZATION (H): ICD-10-CM

## 2022-03-11 PROCEDURE — 92134 CPTRZ OPH DX IMG PST SGM RTA: CPT | Performed by: OPHTHALMOLOGY

## 2022-03-11 PROCEDURE — G0463 HOSPITAL OUTPT CLINIC VISIT: HCPCS | Mod: 25

## 2022-03-11 PROCEDURE — 250N000011 HC RX IP 250 OP 636: Performed by: OPHTHALMOLOGY

## 2022-03-11 PROCEDURE — 92134 CPTRZ OPH DX IMG PST SGM RTA: CPT | Mod: 26 | Performed by: STUDENT IN AN ORGANIZED HEALTH CARE EDUCATION/TRAINING PROGRAM

## 2022-03-11 PROCEDURE — 99213 OFFICE O/P EST LOW 20 MIN: CPT | Mod: 25 | Performed by: STUDENT IN AN ORGANIZED HEALTH CARE EDUCATION/TRAINING PROGRAM

## 2022-03-11 PROCEDURE — 67028 INJECTION EYE DRUG: CPT | Mod: RT | Performed by: STUDENT IN AN ORGANIZED HEALTH CARE EDUCATION/TRAINING PROGRAM

## 2022-03-11 PROCEDURE — 67028 INJECTION EYE DRUG: CPT | Mod: RT | Performed by: OPHTHALMOLOGY

## 2022-03-11 RX ADMIN — AFLIBERCEPT 2 MG: 40 INJECTION, SOLUTION INTRAVITREAL at 12:01

## 2022-03-11 ASSESSMENT — CUP TO DISC RATIO
OD_RATIO: 0.6
OS_RATIO: 0.3

## 2022-03-11 ASSESSMENT — CONF VISUAL FIELD
OD_SUPERIOR_TEMPORAL_RESTRICTION: 3
OS_NORMAL: 1
OD_SUPERIOR_NASAL_RESTRICTION: 3

## 2022-03-11 ASSESSMENT — VISUAL ACUITY
OD_PH_SC+: -1
OS_SC: 20/20
OD_PH_SC: 20/100
OD_SC: 20/150
METHOD: SNELLEN - LINEAR

## 2022-03-11 ASSESSMENT — TONOMETRY
OS_IOP_MMHG: 9
OD_IOP_MMHG: 7
IOP_METHOD: ICARE

## 2022-03-11 ASSESSMENT — SLIT LAMP EXAM - LIDS
COMMENTS: NORMAL
COMMENTS: NORMAL

## 2022-03-11 ASSESSMENT — EXTERNAL EXAM - LEFT EYE: OS_EXAM: NORMAL

## 2022-03-11 ASSESSMENT — EXTERNAL EXAM - RIGHT EYE: OD_EXAM: NORMAL

## 2022-03-11 NOTE — NURSING NOTE
Chief Complaints and History of Present Illnesses   Patient presents with     Macular Degeneration Follow Up     Chief Complaint(s) and History of Present Illness(es)     Macular Degeneration Follow Up     Laterality: both eyes    Onset: weeks ago    Quality: blurred vision    Course: stable    Associated symptoms: Negative for dryness, eye pain, flashes and floaters    Pain scale: 0/10              Comments     12 week follow up for Wet AMD right eye and Dry AMD left eye with possible injection today.   Patient states vision is stable each eye within the last several weeks. Denies new floaters or flashes. Denies eye pain.     Last BS: unknown  Lab Results       Component                Value               Date                       A1C                      5.6                 02/01/2021                 A1C                      5.9                 03/06/2019                 A1C                      5.9                 08/14/2018                 A1C                      5.9                 12/29/2017                 A1C                      6.1                 10/21/2016              IESHA Wilcox 10:43 AM 03/11/2022

## 2022-03-11 NOTE — PROGRESS NOTES
CC -   Wet AMD OD    INTERVAL HISTORY - VA stable  Still Trying to reduce smoking (12/2021) plans to stop by Xmas  Last DFE OU 3/2022    Prefers attending injection     [facility]-   Jonas Hyman is a  76 year old  patient referred by Dr Pinedo for wet  AMD OD.  Had gone few years without eye exam d/t insurance lack, gradual progressive loss OU.  Prior RD repair OD 2014, unsure what BCVA was after repair.  Caring for grandson  VA has worsened OU over years, now trouble with reading/drivign, ADLs.    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  Wet AMD OD   - uncertain duration by history   - uncertain vision potential given prior h/o RD   - started Avastin 9/17/18 (#6 total)   - changed to Eylea 3/12/19     - increased to 9 weeks on 7/27/21     - stable SRF & VA @ 7 weeks on 6/2021   - was typically 20/80-20/125 in past @ 4 weeks      - last injection Eylea  (#27) 12/14/21 (11 weeks, 10 week interval)     - OCT SRF stable   - VA stable    - repeat Eylea today   - increase to 3 months   - next injection in 3 motnhs     # intermediate dry AMD OS   - single large druse on OCT   - AREDS/Amsler    #. H/o RD repair OD   - s/p PPV/SBP/FGx  2014   - retina flat last DFE      #. PVD OS   - onset ~ 7/15/21 by history   - advised s/sx RD 7/2021     - last DFE 9/28/21        #. 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  I personally obtained the chief complaint(s) and history of present illness.  I confirmed and edited as necessary the review of systems, past medical/surgical history, family history, social history, and examination findings as documented by others; and I examined the patient myself.  I personally reviewed the relevant tests, images, and reports as documented above.  I personally reviewed the ophthalmic test(s) associated with this encounter, agree with the interpretation(s) as documented by the resident/fellow, and have edited the corresponding report(s) as necessary.   I formulated and edited as necessary the assessment and plan and discussed the findings and management plan with the patient and family and No resident or fellow assisted with the procedures performed.  I performed the procedures myself.    Anu Wilson MD, PhD  , Vitreoretinal Surgery  Department of Ophthalmology  [facility]

## 2022-04-07 PROBLEM — Z95.828 S/P REPAIR OF ABDOMINAL AORTIC ANEURYSM USING BIFURCATION GRAFT: Status: ACTIVE | Noted: 2019-05-15

## 2022-04-07 PROBLEM — N18.32 STAGE 3B CHRONIC KIDNEY DISEASE (H): Status: ACTIVE | Noted: 2020-11-30

## 2022-04-07 PROBLEM — Z86.79 S/P REPAIR OF ABDOMINAL AORTIC ANEURYSM USING BIFURCATION GRAFT: Status: ACTIVE | Noted: 2019-05-15

## 2022-04-07 PROBLEM — D17.9 MYELOLIPOMA: Status: ACTIVE | Noted: 2020-11-30

## 2022-04-08 ENCOUNTER — OFFICE VISIT (OUTPATIENT)
Dept: INTERNAL MEDICINE | Facility: CLINIC | Age: 77
End: 2022-04-08
Payer: MEDICARE

## 2022-04-08 VITALS
WEIGHT: 248.5 LBS | BODY MASS INDEX: 35.66 KG/M2 | OXYGEN SATURATION: 98 % | SYSTOLIC BLOOD PRESSURE: 165 MMHG | DIASTOLIC BLOOD PRESSURE: 83 MMHG | HEART RATE: 87 BPM

## 2022-04-08 DIAGNOSIS — I10 ESSENTIAL HYPERTENSION: ICD-10-CM

## 2022-04-08 DIAGNOSIS — Z95.5 STATUS POST CORONARY ARTERY STENT PLACEMENT: ICD-10-CM

## 2022-04-08 DIAGNOSIS — Z12.11 SCREEN FOR COLON CANCER: ICD-10-CM

## 2022-04-08 DIAGNOSIS — N18.32 STAGE 3B CHRONIC KIDNEY DISEASE (H): ICD-10-CM

## 2022-04-08 DIAGNOSIS — Z11.59 NEED FOR HEPATITIS C SCREENING TEST: ICD-10-CM

## 2022-04-08 DIAGNOSIS — R25.2 LEG CRAMPS: ICD-10-CM

## 2022-04-08 DIAGNOSIS — F10.21 HISTORY OF ALCOHOL DEPENDENCE (H): ICD-10-CM

## 2022-04-08 DIAGNOSIS — E66.01 SEVERE OBESITY WITH BODY MASS INDEX (BMI) OF 35.0 TO 39.9 WITH SERIOUS COMORBIDITY (H): ICD-10-CM

## 2022-04-08 DIAGNOSIS — I71.40 ABDOMINAL AORTIC ANEURYSM (AAA) WITHOUT RUPTURE (H): ICD-10-CM

## 2022-04-08 DIAGNOSIS — I25.119 CORONARY ARTERY DISEASE INVOLVING NATIVE CORONARY ARTERY OF NATIVE HEART WITH ANGINA PECTORIS (H): ICD-10-CM

## 2022-04-08 DIAGNOSIS — G62.9 NEUROPATHY: ICD-10-CM

## 2022-04-08 DIAGNOSIS — N18.32 TYPE 2 DIABETES MELLITUS WITH STAGE 3B CHRONIC KIDNEY DISEASE, WITHOUT LONG-TERM CURRENT USE OF INSULIN (H): ICD-10-CM

## 2022-04-08 DIAGNOSIS — Z00.00 MEDICARE ANNUAL WELLNESS VISIT, SUBSEQUENT: Primary | ICD-10-CM

## 2022-04-08 DIAGNOSIS — E11.22 TYPE 2 DIABETES MELLITUS WITH STAGE 3B CHRONIC KIDNEY DISEASE, WITHOUT LONG-TERM CURRENT USE OF INSULIN (H): ICD-10-CM

## 2022-04-08 DIAGNOSIS — N40.0 PROSTATISM: ICD-10-CM

## 2022-04-08 DIAGNOSIS — Z12.5 PROSTATE CANCER SCREENING: ICD-10-CM

## 2022-04-08 PROBLEM — E78.5 HYPERLIPIDEMIA: Status: RESOLVED | Noted: 2019-04-08 | Resolved: 2022-04-08

## 2022-04-08 LAB
ALBUMIN SERPL-MCNC: 3.7 G/DL (ref 3.5–5)
ALBUMIN UR-MCNC: NEGATIVE MG/DL
ALP SERPL-CCNC: 62 U/L (ref 45–120)
ALT SERPL W P-5'-P-CCNC: <9 U/L (ref 0–45)
ANION GAP SERPL CALCULATED.3IONS-SCNC: 11 MMOL/L (ref 5–18)
APPEARANCE UR: CLEAR
AST SERPL W P-5'-P-CCNC: 12 U/L (ref 0–40)
BILIRUB SERPL-MCNC: 0.3 MG/DL (ref 0–1)
BILIRUB UR QL STRIP: NEGATIVE
BUN SERPL-MCNC: 18 MG/DL (ref 8–28)
CALCIUM SERPL-MCNC: 9 MG/DL (ref 8.5–10.5)
CHLORIDE BLD-SCNC: 109 MMOL/L (ref 98–107)
CHOLEST SERPL-MCNC: 143 MG/DL
CO2 SERPL-SCNC: 21 MMOL/L (ref 22–31)
COLOR UR AUTO: YELLOW
CREAT SERPL-MCNC: 1.18 MG/DL (ref 0.7–1.3)
CREAT UR-MCNC: 56 MG/DL
FASTING STATUS PATIENT QL REPORTED: NO
GFR SERPL CREATININE-BSD FRML MDRD: 64 ML/MIN/1.73M2
GLUCOSE BLD-MCNC: 86 MG/DL (ref 70–125)
GLUCOSE UR STRIP-MCNC: NEGATIVE MG/DL
HBA1C MFR BLD: 5.7 % (ref 0–5.6)
HDLC SERPL-MCNC: 33 MG/DL
HGB BLD-MCNC: 13.8 G/DL (ref 13.3–17.7)
HGB UR QL STRIP: NEGATIVE
KETONES UR STRIP-MCNC: NEGATIVE MG/DL
LDLC SERPL CALC-MCNC: 82 MG/DL
LEUKOCYTE ESTERASE UR QL STRIP: NEGATIVE
MAGNESIUM SERPL-MCNC: 2 MG/DL (ref 1.8–2.6)
MICROALBUMIN UR-MCNC: 0.99 MG/DL (ref 0–1.99)
MICROALBUMIN/CREAT UR: 17.7 MG/G CR
NITRATE UR QL: NEGATIVE
PH UR STRIP: 6 [PH] (ref 5–8)
PHOSPHATE SERPL-MCNC: 2.7 MG/DL (ref 2.5–4.5)
POTASSIUM BLD-SCNC: 4.3 MMOL/L (ref 3.5–5)
PROT SERPL-MCNC: 6.4 G/DL (ref 6–8)
PSA SERPL-MCNC: 0.48 UG/L (ref 0–6.5)
PTH-INTACT SERPL-MCNC: 53 PG/ML (ref 10–86)
SODIUM SERPL-SCNC: 141 MMOL/L (ref 136–145)
SP GR UR STRIP: 1.02 (ref 1–1.03)
TRIGL SERPL-MCNC: 142 MG/DL
UROBILINOGEN UR STRIP-ACNC: 0.2 E.U./DL

## 2022-04-08 PROCEDURE — 86803 HEPATITIS C AB TEST: CPT | Performed by: INTERNAL MEDICINE

## 2022-04-08 PROCEDURE — G0103 PSA SCREENING: HCPCS | Performed by: INTERNAL MEDICINE

## 2022-04-08 PROCEDURE — 82043 UR ALBUMIN QUANTITATIVE: CPT | Performed by: INTERNAL MEDICINE

## 2022-04-08 PROCEDURE — 99214 OFFICE O/P EST MOD 30 MIN: CPT | Mod: 25 | Performed by: INTERNAL MEDICINE

## 2022-04-08 PROCEDURE — 84100 ASSAY OF PHOSPHORUS: CPT | Performed by: INTERNAL MEDICINE

## 2022-04-08 PROCEDURE — 81003 URINALYSIS AUTO W/O SCOPE: CPT | Performed by: INTERNAL MEDICINE

## 2022-04-08 PROCEDURE — 80053 COMPREHEN METABOLIC PANEL: CPT | Performed by: INTERNAL MEDICINE

## 2022-04-08 PROCEDURE — 83735 ASSAY OF MAGNESIUM: CPT | Performed by: INTERNAL MEDICINE

## 2022-04-08 PROCEDURE — 83970 ASSAY OF PARATHORMONE: CPT | Performed by: INTERNAL MEDICINE

## 2022-04-08 PROCEDURE — 83036 HEMOGLOBIN GLYCOSYLATED A1C: CPT | Performed by: INTERNAL MEDICINE

## 2022-04-08 PROCEDURE — G0439 PPPS, SUBSEQ VISIT: HCPCS | Performed by: INTERNAL MEDICINE

## 2022-04-08 PROCEDURE — 80061 LIPID PANEL: CPT | Performed by: INTERNAL MEDICINE

## 2022-04-08 PROCEDURE — 96372 THER/PROPH/DIAG INJ SC/IM: CPT | Performed by: INTERNAL MEDICINE

## 2022-04-08 PROCEDURE — 85018 HEMOGLOBIN: CPT | Performed by: INTERNAL MEDICINE

## 2022-04-08 PROCEDURE — 36415 COLL VENOUS BLD VENIPUNCTURE: CPT | Performed by: INTERNAL MEDICINE

## 2022-04-08 RX ORDER — FINASTERIDE 5 MG/1
5 TABLET, FILM COATED ORAL DAILY
Qty: 90 TABLET | Refills: 3 | Status: SHIPPED | OUTPATIENT
Start: 2022-04-08 | End: 2022-07-26

## 2022-04-08 RX ORDER — DOXAZOSIN 1 MG/1
1 TABLET ORAL AT BEDTIME
Qty: 90 TABLET | Refills: 3 | Status: SHIPPED | OUTPATIENT
Start: 2022-04-08 | End: 2022-07-26

## 2022-04-08 RX ORDER — CYANOCOBALAMIN 1000 UG/ML
1000 INJECTION, SOLUTION INTRAMUSCULAR; SUBCUTANEOUS
Status: ACTIVE | OUTPATIENT
Start: 2022-04-09

## 2022-04-08 RX ADMIN — CYANOCOBALAMIN 1000 MCG: 1000 INJECTION, SOLUTION INTRAMUSCULAR; SUBCUTANEOUS at 14:49

## 2022-04-08 ASSESSMENT — PATIENT HEALTH QUESTIONNAIRE - PHQ9
SUM OF ALL RESPONSES TO PHQ QUESTIONS 1-9: 2
SUM OF ALL RESPONSES TO PHQ QUESTIONS 1-9: 2
10. IF YOU CHECKED OFF ANY PROBLEMS, HOW DIFFICULT HAVE THESE PROBLEMS MADE IT FOR YOU TO DO YOUR WORK, TAKE CARE OF THINGS AT HOME, OR GET ALONG WITH OTHER PEOPLE: NOT DIFFICULT AT ALL

## 2022-04-08 ASSESSMENT — ACTIVITIES OF DAILY LIVING (ADL): CURRENT_FUNCTION: NO ASSISTANCE NEEDED

## 2022-04-08 NOTE — PROGRESS NOTES
ANNUAL WELLNESS VISIT--Face to Face    Assessment and Plan:     ASSESSMENT and PLAN:  1. Medicare annual wellness visit, subsequent  - REVIEW OF HEALTH MAINTENANCE PROTOCOL ORDERS    2. Type 2 diabetes mellitus with stage 3b chronic kidney disease, without long-term current use of insulin (H)  - HEMOGLOBIN A1C; Future  - Albumin Random Urine Quantitative with Creat Ratio; Future  - Lipid panel reflex to direct LDL Fasting; Future  - Comprehensive metabolic panel; Future  - HEMOGLOBIN A1C  - Albumin Random Urine Quantitative with Creat Ratio  - Lipid panel reflex to direct LDL Fasting  - Comprehensive metabolic panel    3. Need for hepatitis C screening test  - Hepatitis C Screen Reflex to HCV RNA Quant and Genotype; Future  - Hepatitis C Screen Reflex to HCV RNA Quant and Genotype    4. Screen for colon cancer  - COLOGUARD(EXACT SCIENCES); Future    5. History of alcohol dependence (H)  Try since 1975 and active in recovering community    6. Abdominal aortic aneurysm (AAA) without rupture (H)  Status post graft replacement persisting pain    7. Severe obesity with body mass index (BMI) of 35.0 to 39.9 with serious comorbidity (H)  Encouraged to exercise    8. Coronary artery disease involving native coronary artery of native heart with angina pectoris (H)  Rare chest pain  - Lipid panel reflex to direct LDL Fasting; Future  - Comprehensive metabolic panel; Future  - Lipid panel reflex to direct LDL Fasting  - Comprehensive metabolic panel    9. Stage 3b chronic kidney disease (H)  - Parathyroid Hormone Intact; Future  - Phosphorus; Future  - Hemoglobin; Future  - Parathyroid Hormone Intact  - Phosphorus  - Hemoglobin    10. Essential hypertension  Encouraged to monitor    11. Status post coronary artery stent placement      12. Prostatism  Trial of doxazosin.  Trial of finasteride.  Rule out infection  - UA reflex to Microscopic and Culture; Future  - doxazosin (CARDURA) 1 MG tablet; Take 1 tablet (1 mg) by mouth At  Bedtime  Dispense: 90 tablet; Refill: 3  - finasteride (PROSCAR) 5 MG tablet; Take 1 tablet (5 mg) by mouth daily  Dispense: 90 tablet; Refill: 3  - UA reflex to Microscopic and Culture    13. Prostate cancer screening  - Prostate Specific Antigen Screen; Future  - Prostate Specific Antigen Screen    14. Neuropathy  - cyanocobalamin injection 1,000 mcg    15. Leg cramps  - Magnesium; Future  - Magnesium     Preventive Care Assessed: Reviewed as above     Patient Instructions   202    Goal blood pressure between 120 and 140 top number     Update labs including PSA    Update angelica for colon cancer screen    Finasteride 5 mgs daily for 3 months to shrink prostate and improve urination    Doxazosin 1 mg at bedtime for blood pressure and prostate relaxation    Try 1 vitamin B 12 shot today for neuropathy, energy, and memory    Blood tests for leg cramps     Medication list carefully reviewed and corrected  Epic Merger Problem list addressed and updated     Return in about 6 months (around 10/8/2022) for using a video visit.      Subjective:   Jonas is a 76 year old male who presents to the clinic today for an Annual Wellness Visit.    CHIEF COMPLAINT:  Chief Complaint   Patient presents with     Wellness Visit     Follow Up     Diabetes       HPI: Continues to smoke.  No hypoglycemia    Diffuse neuropathy of feet.  Does not recall ever using a B12 shot    Urinary frequency and hesitancy without improvement on Flomax    Occasional angina resolved with nitroglycerin    Nocturia    Review of Systems: Stable mood.  Continued side pain even after having grafts of aneurysm    Patient Care Team:  Dillon Goldsmith MD as PCP - General (Internal Medicine)  Anu Wilson MD as MD (Ophthalmology)  Anu Wilson MD as Assigned Surgical Provider  Dillon Goldsmith MD as Assigned PCP     Patient Active Problem List   Diagnosis     Pelvic mass     Abdominal aortic aneurysm (AAA) without rupture (H)      Pancreatic cyst     Severe obesity with body mass index (BMI) of 35.0 to 39.9 with serious comorbidity (H)     Tobacco dependence syndrome     Right bundle branch block     Hepatic cyst     Alcohol abuse, in remission     Atherosclerosis of native coronary artery of native heart with angina pectoris (H)     Chronic coronary artery disease     Cataract     Cystoid macular edema of right eye     Depressive disorder     Diverticulosis of large intestine without hemorrhage     Dyslipidemia     Essential hypertension     Exudative age-related macular degeneration of right eye (H)     History of acute anterior wall MI     History of alcohol dependence (H)     Low vitamin B12 level     Other, mixed, or unspecified nondependent drug abuse, in remission     Smoker     Status post coronary artery stent placement     Total retinal detachment     Type 2 diabetes mellitus with circulatory disorder, without long-term current use of insulin (H)     History of ventricular fibrillation     Vitamin D deficiency     Myelolipoma     S/P repair of abdominal aortic aneurysm using bifurcation graft     Stage 3b chronic kidney disease (H)     Past Medical History:   Diagnosis Date     Acute MI (H) 2009     Bronchitis      Cardiac arrest (H) 2009     Cardiac arrest (H)      Chemical dependency (H)     Has been in recovery 44 years     Coronary artery disease      Diabetes mellitus (H)     type II     Diabetes mellitus type 2, noninsulin dependent (H)      Diverticula of colon      Diverticulosis of large intestine      Hemorrhoids      Hemorrhoids      History of alcohol dependence (H) 1/12/1975    Created by [facility] Annotation: May 29 2009  5:52PM - Dillon Goldsmith: dry since  1975, also used drugs and marijuana  Replacement Utility updated for latest IMO load     Hypertension      Hypertension      Macular degeneration of right eye      Mixed hyperlipidemia      Myocardial infarction (H)      Retinal detachment 06/23/2014     Vitrectomy Posterior 23 guage, scleral buckle, membrane peel, endolaser gase     Stented coronary artery 2009    stints times 2     Vitamin B12 deficiency      Vitamin D deficiency       Past Surgical History:   Procedure Laterality Date     CATARACT IOL, RT/LT Bilateral      EYE SURGERY       FRACTURE SURGERY       HC REMOVE TONSILS/ADENOIDS,<13 Y/O      Description: Tonsillectomy With Adenoidectomy;  Recorded: 05/29/2009;     HEART CATH STENT COR W/WO PTCA  05/07/2009    Proximal Left Anterior Descending Artery, Proximal Circumflex       HERNIA REPAIR       INGUINAL HERNIA REPAIR       LASER YAG IRIDOTOMY  9/6/2013    Procedure: LASER YAG IRIDOTOMY;  peripheral irridotomy ;  Surgeon: Doroteo Andres MD;  Location: [facility]     OTHER SURGICAL HISTORY      Cath Stent 1 Proximal Left Anterior Descending Artery      PHACOEMULSIFICATION CLEAR CORNEA WITH STANDARD INTRAOCULAR LENS IMPLANT  9/5/2013    Procedure: PHACOEMULSIFICATION CLEAR CORNEA WITH STANDARD INTRAOCULAR LENS IMPLANT;   COMPLEX RIGHT PHACOEMULSIFICATION CLEAR CORNEA WITH ANTERIOR CHAMBER INTRAOCULAR LENS IMPLANT, ANTERIOR VITRECTOMY;  Surgeon: Doroteo Andres MD;  Location: [facility]     PHACOEMULSIFICATION WITH STANDARD INTRAOCULAR LENS IMPLANT Left 12/3/2018    Procedure: Left Eye Phacoemulsification with Intraocular Lens;  Surgeon: Suhail Johnson MD;  Location: [facility] OR     RETINAL DETACHMENT SURGERY       TONSILLECTOMY  5/24/200/9     VASECTOMY       VITRECTOMY ANTERIOR  9/5/2013    Procedure: VITRECTOMY ANTERIOR;;  Surgeon: Doroteo Andres MD;  Location: [facility]     ZZC MUSCLE-SKIN FLAP,LEG        Family History   Problem Relation Age of Onset     Obesity Father      Glaucoma No family hx of      Macular Degeneration No family hx of      Retinal detachment No family hx of      Kidney Cancer Father      Heart Disease Brother       Social History     Socioeconomic History     Marital status: Single     Spouse name: Not on file     Number of children:  Not on file     Years of education: Not on file     Highest education level: Not on file   Occupational History     Not on file   Tobacco Use     Smoking status: Current Every Day Smoker     Packs/day: 1.00     Years: 40.00     Pack years: 40.00     Types: Cigarettes     Start date: 6/1/1956     Smokeless tobacco: Never Used     Tobacco comment: 20 yrs ago-mid 80's, quit again 2007, started again 2012   Substance and Sexual Activity     Alcohol use: No     Comment: from Jan 1975     Drug use: No     Sexual activity: Not on file   Other Topics Concern     Parent/sibling w/ CABG, MI or angioplasty before 65F 55M? Not Asked   Social History Narrative    2 children,         Quit drinking alcohol 1975, jan 12        Son has diabetes and lives in basement        Lives with grandchild     Social Determinants of Health     Financial Resource Strain: Not on file   Food Insecurity: Not on file   Transportation Needs: Not on file   Physical Activity: Not on file   Stress: Not on file   Social Connections: Not on file   Intimate Partner Violence: Not on file   Housing Stability: Not on file      Social History     Social History Narrative    2 children,         Quit drinking alcohol 1975, jan 12        Son has diabetes and lives in basement        Lives with grandchild       Current Outpatient Medications   Medication Sig Dispense Refill     aspirin (ASA) 81 MG tablet Take 81 mg by mouth daily       cyanocobalamin (VITAMIN B-12) 100 MCG tablet Take 2,000 mcg by mouth daily       doxazosin (CARDURA) 1 MG tablet Take 1 tablet (1 mg) by mouth At Bedtime 90 tablet 3     finasteride (PROSCAR) 5 MG tablet Take 1 tablet (5 mg) by mouth daily 90 tablet 3     furosemide (LASIX) 40 MG tablet Take 40 mg by mouth daily       lisinopril (ZESTRIL) 10 MG tablet Take 1 tablet (10 mg) by mouth daily 90 tablet 3     nitroGLYcerin (NITROSTAT) 0.4 MG sublingual tablet Place 1 tablet (0.4 mg) under the tongue every 5 minutes as  "needed for chest pain 30 tablet 5     pravastatin (PRAVACHOL) 40 MG tablet TAKE 1 TABLET (40 MG) BY MOUTH ONCE DAILY 90 tablet 3     vitamin B complex with vitamin C (VITAMIN  B COMPLEX) tablet Take 1 tablet by mouth daily       VITAMIN D, CHOLECALCIFEROL, PO Take 2,000 Units by mouth daily        Objective:   BP (!) 165/83 (BP Location: Left arm, Patient Position: Sitting, Cuff Size: Adult Large)   Pulse 87   Wt 112.7 kg (248 lb 8 oz)   SpO2 98%   BMI 35.66 kg/m   Estimated body mass index is 35.66 kg/m  as calculated from the following:    Height as of 3/25/19: 1.778 m (5' 10\").    Weight as of this encounter: 112.7 kg (248 lb 8 oz).    VisionScreening:  No exam data present     PHYSICAL EXAM:  {Wearing mask when entering room?  Yes  Constitutional:  Reveals pleasant overweight man who ambulates without assistance  Palpation of radial pulse regular   Ears:  Clear without wax wax right ear  Thyroid:  Non palpable   Cardiac; Regular rate and rhythm   Lungs: Clear.  Respiratory effort normal.  Edema of Legs: Trace  Psychiatric:  Alert and oriented       Recent Labs   Lab Test 02/01/21  1316   CHOL 155      PSA 0.7       No results found for: VITDT    No flowsheet data found.  Cognitive Screening   1) Repeat 3 items (Leader, Season, Table)    2) Clock draw: NORMAL  3) 3 item recall: Recalls 3 objects  Results: 3 items recalled: COGNITIVE IMPAIRMENT LESS LIKELY      ROOMING STAFF:    Patient has been advised of split billing requirements and indicates understanding: YES   Are you in the first 12 months of your Medicare coverage?  No      Do you feel safe in your environment? Yes      Have you ever done Advance Care Planning? (For example, a Health Directive, POLST, or a discussion with a medical provider or your loved ones about your wishes): Yes, patient states has an Advance Care Planning document and will bring a copy to the clinic.    Healthy Habits:     In general, how would you rate your overall " "health?  Fair    Frequency of exercise:  None    Do you usually eat at least 4 servings of fruit and vegetables a day, include whole grains    & fiber and avoid regularly eating high fat or \"junk\" foods?  No    Taking medications regularly:  Yes    Medication side effects:  None    Ability to successfully perform activities of daily living:  No assistance needed    Home Safety:  Lack of grab bars in the bathroom    Hearing Impairment:  Difficulty following a conversation in a noisy restaurant or crowded room and no hearing concerns    In the past 6 months, have you been bothered by leaking of urine? Yes    In general, how would you rate your overall mental or emotional health?  Good      PHQ-2 Total Score: 0    Additional concerns today:  Yes      Do you have sleep apnea, excessive snoring or daytime drowsiness?: no    The patient's current medical problems were reviewed.    I have had an Advance Directives discussion with the patient.    Weight management plan: Discussed healthy diet and exercise guidelines    The following health maintenance items are reviewed in Epic and correct as of today:  Health Maintenance Due   Topic Date Due     PARATHYROID  Never done     PHOSPHORUS  Never done     HEPATITIS C SCREENING  Never done     ZOSTER IMMUNIZATION (1 of 2) Never done     LUNG CANCER SCREENING  Never done     Pneumococcal Vaccine: 65+ Years (2 of 2 - PPSV23) 11/04/2016     COLORECTAL CANCER SCREENING  08/12/2019     MICROALBUMIN  08/01/2021     DTAP/TDAP/TD IMMUNIZATION (2 - Td or Tdap) 11/07/2021     BMP  02/01/2022     LIPID  02/01/2022     PSA  02/01/2022       Appropriate preventive services were discussed with this patient, including applicable screening as appropriate for cardiovascular disease, diabetes, osteopenia/osteoporosis, and glaucoma.  As appropriate for age/gender, discussed screening for colorectal cancer, prostate cancer, breast cancer, and cervical cancer. Checklist reviewing preventive services " available has been given to the patient.    Reviewed patients plan of care and provided an AVS. The Basic Care Plan for Jonas meets the Care Plan requirement. This Care Plan has been established and reviewed with the Patient, and printed in the AVS.    The following health maintenance schedule was reviewed with the patient and provided in printed form in the after visit summary:   Health Maintenance   Topic Date Due     PARATHYROID  Never done     PHOSPHORUS  Never done     HEPATITIS C SCREENING  Never done     ZOSTER IMMUNIZATION (1 of 2) Never done     LUNG CANCER SCREENING  Never done     Pneumococcal Vaccine: 65+ Years (2 of 2 - PPSV23) 11/04/2016     COLORECTAL CANCER SCREENING  08/12/2019     MICROALBUMIN  08/01/2021     DTAP/TDAP/TD IMMUNIZATION (2 - Td or Tdap) 11/07/2021     BMP  02/01/2022     LIPID  02/01/2022     PSA  02/01/2022     A1C  10/08/2022     PHQ-9  10/08/2022     EYE EXAM  03/11/2023     FALL RISK ASSESSMENT  03/11/2023     MEDICARE ANNUAL WELLNESS VISIT  04/08/2023     DIABETIC FOOT EXAM  04/08/2023     ANNUAL REVIEW OF HM ORDERS  04/08/2023     HEMOGLOBIN  04/08/2023     ADVANCE CARE PLANNING  04/08/2027     DEPRESSION ACTION PLAN  Completed     INFLUENZA VACCINE  Completed     URINALYSIS  Completed     ALK PHOS  Completed     COVID-19 Vaccine  Completed     IPV IMMUNIZATION  Aged Out     MENINGITIS IMMUNIZATION  Aged Out        Start Time:2:02 PM  End time:  2:37 PM  Face to face plus orders:  35 minutes  Documentation time:  3 minutes    The visit lasted a total of 38 minutes     Reviewed and updated as needed this visit by clinical staff   Tobacco  Allergies  Meds              Dillon Goldsmith MD  [facility]    Offerred video vs face to face?    Answers for HPI/ROS submitted by the patient on 4/8/2022  If you checked off any problems, how difficult have these problems made it for you to do your work, take care of things at home, or get along with  other people?: Not difficult at all  PHQ9 TOTAL SCORE: 2

## 2022-04-08 NOTE — PATIENT INSTRUCTIONS
202    Goal blood pressure between 120 and 140 top number     Update labs including PSA    Update angelica for colon cancer screen    Finasteride 5 mgs daily for 3 months to shrink prostate and improve urination    Doxazosin 1 mg at bedtime for blood pressure and prostate relaxation    Try 1 vitamin B 12 shot today for neuropathy, energy, and memory    Blood tests for leg cramps

## 2022-04-09 ASSESSMENT — PATIENT HEALTH QUESTIONNAIRE - PHQ9: SUM OF ALL RESPONSES TO PHQ QUESTIONS 1-9: 2

## 2022-04-11 LAB — HCV AB SERPL QL IA: NONREACTIVE

## 2022-04-12 NOTE — PROGRESS NOTES
This encounter was created solely for the purpose of releasing the future order that was placed for Cologuard.  This is a necessary step in order for the results to be abstracted once they are available.  Elijah Jackson

## 2022-04-20 LAB — COLOGUARD-ABSTRACT: NEGATIVE

## 2022-06-07 DIAGNOSIS — H35.3211 EXUDATIVE AGE-RELATED MACULAR DEGENERATION OF RIGHT EYE WITH ACTIVE CHOROIDAL NEOVASCULARIZATION (H): Primary | ICD-10-CM

## 2022-06-17 ENCOUNTER — OFFICE VISIT (OUTPATIENT)
Dept: OPHTHALMOLOGY | Facility: CLINIC | Age: 77
End: 2022-06-17
Attending: OPHTHALMOLOGY
Payer: MEDICARE

## 2022-06-17 DIAGNOSIS — H35.3211 EXUDATIVE AGE-RELATED MACULAR DEGENERATION OF RIGHT EYE WITH ACTIVE CHOROIDAL NEOVASCULARIZATION (H): Primary | ICD-10-CM

## 2022-06-17 PROCEDURE — 250N000011 HC RX IP 250 OP 636: Performed by: OPHTHALMOLOGY

## 2022-06-17 PROCEDURE — 67028 INJECTION EYE DRUG: CPT | Mod: RT | Performed by: OPHTHALMOLOGY

## 2022-06-17 PROCEDURE — G0463 HOSPITAL OUTPT CLINIC VISIT: HCPCS

## 2022-06-17 PROCEDURE — 92134 CPTRZ OPH DX IMG PST SGM RTA: CPT | Performed by: OPHTHALMOLOGY

## 2022-06-17 PROCEDURE — 92134 CPTRZ OPH DX IMG PST SGM RTA: CPT | Mod: 26 | Performed by: OPHTHALMOLOGY

## 2022-06-17 RX ADMIN — AFLIBERCEPT 2 MG: 40 INJECTION, SOLUTION INTRAVITREAL at 12:02

## 2022-06-17 ASSESSMENT — EXTERNAL EXAM - LEFT EYE: OS_EXAM: NORMAL

## 2022-06-17 ASSESSMENT — CONF VISUAL FIELD
METHOD: COUNTING FINGERS
OS_NORMAL: 1
OD_NORMAL: 1

## 2022-06-17 ASSESSMENT — SLIT LAMP EXAM - LIDS
COMMENTS: NORMAL
COMMENTS: NORMAL

## 2022-06-17 ASSESSMENT — EXTERNAL EXAM - RIGHT EYE: OD_EXAM: NORMAL

## 2022-06-17 ASSESSMENT — VISUAL ACUITY
OD_PH_SC: 20/100
OD_SC: 20/150
METHOD: SNELLEN - LINEAR

## 2022-06-17 ASSESSMENT — TONOMETRY
IOP_METHOD: TONOPEN
OD_IOP_MMHG: 11
OS_IOP_MMHG: 12

## 2022-06-17 NOTE — NURSING NOTE
Chief Complaints and History of Present Illnesses   Patient presents with     Follow Up     Wet AMD OD     Chief Complaint(s) and History of Present Illness(es)     Follow Up     Pain scale: 0/10    Comments: Wet AMD OD              Comments     Pt states no change in VA since last visit  No flashes, floaters, eye pain or redness    Radha Johnson COT 10:43 AM June 17, 2022

## 2022-06-17 NOTE — PROGRESS NOTES
CC -   Wet AMD OD    INTERVAL HISTORY - VA stable. Eylea 3/11/22  Still Trying to reduce smoking (12/2021) plans to stop by Xmas  Last DFE OU 3/2022    Prefers attending injection     [facility]-   Jonas Hyman is a  76 year old  patient referred by Dr Pinedo for wet  AMD OD.  Had gone few years without eye exam d/t insurance lack, gradual progressive loss OU.  Prior RD repair OD 2014, unsure what BCVA was after repair.  Caring for grandson  VA has worsened OU over years, now trouble with reading/drivign, ADLs.    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  Wet AMD OD   - uncertain duration by history   - uncertain vision potential given prior h/o RD   - started Avastin 9/17/18 (#6 total)   - changed to Eylea 3/12/19     - increased to 9 weeks on 7/27/21     - stable SRF & VA @ 7 weeks on 6/2021   - was typically 20/80-20/125 in past @ 4 weeks      - last injection Eylea  (#28) 3/11/22 (13 weeks, 10 week interval)     - OCT SRF stable   - VA stable    - repeat Eylea today   - increase to 3 months   - next injection in 3 motnhs     # intermediate dry AMD OS   - single large druse on OCT   - AREDS/Amsler    #. H/o RD repair OD   - s/p PPV/SBP/FGx  2014   - retina flat last DFE      #. PVD OS   - onset ~ 7/15/21 by history   - advised s/sx RD 7/2021     - last DFE 9/28/21      #. 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  I personally obtained the chief complaint(s) and history of present illness.  I confirmed and edited as necessary the review of systems, past medical/surgical history, family history, social history, and examination findings as documented by others; and I examined the patient myself.  I personally reviewed the relevant tests, images, and reports as documented above.  I personally reviewed the ophthalmic test(s) associated with this encounter, agree with the interpretation(s) as documented by the resident/fellow, and have edited the corresponding report(s) as necessary.   I formulated and edited as necessary the assessment and plan and discussed the findings and management plan with the patient and family and No resident or fellow assisted with the procedures performed.  I performed the procedures myself.    Anu Wilson MD, PhD  , Vitreoretinal Surgery  Department of Ophthalmology  [facility]

## 2022-07-26 ENCOUNTER — VIRTUAL VISIT (OUTPATIENT)
Dept: INTERNAL MEDICINE | Facility: CLINIC | Age: 77
End: 2022-07-26
Payer: MEDICARE

## 2022-07-26 VITALS — TEMPERATURE: 99.6 F

## 2022-07-26 DIAGNOSIS — U07.1 COVID-19 VIRUS INFECTION: Primary | ICD-10-CM

## 2022-07-26 DIAGNOSIS — H35.3210 EXUDATIVE AGE-RELATED MACULAR DEGENERATION OF RIGHT EYE, UNSPECIFIED STAGE (H): ICD-10-CM

## 2022-07-26 DIAGNOSIS — E11.59 TYPE 2 DIABETES MELLITUS WITH OTHER CIRCULATORY COMPLICATION, WITHOUT LONG-TERM CURRENT USE OF INSULIN (H): ICD-10-CM

## 2022-07-26 DIAGNOSIS — Z23 ENCOUNTER FOR IMMUNIZATION: ICD-10-CM

## 2022-07-26 DIAGNOSIS — I10 ESSENTIAL HYPERTENSION: ICD-10-CM

## 2022-07-26 DIAGNOSIS — Z00.00 PREVENTATIVE HEALTH CARE: ICD-10-CM

## 2022-07-26 DIAGNOSIS — E66.01 SEVERE OBESITY WITH BODY MASS INDEX (BMI) OF 35.0 TO 39.9 WITH SERIOUS COMORBIDITY (H): ICD-10-CM

## 2022-07-26 PROBLEM — N18.32 STAGE 3B CHRONIC KIDNEY DISEASE (H): Status: RESOLVED | Noted: 2020-11-30 | Resolved: 2022-07-26

## 2022-07-26 PROCEDURE — 99443 PR PHYSICIAN TELEPHONE EVALUATION 21-30 MIN: CPT | Mod: CS | Performed by: INTERNAL MEDICINE

## 2022-07-26 RX ORDER — CARVEDILOL 12.5 MG/1
12.5 TABLET ORAL
COMMUNITY
End: 2023-07-24

## 2022-07-26 NOTE — PROGRESS NOTES
"William is a 76 year old who is being evaluated via a billable telephone visit.      What phone number would you like to be contacted at? [phone]  How would you like to obtain your AVS? Mail a copy    {PROVIDER CHARTING PREFERENCE:113449}    Subjective   William is a 76 year old{ACCOMPANIED BY STATEMENT (Optional):237277}, presenting for the following health issues:  OTHER (COVID-tested positive this morning, exposed to 10 year old who tested positive Fri, William was negative Fri and Sun, symptoms started this morning, headache, low grade fever-looking to get meds)      HPI     {SUPERLIST (Optional):852564}  {additonal problems for provider to add (Optional):100155}    Review of Systems   {ROS COMP (Optional):127352}      Objective           Vitals:  No vitals were obtained today due to virtual visit.    Physical Exam   {GENERAL APPEARANCE:50::\"healthy\",\"alert\",\"no distress\"}  PSYCH: Alert and oriented times 3; coherent speech, normal   rate and volume, able to articulate logical thoughts, able   to abstract reason, no tangential thoughts, no hallucinations   or delusions  His affect is { :1482795::\"normal\"}  RESP: No cough, no audible wheezing, able to talk in full sentences  Remainder of exam unable to be completed due to telephone visits    {Diagnostic Test Results (Optional):802497}    {AMBULATORY ATTESTATION (Optional):484670}        Phone call duration: *** minutes    .  ..  "

## 2022-07-26 NOTE — PATIENT INSTRUCTIONS
Paxlovid twice daily for 5 days-most recent renal function normal    Discontinue doxazosin and finasteride-done    Paxlovid sent to 3 different pharmacies with [facility] being primary    No significant drug interactions    Other labs up-to-date    Vaccines entered per protocol

## 2022-07-26 NOTE — PROGRESS NOTES
Jonas is a 76 year old male being evaluated via a billable phone visit, and would like to be contacted via the following  Home number on file [phone] (home)    ASSESSMENT and PLAN:  1. COVID-19 virus infection  Symptoms July 25, exposure a few days before that and positive testing today.  MAS SBP risk score 11.  No significant drug interactions.  Chronic kidney disease was temporary and resolved by April readings.  Will treat.  Fully vaccinated.  Will send to 3 separate pharmacies per convenience.  If unavailable changed to [facility]  - nirmatrelvir and ritonavir (PAXLOVID) therapy pack; Take 3 tablets by mouth 2 times daily  Dispense: 30 each; Refill: 0    2. Severe obesity with body mass index (BMI) of 35.0 to 39.9 with serious comorbidity (H)  Encouraged to exercise    3. Preventative health care  - ZOSTER VACCINE RECOMBINANT ADJUVANTED (SHINGRIX); Future  - Pneumococcal 20 Valent Conjugate (PCV20); Future  - TDAP VACCINE (Adacel, Boostrix); Future    4. Type 2 diabetes mellitus with other circulatory complication, without long-term current use of insulin (H)  Up-to-date with labs in April however he does continue to smoke    5. Encounter for immunization   - Pneumococcal 20 Valent Conjugate (PCV20); Future    6. Essential hypertension  Stable.  Is not taking carvedilol    7. Exudative age-related macular degeneration of right eye, unspecified stage (H)  Improving per history     Preventive Care Assessed: Assessed as above.  Cologuard negative earlier this year  Patient Instructions   Paxlovid twice daily for 5 days-most recent renal function normal    Discontinue doxazosin and finasteride-done    Paxlovid sent to 3 different pharmacies with [facility] being primary    No significant drug interactions    Other labs up-to-date    Vaccines entered per protocol            Return in about 4 months (around 11/26/2022) for using a video visit.       CHIEF COMPLAINT:  Chief Complaint   Patient presents with     OTHER     " COVID-tested positive this morning, exposed to 10 year old who tested positive Fri, William was negative Fri and Sun, symptoms started this morning, headache, low grade fever-looking to get meds       HISTORY OF PRESENT ILLNESS:  Jonas is a 76 year old male contacting the clinic today via phone for request for COVID treatment.  His grandsons returned home from a trip with other family members July 23.  They were ill and tested positive for COVID.  Patient developed symptoms yesterday and tested positive today.  He complains of low-grade temperature, rhinorrhea, cough, fatigue and muscle aches.  No overt shortness of breath or wheezing.  MAS SBP risk score 11 although he does not have chronic kidney disease.  Most recent renal profile has normalized    Diabetes is under control    Macular degeneration is improving    REVIEW OF SYSTEMS:  Blood pressure stable not taking carvedilol    PFSH:  Social History     Social History Narrative    2 children,         Quit drinking alcohol 1975, jan 12        Son has diabetes and lives in basement        Lives with grandchild       TOBACCO USE:  History   Smoking Status     Current Every Day Smoker     Packs/day: 1.00     Years: 40.00     Types: Cigarettes     Start date: 6/1/1956   Smokeless Tobacco     Never Used     Comment: 20 yrs ago-mid 80's, quit again 2007, started again 2012       VITALS:  Vitals:    07/26/22 1205   Temp: 99.6  F (37.6  C)     Temp 99.6  F (37.6  C)  Estimated body mass index is 35.66 kg/m  as calculated from the following:    Height as of 3/25/19: 1.778 m (5' 10\").    Weight as of 4/8/22: 112.7 kg (248 lb 8 oz).    PHYSICAL EXAM:  (observations via Phone)  Alert and oriented with rare cough    MEDICATIONS  Current Outpatient Medications   Medication Sig Dispense Refill     aspirin (ASA) 81 MG tablet Take 81 mg by mouth daily       cyanocobalamin (VITAMIN B-12) 100 MCG tablet Take 2,000 mcg by mouth daily       lisinopril (ZESTRIL) 10 MG " tablet Take 1 tablet (10 mg) by mouth daily 90 tablet 3     nirmatrelvir and ritonavir (PAXLOVID) therapy pack Take 3 tablets by mouth 2 times daily 30 each 0     nitroGLYcerin (NITROSTAT) 0.4 MG sublingual tablet Place 1 tablet (0.4 mg) under the tongue every 5 minutes as needed for chest pain 30 tablet 5     pravastatin (PRAVACHOL) 40 MG tablet TAKE 1 TABLET (40 MG) BY MOUTH ONCE DAILY 90 tablet 3     carvedilol (COREG) 12.5 MG tablet Take 12.5 mg by mouth       furosemide (LASIX) 40 MG tablet Take 40 mg by mouth daily       vitamin B complex with vitamin C (VITAMIN  B COMPLEX) tablet Take 1 tablet by mouth daily       VITAMIN D, CHOLECALCIFEROL, PO Take 2,000 Units by mouth daily         Notes summarized:   Labs, x-rays, cardiology, GI tests reviewed: Labs up-to-date in April Recent Labs   Lab Test 04/08/22  1508 02/01/21  1316   HGB 13.8 14.3    139   POTASSIUM 4.3 4.8   CR 1.18 1.45*   A1C 5.7* 5.6   PSA 0.48 0.7   B12  --  891*   TSH  --  1.28     No results found for: [id number]  Lab Results   Component Value Date    CHOL 143 04/08/2022     New orders:   Orders Placed This Encounter   Procedures     ZOSTER VACCINE RECOMBINANT ADJUVANTED (SHINGRIX)     Pneumococcal 20 Valent Conjugate (PCV20)     TDAP VACCINE (Adacel, Boostrix)       Independent review of:  Supplemental history by:      Patient [facility]    Dillon Goldsmith MD  [facility]    Phone Start Time: 12:51 PM  Phone End time:  1:10 PM  Conversation plus orders: 19 minutes  Dictation time:  3 minutes    The visit lasted a total of 22 minutes

## 2022-08-05 NOTE — TELEPHONE ENCOUNTER
Reason for Call:  Medication or medication refill:    Do you use a [facility]?  Name of the pharmacy and phone number for the current request:    [facility]    Name of the medication requested:   Nitroglyercin - he is out     Other request: n/a    Can we leave a detailed message on this number? YES    Phone number patient can be reached at: Cell number on file:    Telephone Information:   Mobile [phone]       Best Time: anytme    Call taken on 9/20/2021 at 10:52 AM by Yazmin Doty    
The patient is a 22y Female complaining of flank pain.

## 2022-10-04 DIAGNOSIS — H35.3211 EXUDATIVE AGE-RELATED MACULAR DEGENERATION OF RIGHT EYE WITH ACTIVE CHOROIDAL NEOVASCULARIZATION (H): Primary | ICD-10-CM

## 2022-10-09 ENCOUNTER — HEALTH MAINTENANCE LETTER (OUTPATIENT)
Age: 77
End: 2022-10-09

## 2022-10-10 ENCOUNTER — OFFICE VISIT (OUTPATIENT)
Dept: OPHTHALMOLOGY | Facility: CLINIC | Age: 77
End: 2022-10-10
Attending: OPHTHALMOLOGY
Payer: MEDICARE

## 2022-10-10 DIAGNOSIS — H35.3211 EXUDATIVE AGE-RELATED MACULAR DEGENERATION OF RIGHT EYE WITH ACTIVE CHOROIDAL NEOVASCULARIZATION (H): ICD-10-CM

## 2022-10-10 PROCEDURE — G0463 HOSPITAL OUTPT CLINIC VISIT: HCPCS | Mod: 25

## 2022-10-10 PROCEDURE — 67028 INJECTION EYE DRUG: CPT | Mod: RT | Performed by: OPHTHALMOLOGY

## 2022-10-10 PROCEDURE — 250N000011 HC RX IP 250 OP 636: Performed by: OPHTHALMOLOGY

## 2022-10-10 PROCEDURE — 92134 CPTRZ OPH DX IMG PST SGM RTA: CPT | Performed by: OPHTHALMOLOGY

## 2022-10-10 RX ADMIN — AFLIBERCEPT 2 MG: 40 INJECTION, SOLUTION INTRAVITREAL at 11:23

## 2022-10-10 ASSESSMENT — VISUAL ACUITY
OS_SC: 20/25
METHOD: SNELLEN - LINEAR
OD_SC: 20/200
OD_PH_SC: 20/125
OS_SC+: +1

## 2022-10-10 ASSESSMENT — CONF VISUAL FIELD
OS_SUPERIOR_TEMPORAL_RESTRICTION: 0
OS_SUPERIOR_NASAL_RESTRICTION: 3
OS_INFERIOR_NASAL_RESTRICTION: 0
OS_INFERIOR_TEMPORAL_RESTRICTION: 0
OD_INFERIOR_NASAL_RESTRICTION: 0
OD_INFERIOR_TEMPORAL_RESTRICTION: 0
OD_SUPERIOR_TEMPORAL_RESTRICTION: 3
OD_SUPERIOR_NASAL_RESTRICTION: 3
METHOD: COUNTING FINGERS

## 2022-10-10 ASSESSMENT — TONOMETRY
OS_IOP_MMHG: 13
IOP_METHOD: TONOPEN
OD_IOP_MMHG: 15

## 2022-10-10 NOTE — NURSING NOTE
Chief Complaints and History of Present Illnesses   Patient presents with     Follow Up     Follow up of macular degeneration.      Chief Complaint(s) and History of Present Illness(es)     Follow Up            Laterality: both eyes    Associated symptoms: Negative for eye pain, flashes and floaters    Pain scale: 0/10    Comments: Follow up of macular degeneration.          Comments    Denies flashes of light, floaters either eye.  No eye pain either eye.  No concerns of vision per patient.  BEATRIZ Tse 10/10/2022 10:38 AM

## 2022-10-10 NOTE — PROGRESS NOTES
CC -   Wet AMD OD    INTERVAL HISTORY - VA stable. Eylea   Still Trying to reduce smoking (12/2021) plans to stop by Xmas  Last DFE OU 3/2022    Prefers attending injection     [facility]-   Jonas Hyman is a  77 year old  patient referred by Dr Pinedo for wet  AMD OD.  Had gone few years without eye exam d/t insurance lack, gradual progressive loss OU.  Prior RD repair OD 2014, unsure what BCVA was after repair.  Caring for grandson  VA has worsened OU over years, now trouble with reading/drivign, ADLs.    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  Wet AMD OD   - uncertain duration by history   - uncertain vision potential given prior h/o RD   - started Avastin 9/17/18 (#6 total)   - changed to Eylea 3/12/19       - stable SRF & VA @ 7 weeks on 6/2021   - was typically 20/80-20/125 in past @ 4 weeks    - increased to 3 months Eylea in 2022     - last injection Eylea  (#29) 6/17//22 (3 months)     - OCT increased FVPED & SRF   - VA stable   - repeat Eylea today   - next injection in 3 motnhs     # intermediate dry AMD OS   - single large druse on OCT   - AREDS/Amsler    #. H/o RD repair OD   - s/p PPV/SBP/FGx  2014   - retina flat last DFE      #. PVD OS   - onset ~ 7/15/21 by history   - advised s/sx RD 7/2021     - last DFE 9/28/21      #. 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  I personally obtained the chief complaint(s) and history of present illness.  I confirmed and edited as necessary the review of systems, past medical/surgical history, family history, social history, and examination findings as documented by others; and I examined the patient myself.  I personally reviewed the relevant tests, images, and reports as documented above.  I formulated and edited as necessary the assessment and plan and discussed the findings and management plan with the patient and family    Anu Wilson MD, PhD  , Vitreoretinal Surgery  Department of Ophthalmology  [facility]

## 2023-01-03 DIAGNOSIS — H35.3211 EXUDATIVE AGE-RELATED MACULAR DEGENERATION OF RIGHT EYE WITH ACTIVE CHOROIDAL NEOVASCULARIZATION (H): Primary | ICD-10-CM

## 2023-01-09 ENCOUNTER — OFFICE VISIT (OUTPATIENT)
Dept: OPHTHALMOLOGY | Facility: CLINIC | Age: 78
End: 2023-01-09
Attending: OPHTHALMOLOGY
Payer: MEDICARE

## 2023-01-09 DIAGNOSIS — H35.3211 EXUDATIVE AGE-RELATED MACULAR DEGENERATION OF RIGHT EYE WITH ACTIVE CHOROIDAL NEOVASCULARIZATION (H): ICD-10-CM

## 2023-01-09 PROCEDURE — G0463 HOSPITAL OUTPT CLINIC VISIT: HCPCS

## 2023-01-09 PROCEDURE — 67028 INJECTION EYE DRUG: CPT | Mod: RT | Performed by: OPHTHALMOLOGY

## 2023-01-09 PROCEDURE — 250N000011 HC RX IP 250 OP 636: Performed by: OPHTHALMOLOGY

## 2023-01-09 PROCEDURE — 92134 CPTRZ OPH DX IMG PST SGM RTA: CPT | Performed by: OPHTHALMOLOGY

## 2023-01-09 PROCEDURE — 99213 OFFICE O/P EST LOW 20 MIN: CPT | Mod: 25 | Performed by: OPHTHALMOLOGY

## 2023-01-09 RX ADMIN — AFLIBERCEPT 2 MG: 40 INJECTION, SOLUTION INTRAVITREAL at 12:26

## 2023-01-09 ASSESSMENT — CONF VISUAL FIELD
OS_SUPERIOR_TEMPORAL_RESTRICTION: 0
OS_INFERIOR_NASAL_RESTRICTION: 0
OD_INFERIOR_NASAL_RESTRICTION: 3
OS_SUPERIOR_NASAL_RESTRICTION: 0
OS_INFERIOR_TEMPORAL_RESTRICTION: 0
OS_NORMAL: 1

## 2023-01-09 ASSESSMENT — VISUAL ACUITY
OS_SC: 20/25
OD_PH_SC: 20/150
METHOD: SNELLEN - LINEAR
OD_SC: 20/200
OS_SC+: +2

## 2023-01-09 ASSESSMENT — CUP TO DISC RATIO
OD_RATIO: 0.6
OS_RATIO: 0.3

## 2023-01-09 ASSESSMENT — EXTERNAL EXAM - RIGHT EYE: OD_EXAM: NORMAL

## 2023-01-09 ASSESSMENT — SLIT LAMP EXAM - LIDS
COMMENTS: NORMAL
COMMENTS: NORMAL

## 2023-01-09 ASSESSMENT — TONOMETRY
OD_IOP_MMHG: 15
IOP_METHOD: TONOPEN
OS_IOP_MMHG: 12

## 2023-01-09 ASSESSMENT — EXTERNAL EXAM - LEFT EYE: OS_EXAM: NORMAL

## 2023-01-09 NOTE — PROGRESS NOTES
CC -   Wet AMD OD    INTERVAL HISTORY - VA stable  still smoking (1/2023)  Last DFE OU 1/2023    Prefers attending injection     PM-   Jonas Hyman is a  77 year old  patient referred by Dr Pinedo for wet  AMD OD.  Had gone few years without eye exam d/t insurance lack, gradual progressive loss OU.  Prior RD repair OD 2014, unsure what BCVA was after repair.  Caring for grandson  VA has worsened OU over years, now trouble with reading/drivign, ADLs.    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  Wet AMD OD   - uncertain duration by history   - uncertain vision potential given prior h/o RD   - started Avastin 9/17/18 (#6 total)   - changed to Eylea 3/12/19       - stable SRF & VA @ 7 weeks on 6/2021   - was typically 20/80-20/125 in past @ 4 weeks    - increased to 3 months Eylea in 2022 with incr FVPED/fluid no vision changes       - last injection Eylea  (#30) 10/10//22 (3 months)     - OCT increased FVPED & SRF   - VA stable   - repeat Eylea today   - next injection in 3 months    - check Vabysmo coverage (1/2023)     # intermediate dry AMD OS   - single large druse on OCT   - AREDS/Amsler    #. H/o RD repair OD   - s/p PPV/SBP/FGx  2014   - retina flat last DFE      #. PVD OS   - onset ~ 7/15/21 by history   - advised s/sx RD 1/2023          #. 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  I personally obtained the chief complaint(s) and history of present illness.  I confirmed and edited as necessary the review of systems, past medical/surgical history, family history, social history, and examination findings as documented by others; and I examined the patient myself.  I personally reviewed the relevant tests, images, and reports as documented above.  I formulated and edited as necessary the assessment and plan and discussed the findings and management plan with the patient and family    Anu Wilson MD, PhD  , Vitreoretinal Surgery  Department of Ophthalmology  [facility]

## 2023-01-09 NOTE — NURSING NOTE
Chief Complaints and History of Present Illnesses   Patient presents with     Follow Up     Exudative age-related macular degeneration of right eye with active choroidal neovascularization      Chief Complaint(s) and History of Present Illness(es)     Follow Up            Comments: Exudative age-related macular degeneration of right eye with active choroidal neovascularization           Comments    Pt states no change in VA since last visit  Pt states no flashes, floaters, eye pain or redness    Radha Johnson COT 10:32 AM January 9, 2023

## 2023-03-20 DIAGNOSIS — I25.119 CORONARY ARTERY DISEASE INVOLVING NATIVE CORONARY ARTERY OF NATIVE HEART WITH ANGINA PECTORIS (H): ICD-10-CM

## 2023-03-20 DIAGNOSIS — I10 ESSENTIAL HYPERTENSION: ICD-10-CM

## 2023-03-20 RX ORDER — PRAVASTATIN SODIUM 40 MG
TABLET ORAL
Qty: 90 TABLET | Refills: 0 | Status: SHIPPED | OUTPATIENT
Start: 2023-03-20 | End: 2023-07-24

## 2023-03-20 RX ORDER — LISINOPRIL 10 MG/1
TABLET ORAL
Qty: 90 TABLET | Refills: 0 | Status: SHIPPED | OUTPATIENT
Start: 2023-03-20 | End: 2023-06-26

## 2023-03-20 NOTE — TELEPHONE ENCOUNTER
"Routing refill request to provider for review/approval because:  bp out of range  Labs  due    Last Written Prescription Date:  2/28/22  Last Fill Quantity: 90,  # refills: 3   Last office visit provider:  7/26/22     Requested Prescriptions   Pending Prescriptions Disp Refills     lisinopril (ZESTRIL) 10 MG tablet [Pharmacy Med Name: Lisinopril Oral Tablet 10 MG] 90 tablet 0     Sig: Take 1 tablet (10 mg) by mouth ONCE daily       ACE Inhibitors (Including Combos) Protocol Failed - 3/20/2023 11:47 AM        Failed - Blood pressure under 140/90 in past 12 months     BP Readings from Last 3 Encounters:   04/08/22 (!) 165/83   04/05/19 161/84   04/01/19 148/89                 Passed - Recent (12 mo) or future (30 days) visit within the authorizing provider's specialty     Patient has had an office visit with the authorizing provider or a provider within the authorizing providers department within the previous 12 mos or has a future within next 30 days. See \"Patient Info\" tab in inbasket, or \"Choose Columns\" in Meds & Orders section of the refill encounter.              Passed - Medication is active on med list        Passed - Patient is age 18 or older        Passed - Normal serum creatinine on file in past 12 months     Recent Labs   Lab Test 04/08/22  1508   CR 1.18       Ok to refill medication if creatinine is low          Passed - Normal serum potassium on file in past 12 months     Recent Labs   Lab Test 04/08/22  1508   POTASSIUM 4.3                pravastatin (PRAVACHOL) 40 MG tablet [Pharmacy Med Name: Pravastatin Sodium Oral Tablet 40 MG] 90 tablet 0     Sig: TAKE 1 TABLET (40 MG) BY MOUTH ONCE DAILY       Statins Protocol Passed - 3/20/2023 11:47 AM        Passed - LDL on file in past 12 months     Recent Labs   Lab Test 04/08/22  1508   LDL 82             Passed - No abnormal creatine kinase in past 12 months     No lab results found.             Passed - Recent (12 mo) or future (30 days) visit within the " "authorizing provider's specialty     Patient has had an office visit with the authorizing provider or a provider within the authorizing providers department within the previous 12 mos or has a future within next 30 days. See \"Patient Info\" tab in inbasket, or \"Choose Columns\" in Meds & Orders section of the refill encounter.              Passed - Medication is active on med list        Passed - Patient is age 18 or older             Jennie Pretty RN 03/20/23 5:28 PM  "

## 2023-04-05 DIAGNOSIS — H35.3211 EXUDATIVE AGE-RELATED MACULAR DEGENERATION OF RIGHT EYE WITH ACTIVE CHOROIDAL NEOVASCULARIZATION (H): Primary | ICD-10-CM

## 2023-04-18 ENCOUNTER — OFFICE VISIT (OUTPATIENT)
Dept: OPHTHALMOLOGY | Facility: CLINIC | Age: 78
End: 2023-04-18
Attending: OPHTHALMOLOGY
Payer: MEDICARE

## 2023-04-18 DIAGNOSIS — H35.3211 EXUDATIVE AGE-RELATED MACULAR DEGENERATION OF RIGHT EYE WITH ACTIVE CHOROIDAL NEOVASCULARIZATION (H): ICD-10-CM

## 2023-04-18 PROCEDURE — 67028 INJECTION EYE DRUG: CPT | Mod: RT | Performed by: OPHTHALMOLOGY

## 2023-04-18 PROCEDURE — 250N000011 HC RX IP 250 OP 636: Performed by: OPHTHALMOLOGY

## 2023-04-18 PROCEDURE — 92134 CPTRZ OPH DX IMG PST SGM RTA: CPT | Performed by: OPHTHALMOLOGY

## 2023-04-18 RX ADMIN — FARICIMAB 6 MG: 6 INJECTION, SOLUTION INTRAVITREAL at 11:53

## 2023-04-18 ASSESSMENT — TONOMETRY
IOP_METHOD: ICARE
OS_IOP_MMHG: 10
OD_IOP_MMHG: 14

## 2023-04-18 ASSESSMENT — SLIT LAMP EXAM - LIDS
COMMENTS: NORMAL
COMMENTS: NORMAL

## 2023-04-18 ASSESSMENT — CONF VISUAL FIELD
OS_INFERIOR_NASAL_RESTRICTION: 0
OS_SUPERIOR_TEMPORAL_RESTRICTION: 0
OD_INFERIOR_NASAL_RESTRICTION: 3
METHOD: COUNTING FINGERS
OS_NORMAL: 1
OS_INFERIOR_TEMPORAL_RESTRICTION: 0
OS_SUPERIOR_NASAL_RESTRICTION: 0

## 2023-04-18 ASSESSMENT — VISUAL ACUITY
METHOD: SNELLEN - LINEAR
OD_SC: 20/200
OS_SC: 20/25
OS_SC+: -2

## 2023-04-18 ASSESSMENT — EXTERNAL EXAM - LEFT EYE: OS_EXAM: NORMAL

## 2023-04-18 ASSESSMENT — EXTERNAL EXAM - RIGHT EYE: OD_EXAM: NORMAL

## 2023-04-18 NOTE — PROGRESS NOTES
CC -   Wet AMD OD    INTERVAL HISTORY - VA stable subjectively  still smoking (1/2023)  Last DFE OU 1/2023    Prefers attending injection     PM-   Jonas Hyman is a  77 year old  patient referred by Dr Pinedo for wet  AMD OD.  Had gone few years without eye exam d/t insurance lack, gradual progressive loss OU.  Prior RD repair OD 2014, unsure what BCVA was after repair.  Caring for grandson  VA has worsened OU over years, now trouble with reading/drivign, ADLs.    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  Wet AMD OD   - uncertain duration by history   - uncertain vision potential given prior h/o RD   - started Avastin 9/17/18 (#6 total)   - changed to Eylea 3/12/19       - stable SRF & VA @ 7 weeks on 6/2021   - was typically 20/80-20/125 in past @ 4 weeks    - increased to 3 months Eylea in 2022 with incr FVPED/fluid no vision changes     - changed to Vabysmo 4/18/23 after Eylea (#31) @ 3 months     - last injection Eylea  (#31) 1/9/23  (3 months)     - OCT increased FVPED & SRF   - VA mild decrease   - change to Vabysmo today   - inject Vabysmo today   - RTC 3 months       # intermediate dry AMD OS   - single large druse on OCT   - AREDS/Amsler    #. H/o RD repair OD   - s/p PPV/SBP/FGx  2014   - retina flat last DFE      #. PVD OS   - onset ~ 7/15/21 by history   - advised s/sx RD 1/2023          #. 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  I personally obtained the chief complaint(s) and history of present illness.  I confirmed and edited as necessary the review of systems, past medical/surgical history, family history, social history, and examination findings as documented by others; and I examined the patient myself.  I personally reviewed the relevant tests, images, and reports as documented above.  I formulated and edited as necessary the assessment and plan and discussed the findings and management plan with the patient and family    Anu Wilson MD, PhD  , Vitreoretinal Surgery  Department of Ophthalmology  [facility]

## 2023-04-18 NOTE — NURSING NOTE
Chief Complaints and History of Present Illnesses   Patient presents with     Follow Up     Chief Complaint(s) and History of Present Illness(es)     Follow Up           Comments    Patient states that his vision is a little worse. No pain and irritation. No flashes of lights. No floaters.     Ocular Meds:none    [place] JULIAN, April 18, 2023 10:16 AM

## 2023-04-20 ENCOUNTER — PATIENT OUTREACH (OUTPATIENT)
Dept: CARE COORDINATION | Facility: CLINIC | Age: 78
End: 2023-04-20
Payer: MEDICARE

## 2023-05-21 ENCOUNTER — HEALTH MAINTENANCE LETTER (OUTPATIENT)
Age: 78
End: 2023-05-21

## 2023-06-25 DIAGNOSIS — I10 ESSENTIAL HYPERTENSION: ICD-10-CM

## 2023-06-26 RX ORDER — LISINOPRIL 10 MG/1
TABLET ORAL
Qty: 90 TABLET | Refills: 0 | Status: SHIPPED | OUTPATIENT
Start: 2023-06-26 | End: 2023-07-24

## 2023-06-26 NOTE — TELEPHONE ENCOUNTER
"Routing refill request to provider for review/approval because:  Labs not current:  multiple    Last Written Prescription Date:  3/20/2023  Last Fill Quantity: 90,  # refills: 0   Last office visit provider:  7/26/2023     Requested Prescriptions   Pending Prescriptions Disp Refills     lisinopril (ZESTRIL) 10 MG tablet [Pharmacy Med Name: Lisinopril Oral Tablet 10 MG] 90 tablet 0     Sig: Take 1 tablet (10 mg) by mouth ONCE daily       ACE Inhibitors (Including Combos) Protocol Failed - 6/25/2023 12:16 PM        Failed - Blood pressure under 140/90 in past 12 months     BP Readings from Last 3 Encounters:   04/08/22 (!) 165/83   04/05/19 161/84   04/01/19 148/89                 Failed - Normal serum creatinine on file in past 12 months     Recent Labs   Lab Test 04/08/22  1508   CR 1.18       Ok to refill medication if creatinine is low          Failed - Normal serum potassium on file in past 12 months     Recent Labs   Lab Test 04/08/22  1508   POTASSIUM 4.3             Passed - Recent (12 mo) or future (30 days) visit within the authorizing provider's specialty     Patient has had an office visit with the authorizing provider or a provider within the authorizing providers department within the previous 12 mos or has a future within next 30 days. See \"Patient Info\" tab in inbasket, or \"Choose Columns\" in Meds & Orders section of the refill encounter.              Passed - Medication is active on med list        Passed - Patient is age 18 or older             Chikis Goff RN 06/26/23 12:50 PM  "

## 2023-07-11 DIAGNOSIS — H35.3211 EXUDATIVE AGE-RELATED MACULAR DEGENERATION OF RIGHT EYE WITH ACTIVE CHOROIDAL NEOVASCULARIZATION (H): Primary | ICD-10-CM

## 2023-07-18 ENCOUNTER — OFFICE VISIT (OUTPATIENT)
Dept: OPHTHALMOLOGY | Facility: CLINIC | Age: 78
End: 2023-07-18
Attending: OPHTHALMOLOGY
Payer: MEDICARE

## 2023-07-18 DIAGNOSIS — H35.3211 EXUDATIVE AGE-RELATED MACULAR DEGENERATION OF RIGHT EYE WITH ACTIVE CHOROIDAL NEOVASCULARIZATION (H): ICD-10-CM

## 2023-07-18 PROCEDURE — 250N000011 HC RX IP 250 OP 636: Mod: JZ | Performed by: OPHTHALMOLOGY

## 2023-07-18 PROCEDURE — 92134 CPTRZ OPH DX IMG PST SGM RTA: CPT | Performed by: OPHTHALMOLOGY

## 2023-07-18 PROCEDURE — 67028 INJECTION EYE DRUG: CPT | Mod: RT | Performed by: OPHTHALMOLOGY

## 2023-07-18 RX ADMIN — FARICIMAB 6 MG: 6 INJECTION, SOLUTION INTRAVITREAL at 11:48

## 2023-07-18 ASSESSMENT — TONOMETRY
OS_IOP_MMHG: 14
OD_IOP_MMHG: 16
IOP_METHOD: TONOPEN

## 2023-07-18 ASSESSMENT — VISUAL ACUITY
OS_SC: 20/25
OD_SC: 20/250
OS_SC+: -2
METHOD: SNELLEN - LINEAR

## 2023-07-18 ASSESSMENT — REFRACTION_WEARINGRX
OD_SPHERE: +0.25
OD_AXIS: 164
OS_ADD: +2.75
OS_SPHERE: -0.75
OD_ADD: +2.75
OS_CYLINDER: +0.75
OS_AXIS: 026
OD_CYLINDER: +0.75

## 2023-07-18 ASSESSMENT — CONF VISUAL FIELD
OD_SUPERIOR_TEMPORAL_RESTRICTION: 3
OS_SUPERIOR_TEMPORAL_RESTRICTION: 3
OS_INFERIOR_TEMPORAL_RESTRICTION: 3
OD_SUPERIOR_NASAL_RESTRICTION: 1
OD_INFERIOR_NASAL_RESTRICTION: 3
OS_SUPERIOR_NASAL_RESTRICTION: 0
OS_INFERIOR_NASAL_RESTRICTION: 0

## 2023-07-18 NOTE — NURSING NOTE
"Chief Complaints and History of Present Illnesses   Patient presents with     Macular Degeneration Follow Up     3 month follow up both eyes     Chief Complaint(s) and History of Present Illness(es)     Macular Degeneration Follow Up            Comments: 3 month follow up both eyes          Comments    Pt notes \"deterioration\" in the vision of his  RE. No eye pain today. No new flashes or floaters.  No new redness or dryness.  DM2 BS: Pt does not check sugars at home.  Lab Results       Component                Value               Date                       A1C                      5.7                 04/08/2022                 A1C                      5.6                 02/01/2021                 A1C                      5.9                 03/06/2019                 A1C                      5.9                 08/14/2018                 A1C                      5.9                 12/29/2017              CARMEN Baca July 18, 2023 10:56 AM                       "

## 2023-07-18 NOTE — PROGRESS NOTES
CC -   Wet AMD OD    INTERVAL HISTORY - VA worse   still smoking (1/2023)  Last DFE OU 1/2023    Prefers attending injection     PM-   Jonas Hyman is a  77 year old  patient referred by Dr Pinedo for wet  AMD OD.  Had gone few years without eye exam d/t insurance lack, gradual progressive loss OU.  Prior RD repair OD 2014, unsure what BCVA was after repair.  Caring for grandson  VA has worsened OU over years, now trouble with reading/drivign, ADLs.    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  Wet AMD OD   - uncertain duration by history   - uncertain vision potential given prior h/o RD   - started Avastin 9/17/18 (#6 total)   - changed to Eylea 3/12/19       - stable SRF & VA @ 7 weeks on 6/2021   - was typically 20/80-20/125 in past @ 4 weeks    - increased to 3 months Eylea in 2022 with incr FVPED/fluid no vision changes     - changed to Vabysmo 4/18/23 after Eylea (#31) @ 3 months   - worse SRF 7/18/23 with Vabysmo @ 3 months 7/18/23     - last injection Vabysmo (#1) 4/18/23  (3 months)     - OCT worse   - VA mild decrease   - inject Vabysmo today   - reduce interval to 2 months   - RTC 2 months T&E       # intermediate dry AMD OS   - single large druse on OCT   - AREDS/Lialer    #. H/o RD repair OD   - s/p PPV/SBP/FGx  2014   - retina flat last DFE      #. PVD OS   - onset ~ 7/15/21 by history   - advised s/sx RD 1/2023          #. 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         ATTESTATION     Attending  Physician Attestation:      Complete documentation of historical and exam elements from today's encounter can be found in the full encounter summary report (not reduplicated in this progress note).  I personally obtained the chief complaint(s) and history of present illness.  I confirmed and edited as necessary the review of systems, past medical/surgical history, family history, social history, and examination findings as documented by others; and I examined the patient myself.  I personally reviewed the relevant tests, images, and reports as documented above.  I formulated and edited as necessary the assessment and plan and discussed the findings and management plan with the patient and family    Anu Wilson MD, PhD  , Vitreoretinal Surgery  Department of Ophthalmology  [facility]

## 2023-07-24 ENCOUNTER — VIRTUAL VISIT (OUTPATIENT)
Dept: INTERNAL MEDICINE | Facility: CLINIC | Age: 78
End: 2023-07-24
Payer: MEDICARE

## 2023-07-24 ENCOUNTER — TELEPHONE (OUTPATIENT)
Dept: INTERNAL MEDICINE | Facility: CLINIC | Age: 78
End: 2023-07-24

## 2023-07-24 DIAGNOSIS — I71.40 ABDOMINAL AORTIC ANEURYSM (AAA) WITHOUT RUPTURE, UNSPECIFIED PART (H): ICD-10-CM

## 2023-07-24 DIAGNOSIS — D17.9 MYELOLIPOMA: ICD-10-CM

## 2023-07-24 DIAGNOSIS — M54.50 ACUTE RIGHT-SIDED LOW BACK PAIN WITHOUT SCIATICA: Primary | ICD-10-CM

## 2023-07-24 DIAGNOSIS — Z12.5 PROSTATE CANCER SCREENING: ICD-10-CM

## 2023-07-24 DIAGNOSIS — N18.32 TYPE 2 DIABETES MELLITUS WITH STAGE 3B CHRONIC KIDNEY DISEASE, WITHOUT LONG-TERM CURRENT USE OF INSULIN (H): ICD-10-CM

## 2023-07-24 DIAGNOSIS — Z95.828 S/P REPAIR OF ABDOMINAL AORTIC ANEURYSM USING BIFURCATION GRAFT: ICD-10-CM

## 2023-07-24 DIAGNOSIS — F10.21 HISTORY OF ALCOHOL DEPENDENCE (H): ICD-10-CM

## 2023-07-24 DIAGNOSIS — E66.01 SEVERE OBESITY WITH BODY MASS INDEX (BMI) OF 35.0 TO 39.9 WITH SERIOUS COMORBIDITY (H): ICD-10-CM

## 2023-07-24 DIAGNOSIS — Z00.00 PREVENTATIVE HEALTH CARE: ICD-10-CM

## 2023-07-24 DIAGNOSIS — I25.119 CORONARY ARTERY DISEASE INVOLVING NATIVE CORONARY ARTERY OF NATIVE HEART WITH ANGINA PECTORIS (H): ICD-10-CM

## 2023-07-24 DIAGNOSIS — E11.22 TYPE 2 DIABETES MELLITUS WITH STAGE 3B CHRONIC KIDNEY DISEASE, WITHOUT LONG-TERM CURRENT USE OF INSULIN (H): ICD-10-CM

## 2023-07-24 DIAGNOSIS — I10 ESSENTIAL HYPERTENSION: ICD-10-CM

## 2023-07-24 DIAGNOSIS — Z86.79 S/P REPAIR OF ABDOMINAL AORTIC ANEURYSM USING BIFURCATION GRAFT: ICD-10-CM

## 2023-07-24 PROCEDURE — 99214 OFFICE O/P EST MOD 30 MIN: CPT | Mod: 95 | Performed by: INTERNAL MEDICINE

## 2023-07-24 RX ORDER — PRAVASTATIN SODIUM 40 MG
40 TABLET ORAL DAILY
Qty: 90 TABLET | Refills: 3 | Status: SHIPPED | OUTPATIENT
Start: 2023-07-24 | End: 2024-08-16

## 2023-07-24 RX ORDER — ACETAMINOPHEN 500 MG
TABLET ORAL 2 TIMES DAILY
COMMUNITY

## 2023-07-24 RX ORDER — NITROGLYCERIN 0.4 MG/1
0.4 TABLET SUBLINGUAL EVERY 5 MIN PRN
Qty: 30 TABLET | Refills: 5 | Status: SHIPPED | OUTPATIENT
Start: 2023-07-24

## 2023-07-24 RX ORDER — TRAMADOL HYDROCHLORIDE 50 MG/1
50 TABLET ORAL EVERY 6 HOURS PRN
Qty: 10 TABLET | Refills: 0 | Status: SHIPPED | OUTPATIENT
Start: 2023-07-24 | End: 2023-07-27

## 2023-07-24 RX ORDER — MELOXICAM 15 MG/1
15 TABLET ORAL DAILY
Qty: 30 TABLET | Refills: 11 | Status: SHIPPED | OUTPATIENT
Start: 2023-07-24 | End: 2023-12-22

## 2023-07-24 RX ORDER — BACLOFEN 10 MG/1
10 TABLET ORAL 3 TIMES DAILY PRN
Qty: 20 TABLET | Refills: 1 | Status: SHIPPED | OUTPATIENT
Start: 2023-07-24 | End: 2023-12-22

## 2023-07-24 RX ORDER — LISINOPRIL 10 MG/1
10 TABLET ORAL DAILY
Qty: 90 TABLET | Refills: 3 | Status: SHIPPED | OUTPATIENT
Start: 2023-07-24 | End: 2024-08-30

## 2023-07-24 RX ORDER — GABAPENTIN 100 MG/1
100 CAPSULE ORAL 2 TIMES DAILY
Qty: 60 CAPSULE | Refills: 11 | Status: SHIPPED | OUTPATIENT
Start: 2023-07-24 | End: 2023-12-22

## 2023-07-24 RX ORDER — PREDNISONE 20 MG/1
20 TABLET ORAL
Qty: 4 TABLET | Refills: 0 | Status: SHIPPED | OUTPATIENT
Start: 2023-07-24 | End: 2023-07-28

## 2023-07-24 ASSESSMENT — ENCOUNTER SYMPTOMS: BACK PAIN: 1

## 2023-07-24 NOTE — PROGRESS NOTES
Jonas is a 77 year old male contacting the clinic today via video, who will use the platform: [employer] for the visit.  Phone # for [employer], or if [facility] drops:   Telephone Information:   Mobile [phone]          ASSESSMENT and PLAN:  1. Acute right-sided low back pain without sciatica  Probable disc although cannot completely exclude internal derangement such as kidney stone, metastatic disease, aneurysm.  Begin empiric treatment with rapid progression to lab testing and CAT scan pending response  - CBC with Platelets & Differential; Future  - Comprehensive metabolic panel; Future  - UA Macroscopic with reflex to Microscopic and Culture; Future  - baclofen (LIORESAL) 10 MG tablet; Take 1 tablet (10 mg) by mouth 3 times daily as needed for muscle spasms  Dispense: 20 tablet; Refill: 1  - predniSONE (DELTASONE) 20 MG tablet; Take 1 tablet (20 mg) by mouth daily (with breakfast) for 4 days  Dispense: 4 tablet; Refill: 0  - meloxicam (MOBIC) 15 MG tablet; Take 1 tablet (15 mg) by mouth daily  Dispense: 30 tablet; Refill: 11  - gabapentin (NEURONTIN) 100 MG capsule; Take 1 capsule (100 mg) by mouth 2 times daily  Dispense: 60 capsule; Refill: 11  - traMADol (ULTRAM) 50 MG tablet; Take 1 tablet (50 mg) by mouth every 6 hours as needed for severe pain  Dispense: 10 tablet; Refill: 0  - CT Abdomen Pelvis w/o Contrast; Future    2. Coronary artery disease involving native coronary artery of native heart with angina pectoris (H)  Refill nitro.  Resume pravastatin  - nitroGLYcerin (NITROSTAT) 0.4 MG sublingual tablet; Place 1 tablet (0.4 mg) under the tongue every 5 minutes as needed for chest pain  Dispense: 30 tablet; Refill: 5  - Lipid Profile; Future  - pravastatin (PRAVACHOL) 40 MG tablet; Take 1 tablet (40 mg) by mouth daily  Dispense: 90 tablet; Refill: 3    3. Myelolipoma  Status post biopsy 2019    4. S/P repair of abdominal aortic aneurysm using bifurcation graft  At [facility]    5. Severe obesity with body  mass index (BMI) of 35.0 to 39.9 with serious comorbidity (H)  Encouraged exercise    6. Type 2 diabetes mellitus with stage 3b chronic kidney disease, without long-term current use of insulin (H)  Update labs  - Comprehensive metabolic panel; Future  - Hemoglobin A1c; Future  - Albumin Random Urine Quantitative with Creat Ratio; Future    7. Prostate cancer screening  - Prostate Specific Antigen Screen; Future    8. Preventative health care  Encourage living well and quitting smoking.  Vaccinations and cancer screening reviewed  - REVIEW OF HEALTH MAINTENANCE PROTOCOL ORDERS    9. Essential hypertension  - lisinopril (ZESTRIL) 10 MG tablet; Take 1 tablet (10 mg) by mouth daily  Dispense: 90 tablet; Refill: 3    10. History of alcohol dependence (H)  Abstinent 44 years    11. Abdominal aortic aneurysm (AAA) without rupture, unspecified part (H)         Patient Instructions   Prednisone 20 mg daily for 4 days    Baclofen 10 mg every 8 hours as needed    Tramadol every 8 hours as needed    Meloxicam 15 mg daily    Gabapentin 100 mg twice daily    Refill pravastatin and lisinopril    Resume pravastatin    Update labs    If no improvement next week CT scan of abdomen    Vaccinations reviewed    Cologuard up-to-date 2022    PSA ordered    Recommend quitting smoking and updating living will            Return in about 6 months (around 1/24/2024) for using a video visit.       CHIEF COMPLAINT:  Chief Complaint   Patient presents with     Back Pain     Pain starting 5 years ago.On the right side buttock area. Worsens with movement.  Pain has become much more severe. Taking tylenol to manage but it is not effective.      Recheck Medication     Not currently taking pravastatin or carvedilol      Musculoskeletal Problem     Cramping in legs           7/24/2023     3:47 PM   Additional Questions   Roomed by Mimi ROSARIO       HISTORY OF PRESENT ILLNESS:  Jonas is a 77 year old male contacting the clinic today via video for  "complaints of back pain.  He reports severe right-sided back pain which escalated about 5 days ago.  He has had more chronic back pain dating back years including through the time he underwent aortic graft repair.  He also had a right-sided renal mass biopsied as angiolipoma.  No radiation down the leg.  Concerned about possible hernia.  Some worsening difficulty urinating but chronic urinary difficulties refractory to Flomax and finasteride.  No recent injury fever or chills    Preventive care reviewed    Held pravastatin for 1 month due to see if leg cramps would improve which they did not    Back Pain     Musculoskeletal Problem    History of Present Illness       Reason for visit:  Sciatic pain    He eats 2-3 servings of fruits and vegetables daily.He consumes 0 sweetened beverage(s) daily.He exercises with enough effort to increase his heart rate 9 or less minutes per day.  He exercises with enough effort to increase his heart rate 3 or less days per week.   He is taking medications regularly.      REVIEW OF SYSTEMS:   Nocturia x2    PFSH:  Social History     Social History Narrative    2 children,         Quit drinking alcohol 1975, jan 12        Son has diabetes and lives in basement        Lives with grandchild who is 11, born 2012     Sober 44 years    TOBACCO USE:  History   Smoking Status     Every Day     Packs/day: 1.00     Years: 40.00     Types: Cigarettes     Start date: 6/1/1956   Smokeless Tobacco     Never       VITALS:  There were no vitals filed for this visit.  There were no vitals taken for this visit. Estimated body mass index is 35.66 kg/m  as calculated from the following:    Height as of 3/25/19: 1.778 m (5' 10\").    Weight as of 4/8/22: 112.7 kg (248 lb 8 oz).    PHYSICAL EXAM:  (observations via Video)  Alert and oriented with nasal rhinophyma    MEDICATIONS:   Current Outpatient Medications   Medication Sig Dispense Refill     acetaminophen (TYLENOL) 500 MG tablet Take 500-1,000 " mg by mouth every 6 hours as needed for mild pain       aspirin (ASA) 81 MG tablet Take 81 mg by mouth daily       baclofen (LIORESAL) 10 MG tablet Take 1 tablet (10 mg) by mouth 3 times daily as needed for muscle spasms 20 tablet 1     cyanocobalamin (VITAMIN B-12) 100 MCG tablet Take 2,000 mcg by mouth daily       gabapentin (NEURONTIN) 100 MG capsule Take 1 capsule (100 mg) by mouth 2 times daily 60 capsule 11     lisinopril (ZESTRIL) 10 MG tablet Take 1 tablet (10 mg) by mouth daily 90 tablet 3     meloxicam (MOBIC) 15 MG tablet Take 1 tablet (15 mg) by mouth daily 30 tablet 11     nitroGLYcerin (NITROSTAT) 0.4 MG sublingual tablet Place 1 tablet (0.4 mg) under the tongue every 5 minutes as needed for chest pain 30 tablet 5     pravastatin (PRAVACHOL) 40 MG tablet Take 1 tablet (40 mg) by mouth daily 90 tablet 3     predniSONE (DELTASONE) 20 MG tablet Take 1 tablet (20 mg) by mouth daily (with breakfast) for 4 days 4 tablet 0     traMADol (ULTRAM) 50 MG tablet Take 1 tablet (50 mg) by mouth every 6 hours as needed for severe pain 10 tablet 0     vitamin B complex with vitamin C (VITAMIN  B COMPLEX) tablet Take 1 tablet by mouth daily       VITAMIN D, CHOLECALCIFEROL, PO Take 2,000 Units by mouth daily         Outside Notes summarized:   Labs, x-rays, cardiology, GI tests reviewed:   Recent Labs   Lab Test 04/08/22  1508   HGB 13.8      POTASSIUM 4.3   CR 1.18   A1C 5.7*   PSA 0.48     No results found for: [id number]  Lab Results   Component Value Date    CHOL 143 04/08/2022     New orders:   Orders Placed This Encounter   Procedures     REVIEW OF HEALTH MAINTENANCE PROTOCOL ORDERS     CT Abdomen Pelvis w/o Contrast     Comprehensive metabolic panel     UA Macroscopic with reflex to Microscopic and Culture     Prostate Specific Antigen Screen     Lipid Profile     Hemoglobin A1c     Albumin Random Urine Quantitative with Creat Ratio     CBC with Platelets & Differential       Independent review of:      Dillon Goldsmith MD  [facility]    Video-Visit Details  Type of service:  Video Visit  Patient has given verbal consent to a Video visit?  Yes  How would you like to obtain your AVS?  MyChart  Will anyone else be joining your video visit, giving supplemental history? No    Originating location (pt location): Home    Distant Location (provider location):  Off-site    Video Start Time: 4:29 PM  Video End time:  5:01 PM  Face to face plus orders: 32 minutes  Documentation time:  3 minutes     The visit lasted a total of 35 minutes

## 2023-07-24 NOTE — PATIENT INSTRUCTIONS
Prednisone 20 mg daily for 4 days    Baclofen 10 mg every 8 hours as needed    Tramadol every 8 hours as needed    Meloxicam 15 mg daily    Gabapentin 100 mg twice daily    Refill pravastatin and lisinopril    Resume pravastatin    Update labs    If no improvement next week CT scan of abdomen    Vaccinations reviewed    Cologuard up-to-date 2022    PSA ordered    Recommend quitting smoking and updating living will

## 2023-07-24 NOTE — PROGRESS NOTES
Jonas is a 77 year old who is being evaluated via a billable video visit.      How would you like to obtain your AVS? MyChart  If the video visit is dropped, the invitation should be resent by: Text to cell phone: [phone]  Will anyone else be joining your video visit? No  {If patient encounters technical issues they should call [phone] :894514}        {PROVIDER CHARTING PREFERENCE:349410}    Subjective   Jonas is a 77 year old, presenting for the following health issues:  Back Pain (Pain starting 5 years ago.On the right side buttock area. Worsens with movement.  Pain has become much more severe. Taking tylenol to manage but it is not effective. ), Recheck Medication (Not currently taking pravastatin or carvedilol ), and Musculoskeletal Problem (Cramping in legs)        7/24/2023     3:47 PM   Additional Questions   Roomed by Mimi ROSARIO     Back Pain     Musculoskeletal Problem    History of Present Illness       Reason for visit:  Sciatic pain    He eats 2-3 servings of fruits and vegetables daily.He consumes 0 sweetened beverage(s) daily.He exercises with enough effort to increase his heart rate 9 or less minutes per day.  He exercises with enough effort to increase his heart rate 3 or less days per week.   He is taking medications regularly.       {SUPERLIST (Optional):922138}  {additonal problems for provider to add (Optional):152918}      Review of Systems   Musculoskeletal:  Positive for back pain.      {ROS COMP (Optional):723269}      Objective    Vitals - Patient Reported  Pain Score: Extreme Pain (8)  Pain Loc: Low Back (with movement.)        Physical Exam   {video visit exam brief selected:906309}    {Diagnostic Test Results (Optional):637420}    {AMBULATORY ATTESTATION (Optional):479203}        Video-Visit Details    Type of service:  Video Visit   Video Start Time: {video visit start/end time for provider to select:996505}  Video End Time:{video visit start/end time for provider to  "select:253482}    Originating Location (pt. Location): {video visit patient location:332769::\"Home\"}  {PROVIDER LOCATION On-site should be selected for visits conducted from your clinic location or adjoining [facility] hospital, academic office, or other nearby [facility] building. Off-site should be selected for all other provider locations, including home:292799}  Distant Location (provider location):  {virtual location provider:485978}  Platform used for Video Visit: {Virtual Visit Platforms:872223::\"[employer]\"}      "

## 2023-08-09 ENCOUNTER — HOSPITAL ENCOUNTER (OUTPATIENT)
Dept: CT IMAGING | Facility: HOSPITAL | Age: 78
Discharge: HOME | End: 2023-08-09
Attending: INTERNAL MEDICINE | Admitting: INTERNAL MEDICINE
Payer: MEDICARE

## 2023-08-09 DIAGNOSIS — M54.50 ACUTE RIGHT-SIDED LOW BACK PAIN WITHOUT SCIATICA: ICD-10-CM

## 2023-08-09 PROCEDURE — G1010 CDSM STANSON: HCPCS

## 2023-08-14 ENCOUNTER — LAB (OUTPATIENT)
Dept: LAB | Facility: CLINIC | Age: 78
End: 2023-08-14
Payer: MEDICARE

## 2023-08-14 DIAGNOSIS — I25.119 CORONARY ARTERY DISEASE INVOLVING NATIVE CORONARY ARTERY OF NATIVE HEART WITH ANGINA PECTORIS (H): ICD-10-CM

## 2023-08-14 DIAGNOSIS — E11.22 TYPE 2 DIABETES MELLITUS WITH STAGE 3B CHRONIC KIDNEY DISEASE, WITHOUT LONG-TERM CURRENT USE OF INSULIN (H): ICD-10-CM

## 2023-08-14 DIAGNOSIS — Z12.5 PROSTATE CANCER SCREENING: ICD-10-CM

## 2023-08-14 DIAGNOSIS — N18.32 TYPE 2 DIABETES MELLITUS WITH STAGE 3B CHRONIC KIDNEY DISEASE, WITHOUT LONG-TERM CURRENT USE OF INSULIN (H): ICD-10-CM

## 2023-08-14 DIAGNOSIS — M54.50 ACUTE RIGHT-SIDED LOW BACK PAIN WITHOUT SCIATICA: ICD-10-CM

## 2023-08-14 LAB
ALBUMIN SERPL BCG-MCNC: 4.1 G/DL (ref 3.5–5.2)
ALBUMIN UR-MCNC: NEGATIVE MG/DL
ALP SERPL-CCNC: 61 U/L (ref 40–129)
ALT SERPL W P-5'-P-CCNC: 8 U/L (ref 0–70)
ANION GAP SERPL CALCULATED.3IONS-SCNC: 11 MMOL/L (ref 7–15)
APPEARANCE UR: CLEAR
AST SERPL W P-5'-P-CCNC: 12 U/L (ref 0–45)
BASOPHILS # BLD AUTO: 0 10E3/UL (ref 0–0.2)
BASOPHILS NFR BLD AUTO: 0 %
BILIRUB SERPL-MCNC: 0.3 MG/DL
BILIRUB UR QL STRIP: NEGATIVE
BUN SERPL-MCNC: 21.6 MG/DL (ref 8–23)
CALCIUM SERPL-MCNC: 9.1 MG/DL (ref 8.8–10.2)
CHLORIDE SERPL-SCNC: 107 MMOL/L (ref 98–107)
CHOLEST SERPL-MCNC: 148 MG/DL
COLOR UR AUTO: YELLOW
CREAT SERPL-MCNC: 1.32 MG/DL (ref 0.67–1.17)
CREAT UR-MCNC: 23.2 MG/DL
DEPRECATED HCO3 PLAS-SCNC: 23 MMOL/L (ref 22–29)
EOSINOPHIL # BLD AUTO: 0.1 10E3/UL (ref 0–0.7)
EOSINOPHIL NFR BLD AUTO: 1 %
ERYTHROCYTE [DISTWIDTH] IN BLOOD BY AUTOMATED COUNT: 12.7 % (ref 10–15)
GFR SERPL CREATININE-BSD FRML MDRD: 55 ML/MIN/1.73M2
GLUCOSE SERPL-MCNC: 136 MG/DL (ref 70–99)
GLUCOSE UR STRIP-MCNC: NEGATIVE MG/DL
HBA1C MFR BLD: 6 % (ref 0–5.6)
HCT VFR BLD AUTO: 42.9 % (ref 40–53)
HDLC SERPL-MCNC: 41 MG/DL
HGB BLD-MCNC: 13.8 G/DL (ref 13.3–17.7)
HGB UR QL STRIP: NEGATIVE
IMM GRANULOCYTES # BLD: 0 10E3/UL
IMM GRANULOCYTES NFR BLD: 0 %
KETONES UR STRIP-MCNC: NEGATIVE MG/DL
LDLC SERPL CALC-MCNC: 82 MG/DL
LEUKOCYTE ESTERASE UR QL STRIP: NEGATIVE
LYMPHOCYTES # BLD AUTO: 1.6 10E3/UL (ref 0.8–5.3)
LYMPHOCYTES NFR BLD AUTO: 18 %
MCH RBC QN AUTO: 32.2 PG (ref 26.5–33)
MCHC RBC AUTO-ENTMCNC: 32.2 G/DL (ref 31.5–36.5)
MCV RBC AUTO: 100 FL (ref 78–100)
MICROALBUMIN UR-MCNC: <12 MG/L
MICROALBUMIN/CREAT UR: NORMAL MG/G{CREAT}
MONOCYTES # BLD AUTO: 0.5 10E3/UL (ref 0–1.3)
MONOCYTES NFR BLD AUTO: 6 %
NEUTROPHILS # BLD AUTO: 6.6 10E3/UL (ref 1.6–8.3)
NEUTROPHILS NFR BLD AUTO: 74 %
NITRATE UR QL: NEGATIVE
NONHDLC SERPL-MCNC: 107 MG/DL
PH UR STRIP: 7 [PH] (ref 5–8)
PLATELET # BLD AUTO: 218 10E3/UL (ref 150–450)
POTASSIUM SERPL-SCNC: 5.4 MMOL/L (ref 3.4–5.3)
PROT SERPL-MCNC: 6.7 G/DL (ref 6.4–8.3)
PSA SERPL DL<=0.01 NG/ML-MCNC: 0.43 NG/ML (ref 0–6.5)
RBC # BLD AUTO: 4.28 10E6/UL (ref 4.4–5.9)
SODIUM SERPL-SCNC: 141 MMOL/L (ref 136–145)
SP GR UR STRIP: 1.01 (ref 1–1.03)
TRIGL SERPL-MCNC: 125 MG/DL
UROBILINOGEN UR STRIP-ACNC: 0.2 E.U./DL
WBC # BLD AUTO: 8.9 10E3/UL (ref 4–11)

## 2023-08-14 PROCEDURE — G0103 PSA SCREENING: HCPCS

## 2023-08-14 PROCEDURE — 85025 COMPLETE CBC W/AUTO DIFF WBC: CPT

## 2023-08-14 PROCEDURE — 80053 COMPREHEN METABOLIC PANEL: CPT

## 2023-08-14 PROCEDURE — 36415 COLL VENOUS BLD VENIPUNCTURE: CPT

## 2023-08-14 PROCEDURE — 80061 LIPID PANEL: CPT

## 2023-08-14 PROCEDURE — 82043 UR ALBUMIN QUANTITATIVE: CPT

## 2023-08-14 PROCEDURE — 83036 HEMOGLOBIN GLYCOSYLATED A1C: CPT

## 2023-08-14 PROCEDURE — 82570 ASSAY OF URINE CREATININE: CPT

## 2023-08-14 PROCEDURE — 81003 URINALYSIS AUTO W/O SCOPE: CPT

## 2023-09-12 DIAGNOSIS — H35.3211 EXUDATIVE AGE-RELATED MACULAR DEGENERATION OF RIGHT EYE WITH ACTIVE CHOROIDAL NEOVASCULARIZATION (H): Primary | ICD-10-CM

## 2023-09-14 ENCOUNTER — HOSPITAL ENCOUNTER (EMERGENCY)
Facility: HOSPITAL | Age: 78
Discharge: HOME | End: 2023-09-14
Attending: EMERGENCY MEDICINE | Admitting: EMERGENCY MEDICINE
Payer: MEDICARE

## 2023-09-14 VITALS
OXYGEN SATURATION: 95 % | BODY MASS INDEX: 31.5 KG/M2 | DIASTOLIC BLOOD PRESSURE: 80 MMHG | HEART RATE: 64 BPM | HEIGHT: 70 IN | TEMPERATURE: 97.9 F | RESPIRATION RATE: 21 BRPM | WEIGHT: 220 LBS | SYSTOLIC BLOOD PRESSURE: 157 MMHG

## 2023-09-14 DIAGNOSIS — R10.11 RIGHT UPPER QUADRANT PAIN: ICD-10-CM

## 2023-09-14 LAB
ABO/RH(D): NORMAL
ALBUMIN SERPL BCG-MCNC: 4.2 G/DL (ref 3.5–5.2)
ALP SERPL-CCNC: 64 U/L (ref 40–129)
ALT SERPL W P-5'-P-CCNC: 8 U/L (ref 0–70)
ANION GAP SERPL CALCULATED.3IONS-SCNC: 10 MMOL/L (ref 7–15)
ANTIBODY SCREEN: NEGATIVE
AST SERPL W P-5'-P-CCNC: 21 U/L (ref 0–45)
BASOPHILS # BLD AUTO: 0.1 10E3/UL (ref 0–0.2)
BASOPHILS NFR BLD AUTO: 1 %
BILIRUB SERPL-MCNC: 0.2 MG/DL
BUN SERPL-MCNC: 24 MG/DL (ref 8–23)
CALCIUM SERPL-MCNC: 9.4 MG/DL (ref 8.8–10.2)
CHLORIDE SERPL-SCNC: 106 MMOL/L (ref 98–107)
CREAT SERPL-MCNC: 1.27 MG/DL (ref 0.67–1.17)
DEPRECATED HCO3 PLAS-SCNC: 21 MMOL/L (ref 22–29)
EGFRCR SERPLBLD CKD-EPI 2021: 57 ML/MIN/1.73M2
EOSINOPHIL # BLD AUTO: 0.2 10E3/UL (ref 0–0.7)
EOSINOPHIL NFR BLD AUTO: 2 %
ERYTHROCYTE [DISTWIDTH] IN BLOOD BY AUTOMATED COUNT: 12.4 % (ref 10–15)
GLUCOSE SERPL-MCNC: 153 MG/DL (ref 70–99)
HCT VFR BLD AUTO: 43 % (ref 40–53)
HGB BLD-MCNC: 13.9 G/DL (ref 13.3–17.7)
HOLD SPECIMEN: NORMAL
IMM GRANULOCYTES # BLD: 0 10E3/UL
IMM GRANULOCYTES NFR BLD: 0 %
INR PPP: 1.04 (ref 0.85–1.15)
LACTATE SERPL-SCNC: 1 MMOL/L (ref 0.7–2)
LIPASE SERPL-CCNC: 53 U/L (ref 13–60)
LYMPHOCYTES # BLD AUTO: 2.7 10E3/UL (ref 0.8–5.3)
LYMPHOCYTES NFR BLD AUTO: 26 %
MCH RBC QN AUTO: 32 PG (ref 26.5–33)
MCHC RBC AUTO-ENTMCNC: 32.3 G/DL (ref 31.5–36.5)
MCV RBC AUTO: 99 FL (ref 78–100)
MONOCYTES # BLD AUTO: 0.7 10E3/UL (ref 0–1.3)
MONOCYTES NFR BLD AUTO: 7 %
NEUTROPHILS # BLD AUTO: 6.7 10E3/UL (ref 1.6–8.3)
NEUTROPHILS NFR BLD AUTO: 64 %
NRBC # BLD AUTO: 0 10E3/UL
NRBC BLD AUTO-RTO: 0 /100
PLATELET # BLD AUTO: 236 10E3/UL (ref 150–450)
POTASSIUM SERPL-SCNC: 4.2 MMOL/L (ref 3.4–5.3)
PROT SERPL-MCNC: 6.9 G/DL (ref 6.4–8.3)
RBC # BLD AUTO: 4.34 10E6/UL (ref 4.4–5.9)
SODIUM SERPL-SCNC: 137 MMOL/L (ref 136–145)
SPECIMEN EXPIRATION DATE: NORMAL
TROPONIN T SERPL HS-MCNC: 10 NG/L
WBC # BLD AUTO: 10.4 10E3/UL (ref 4–11)

## 2023-09-14 PROCEDURE — 93005 ELECTROCARDIOGRAM TRACING: CPT | Performed by: EMERGENCY MEDICINE

## 2023-09-14 PROCEDURE — 250N000011 HC RX IP 250 OP 636: Performed by: EMERGENCY MEDICINE

## 2023-09-14 PROCEDURE — 83605 ASSAY OF LACTIC ACID: CPT | Performed by: EMERGENCY MEDICINE

## 2023-09-14 PROCEDURE — 86901 BLOOD TYPING SEROLOGIC RH(D): CPT | Performed by: EMERGENCY MEDICINE

## 2023-09-14 PROCEDURE — 84484 ASSAY OF TROPONIN QUANT: CPT | Performed by: EMERGENCY MEDICINE

## 2023-09-14 PROCEDURE — 85025 COMPLETE CBC W/AUTO DIFF WBC: CPT | Performed by: EMERGENCY MEDICINE

## 2023-09-14 PROCEDURE — 86850 RBC ANTIBODY SCREEN: CPT | Performed by: EMERGENCY MEDICINE

## 2023-09-14 PROCEDURE — 36415 COLL VENOUS BLD VENIPUNCTURE: CPT | Performed by: STUDENT IN AN ORGANIZED HEALTH CARE EDUCATION/TRAINING PROGRAM

## 2023-09-14 PROCEDURE — 80053 COMPREHEN METABOLIC PANEL: CPT | Performed by: EMERGENCY MEDICINE

## 2023-09-14 PROCEDURE — 83690 ASSAY OF LIPASE: CPT | Performed by: EMERGENCY MEDICINE

## 2023-09-14 PROCEDURE — 36415 COLL VENOUS BLD VENIPUNCTURE: CPT | Performed by: EMERGENCY MEDICINE

## 2023-09-14 PROCEDURE — 99284 EMERGENCY DEPT VISIT MOD MDM: CPT | Mod: 25

## 2023-09-14 PROCEDURE — 93005 ELECTROCARDIOGRAM TRACING: CPT | Performed by: STUDENT IN AN ORGANIZED HEALTH CARE EDUCATION/TRAINING PROGRAM

## 2023-09-14 PROCEDURE — 96374 THER/PROPH/DIAG INJ IV PUSH: CPT

## 2023-09-14 PROCEDURE — 85610 PROTHROMBIN TIME: CPT | Performed by: EMERGENCY MEDICINE

## 2023-09-14 RX ORDER — MORPHINE SULFATE 4 MG/ML
4 INJECTION, SOLUTION INTRAMUSCULAR; INTRAVENOUS
Status: DISCONTINUED | OUTPATIENT
Start: 2023-09-14 | End: 2023-09-15 | Stop reason: HOSPADM

## 2023-09-14 RX ADMIN — MORPHINE SULFATE 4 MG: 4 INJECTION, SOLUTION INTRAMUSCULAR; INTRAVENOUS at 21:05

## 2023-09-14 ASSESSMENT — ACTIVITIES OF DAILY LIVING (ADL): ADLS_ACUITY_SCORE: 35

## 2023-09-14 ASSESSMENT — ENCOUNTER SYMPTOMS: ABDOMINAL PAIN: 1

## 2023-09-15 NOTE — DISCHARGE INSTRUCTIONS
EKG, cardiac enzymes, remaining laboratory test all appear reassuring here tonight in the emergency department.    Follow-up with your primary care provider for routine reevaluation within the next few days.  Return back to ED sooner for any worsening pain, vomiting, fevers, shortness of breath, or any other new or concerning symptoms.

## 2023-09-15 NOTE — ED TRIAGE NOTES
Pt arrived via EMS from home after suddenly developing right upper quadrant abdominal pain described as stabbing. Patient states it radiates to right shoulder. Pt hypertensive at 222/103 upon arrival. nitroglycerin and asa given by EMS with no relief.      Triage Assessment       Row Name 09/14/23 2020       Triage Assessment (Adult)    Airway WDL WDL       Respiratory WDL    Respiratory WDL WDL       Skin Circulation/Temperature WDL    Skin Circulation/Temperature WDL WDL       Cardiac WDL    Cardiac WDL X  HTN       Peripheral/Neurovascular WDL    Peripheral Neurovascular WDL neurovascular assessment lower       LLE Neurovascular Assessment    Temperature LLE warm    Color LLE pale    Sensation LLE tenderness present;numbness present       RLE Neurovascular Assessment    Temperature RLE warm    Color RLE pale    Sensation RLE numbness present;tenderness present       Cognitive/Neuro/Behavioral WDL    Cognitive/Neuro/Behavioral WDL WDL

## 2023-09-15 NOTE — ED PROVIDER NOTES
EMERGENCY DEPARTMENT ENCOUNTER      NAME: Jonas Hyman  AGE: 78 year old male  YOB: 1945  MRN: [id number]  EVALUATION DATE & TIME: 2023  8:16 PM    PCP: Dillon Goldsmith    ED PROVIDER: Christopher Tiwari DO      Chief Complaint   Patient presents with    Abdominal Pain         FINAL IMPRESSION:  1. Right upper quadrant pain          ED COURSE & MEDICAL DECISION MAKIN-year-old male presented to the ED for evaluation of sudden onset right upper quadrant abdominal pain that started a few hours prior to ED arrival.  Upon arrival to the ED the patient was noted to be hypertensive with a blood pressure of 223/101.  The patient was otherwise hemodynamically stable.  The patient did not appear to be in any obvious distress or discomfort at the time of my evaluation.  The patient stated that his abdominal pain resolved completely after he received IV morphine prior to my evaluation.  The patient's physical exam was unremarkable and he did not have any reproducible abdominal tenderness to palpation.     The patient's EKG revealed normal sinus rhythm with occasional PVCs.  There were no new or concerning ST or T wave changes.    CBC, CMP, lipase, troponin, and lactic acid were all reassuring.  The patient and his son were informed of the reassuring laboratory results at the time of the initial evaluation.  Additional imaging was discussed with the patient and his son.  However, because his pain resolved completely the patient declined any imaging studies or any other further testing.  The patient stated that he wanted to return home.      Because the underlying cause of his pain remains unclear at this time the patient was instructed to follow-up with his primary care provider for reevaluation within the next few days.  The patient was instructed to return back to ED sooner for any worsening abdominal pain, chest pain, shortness of breath, or any other new or concerning symptoms.  The patient and  son indicated agreement and understanding of the discharge instructions.      Pertinent Labs & Imaging studies reviewed. (See chart for details)  9:44 PM I met with the patient to gather history and to perform my initial exam. We discussed plans for the ED course, including diagnostic testing and treatment.       At the conclusion of the encounter I discussed the results of all of the tests and the disposition. The questions were answered. The patient or family acknowledged understanding and was agreeable with the care plan.     Medical Decision Making    History:  Supplemental history from: Documented in chart, if applicable  External Record(s) reviewed: Documented in chart, if applicable.    Work Up:  Chart documentation includes differential considered and any EKGs or imaging independently interpreted by provider, where specified.  In additional to work up documented, I considered the following work up: Documented in chart, if applicable.    External consultation:  Discussion of management with another provider: Documented in chart, if applicable    Complicating factors:  Care impacted by chronic illness: Hyperlipidemia, Hypertension, and Other: Type 2 diabetes  Care affected by social determinants of health: N/A    Disposition considerations: Discharge. No recommendations on prescription strength medication(s). I considered admission, but discharged patient after significant clinical improvement.      PPE worn: n95 mask, goggles    MEDICATIONS GIVEN IN THE EMERGENCY:  Medications   morphine (PF) injection 4 mg (4 mg Intravenous $Given 9/14/23 2105)       NEW PRESCRIPTIONS STARTED AT TODAY'S ER VISIT  Discharge Medication List as of 9/14/2023 10:13 PM             =================================================================    HPI    Patient information was obtained from: Patient    Use of : N/A        Jonas Hyman is a 78 year old male with a pertinent history of hypertension, hyperlipidemia,  and type 2 diabetes who presents to this ED by EMS  for evaluation of abdominal pain.     The patient reports that he suddenly felt pain in his right upper quadrant when he was working on the cello with his grandson. He sat down to think what the pain could be, but could not figure out what it may be. He felt lightheaded when this pain came on. His pain was a 10/10, but because he was given morphine at the ER here, his pain decreased.    Denies nausea, vomit, and diaphoresis.       REVIEW OF SYSTEMS   Review of Systems   Gastrointestinal:  Positive for abdominal pain.        PAST MEDICAL HISTORY:  Past Medical History:   Diagnosis Date    Acute MI (H) 2009    Bronchitis     Cardiac arrest (H) 2009    Cardiac arrest (H)     Chemical dependency (H)     Has been in recovery 44 years    Coronary artery disease     Diabetes mellitus (H)     type II    Diabetes mellitus type 2, noninsulin dependent (H)     Diverticula of colon     Diverticulosis of large intestine     Hemorrhoids     Hemorrhoids     History of alcohol dependence (H) 1/12/1975    Created by [facility] Annotation: May 29 2009  5:52PM - Dillon Goldsmith: dry since  1975, also used drugs and marijuana  Replacement Utility updated for latest IMO load    Hypertension     Hypertension     Macular degeneration of right eye     Mixed hyperlipidemia     Myocardial infarction (H)     Retinal detachment 06/23/2014    Vitrectomy Posterior 23 guage, scleral buckle, membrane peel, endolaser gase    Stage 3b chronic kidney disease (H) 11/30/2020    Stented coronary artery 2009    stints times 2    Vitamin B12 deficiency     Vitamin D deficiency        PAST SURGICAL HISTORY:  Past Surgical History:   Procedure Laterality Date    CATARACT IOL, RT/LT Bilateral     EYE SURGERY      FRACTURE SURGERY      HC REMOVE TONSILS/ADENOIDS,<11 Y/O      Description: Tonsillectomy With Adenoidectomy;  Recorded: 05/29/2009;    HEART CATH STENT COR W/WO PTCA  05/07/2009     Proximal Left Anterior Descending Artery, Proximal Circumflex      HERNIA REPAIR      INGUINAL HERNIA REPAIR      LASER YAG IRIDOTOMY  9/6/2013    Procedure: LASER YAG IRIDOTOMY;  peripheral irridotomy ;  Surgeon: Doroteo Andres MD;  Location: [facility]    OTHER SURGICAL HISTORY      Cath Stent 1 Proximal Left Anterior Descending Artery     PHACOEMULSIFICATION CLEAR CORNEA WITH STANDARD INTRAOCULAR LENS IMPLANT  9/5/2013    Procedure: PHACOEMULSIFICATION CLEAR CORNEA WITH STANDARD INTRAOCULAR LENS IMPLANT;   COMPLEX RIGHT PHACOEMULSIFICATION CLEAR CORNEA WITH ANTERIOR CHAMBER INTRAOCULAR LENS IMPLANT, ANTERIOR VITRECTOMY;  Surgeon: Doroteo Andres MD;  Location: [facility]    PHACOEMULSIFICATION WITH STANDARD INTRAOCULAR LENS IMPLANT Left 12/3/2018    Procedure: Left Eye Phacoemulsification with Intraocular Lens;  Surgeon: Suhail Johnson MD;  Location: [facility] OR    RETINAL DETACHMENT SURGERY      TONSILLECTOMY  5/24/200/9    VASECTOMY      VITRECTOMY ANTERIOR  9/5/2013    Procedure: VITRECTOMY ANTERIOR;;  Surgeon: Doroteo Andres MD;  Location: [facility]    ZZC MUSCLE-SKIN FLAP,LEG             CURRENT MEDICATIONS:    acetaminophen (TYLENOL) 500 MG tablet  aspirin (ASA) 81 MG tablet  baclofen (LIORESAL) 10 MG tablet  cyanocobalamin (VITAMIN B-12) 100 MCG tablet  gabapentin (NEURONTIN) 100 MG capsule  lisinopril (ZESTRIL) 10 MG tablet  meloxicam (MOBIC) 15 MG tablet  nitroGLYcerin (NITROSTAT) 0.4 MG sublingual tablet  pravastatin (PRAVACHOL) 40 MG tablet  vitamin B complex with vitamin C (VITAMIN  B COMPLEX) tablet  VITAMIN D, CHOLECALCIFEROL, PO        ALLERGIES:  No Known Allergies    FAMILY HISTORY:  Family History   Problem Relation Age of Onset    Obesity Father     Glaucoma No family hx of     Macular Degeneration No family hx of     Retinal detachment No family hx of     Kidney Cancer Father     Heart Disease Brother        SOCIAL HISTORY:   Social History     Socioeconomic History    Marital status: Single      "Spouse name: None    Number of children: None    Years of education: None    Highest education level: None   Tobacco Use    Smoking status: Every Day     Packs/day: 1.00     Years: 40.00     Pack years: 40.00     Types: Cigarettes     Start date: 6/1/1956    Smokeless tobacco: Never    Tobacco comments:     20 yrs ago-mid 80's, quit again 2007, started again 2012   Vaping Use    Vaping Use: Never used   Substance and Sexual Activity    Alcohol use: No     Comment: from Jan 1975    Drug use: No   Social History Narrative    2 children,         Quit drinking alcohol 1975, jan 12        Son has diabetes and lives in basement        Lives with grandchild who is 11, born 2012       VITALS:  BP (!) 157/80   Pulse 64   Temp 97.9  F (36.6  C) (Oral)   Resp 21   Ht 1.778 m (5' 10\")   Wt 99.8 kg (220 lb)   SpO2 95%   BMI 31.57 kg/m      PHYSICAL EXAM    General presentation: Alert, Vital signs reviewed. NAD  HENT: ENT inspection is normal. Oropharynx is moist and clear.   Eye: Pupils are equal and reactive to light. EOMI  Neck: The neck is supple, with full ROM, with no evidence of meningismus.  Pulmonary: Currently in no acute respiratory distress. Normal, non labored respirations, the lung sounds are normal with good equal air movement. Clear to auscultation bilaterally.   Circulatory: Regular rate and rhythm. Peripheral pulses are strong and equal. No murmurs, rubs, or gallops.   Abdominal: The abdomen is soft. Nontender. No rigidity, guarding, or rebound. Bowel sounds normal.   Neurologic: Alert, oriented to person, place, and time. No motor deficit. No sensory deficit. Cranial nerves II through XII are intact.  Musculoskeletal: No extremity tenderness. Full range of motion in all extremities. No extremity edema.   Skin: Skin color is normal. No rash. Warm. Dry to touch.      LAB:  All pertinent labs reviewed and interpreted.  Results for orders placed or performed during the hospital encounter of 09/14/23 "   Extra Blue Top Tube   Result Value Ref Range    Hold Specimen JIC    Extra Red Top Tube   Result Value Ref Range    Hold Specimen JIC    Extra Green Top (Lithium Heparin) Tube   Result Value Ref Range    Hold Specimen JIC    Extra Green Top (Lithium Heparin) Tube   Result Value Ref Range    Hold Specimen JIC    Extra Purple Top Tube   Result Value Ref Range    Hold Specimen JIC    Extra Purple Top Tube   Result Value Ref Range    Hold Specimen JIC    Result Value Ref Range    INR 1.04 0.85 - 1.15   Comprehensive metabolic panel   Result Value Ref Range    Sodium 137 136 - 145 mmol/L    Potassium 4.2 3.4 - 5.3 mmol/L    Chloride 106 98 - 107 mmol/L    Carbon Dioxide (CO2) 21 (L) 22 - 29 mmol/L    Anion Gap 10 7 - 15 mmol/L    Urea Nitrogen 24.0 (H) 8.0 - 23.0 mg/dL    Creatinine 1.27 (H) 0.67 - 1.17 mg/dL    Calcium 9.4 8.8 - 10.2 mg/dL    Glucose 153 (H) 70 - 99 mg/dL    Alkaline Phosphatase 64 40 - 129 U/L    AST 21 0 - 45 U/L    ALT 8 0 - 70 U/L    Protein Total 6.9 6.4 - 8.3 g/dL    Albumin 4.2 3.5 - 5.2 g/dL    Bilirubin Total 0.2 <=1.2 mg/dL    GFR Estimate 57 (L) >60 mL/min/1.73m2   Result Value Ref Range    Lipase 53 13 - 60 U/L   Lactic acid whole blood   Result Value Ref Range    Lactic Acid 1.0 0.7 - 2.0 mmol/L   Result Value Ref Range    Troponin T, High Sensitivity 10 <=22 ng/L   CBC with platelets and differential   Result Value Ref Range    WBC Count 10.4 4.0 - 11.0 10e3/uL    RBC Count 4.34 (L) 4.40 - 5.90 10e6/uL    Hemoglobin 13.9 13.3 - 17.7 g/dL    Hematocrit 43.0 40.0 - 53.0 %    MCV 99 78 - 100 fL    MCH 32.0 26.5 - 33.0 pg    MCHC 32.3 31.5 - 36.5 g/dL    RDW 12.4 10.0 - 15.0 %    Platelet Count 236 150 - 450 10e3/uL    % Neutrophils 64 %    % Lymphocytes 26 %    % Monocytes 7 %    % Eosinophils 2 %    % Basophils 1 %    % Immature Granulocytes 0 %    NRBCs per 100 WBC 0 <1 /100    Absolute Neutrophils 6.7 1.6 - 8.3 10e3/uL    Absolute Lymphocytes 2.7 0.8 - 5.3 10e3/uL    Absolute Monocytes  0.7 0.0 - 1.3 10e3/uL    Absolute Eosinophils 0.2 0.0 - 0.7 10e3/uL    Absolute Basophils 0.1 0.0 - 0.2 10e3/uL    Absolute Immature Granulocytes 0.0 <=0.4 10e3/uL    Absolute NRBCs 0.0 10e3/uL   Adult Type and Screen   Result Value Ref Range    ABO/RH(D) O POS     Antibody Screen Negative Negative    SPECIMEN EXPIRATION DATE [id number]        RADIOLOGY:  Reviewed all pertinent imaging. Please see official radiology report.  No orders to display       EKG:    Normal sinus rhythm.  Rate of 93.  Occasional PVCs.  Right bundle branch block.  Prolonged QT.  Compared to the EKG on 11/30/2018 the PVCs and prolonged QT appear new.  No other significant changes are noted.    I have independently reviewed and interpreted the EKG(s) documented above.      I, Divya Craven , am serving as a scribe to document services personally performed by Christopher Tiwari DO based on my observation and the provider's statements to me. I, Christopher Tiwari, attest that Divya Craven is acting in a scribe capacity, has observed my performance of the services and has documented them in accordance with my direction.    Christopher Tiwari DO  Emergency Medicine  [facility] EMERGENCY DEPARTMENT  [facility] [facility] [address] [phone]       Christopher Tiwari DO  09/14/23 [id number]

## 2023-09-15 NOTE — ED PROVIDER NOTES
Emergency Department Encounter     Evaluation Date & Time:   9/14/2023  8:16 PM    CHIEF COMPLAINT:  Abdominal Pain      Triage Note:Pt arrived via EMS from home after suddenly developing right upper quadrant abdominal pain described as stabbing. Patient states it radiates to right shoulder. Pt hypertensive at 222/103 upon arrival. nitroglycerin and asa given by EMS with no relief.      Triage Assessment       Row Name 09/14/23 2020       Triage Assessment (Adult)    Airway WDL WDL       Respiratory WDL    Respiratory WDL WDL       Skin Circulation/Temperature WDL    Skin Circulation/Temperature WDL WDL       Cardiac WDL    Cardiac WDL X  HTN       Peripheral/Neurovascular WDL    Peripheral Neurovascular WDL neurovascular assessment lower       LLE Neurovascular Assessment    Temperature LLE warm    Color LLE pale    Sensation LLE tenderness present;numbness present       RLE Neurovascular Assessment    Temperature RLE warm    Color RLE pale    Sensation RLE numbness present;tenderness present       Cognitive/Neuro/Behavioral WDL    Cognitive/Neuro/Behavioral WDL WDL                        FINAL IMPRESSION:    ICD-10-CM    1. Right upper quadrant pain  R10.11           Impression and Plan     ED COURSE & MEDICAL DECISION MAKING:    Patient having ruq abd pain, and has a hx/o aaa.  His pain was sudden in onset so of course differential includes dissection or ruptured aneurysm but he is stable, and so I think can get labs and then wait for CT.  Differential includes gallbladder colic, renal stones, diverticulitis, appendicitis.  He doesn't have lung complaints to suggest pneumonia and no cp to suggest cardiac ischemia.  I will do labs to rule out ischemic colitis, ua for ? Uti or stones, cta of chest/abd pelvis, and the labs for gallbladder/kidney and pancreas function.  We discussed his hx/o alcoholism and his thoughts on whether or not to take a narcotic for his pain versus warm compresses, until his evaluation  was completed with maybe acetaminophen and he prefers the morphine understanding addictive risks.    The patient then was seen by my partner, Dr. Tiwari and noted after his morphine, hispain was gone and as we had discussed ct scanning and the time for this evaluation, he told Dr. Tiwari that he preferred not to complete evaluation and would follow up in clinic, return here as needed if his symptoms worsen and so he was discharged.  His son who is an rn was present at that time.  Please see dr. Tiwari's note for details.      At the conclusion of the encounter I discussed the results of all the tests and the disposition. The questions were answered. The patient or family acknowledged understanding and was agreeable with the care plan.          0 minutes of critical care time        MEDICATIONS GIVEN IN THE EMERGENCY DEPARTMENT:  Medications   morphine (PF) injection 4 mg (4 mg Intravenous $Given 9/14/23 2105)       NEW PRESCRIPTIONS STARTED AT TODAY'S ED VISIT:  Discharge Medication List as of 9/14/2023 10:13 PM          HPI     HPI     Jonas Hyman is a 78 year old male with a pertinent history of cad, thoracic aneurysm, diet controlled niddm who presents to this ED via ems for evaluation of sudden onset ruq abd pain radiating towards r flank.  He felt well earlier today.  He ate [employer] beef sandwich around 6 PM tonight and then around 7:30 PM he had the sudden onset of fairly intense right upper quadrant abdominal pain that radiated through to his right flank area.  It did not resolve and so he did call EMS.  He does have a significant history of coronary artery disease and so they did give him nitroglycerin as well as aspirin in route.  This has not improved his symptoms.  He denies any associated shortness of breath lightheadedness diaphoresis vomiting black or bloody stools with it.  He has not had this pain previously.  He did recently have a CAT scan of his hip area because of some discomfort in that area that has  been keeping him awake at night.  He has no known history of kidney stones or gallstones.  He denies feeling of urinary urgency with this.  Pain has waxed and waned since that time but has not fully gone away.    REVIEW OF SYSTEMS:  Review of Systems  remainder of systems are all otherwise negative.        Medical History     Past Medical History:   Diagnosis Date    Acute MI (H) 2009    Bronchitis     Cardiac arrest (H) 2009    Cardiac arrest (H)     Chemical dependency (H)     Coronary artery disease     Diabetes mellitus (H)     Diabetes mellitus type 2, noninsulin dependent (H)     Diverticula of colon     Diverticulosis of large intestine     Hemorrhoids     Hemorrhoids     History of alcohol dependence (H) 1/12/1975    Hypertension     Hypertension     Macular degeneration of right eye     Mixed hyperlipidemia     Myocardial infarction (H)     Retinal detachment 06/23/2014    Stage 3b chronic kidney disease (H) 11/30/2020    Stented coronary artery 2009    Vitamin B12 deficiency     Vitamin D deficiency        Past Surgical History:   Procedure Laterality Date    CATARACT IOL, RT/LT Bilateral     EYE SURGERY      FRACTURE SURGERY      HC REMOVE TONSILS/ADENOIDS,<11 Y/O      Description: Tonsillectomy With Adenoidectomy;  Recorded: 05/29/2009;    HEART CATH STENT COR W/WO PTCA  05/07/2009    Proximal Left Anterior Descending Artery, Proximal Circumflex      HERNIA REPAIR      INGUINAL HERNIA REPAIR      LASER YAG IRIDOTOMY  9/6/2013    Procedure: LASER YAG IRIDOTOMY;  peripheral irridotomy ;  Surgeon: Doroteo Andres MD;  Location: [facility]    OTHER SURGICAL HISTORY      Cath Stent 1 Proximal Left Anterior Descending Artery     PHACOEMULSIFICATION CLEAR CORNEA WITH STANDARD INTRAOCULAR LENS IMPLANT  9/5/2013    Procedure: PHACOEMULSIFICATION CLEAR CORNEA WITH STANDARD INTRAOCULAR LENS IMPLANT;   COMPLEX RIGHT PHACOEMULSIFICATION CLEAR CORNEA WITH ANTERIOR CHAMBER INTRAOCULAR LENS IMPLANT, ANTERIOR VITRECTOMY;   Surgeon: Doroteo Andres MD;  Location: [facility]    PHACOEMULSIFICATION WITH STANDARD INTRAOCULAR LENS IMPLANT Left 12/3/2018    Procedure: Left Eye Phacoemulsification with Intraocular Lens;  Surgeon: Suhail Johnson MD;  Location: [facility] OR    RETINAL DETACHMENT SURGERY      TONSILLECTOMY  5/24/200/9    VASECTOMY      VITRECTOMY ANTERIOR  9/5/2013    Procedure: VITRECTOMY ANTERIOR;;  Surgeon: Doroteo Andres MD;  Location: [facility]    ZZC MUSCLE-SKIN FLAP,LEG         Family History   Problem Relation Age of Onset    Obesity Father     Glaucoma No family hx of     Macular Degeneration No family hx of     Retinal detachment No family hx of     Kidney Cancer Father     Heart Disease Brother        Social History     Tobacco Use    Smoking status: Every Day     Packs/day: 1.00     Years: 40.00     Pack years: 40.00     Types: Cigarettes     Start date: 6/1/1956    Smokeless tobacco: Never    Tobacco comments:     20 yrs ago-mid 80's, quit again 2007, started again 2012   Vaping Use    Vaping Use: Never used   Substance Use Topics    Alcohol use: No     Comment: from Jan 1975    Drug use: No       acetaminophen (TYLENOL) 500 MG tablet  aspirin (ASA) 81 MG tablet  baclofen (LIORESAL) 10 MG tablet  cyanocobalamin (VITAMIN B-12) 100 MCG tablet  gabapentin (NEURONTIN) 100 MG capsule  lisinopril (ZESTRIL) 10 MG tablet  meloxicam (MOBIC) 15 MG tablet  nitroGLYcerin (NITROSTAT) 0.4 MG sublingual tablet  pravastatin (PRAVACHOL) 40 MG tablet  vitamin B complex with vitamin C (VITAMIN  B COMPLEX) tablet  VITAMIN D, CHOLECALCIFEROL, PO        Physical Exam     First Vitals:  Patient Vitals for the past 24 hrs:   BP Temp Temp src Pulse Resp SpO2 Height Weight   09/14/23 2204 (!) 157/80 97.9  F (36.6  C) Oral 64 21 95 % -- --   09/14/23 2142 (!) 162/88 -- -- 76 26 94 % -- --   09/14/23 2127 (!) 153/78 -- -- 66 24 93 % -- --   09/14/23 2112 (!) 159/77 -- -- 74 26 94 % -- --   09/14/23 2059 (!) 162/71 -- -- 79 (!) 36 94 % -- --  "  09/14/23 2044 (!) 148/75 -- -- 73 (!) 32 94 % -- --   09/14/23 2029 (!) 196/94 -- -- 74 30 94 % -- --   09/14/23 2019 (!) 222/103 97.9  F (36.6  C) Oral 63 22 95 % 1.778 m (5' 10\") 99.8 kg (220 lb)       PHYSICAL EXAM:   Constitutional:   appears in nad obese pleasant male sitting up in bed   HENT:  Normocephalic, posterior pharynx wnl, mucous membranes moist and drk pink   Eyes:  PERRL, EOMI, Conjunctiva normal, No discharge, no scleral icterus.  Respiratory:  Breathing easily, cta  Cardiovascular:  rrr with distant heart sounds but nl s1s2 0 murmurs, rubs, or gallops.  Peripheral pulses dp, pt, and radial are wnl.  tr peripheral edema   GI:  Bowel sounds normal, Soft, ttp diffusely without rebound but has some guarding in ruq locally,  No flank tenderness,:has r sided CVA tenderness.   Musculoskeletal:  Moves all extremities.  No erythematous or swollen major joints,   Integument:  normal color, dry.  Neurologic:  Alert & oriented x 3, Normal motor function, Normal sensory function, No focal deficits noted. Normal speech.  Psychiatric:  Affect normal, Judgment normal, Mood normal.     Results     LAB AND RADIOLOGY:  All pertinent labs reviewed and interpreted  Results for orders placed or performed during the hospital encounter of 09/14/23   [place] Draw     Status: None    Narrative    The following orders were created for panel order [place] Draw.  Procedure                               Abnormality         Status                     ---------                               -----------         ------                     Extra Blue Top Tube[[id number]]                              Final result               Extra Red Top Tube[[id number]]                               Final result               Extra Green Top (Lithium...[[id number]]                      Final result               Extra Green Top (Lithium...[[id number]]                      Final result               Extra Purple Top Tube[[id number]]                 "            Final result               Extra Purple Top Tube[[id number]]                            Final result                 Please view results for these tests on the individual orders.   Extra Blue Top Tube     Status: None   Result Value Ref Range    Hold Specimen JIC    Extra Red Top Tube     Status: None   Result Value Ref Range    Hold Specimen JIC    Extra Green Top (Lithium Heparin) Tube     Status: None   Result Value Ref Range    Hold Specimen JIC    Extra Green Top (Lithium Heparin) Tube     Status: None   Result Value Ref Range    Hold Specimen JIC    Extra Purple Top Tube     Status: None   Result Value Ref Range    Hold Specimen JIC    Extra Purple Top Tube     Status: None   Result Value Ref Range    Hold Specimen JIC    INR     Status: Normal   Result Value Ref Range    INR 1.04 0.85 - 1.15   Comprehensive metabolic panel     Status: Abnormal   Result Value Ref Range    Sodium 137 136 - 145 mmol/L    Potassium 4.2 3.4 - 5.3 mmol/L    Chloride 106 98 - 107 mmol/L    Carbon Dioxide (CO2) 21 (L) 22 - 29 mmol/L    Anion Gap 10 7 - 15 mmol/L    Urea Nitrogen 24.0 (H) 8.0 - 23.0 mg/dL    Creatinine 1.27 (H) 0.67 - 1.17 mg/dL    Calcium 9.4 8.8 - 10.2 mg/dL    Glucose 153 (H) 70 - 99 mg/dL    Alkaline Phosphatase 64 40 - 129 U/L    AST 21 0 - 45 U/L    ALT 8 0 - 70 U/L    Protein Total 6.9 6.4 - 8.3 g/dL    Albumin 4.2 3.5 - 5.2 g/dL    Bilirubin Total 0.2 <=1.2 mg/dL    GFR Estimate 57 (L) >60 mL/min/1.73m2   Lipase     Status: Normal   Result Value Ref Range    Lipase 53 13 - 60 U/L   Lactic acid whole blood     Status: Normal   Result Value Ref Range    Lactic Acid 1.0 0.7 - 2.0 mmol/L   Troponin T, High Sensitivity     Status: Normal   Result Value Ref Range    Troponin T, High Sensitivity 10 <=22 ng/L   CBC with platelets and differential     Status: Abnormal   Result Value Ref Range    WBC Count 10.4 4.0 - 11.0 10e3/uL    RBC Count 4.34 (L) 4.40 - 5.90 10e6/uL    Hemoglobin 13.9 13.3 - 17.7 g/dL     Hematocrit 43.0 40.0 - 53.0 %    MCV 99 78 - 100 fL    MCH 32.0 26.5 - 33.0 pg    MCHC 32.3 31.5 - 36.5 g/dL    RDW 12.4 10.0 - 15.0 %    Platelet Count 236 150 - 450 10e3/uL    % Neutrophils 64 %    % Lymphocytes 26 %    % Monocytes 7 %    % Eosinophils 2 %    % Basophils 1 %    % Immature Granulocytes 0 %    NRBCs per 100 WBC 0 <1 /100    Absolute Neutrophils 6.7 1.6 - 8.3 10e3/uL    Absolute Lymphocytes 2.7 0.8 - 5.3 10e3/uL    Absolute Monocytes 0.7 0.0 - 1.3 10e3/uL    Absolute Eosinophils 0.2 0.0 - 0.7 10e3/uL    Absolute Basophils 0.1 0.0 - 0.2 10e3/uL    Absolute Immature Granulocytes 0.0 <=0.4 10e3/uL    Absolute NRBCs 0.0 10e3/uL   Adult Type and Screen     Status: None   Result Value Ref Range    ABO/RH(D) O POS     Antibody Screen Negative Negative    SPECIMEN EXPIRATION DATE [id number]    CBC with platelets differential     Status: Abnormal    Narrative    The following orders were created for panel order CBC with platelets differential.  Procedure                               Abnormality         Status                     ---------                               -----------         ------                     CBC with platelets and d...[[id number]]  Abnormal            Final result                 Please view results for these tests on the individual orders.   ABO/Rh type and screen     Status: None    Narrative    The following orders were created for panel order ABO/Rh type and screen.  Procedure                               Abnormality         Status                     ---------                               -----------         ------                     Adult Type and Screen[[id number]]                            Final result                 Please view results for these tests on the individual orders.         ECG:    Performed at: nsr rate of 93 with frequent pvc's in a trigeminal pattern.  Underlying rbbb with repolarization changes, lae, pr 198ms, qrs 144ms, qtc 502ms, prt axes 62 10 19.   Compared to previous EKG, he is now in trgeminal pattern and he has longer qtc.      I have independently reviewed and interpreted the EKS(s) documented above    PROCEDURES:  Procedures:      [facility] Documentation     Medical Decision Making    History:  Supplemental history from: Documented in chart, if applicable and Family Member/Significant Other  External Record(s) reviewed: Documented in chart, if applicable. and Outpatient Record: and prior labs , prior ekg    Work Up:  Chart documentation includes differential considered and any EKGs or imaging independently interpreted by provider, where specified.  In additional to work up documented, I considered the following work up: Documented in chart, if applicable.    External consultation:  Discussion of management with another provider: Documented in chart, if applicable    Complicating factors:  Care impacted by chronic illness: Heart Diseasediabetes, obesity  Care affected by social determinants of health: alcoholism hx/o so discussed if he would like narcotic for pain understanding risks of addiction and he does feel he needs that for pain.    Disposition considerations: Admission considered. Patient was signed out to the oncoming physician, disposition pending.  Signed out to Dr. Karie Hoyt MD  Emergency Medicine  [facility] EMERGENCY DEPARTMENT         Radha Hoyt MD  09/14/23 1826

## 2023-09-15 NOTE — ED NOTES
Bed: JNED-25  Expected date: 9/14/23  Expected time: 8:00 PM  Means of arrival: Ambulance  Comments:  78 M Abd pain  [facility]

## 2023-09-16 LAB
ATRIAL RATE - MUSE: 93 BPM
DIASTOLIC BLOOD PRESSURE - MUSE: NORMAL MMHG
INTERPRETATION ECG - MUSE: NORMAL
P AXIS - MUSE: 62 DEGREES
PR INTERVAL - MUSE: 198 MS
QRS DURATION - MUSE: 144 MS
QT - MUSE: 404 MS
QTC - MUSE: 502 MS
R AXIS - MUSE: 10 DEGREES
SYSTOLIC BLOOD PRESSURE - MUSE: NORMAL MMHG
T AXIS - MUSE: 19 DEGREES
VENTRICULAR RATE- MUSE: 93 BPM

## 2023-09-19 ENCOUNTER — TELEPHONE (OUTPATIENT)
Dept: OPHTHALMOLOGY | Facility: CLINIC | Age: 78
End: 2023-09-19
Payer: MEDICARE

## 2023-09-19 NOTE — TELEPHONE ENCOUNTER
Scheduled in open 9 AM time slot next week Tuesday with Dr. Wilson    Pt aware of date/time/location at [facility]    Garrett Wise RN 10:36 AM 09/19/23            [facility]    Phone Message    May a detailed message be left on voicemail: yes     Reason for Call: Other: Pt had a 2 month follow up scheduled for today but had to r/s due to have a sick child at home. He is r/s for 11/6, which falls outside of the recommended follow up timeframe. Please review and call pt if appt needs to be scheduled sooner. Thank you.     Action Taken: Message routed to:  [facility] ([facility] EYE    Travel Screening: Not Applicable

## 2023-09-26 ENCOUNTER — OFFICE VISIT (OUTPATIENT)
Dept: OPHTHALMOLOGY | Facility: CLINIC | Age: 78
End: 2023-09-26
Attending: OPHTHALMOLOGY
Payer: MEDICARE

## 2023-09-26 DIAGNOSIS — H35.3211 EXUDATIVE AGE-RELATED MACULAR DEGENERATION OF RIGHT EYE WITH ACTIVE CHOROIDAL NEOVASCULARIZATION (H): ICD-10-CM

## 2023-09-26 PROCEDURE — 67028 INJECTION EYE DRUG: CPT | Mod: RT | Performed by: OPHTHALMOLOGY

## 2023-09-26 PROCEDURE — 99213 OFFICE O/P EST LOW 20 MIN: CPT | Mod: 25 | Performed by: OPHTHALMOLOGY

## 2023-09-26 PROCEDURE — 250N000011 HC RX IP 250 OP 636: Mod: JZ | Performed by: OPHTHALMOLOGY

## 2023-09-26 PROCEDURE — 92134 CPTRZ OPH DX IMG PST SGM RTA: CPT | Performed by: OPHTHALMOLOGY

## 2023-09-26 PROCEDURE — G0463 HOSPITAL OUTPT CLINIC VISIT: HCPCS | Mod: 25 | Performed by: OPHTHALMOLOGY

## 2023-09-26 RX ADMIN — FARICIMAB 6 MG: 6 INJECTION, SOLUTION INTRAVITREAL at 10:05

## 2023-09-26 ASSESSMENT — CONF VISUAL FIELD
OS_SUPERIOR_TEMPORAL_RESTRICTION: 3
OD_SUPERIOR_TEMPORAL_RESTRICTION: 3
OD_INFERIOR_NASAL_RESTRICTION: 3
OD_SUPERIOR_NASAL_RESTRICTION: 1
OD_INFERIOR_TEMPORAL_RESTRICTION: 3

## 2023-09-26 ASSESSMENT — VISUAL ACUITY
OS_SC: 20/30
METHOD: SNELLEN - LINEAR
OD_SC: 20/300

## 2023-09-26 ASSESSMENT — SLIT LAMP EXAM - LIDS
COMMENTS: NORMAL
COMMENTS: NORMAL

## 2023-09-26 ASSESSMENT — TONOMETRY
IOP_METHOD: TONOPEN
OS_IOP_MMHG: 15
OD_IOP_MMHG: 17

## 2023-09-26 ASSESSMENT — EXTERNAL EXAM - LEFT EYE: OS_EXAM: NORMAL

## 2023-09-26 ASSESSMENT — CUP TO DISC RATIO
OS_RATIO: 0.3
OD_RATIO: 0.6

## 2023-09-26 ASSESSMENT — EXTERNAL EXAM - RIGHT EYE: OD_EXAM: NORMAL

## 2023-09-26 NOTE — PROGRESS NOTES
CC -   Wet AMD OD    INTERVAL HISTORY - VA stable, here to assess OD and OS dry AMD  still smoking (1/2023)  Last DFE OU 1/2023    Prefers attending injection     [facility]-   Jonas Hyman is a  78 year old  patient referred by Dr Pinedo for wet  AMD OD.  Had gone few years without eye exam d/t insurance lack, gradual progressive loss OU.  Prior RD repair OD 2014, unsure what BCVA was after repair.  Caring for grandson  VA has worsened OU over years, now trouble with reading/drivign, ADLs.    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  Wet AMD OD   - uncertain duration by history   - uncertain vision potential given prior h/o RD   - started Avastin 9/17/18 (#6 total)   - changed to Eylea 3/12/19       - stable SRF & VA @ 7 weeks on 6/2021   - was typically 20/80-20/125 in past @ 4 weeks    - increased to 3 months Eylea in 2022 with incr FVPED/fluid no vision changes     - changed to Vabysmo 4/18/23 after Eylea (#31) @ 3 months   - worse SRF 7/18/23 with Vabysmo @ 3 months 7/18/23     - last injection Vabysmo (#2) 7/18/23  (2 months)     - OCT better   - VA stable   - inject Vabysmo today   - Continue interval to 2-3 months   - RTC 2-3 months T&E       # intermediate dry AMD OS   - single large druse on OCT   - AREDS/Yue    #. H/o RD repair OD   - s/p PPV/SBP/FGx  2014   - retina flat last DFE      #. PVD OS   - onset ~ 7/15/21 by history   - advised s/sx RD 9/2023      #. 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     Christopher Underwood MD  PGY-3 Ophthalmology      ATTESTATION     Attending Physician Attestation:      Complete documentation of historical and exam elements from today's encounter can be found in the full encounter summary report (not reduplicated in this progress note).  I personally obtained the chief complaint(s) and history of present illness.  I confirmed and edited as necessary the review of systems, past medical/surgical history, family history, social history, and examination findings as documented by others; and I examined the patient myself.  I personally reviewed the relevant tests, images, and reports as documented above.  I personally reviewed the ophthalmic test(s) associated with this encounter, agree with the interpretation(s) as documented by the resident/fellow, and have edited the corresponding report(s) as necessary.   I formulated and edited as necessary the assessment and plan and discussed the findings and management plan with the patient and family and No resident or fellow assisted with the procedures performed.  I performed the procedures myself.    Anu Wilson MD, PhD  , Vitreoretinal Surgery  Department of Ophthalmology  [facility]

## 2023-09-26 NOTE — NURSING NOTE
Chief Complaints and History of Present Illnesses   Patient presents with    Macular Degeneration Follow Up     Follow up exudative age-related macular degeneration of right eye with active choroidal neovascularization. Last seen September 26, 2023.      Chief Complaint(s) and History of Present Illness(es)       Macular Degeneration Follow Up              Laterality: right eye    Onset: months ago    Associated symptoms: Negative for eye pain, photophobia, flashes and floaters    Pain scale: 0/10    Comments: Follow up exudative age-related macular degeneration of right eye with active choroidal neovascularization. Last seen September 26, 2023.               Comments    Pt reports no noticeable changes in vision since last seen, left eye.   Denies new floaters, flashes, pain. BE  Rarely uses PF AT's per pt.     MITRA Celaya OA, September 26, 2023

## 2023-12-08 DIAGNOSIS — H35.3211 EXUDATIVE AGE-RELATED MACULAR DEGENERATION OF RIGHT EYE WITH ACTIVE CHOROIDAL NEOVASCULARIZATION (H): Primary | ICD-10-CM

## 2023-12-22 ENCOUNTER — OFFICE VISIT (OUTPATIENT)
Dept: OPHTHALMOLOGY | Facility: CLINIC | Age: 78
End: 2023-12-22
Attending: OPHTHALMOLOGY
Payer: MEDICARE

## 2023-12-22 DIAGNOSIS — H35.3211 EXUDATIVE AGE-RELATED MACULAR DEGENERATION OF RIGHT EYE WITH ACTIVE CHOROIDAL NEOVASCULARIZATION (H): Primary | ICD-10-CM

## 2023-12-22 PROCEDURE — 67028 INJECTION EYE DRUG: CPT | Mod: RT | Performed by: OPHTHALMOLOGY

## 2023-12-22 PROCEDURE — 92134 CPTRZ OPH DX IMG PST SGM RTA: CPT | Performed by: OPHTHALMOLOGY

## 2023-12-22 PROCEDURE — G0463 HOSPITAL OUTPT CLINIC VISIT: HCPCS | Mod: 25 | Performed by: OPHTHALMOLOGY

## 2023-12-22 PROCEDURE — 250N000011 HC RX IP 250 OP 636: Mod: JZ | Performed by: OPHTHALMOLOGY

## 2023-12-22 RX ADMIN — FARICIMAB 6 MG: 6 INJECTION, SOLUTION INTRAVITREAL at 11:38

## 2023-12-22 ASSESSMENT — TONOMETRY
OS_IOP_MMHG: 10
OD_IOP_MMHG: 13
IOP_METHOD: TONOPEN

## 2023-12-22 ASSESSMENT — CUP TO DISC RATIO
OD_RATIO: 0.6
OS_RATIO: 0.3

## 2023-12-22 ASSESSMENT — VISUAL ACUITY
OS_SC+: -1
METHOD: SNELLEN - LINEAR
OD_SC: 20/500
OS_SC: 20/20

## 2023-12-22 ASSESSMENT — SLIT LAMP EXAM - LIDS
COMMENTS: NORMAL
COMMENTS: NORMAL

## 2023-12-22 ASSESSMENT — EXTERNAL EXAM - LEFT EYE: OS_EXAM: NORMAL

## 2023-12-22 ASSESSMENT — EXTERNAL EXAM - RIGHT EYE: OD_EXAM: NORMAL

## 2023-12-22 NOTE — NURSING NOTE
Chief Complaints and History of Present Illnesses   Patient presents with    Macular Degeneration Follow Up     Chief Complaint(s) and History of Present Illness(es)       Macular Degeneration Follow Up              Laterality: both eyes    Onset: 3 months ago    Quality: blurred vision    Associated symptoms: Negative for eye pain, flashes and floaters    Treatments tried: no treatments    Response to treatment: no improvement    Pain scale: 0/10              Comments    Vision has been stable since last visit.  Uses artificial tears occasionally.      Gill Jasso on 12/22/2023 at 10:28 AM

## 2023-12-22 NOTE — PROGRESS NOTES
CC -   Wet AMD OD    INTERVAL HISTORY - subjectively stable but worse OD on testing today  still smoking (1/2023)  Last DFE OU 12/2023    Prefers attending injection     PM-   Jonas Hyman is a  78 year old  patient referred by Dr Pinedo for wet  AMD OD.  Had gone few years without eye exam d/t insurance lack, gradual progressive loss OU.  Prior RD repair OD 2014, unsure what BCVA was after repair.  Caring for grandson  VA has worsened OU over years, now trouble with reading/drivign, ADLs.    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  Wet AMD OD   - uncertain duration by history   - uncertain vision potential given prior h/o RD   - started Avastin 9/17/18 (#6 total)   - changed to Eylea 3/12/19       - stable SRF & VA @ 7 weeks on 6/2021   - was typically 20/80-20/125 in past @ 4 weeks    - increased to 3 months Eylea in 2022 with incr FVPED/fluid no vision changes     - changed to Vabysmo 4/18/23 after Eylea (#31) @ 3 months   - worse SRF 7/18/23 with Vabysmo @ 3 months 7/18/23   - worse SRH & SRF with vabysmo @ 3 months 12/22/23     - last injection Vabysmo (#3) 9/26/23  (3 months)     - OCT worse   - VA stable   - DFE new SRH 12/2023   - inject Vabysmo today   - reduce interval to 1-2 months   - RTC 1-2 months T&E       # Retinal hemorrhage OD/SRH   - onset 12/2023   - from AMD, not large enough to displace   - no blood thinners, using ibuprofen 800 mg daily   - monitor    # intermediate dry AMD OS   - single large druse on OCT   - AREDS/Amsler    #. H/o RD repair OD   - s/p PPV/SBP/FGx  2014   - retina flat last DFE      #. PVD OS   - onset ~ 7/15/21 by history   - advised s/sx  RD 9/2023      #. OAG suspect OD   - d/t CDR asymmetry   - RNFL OK    - saw Elizabeth 11/2018     - refer again in future      Return in about 6 weeks (around 2/2/2024) for DFE OD, OCT OU.     Christopher Underwood MD  PGY-3 Ophthalmology      ATTESTATION     Attending Physician Attestation:      Complete documentation of historical and exam elements from today's encounter can be found in the full encounter summary report (not reduplicated in this progress note).  I personally obtained the chief complaint(s) and history of present illness.  I confirmed and edited as necessary the review of systems, past medical/surgical history, family history, social history, and examination findings as documented by others; and I examined the patient myself.  I personally reviewed the relevant tests, images, and reports as documented above.  I personally reviewed the ophthalmic test(s) associated with this encounter, agree with the interpretation(s) as documented by the resident/fellow, and have edited the corresponding report(s) as necessary.   I formulated and edited as necessary the assessment and plan and discussed the findings and management plan with the patient and family and No resident or fellow assisted with the procedures performed.  I performed the procedures myself.    Anu Wilson MD, PhD  , Vitreoretinal Surgery  Department of Ophthalmology  [facility]

## 2024-01-24 DIAGNOSIS — H35.3211 EXUDATIVE AGE-RELATED MACULAR DEGENERATION OF RIGHT EYE WITH ACTIVE CHOROIDAL NEOVASCULARIZATION (H): Primary | ICD-10-CM

## 2024-01-26 ENCOUNTER — OFFICE VISIT (OUTPATIENT)
Dept: OPHTHALMOLOGY | Facility: CLINIC | Age: 79
End: 2024-01-26
Attending: OPHTHALMOLOGY
Payer: MEDICARE

## 2024-01-26 DIAGNOSIS — H35.3211 EXUDATIVE AGE-RELATED MACULAR DEGENERATION OF RIGHT EYE WITH ACTIVE CHOROIDAL NEOVASCULARIZATION (H): ICD-10-CM

## 2024-01-26 PROCEDURE — 250N000011 HC RX IP 250 OP 636: Mod: JZ | Performed by: OPHTHALMOLOGY

## 2024-01-26 PROCEDURE — 67028 INJECTION EYE DRUG: CPT | Mod: RT | Performed by: OPHTHALMOLOGY

## 2024-01-26 PROCEDURE — 92134 CPTRZ OPH DX IMG PST SGM RTA: CPT | Performed by: OPHTHALMOLOGY

## 2024-01-26 RX ADMIN — FARICIMAB 6 MG: 6 INJECTION, SOLUTION INTRAVITREAL at 11:47

## 2024-01-26 ASSESSMENT — CUP TO DISC RATIO
OS_RATIO: 0.3
OD_RATIO: 0.6

## 2024-01-26 ASSESSMENT — VISUAL ACUITY
OS_SC: 20/20
METHOD: SNELLEN - LINEAR
OD_SC: 20/600
OD_SC+: ECC

## 2024-01-26 ASSESSMENT — TONOMETRY
IOP_METHOD: TONOPEN
OS_IOP_MMHG: 14
OD_IOP_MMHG: 13

## 2024-01-26 ASSESSMENT — EXTERNAL EXAM - RIGHT EYE: OD_EXAM: NORMAL

## 2024-01-26 ASSESSMENT — SLIT LAMP EXAM - LIDS
COMMENTS: NORMAL
COMMENTS: NORMAL

## 2024-01-26 ASSESSMENT — EXTERNAL EXAM - LEFT EYE: OS_EXAM: NORMAL

## 2024-01-26 NOTE — NURSING NOTE
Chief Complaints and History of Present Illnesses   Patient presents with    Follow Up        Exudative age-related macular degeneration of right eye with active choroidal neovascularization     Chief Complaint(s) and History of Present Illness(es)       Follow Up              Comments:    Exudative age-related macular degeneration of right eye with active choroidal neovascularization              Comments    Pt states no change in VA since last visit  States no flashes or floaters   No eye pain or tearing     Radha Johnson COT 10:23 AM January 26, 2024

## 2024-01-26 NOTE — PROGRESS NOTES
CC -   Wet AMD OD    INTERVAL HISTORY - Patient states that vision in the right eye has decreased over the past 4 months  still smoking (1/2023)  Last DFE OU 12/2023    Prefers attending injection     [facility]-   Jonas Hyman is a  78 year old  patient referred by Dr iPnedo for wet  AMD OD.  Had gone few years without eye exam d/t insurance lack, gradual progressive loss OU.  Prior RD repair OD 2014, unsure what BCVA was after repair.  Caring for grandson  VA has worsened OU over years, now trouble with reading/drivign, ADLs.    PAST OCULAR SURGERY  PPV/SBP/FGx OD 2014 ([facility])  ACIOL OD   CE/IOL OS 12/3/18    RETINAL IMAGING:  OCT 01/26/2024  OD - larger  FVPED with worsening SRF/SRH, PVD -> 627  OS - mild RPE elevations/drusen, tr fluid , tr ERM, PVD    FA 9-17-18  OD - central leakage c/w occult CNVM  OS - no leakage    ICG 9-17-18  OD - central leakage c/w CNMV, no polyps  OS - no leakage      ASSESSMENT & PLAN  #.  Wet AMD OD   - uncertain duration by history   - uncertain vision potential given prior h/o RD   - started Avastin 9/17/18 (#6 total)   - changed to Eylea 3/12/19       - stable SRF & VA @ 7 weeks on 6/2021   - was typically 20/80-20/125 in past @ 4 weeks    - increased to 3 months Eylea in 2022 with incr FVPED/fluid no vision changes     - changed to Vabysmo 4/18/23 after Eylea (#31) @ 3 months   - worse SRF 7/18/23 with Vabysmo @ 3 months 7/18/23   - worse SRH & SRF with vabysmo @ 3 months 12/22/23   - worse SRF & IRF with Vabysmo @ 5 weeks 1/26/24     - last injection Vabysmo (#4) 12/22/23  (1 months)     - OCT worse   - VA worse   - DFE new SRH 12/2023 persistent 1/2024   - inject Vabysmo today   - RTC 1 months T&E   - change to Eylea next visit   - may be end stage       # Retinal hemorrhage OD/SRH   - onset 12/2023   - from AMD, not large enough to displace   - no blood thinners, using ibuprofen 800 mg daily   - monitor    # intermediate dry AMD OS   - single large druse on OCT   -  AREDS/Amsler    #. H/o RD repair OD   - s/p PPV/SBP/FGx  2014   - retina flat last DFE      #. PVD OS   - onset ~ 7/15/21 by history   - advised s/sx RD 9/2023      #. 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     Clint Solitario MD  PGY-5 Vitreo-retina surgery Fellow  Department of Ophthalmology   [facility]        ATTESTATION     Attending Physician Attestation:      Complete documentation of historical and exam elements from today's encounter can be found in the full encounter summary report (not reduplicated in this progress note).  I personally obtained the chief complaint(s) and history of present illness.  I confirmed and edited as necessary the review of systems, past medical/surgical history, family history, social history, and examination findings as documented by others; and I examined the patient myself.  I personally reviewed the relevant tests, images, and reports as documented above.  I personally reviewed the ophthalmic test(s) associated with this encounter, agree with the interpretation(s) as documented by the resident/fellow, and have edited the corresponding report(s) as necessary.   I formulated and edited as necessary the assessment and plan and discussed the findings and management plan with the patient and family and No resident or fellow assisted with the procedures performed.  I performed the procedures myself.    Anu Wilson MD, PhD  , Vitreoretinal Surgery  Department of Ophthalmology  [facility]

## 2024-02-06 DIAGNOSIS — H35.3211 EXUDATIVE AGE-RELATED MACULAR DEGENERATION OF RIGHT EYE WITH ACTIVE CHOROIDAL NEOVASCULARIZATION (H): Primary | ICD-10-CM

## 2024-03-16 ENCOUNTER — HEALTH MAINTENANCE LETTER (OUTPATIENT)
Age: 79
End: 2024-03-16

## 2024-05-25 ENCOUNTER — HEALTH MAINTENANCE LETTER (OUTPATIENT)
Age: 79
End: 2024-05-25

## 2024-05-31 ENCOUNTER — OFFICE VISIT (OUTPATIENT)
Dept: OPHTHALMOLOGY | Facility: CLINIC | Age: 79
End: 2024-05-31
Attending: OPHTHALMOLOGY
Payer: MEDICARE

## 2024-05-31 DIAGNOSIS — H35.3211 EXUDATIVE AGE-RELATED MACULAR DEGENERATION OF RIGHT EYE WITH ACTIVE CHOROIDAL NEOVASCULARIZATION (H): ICD-10-CM

## 2024-05-31 PROCEDURE — 92134 CPTRZ OPH DX IMG PST SGM RTA: CPT | Performed by: OPHTHALMOLOGY

## 2024-05-31 PROCEDURE — G0463 HOSPITAL OUTPT CLINIC VISIT: HCPCS | Performed by: OPHTHALMOLOGY

## 2024-05-31 PROCEDURE — 99213 OFFICE O/P EST LOW 20 MIN: CPT | Mod: GC | Performed by: OPHTHALMOLOGY

## 2024-05-31 ASSESSMENT — CONF VISUAL FIELD
OS_INFERIOR_TEMPORAL_RESTRICTION: 0
OD_INFERIOR_NASAL_RESTRICTION: 2
OS_NORMAL: 1
OD_SUPERIOR_TEMPORAL_RESTRICTION: 2
OD_INFERIOR_TEMPORAL_RESTRICTION: 2
METHOD: COUNTING FINGERS
OS_SUPERIOR_NASAL_RESTRICTION: 0
OS_INFERIOR_NASAL_RESTRICTION: 0
OD_SUPERIOR_NASAL_RESTRICTION: 1
OS_SUPERIOR_TEMPORAL_RESTRICTION: 0

## 2024-05-31 ASSESSMENT — VISUAL ACUITY
CORRECTION_TYPE: GLASSES
OS_SC: 20/25
OS_SC+: +1
OD_SC: 20/600 ECC
METHOD: SNELLEN - LINEAR

## 2024-05-31 ASSESSMENT — TONOMETRY
IOP_METHOD: TONOPEN
OD_IOP_MMHG: 9
OS_IOP_MMHG: 10

## 2024-05-31 ASSESSMENT — SLIT LAMP EXAM - LIDS
COMMENTS: NORMAL
COMMENTS: NORMAL

## 2024-05-31 ASSESSMENT — CUP TO DISC RATIO
OD_RATIO: 0.6
OS_RATIO: 0.3

## 2024-05-31 ASSESSMENT — EXTERNAL EXAM - LEFT EYE: OS_EXAM: NORMAL

## 2024-05-31 ASSESSMENT — EXTERNAL EXAM - RIGHT EYE: OD_EXAM: NORMAL

## 2024-05-31 NOTE — NURSING NOTE
Chief Complaints and History of Present Illnesses   Patient presents with    Macular Degeneration Follow Up     Chief Complaint(s) and History of Present Illness(es)       Macular Degeneration Follow Up              Laterality: right eye    Onset: 4 months ago    Severity: severe    Frequency: constantly    Course: stable    Associated symptoms: floaters.  Negative for flashes    Response to treatment: no improvement              Comments    Patient is not sure if vision is worse, but knows it is not any better.   Occasional floaters in left eye.  Denies eye pain.      Gill Jasso on 5/31/2024 at 10:28 AM

## 2024-05-31 NOTE — PROGRESS NOTES
CC -   Wet AMD OD    INTERVAL HISTORY - SISSY 1/26/24, 4 months,  missed return reports that his central vision is decreased, and fluctuates.   wet AMD OD, likely end stage,       last history - Patient states that vision in the right eye has decreased over the past 4 months  still smoking (1/2023)  Last DFE OU 12/2023    Prefers attending injection     [facility]-   Jonas Hyman is a  78 year old  patient referred by Dr Pinedo for wet  AMD OD.  Had gone few years without eye exam d/t insurance lack, gradual progressive loss OU.  Prior RD repair OD 2014, unsure what BCVA was after repair.  Caring for grandson  VA has worsened OU over years, now trouble with reading/drivign, ADLs.    PAST OCULAR SURGERY  PPV/SBP/FGx OD 2014 ([facility])  ACIOL OD   CE/IOL OS 12/3/18    RETINAL IMAGING:  OCT 05/31/2024  OD - larger  FVPED with worsening temporal subretinal and intraretinal fluid. There is improving central SRF/SRH, PVD -> 627 -> 843 -> 715  OS - mild RPE elevations/drusen, tr fluid , tr ERM, PVD    Fundus photos   Right eye: Clear media, optic disc is flat with PPA. Macula with decreased foveal reflex. Superior subretinal heme. Temporal chorioretinal scar.     FA 9-17-18  OD - central leakage c/w occult CNVM  OS - no leakage    ICG 9-17-18  OD - central leakage c/w CNMV, no polyps  OS - no leakage      ASSESSMENT & PLAN  #.  Wet AMD OD   - uncertain duration by history   - uncertain vision potential given prior h/o RD   - started Avastin 9/17/18 (#6 total)   - changed to Eylea 3/12/19       - stable SRF & VA @ 7 weeks on 6/2021   - was typically 20/80-20/125 in past @ 4 weeks    - increased to 3 months Eylea in 2022 with incr FVPED/fluid no vision changes     - changed to Vabysmo 4/18/23 after Eylea (#31) @ 3 months   - worse SRF 7/18/23 with Vabysmo @ 3 months 7/18/23   - worse SRH & SRF with vabysmo @ 3 months 12/22/23   - worse SRF & IRF with Vabysmo @ 5 weeks 1/26/24     - last injection Vabysmo (#5) 1/26/24  (4  months)     - OCT right eye: Worsening interotemporal and temporal subretinal and intraretinal fluid with improving central intraretinal fluid.    - VA 20/600 ecc stable ?end stage   - DFE new SRH 12/2023 persistent 1/2024 & 5/2024     - observe today, likely end stage   - recheck 2 months as precaution       # Retinal hemorrhage OD/SRH   - onset 12/2023   - from AMD, not large enough to displace   - no blood thinners, using ibuprofen 800 mg daily   - monitor    # intermediate dry AMD OS   - single large druse on OCT   - AREDS/Amsler    #. H/o RD repair OD   - s/p PPV/SBP/FGx  2014   - retina flat last DFE      #. PVD OS   - onset ~ 7/15/21 by history   - advised s/sx RD 5/2024      #. 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     Clint Solitario MD  PGY-5 Vitreo-retina surgery Fellow  Department of Ophthalmology   [facility]      ATTESTATION     Attending Physician Attestation:      Complete documentation of historical and exam elements from today's encounter can be found in the full encounter summary report (not reduplicated in this progress note).  I personally obtained the chief complaint(s) and history of present illness.  I confirmed and edited as necessary the review of systems, past medical/surgical history, family history, social history, and examination findings as documented by others; and I examined the patient myself.  I personally reviewed the relevant tests, images, and reports as documented above.  I personally reviewed the ophthalmic test(s) associated with this encounter, agree with the interpretation(s) as documented by the resident/fellow, and have edited the corresponding report(s) as necessary.   I formulated and edited as necessary the assessment and plan and discussed the findings and  management plan with the patient and family    Anu Wilson MD, PhD  , Vitreoretinal Surgery  Department of Ophthalmology  [facility]

## 2024-06-26 ENCOUNTER — TELEPHONE (OUTPATIENT)
Dept: OPHTHALMOLOGY | Facility: CLINIC | Age: 79
End: 2024-06-26
Payer: MEDICARE

## 2024-07-08 ENCOUNTER — NURSE TRIAGE (OUTPATIENT)
Dept: INTERNAL MEDICINE | Facility: CLINIC | Age: 79
End: 2024-07-08
Payer: MEDICARE

## 2024-07-08 DIAGNOSIS — R60.0 PERIPHERAL EDEMA: Primary | ICD-10-CM

## 2024-07-08 NOTE — TELEPHONE ENCOUNTER
General Call    Contacts       Contact Date/Time Type Contact Phone/Fax    07/08/2024 10:17 AM CDT Phone (Incoming) Jonas Hyman ZE (Self) [phone] (M)          Reason for Call:  Requesting call back to discuss    What are your questions or concerns:   Patient has concerns about medications and would like a call back to discuss. He will go into more detail when he speaks with the care team    Date of last appointment with provider: 07/24/2023    Could we send this information to you in [employer] or would you prefer to receive a phone call?:   Patient would prefer a phone call   Okay to leave a detailed message?: Yes at Cell number on file:    Telephone Information:   Mobile [phone]

## 2024-07-10 RX ORDER — FUROSEMIDE 40 MG
40 TABLET ORAL DAILY PRN
Qty: 30 TABLET | Refills: 3 | Status: SHIPPED | OUTPATIENT
Start: 2024-07-10

## 2024-07-10 RX ORDER — TRIAMTERENE/HYDROCHLOROTHIAZID 37.5-25 MG
1 TABLET ORAL DAILY
Qty: 90 TABLET | Refills: 3 | Status: SHIPPED | OUTPATIENT
Start: 2024-07-10 | End: 2024-08-16

## 2024-07-10 NOTE — TELEPHONE ENCOUNTER
Provider Recommendation Follow Up:   Reached patient/caregiver. Informed of provider's recommendations. Patient verbalized understanding and agrees with the plan.       Reports swelling is the same as years ago.     Tingling and hot and cold in feet and sleeplessness is new.     Cramping in legs ongoing but now worse.     Patient will do evisit today via mychart. Advised if unable to complete this to contact clinic.

## 2024-07-10 NOTE — TELEPHONE ENCOUNTER
If this is similar to episode of years ago we can repeat the treatment of years ago.  I would need to research that    If this is different somehow we could need an office visit or a [employer] message or an e-visit however face-to-face visit not needed

## 2024-07-10 NOTE — TELEPHONE ENCOUNTER
Nurse Triage SBAR    Is this a 2nd Level Triage? YES, LICENSED PRACTITIONER REVIEW IS REQUIRED    Situation: Leg Swelling     Background: Reports symptoms are similar to episode years ago    Assessment: Patient contacting clinic reports bilateral lower leg edema that extends to knees. Left leg is more swollen than the right. Right foot is able to fit in size 11 shoe. Left foot can not. Reports mild red discoloration on inner left thigh. Denies calf of thigh pain or tenderness to touch. Reports history of cramping that is now worsening and interfering with sleep. Reports tingling and cool/hot sensations in feet. Swelling is worse at night and in the morning. Reports swelling improves with activity. Reports restless leg symptoms are worsening. Denies difficulty breathing or chest pain. Denies recent travel or surgeries. Aspirin 81 mg daily.    Does not use compression stockings. Avoiding tight binding items to legs. Taking Magnesium, eating bananas, staying hydrated and taking vitamin B6.    Protocol Recommended Disposition:   Go To Office Now    Recommendation: Be seen in office. Patient would like to see PCP. No available visits. Requesting PCP review and prescription for diuretic.      Routed to provider    Does the patient meet one of the following criteria for ADS visit consideration? 16+ years old, with an [facility] PCP     TIP  Providers, please consider if this condition is appropriate for management at one of our Acute and Diagnostic Services sites.     If patient is a good candidate, please use dotphrase <dot>triageresponse and select Refer to ADS to document.    Reason for Disposition   Thigh, calf, or ankle swelling in both legs, but one side is definitely more swollen (Exception: Longstanding difference between legs.)    Additional Information   Negative: Sounds like a life-threatening emergency to the triager   Negative: Chest pain   Negative: Followed an insect bite and has localized swelling (e.g., small  area of puffy or swollen skin)   Negative: Followed a knee injury   Negative: Ankle or foot injury   Negative: Pregnant with leg swelling or edema   Negative: Difficulty breathing at rest   Negative: Entire foot is cool or blue in comparison to other side   Negative: SEVERE swelling (e.g., swelling extends above knee, entire leg is swollen, weeping fluid)   Negative: Cast on leg or ankle and has increasing pain   Negative: Can't walk or can barely stand (new-onset)   Negative: Fever and red area (or area very tender to touch)   Negative: Patient sounds very sick or weak to the triager   Negative: Swelling of face, arm or hands  (Exception: Slight puffiness of fingers during hot weather.)   Negative: Pregnant 20 or more weeks and sudden weight gain (i.e., > 2 lbs, 1 kg in one week)   Negative: Thigh or calf pain and only 1 side and present > 1 hour   Negative: Thigh, calf, or ankle swelling in only one leg    Protocols used: Leg Swelling and Edema-A-[place]

## 2024-07-14 DIAGNOSIS — E11.22 TYPE 2 DIABETES MELLITUS WITH STAGE 3B CHRONIC KIDNEY DISEASE, WITHOUT LONG-TERM CURRENT USE OF INSULIN (H): ICD-10-CM

## 2024-07-14 DIAGNOSIS — R25.2 LEG CRAMPS: ICD-10-CM

## 2024-07-14 DIAGNOSIS — I10 ESSENTIAL HYPERTENSION: ICD-10-CM

## 2024-07-14 DIAGNOSIS — R60.0 PERIPHERAL EDEMA: Primary | ICD-10-CM

## 2024-07-14 DIAGNOSIS — N18.32 TYPE 2 DIABETES MELLITUS WITH STAGE 3B CHRONIC KIDNEY DISEASE, WITHOUT LONG-TERM CURRENT USE OF INSULIN (H): ICD-10-CM

## 2024-07-14 RX ORDER — ROPINIROLE 0.5 MG/1
0.5 TABLET, FILM COATED ORAL
Qty: 20 TABLET | Refills: 0 | Status: SHIPPED | OUTPATIENT
Start: 2024-07-14 | End: 2024-07-22

## 2024-07-16 ENCOUNTER — PATIENT OUTREACH (OUTPATIENT)
Dept: CARE COORDINATION | Facility: CLINIC | Age: 79
End: 2024-07-16

## 2024-07-16 ENCOUNTER — LAB (OUTPATIENT)
Dept: LAB | Facility: CLINIC | Age: 79
End: 2024-07-16
Payer: MEDICARE

## 2024-07-16 DIAGNOSIS — E11.59 TYPE 2 DIABETES MELLITUS WITH CIRCULATORY DISORDER, WITHOUT LONG-TERM CURRENT USE OF INSULIN (H): Primary | ICD-10-CM

## 2024-07-16 DIAGNOSIS — Z12.5 SCREENING FOR PROSTATE CANCER: ICD-10-CM

## 2024-07-16 DIAGNOSIS — N18.32 TYPE 2 DIABETES MELLITUS WITH STAGE 3B CHRONIC KIDNEY DISEASE, WITHOUT LONG-TERM CURRENT USE OF INSULIN (H): ICD-10-CM

## 2024-07-16 DIAGNOSIS — E11.22 TYPE 2 DIABETES MELLITUS WITH STAGE 3B CHRONIC KIDNEY DISEASE, WITHOUT LONG-TERM CURRENT USE OF INSULIN (H): ICD-10-CM

## 2024-07-16 DIAGNOSIS — R60.0 PERIPHERAL EDEMA: ICD-10-CM

## 2024-07-16 DIAGNOSIS — R25.2 LEG CRAMPS: ICD-10-CM

## 2024-07-16 LAB
BASOPHILS # BLD AUTO: 0 10E3/UL (ref 0–0.2)
BASOPHILS NFR BLD AUTO: 0 %
EOSINOPHIL # BLD AUTO: 0.1 10E3/UL (ref 0–0.7)
EOSINOPHIL NFR BLD AUTO: 1 %
ERYTHROCYTE [DISTWIDTH] IN BLOOD BY AUTOMATED COUNT: 12 % (ref 10–15)
HBA1C MFR BLD: 5.8 % (ref 0–5.6)
HCT VFR BLD AUTO: 39.4 % (ref 40–53)
HGB BLD-MCNC: 13.3 G/DL (ref 13.3–17.7)
IMM GRANULOCYTES # BLD: 0 10E3/UL
IMM GRANULOCYTES NFR BLD: 0 %
LYMPHOCYTES # BLD AUTO: 1.7 10E3/UL (ref 0.8–5.3)
LYMPHOCYTES NFR BLD AUTO: 17 %
MCH RBC QN AUTO: 31.8 PG (ref 26.5–33)
MCHC RBC AUTO-ENTMCNC: 33.8 G/DL (ref 31.5–36.5)
MCV RBC AUTO: 94 FL (ref 78–100)
MONOCYTES # BLD AUTO: 0.9 10E3/UL (ref 0–1.3)
MONOCYTES NFR BLD AUTO: 9 %
NEUTROPHILS # BLD AUTO: 7.1 10E3/UL (ref 1.6–8.3)
NEUTROPHILS NFR BLD AUTO: 72 %
PLATELET # BLD AUTO: 347 10E3/UL (ref 150–450)
RBC # BLD AUTO: 4.18 10E6/UL (ref 4.4–5.9)
WBC # BLD AUTO: 9.9 10E3/UL (ref 4–11)

## 2024-07-16 PROCEDURE — 82043 UR ALBUMIN QUANTITATIVE: CPT

## 2024-07-16 PROCEDURE — 80053 COMPREHEN METABOLIC PANEL: CPT

## 2024-07-16 PROCEDURE — 84443 ASSAY THYROID STIM HORMONE: CPT

## 2024-07-16 PROCEDURE — 85025 COMPLETE CBC W/AUTO DIFF WBC: CPT

## 2024-07-16 PROCEDURE — 36415 COLL VENOUS BLD VENIPUNCTURE: CPT

## 2024-07-16 PROCEDURE — G0103 PSA SCREENING: HCPCS

## 2024-07-16 PROCEDURE — 83036 HEMOGLOBIN GLYCOSYLATED A1C: CPT

## 2024-07-16 PROCEDURE — 82570 ASSAY OF URINE CREATININE: CPT

## 2024-07-16 PROCEDURE — 82607 VITAMIN B-12: CPT

## 2024-07-16 PROCEDURE — 83735 ASSAY OF MAGNESIUM: CPT

## 2024-07-16 PROCEDURE — 80061 LIPID PANEL: CPT

## 2024-07-17 LAB
ALBUMIN SERPL BCG-MCNC: 4.3 G/DL (ref 3.5–5.2)
ALP SERPL-CCNC: 67 U/L (ref 40–150)
ALT SERPL W P-5'-P-CCNC: 19 U/L (ref 0–70)
ANION GAP SERPL CALCULATED.3IONS-SCNC: 12 MMOL/L (ref 7–15)
AST SERPL W P-5'-P-CCNC: 21 U/L (ref 0–45)
BILIRUB SERPL-MCNC: 0.4 MG/DL
BUN SERPL-MCNC: 34.8 MG/DL (ref 8–23)
CALCIUM SERPL-MCNC: 9.4 MG/DL (ref 8.8–10.4)
CHLORIDE SERPL-SCNC: 93 MMOL/L (ref 98–107)
CHOLEST SERPL-MCNC: 168 MG/DL
CREAT SERPL-MCNC: 1.66 MG/DL (ref 0.67–1.17)
CREAT UR-MCNC: 40.4 MG/DL
EGFRCR SERPLBLD CKD-EPI 2021: 42 ML/MIN/1.73M2
FASTING STATUS PATIENT QL REPORTED: ABNORMAL
FASTING STATUS PATIENT QL REPORTED: ABNORMAL
GLUCOSE SERPL-MCNC: 182 MG/DL (ref 70–99)
HCO3 SERPL-SCNC: 28 MMOL/L (ref 22–29)
HDLC SERPL-MCNC: 36 MG/DL
LDLC SERPL CALC-MCNC: 76 MG/DL
MAGNESIUM SERPL-MCNC: 2 MG/DL (ref 1.7–2.3)
MICROALBUMIN UR-MCNC: <12 MG/L
MICROALBUMIN/CREAT UR: NORMAL MG/G{CREAT}
NONHDLC SERPL-MCNC: 132 MG/DL
POTASSIUM SERPL-SCNC: 4.5 MMOL/L (ref 3.4–5.3)
PROT SERPL-MCNC: 7.3 G/DL (ref 6.4–8.3)
PSA SERPL DL<=0.01 NG/ML-MCNC: 0.81 NG/ML (ref 0–6.5)
SODIUM SERPL-SCNC: 133 MMOL/L (ref 135–145)
TRIGL SERPL-MCNC: 278 MG/DL
TSH SERPL DL<=0.005 MIU/L-ACNC: 0.93 UIU/ML (ref 0.3–4.2)
VIT B12 SERPL-MCNC: 1103 PG/ML (ref 232–1245)

## 2024-07-21 DIAGNOSIS — R25.2 LEG CRAMPS: ICD-10-CM

## 2024-07-22 DIAGNOSIS — R25.2 LEG CRAMPS: ICD-10-CM

## 2024-07-22 RX ORDER — ROPINIROLE 1 MG/1
1 TABLET, FILM COATED ORAL
Qty: 30 TABLET | Refills: 1 | Status: SHIPPED | OUTPATIENT
Start: 2024-07-22 | End: 2024-08-16

## 2024-07-30 DIAGNOSIS — H35.3211 EXUDATIVE AGE-RELATED MACULAR DEGENERATION OF RIGHT EYE WITH ACTIVE CHOROIDAL NEOVASCULARIZATION (H): Primary | ICD-10-CM

## 2024-08-06 ENCOUNTER — TELEPHONE (OUTPATIENT)
Dept: INTERNAL MEDICINE | Facility: CLINIC | Age: 79
End: 2024-08-06
Payer: MEDICARE

## 2024-08-06 NOTE — TELEPHONE ENCOUNTER
FYI - Status Update    Who is Calling: patient    Update: The medication only worked for 2 days, patient is still experiencing cramping, fatigue and only getting 1-3 hours of sleep per night in the last few days. Please call patient.    Does caller want a call/response back: Yes     Could we send this information to you in [employer] or would you prefer to receive a phone call?:   Patient would prefer a phone call   Okay to leave a detailed message?: Yes at Cell number on file:    Telephone Information:   Mobile [phone]

## 2024-08-06 NOTE — TELEPHONE ENCOUNTER
Patient reports the cramping in his legs has returned and is keeping him awake at night.  Patient was recently advised to increase ropinirole to 1 MG daily from 0.5 MG daily.  Patient reports he was also advised to hold his statin for 7-10 days.  Patient reports the cramping did improve and he was able to get more sleep for a couple of days but has now returned.  Patient reports he has also had a couple of instances of hand cramping that lasts about five minutes.  Patient is staying hydrated, eating bananas and stretching.  Patient would also like to know if he should restart his statin.    Writer advised patient Dr. Goldsmith is out of the clinic until 8/12.  Patient requests this message not be sent to a covering provider and wait for PCP return. Writer advised patient to return call to clinic if symptoms worsen.    Patient reports he is having issues with MyChart and will call the assistance line.      Patient also reports he has a cardiology follow-up at [facility] on 8/14/24.

## 2024-08-12 NOTE — TELEPHONE ENCOUNTER
Spoke with patient and relayed message from Dr. Goldsmith. Patient requested to schedule an appointment. RN assisted patient in scheduling appointment on Friday.

## 2024-08-12 NOTE — CONFIDENTIAL NOTE
If cramping has occurred even though he stopped the statin, please resume the statin    Ropinirole can be increased to a dose of up to 5 mg.  If he feels that it is helping he may increase from 1 mg to 2 mg

## 2024-08-14 ENCOUNTER — TELEPHONE (OUTPATIENT)
Dept: INTERNAL MEDICINE | Facility: CLINIC | Age: 79
End: 2024-08-14
Payer: MEDICARE

## 2024-08-14 NOTE — TELEPHONE ENCOUNTER
"Patient calls reporting findings on CT chest abdomen pelvis angiogram done at [facility] today. Report available in Care Everywhere. Report states \"Interval development of right lower lobe mass lesion with right hilar and mediastinal adenopathy concerning for primary lung cancer with lymph node metastases\".     Patient would like Dr. Goldsmith to be aware of lung mass. Patient has upcoming video visit with Dr. Goldsmith 8/16/24.   "

## 2024-08-16 ENCOUNTER — PRE VISIT (OUTPATIENT)
Dept: ONCOLOGY | Facility: CLINIC | Age: 79
End: 2024-08-16

## 2024-08-16 ENCOUNTER — VIRTUAL VISIT (OUTPATIENT)
Dept: INTERNAL MEDICINE | Facility: CLINIC | Age: 79
End: 2024-08-16
Payer: MEDICARE

## 2024-08-16 ENCOUNTER — PATIENT OUTREACH (OUTPATIENT)
Dept: ONCOLOGY | Facility: CLINIC | Age: 79
End: 2024-08-16

## 2024-08-16 DIAGNOSIS — Z95.828 S/P REPAIR OF ABDOMINAL AORTIC ANEURYSM USING BIFURCATION GRAFT: ICD-10-CM

## 2024-08-16 DIAGNOSIS — E66.01 SEVERE OBESITY WITH BODY MASS INDEX (BMI) OF 35.0 TO 39.9 WITH SERIOUS COMORBIDITY (H): ICD-10-CM

## 2024-08-16 DIAGNOSIS — E11.22 TYPE 2 DIABETES MELLITUS WITH STAGE 3B CHRONIC KIDNEY DISEASE, WITHOUT LONG-TERM CURRENT USE OF INSULIN (H): ICD-10-CM

## 2024-08-16 DIAGNOSIS — R25.2 LEG CRAMPS: ICD-10-CM

## 2024-08-16 DIAGNOSIS — Z86.79 S/P REPAIR OF ABDOMINAL AORTIC ANEURYSM USING BIFURCATION GRAFT: ICD-10-CM

## 2024-08-16 DIAGNOSIS — R91.8 RIGHT LOWER LOBE LUNG MASS: Primary | ICD-10-CM

## 2024-08-16 DIAGNOSIS — F10.21 HISTORY OF ALCOHOL DEPENDENCE (H): ICD-10-CM

## 2024-08-16 DIAGNOSIS — N18.32 TYPE 2 DIABETES MELLITUS WITH STAGE 3B CHRONIC KIDNEY DISEASE, WITHOUT LONG-TERM CURRENT USE OF INSULIN (H): ICD-10-CM

## 2024-08-16 DIAGNOSIS — I25.119 CORONARY ARTERY DISEASE INVOLVING NATIVE CORONARY ARTERY OF NATIVE HEART WITH ANGINA PECTORIS (H): ICD-10-CM

## 2024-08-16 DIAGNOSIS — I71.40 ABDOMINAL AORTIC ANEURYSM (AAA) WITHOUT RUPTURE, UNSPECIFIED PART (H): ICD-10-CM

## 2024-08-16 PROBLEM — F19.11 OTHER, MIXED, OR UNSPECIFIED NONDEPENDENT DRUG ABUSE, IN REMISSION: Status: RESOLVED | Noted: 2019-04-08 | Resolved: 2024-08-16

## 2024-08-16 PROCEDURE — G2211 COMPLEX E/M VISIT ADD ON: HCPCS | Mod: 95 | Performed by: INTERNAL MEDICINE

## 2024-08-16 PROCEDURE — 99214 OFFICE O/P EST MOD 30 MIN: CPT | Mod: 95 | Performed by: INTERNAL MEDICINE

## 2024-08-16 RX ORDER — ROPINIROLE 2 MG/1
2 TABLET, FILM COATED ORAL AT BEDTIME
Qty: 90 TABLET | Refills: 3 | Status: SHIPPED | OUTPATIENT
Start: 2024-08-16 | End: 2025-08-16

## 2024-08-16 RX ORDER — PRAVASTATIN SODIUM 40 MG
40 TABLET ORAL DAILY
Qty: 90 TABLET | Refills: 3 | Status: SHIPPED | OUTPATIENT
Start: 2024-08-16

## 2024-08-16 RX ORDER — MAGNESIUM OXIDE 400 MG/1
400 TABLET ORAL DAILY
COMMUNITY
Start: 2024-08-16 | End: 2025-08-16

## 2024-08-16 RX ORDER — MELATONIN 10 MG
20 CAPSULE ORAL AT BEDTIME
COMMUNITY
Start: 2024-08-16

## 2024-08-16 NOTE — PATIENT INSTRUCTIONS
Referral to pulmonary to review and arrange biopsy    Oncology referral pending results    Consider treatment at [place] depending on options    Discontinue hydrochlorothiazide    Initiation of Requip and now increase to 2 mg daily    When convenient update labs    Increase magnesium to 2 tablets daily    Trial of tonic water, yellow mustard, or pickle juice discussed    Resume pravastatin

## 2024-08-16 NOTE — PROGRESS NOTES
"New IP (Interventional Pulmonology) referral rec'd.  Chart reviewed.    Debra Neal PA-C, IP (Interventional Pulmonology) team notified by IB message to ask if she is able to see patient in a 30\" spot on TU 8/20.      New Patient: Interventional Pulmonary (Lung nodule) Nurse Navigator Note    Referring provider:   Dillon Goldsmith MD [facility] Internal Medicine [facility] [place] [phone]       Referred to (specialty): Interventional Pulmonary (Lung nodule)    Requested provider (if applicable): n/a    Date Referral Received: 8/16/2024    Evaluation for:  Right lower lobe lung mass    Clinical History (per Nurse review of records provided):    **BOOK MARKED**    [facility]  EXAM:  CT CHEST ABDOMEN PELVIS ANGIOGRAM WITH IV CONTRAST   Including 3D image post-processing with or without AI assistance.     COMPARISON:  CT angiogram chest, abdomen, and pelvis with IV contrast 11/16/2021, 01/08/2020, 07/16/2019, and 01/03/2019.     FINDINGS:   VASCULAR FINDINGS:   Surgical changes from fenestrated endograft placement (05/02/2019) with seal zone in the supraceliac abdominal aorta, extension limbs to the distal common iliac arteries, and fenestrated grafts supplying the celiac axis, superior mesenteric artery, right    renal artery, and left renal artery. The extension limbs, parent graft, and fenestrated grafts remain widely patent. Redemonstrated type II endoleak (series 21, image 765) with third lumbar arterial supply. The sac now measures 73 x 70 mm (axial,   previously 66 x 67 mm on 11/16/2021) and 72 x 69 mm (double oblique, previously 66 x 67 mm on 11/16/2021). The positioning of the graft within the sac has changed, with positioning more anterolateral than on the prior. Additionally, there is a   broad-based region of remodeling along the left posterolateral sac (series 10, image 332). Similar configuration of the sac near the seal zone at the juxtarenal region.     The aortic arch is left-sided, " with three branches and conventional branching anatomy. Moderate mixed density atherosclerosis in the arch and descending thoracic aorta with foci of plaque ulceration. The proximal great vessels are minimally stenosed.   Scattered calcific coronary artery disease with a LAD stent.     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 Borderline mild stenosis of the right external iliac artery secondary to mixed density plaque. The right common femoral artery    is minimally stenosed secondary to mixed density plaque. High bifurcation of the right common femoral artery at the lower third of the femoral head. Predominantly calcific plaque in the proximal profunda femoris artery causes mild stenosis. Mixed   density plaque in the proximal right superficial femoral artery causes mild stenosis. Mixed density plaque causes mild stenosis in the right internal iliac artery.     Dilation of the left common iliac artery measuring 18 mm at the distal seal zone of the iliac extension limb. Mixed density plaque in the external iliac artery causes borderline mild stenosis. Predominantly calcific plaque in the left common femoral   artery causes minimal stenosis. Mixed density plaque causes mild stenosis in the left internal iliac artery.     Retroaortic left renal vein.     ADDITIONAL FINDINGS:   Interval development of a right middle lobe mass with extension along the minor fissure, right hilar adenopathy, right paratracheal and anterior mediastinal adenopathy, and an enlarged node in the azygoesophageal recess (series 9, image 165). The right   middle lobe mass lesion is contiguous with nodularity along the minor fissure. The mediastinal nodes measure up to 51 x 57 mm anteriorly. Physiologic pericardial fluid. No pleural effusions or pneumothoraces.     The gallbladder, spleen, and right adrenal gland are normal in appearance. Right liver lobe  subcentimeter cyst or hemangioma unchanged from 2021. Hypoattenuating fat-containing left adrenal nodule consistent with a myelolipoma. Left renal cortical   scarring with unchanged atrophy and hypoattenuation involving the left renal inferior pole consistent with ischemic changes in a patient with an accessory arterial supply. Scattered pancreatic calcifications visible over prior inflammatory process.   Colonic diverticulosis without adjacent inflammatory changes. The appendix is normal. The large and small bowel are normal in caliber. Small fat-containing inguinal hernias bilaterally. The prostate is enlarged. The urinary bladder is moderately   distended with circumferential wall thickening.     Bilateral hip osteoarthritis. Dense foci of sclerosis noted in the iliac bones bilaterally consistent with bone islands. No suspicious osseous lesions or fractures. Multilevel thoracolumbar degenerative disc disease. Midthoracic hemangioma. Dense focus   of sclerosis in the sternum consistent with a bone island.    Records Location: [facility] & [facility] ([facility])    RECORDS NEEDED:  Last FIVE years CHEST imaging pushed to PACS from [facility]--thank you!!    Additional testing needed prior to consult: none

## 2024-08-16 NOTE — TELEPHONE ENCOUNTER
Imaging Received  August 19, 2024 9:28 AM ABT   Action: Images from [facility] received and resolved to PACS.     RECORDS STATUS - ALL OTHER DIAGNOSIS      RECORDS RECEIVED FROM: [facility], [facility]   NOTES STATUS DETAILS   OFFICE NOTE from referring provider [facility] Dr. Dillon Goldsmith   MEDICATION LIST [facility]    LABS     PATHOLOGY REPORTS     ANYTHING RELATED TO DIAGNOSIS [facility] Most recent 08/14/24   IMAGING (NEED IMAGES & REPORT)     CT SCANS PACS [facility]:  08/14/24-01/03/19: CT CAP   XRAYS PACS [facility]:  01/03/19: DX Chest

## 2024-08-16 NOTE — PROGRESS NOTES
OFFICE VISIT--Video    Jonas is a 79 year old male contacting the clinic today via video, who will use the platform: [employer] for the visit.  Phone # for [employer], or if [facility] drops:   Telephone Information:   Mobile [phone]          ASSESSMENT and PLAN:  1. Right lower lobe lung mass  Probable cancer with long history of smoking.  Referral to pulmonary for bronchoscopic biopsy or arrangement of transthoracic.  Referral to oncology but may wish to go to [facility]  - Adult Pulmonary Medicine  Referral; Future  - Adult Oncology/Hematology  Referral; Future    2. S/P repair of abdominal aortic aneurysm using bifurcation graft  Noted and stable after stenting    3. Leg cramps  Slowly increase ropinirole.  Clarify and increase magnesium.  Also discussed tonic water and yellow mustard  - rOPINIRole (REQUIP) 2 MG tablet; Take 1 tablet (2 mg) by mouth at bedtime  Dispense: 90 tablet; Refill: 3  - magnesium oxide (MAG-OX) 400 MG tablet; Take 1 tablet (400 mg) by mouth daily  - Melatonin 10 MG CAPS; Take 20 mg by mouth at bedtime    4. Coronary artery disease involving native coronary artery of native heart with angina pectoris (H24)  Okay to resume  - pravastatin (PRAVACHOL) 40 MG tablet; Take 1 tablet (40 mg) by mouth daily  Dispense: 90 tablet; Refill: 3    5. History of alcohol dependence (H)  Sober many years    6. Abdominal aortic aneurysm (AAA) without rupture, unspecified part (H24)    7. Severe obesity with body mass index (BMI) of 35.0 to 39.9 with serious comorbidity (H)    8. Type 2 diabetes mellitus with stage 3b chronic kidney disease, without long-term current use of insulin (H)  Trial off hydrochlorothiazide       Patient Instructions   Referral to pulmonary to review and arrange biopsy    Oncology referral pending results    Consider treatment at [facility] depending on options    Discontinue hydrochlorothiazide    Initiation of Requip and now increase to 2 mg daily    When convenient  update labs    Increase magnesium to 2 tablets daily    Trial of tonic water, yellow mustard, or pickle juice discussed    Resume pravastatin            Return in about 3 months (around 11/16/2024) for using a video visit.       CHIEF COMPLAINT:  No chief complaint on file.      HISTORY OF PRESENT ILLNESS:  Jonas is a 79 year old male contacting the clinic today via video for reports of new findings.  Underwent routine follow-up for his aneurysmal graft.  However new CT scan showed right lower lobe mass with extension to lymph nodes probably cancerous.  Discussed need for biopsy which will then provide treatment options.  He is interested in being aggressive due to his grandson.  Quit smoking in January    Grafting seems to be going well by follow-up testing now 5 years    Developed leg cramps several weeks ago although he has always had some leg cramps.  Trial of Requip has helped and recent dose escalation to 2 mg seems to help the most.  Has also added magnesium and tonic water    Continues to have peripheral edema.  Takes hydrochlorothiazide in the morning and furosemide.  Discussed possible impact on renal function and optimizing same    History of Present Illness       Reason for visit:  Multiple and new radiology report from [facility]  Symptom onset:  More than a month  Symptoms include:  Back pain, shortness of breath, leg cramps, sleep deficiency  Symptom intensity:  Severe  Symptom progression:  Staying the same  Had these symptoms before:  Yes  Has tried/received treatment for these symptoms:  Yes  Previous treatment was successful:  No  What makes it worse:  No sleep or less than 4 hours sleep  What makes it better:  Sleeping more than four hours per night    He eats 2-3 servings of fruits and vegetables daily.He consumes 0 sweetened beverage(s) daily.He exercises with enough effort to increase his heart rate 9 or less minutes per day.  He exercises with enough effort to increase his heart rate 3 or less  "days per week. He is missing 1 dose(s) of medications per week.  He is not taking prescribed medications regularly due to remembering to take.      REVIEW OF SYSTEMS:   Persisting peripheral edema and nocturia    Today's PHQ-2 Score:       8/16/2024     2:12 PM   PHQ-2 ( 1999 [employer])   Q1: Little interest or pleasure in doing things 0   Q2: Feeling down, depressed or hopeless 0   PHQ-2 Score 0   Q1: Little interest or pleasure in doing things Not at all   Q2: Feeling down, depressed or hopeless Not at all   PHQ-2 Score 0       PFSH:  Social History     Social History Narrative    2 children,         Quit drinking alcohol 1975, jan 12        Son has diabetes and lives in basement        Lives with grandchild who is 11, born 2012     Quit smoking January 2024    TOBACCO USE:  History   Smoking Status    Every Day    Packs/day: 1.00    Years: 40.00    Types: Cigarettes    Start date: 6/1/1956   Smokeless Tobacco    Never       VITALS:  There were no vitals filed for this visit.  There were no vitals taken for this visit. Estimated body mass index is 31.57 kg/m  as calculated from the following:    Height as of 9/14/23: 1.778 m (5' 10\").    Weight as of 9/14/23: 99.8 kg (220 lb).    PHYSICAL EXAM:  (observations via Video)  Alert and oriented.  Right eye drooping    MEDICATIONS:   Current Outpatient Medications   Medication Sig Dispense Refill    magnesium oxide (MAG-OX) 400 MG tablet Take 1 tablet (400 mg) by mouth daily      Melatonin 10 MG CAPS Take 20 mg by mouth at bedtime      pravastatin (PRAVACHOL) 40 MG tablet Take 1 tablet (40 mg) by mouth daily 90 tablet 3    rOPINIRole (REQUIP) 2 MG tablet Take 1 tablet (2 mg) by mouth at bedtime 90 tablet 3    acetaminophen (TYLENOL) 500 MG tablet Take 500-1,000 mg by mouth every 6 hours as needed for mild pain      aspirin (ASA) 81 MG tablet Take 81 mg by mouth daily      cyanocobalamin (VITAMIN B-12) 100 MCG tablet Take 2,000 mcg by mouth daily      furosemide " "(LASIX) 40 MG tablet Take 1 tablet (40 mg) by mouth daily as needed (Leg swelling) 30 tablet 3    lisinopril (ZESTRIL) 10 MG tablet Take 1 tablet (10 mg) by mouth daily 90 tablet 3    nitroGLYcerin (NITROSTAT) 0.4 MG sublingual tablet Place 1 tablet (0.4 mg) under the tongue every 5 minutes as needed for chest pain 30 tablet 5    vitamin B complex with vitamin C (VITAMIN  B COMPLEX) tablet Take 1 tablet by mouth daily      VITAMIN D, CHOLECALCIFEROL, PO Take 2,000 Units by mouth daily         Outside Notes summarized: [facility]  Labs, x-rays, cardiology, GI tests reviewed: CT chest and labs showing renal insufficiency  Recent Labs   Lab Test 07/16/24  1443 09/14/23 2026 08/14/23  1118   HGB 13.3 13.9 13.8   WBC 9.9 10.4 8.9   * 137 141   POTASSIUM 4.5 4.2 5.4*   CR 1.66* 1.27* 1.32*   A1C 5.8*  --  6.0*   PSA 0.81  --  0.43   B12 1,103  --   --    TSH 0.93  --   --      No results found for: \"[id number]\"  Lab Results   Component Value Date    CHOL 168 07/16/2024     New orders:   Orders Placed This Encounter   Procedures    Adult Pulmonary Medicine  Referral    Adult Oncology/Hematology  Referral       Independent review of:   Patient would like to receive their AVS by [employer]    Video-Visit Details  Type of service:  Video Visit      7/24/2023     3:47 PM   Additional Questions   Roomed by Mimi ROSARIO     Patient has given verbal consent to a Video visit?  Yes  How would you like to obtain your AVS?  ActualSunhart  Will anyone else be joining your video visit, giving supplemental history? No    Originating location (pt location): Home    Distant Location (provider location):  Off-site    Video Start Time: 3:02 PM  Video End time:  3:37 PM  Face to face plus orders: 35 minutes  Documentation time:  3 minutes     The visit lasted a total of 38 minutes    Dillon Goldsmith MD  Internal Medicine  [facility]"

## 2024-08-21 PROBLEM — I71.60: Status: ACTIVE | Noted: 2024-08-16

## 2024-08-22 ENCOUNTER — ONCOLOGY VISIT (OUTPATIENT)
Dept: PULMONOLOGY | Facility: CLINIC | Age: 79
End: 2024-08-22
Attending: INTERNAL MEDICINE
Payer: MEDICARE

## 2024-08-22 ENCOUNTER — TELEPHONE (OUTPATIENT)
Dept: PULMONOLOGY | Facility: CLINIC | Age: 79
End: 2024-08-22

## 2024-08-22 VITALS
RESPIRATION RATE: 18 BRPM | SYSTOLIC BLOOD PRESSURE: 133 MMHG | DIASTOLIC BLOOD PRESSURE: 83 MMHG | WEIGHT: 260.4 LBS | HEART RATE: 71 BPM | HEIGHT: 69 IN | OXYGEN SATURATION: 99 % | BODY MASS INDEX: 38.57 KG/M2

## 2024-08-22 DIAGNOSIS — R91.8 RIGHT LOWER LOBE LUNG MASS: ICD-10-CM

## 2024-08-22 PROCEDURE — G0463 HOSPITAL OUTPT CLINIC VISIT: HCPCS | Performed by: STUDENT IN AN ORGANIZED HEALTH CARE EDUCATION/TRAINING PROGRAM

## 2024-08-22 PROCEDURE — 99204 OFFICE O/P NEW MOD 45 MIN: CPT | Performed by: STUDENT IN AN ORGANIZED HEALTH CARE EDUCATION/TRAINING PROGRAM

## 2024-08-22 RX ORDER — LIDOCAINE 40 MG/G
CREAM TOPICAL
Status: CANCELLED | OUTPATIENT
Start: 2024-08-22

## 2024-08-22 RX ORDER — TRIAMTERENE AND HYDROCHLOROTHIAZIDE 37.5; 25 MG/1; MG/1
CAPSULE ORAL EVERY MORNING
COMMUNITY

## 2024-08-22 ASSESSMENT — PAIN SCALES - GENERAL: PAINLEVEL: SEVERE PAIN (6)

## 2024-08-22 NOTE — NURSING NOTE
"Oncology Rooming Note    August 22, 2024 11:46 AM   Jonas Hyman is a 79 year old male who presents for:    Chief Complaint   Patient presents with    Oncology Clinic Visit     New Eval for Lung Mass     Initial Vitals: /83   Pulse 71   Resp 18   Ht 1.74 m (5' 8.5\")   Wt 118.1 kg (260 lb 6.4 oz)   SpO2 99%   BMI 39.01 kg/m   Estimated body mass index is 39.01 kg/m  as calculated from the following:    Height as of this encounter: 1.74 m (5' 8.5\").    Weight as of this encounter: 118.1 kg (260 lb 6.4 oz). Body surface area is 2.39 meters squared.  Severe Pain (6) Comment: Data Unavailable   No LMP for male patient.  Allergies reviewed: Yes  Medications reviewed: Yes    Medications: Medication refills not needed today.  Pharmacy name entered into [employer]: [id number] - [place] [id number] [address]    Frailty Screening:   Is the patient here for a new oncology consult visit in cancer care? 2. No      Clinical concerns: Pt has had a pain in his lower back and right hip area that causes him pain. It hurts at night when laying down.  He also has tingling in his feet that he mentioned to the [facility] team as he is in a case study with them.      Tamara Arzola MA             "

## 2024-08-22 NOTE — PROGRESS NOTES
LUNG NODULE & INTERVENTIONAL PULMONARY CLINIC  [facility]    Jonas Hyman MRN# [id number]   Age: 79 year old YOB: 1945     Reason for Consultation: Lung mass    Requesting Physician: Dillon Goldsmith MD  [address] [place] [address]     Assessment and Plan:    Jonas Hyman is a 79 year old male who presents for evaluation of a lung mass.    I independently reviewed their CT scan from 8/14/24 which reveals a 6cm RML lung mass in additional to enlarged right hilar and mediastinal LN.     Suspicion is for lung cancer.     I explained to him that a diagnosis is our first step.     Plan for bronchoscopy with EBUS TBNA. Would request starting from the left hilum so we can stage him at the same time. PET CT ordered as well.     Does have a prior hx of substance abuse and has gone through recovery.     Patient indicated understanding and agreed to the plan of care. All questions answered.     Delvin Hou DO   of Medicine  Interventional Pulmonology  Department of Pulmonary, Allergy, Critical Care and Sleep Medicine   [facility]     History:  Jonas Hyman is a 79 year old male who presents for evaluation of a lung mass.  Pmhx of CAD, went into full cardiac arrest 16 years ago.  Also has macular degeneration.   Was at [facility], participating in a aortic graft study. This is how the lung mass was found.   Cough? no  Shortness of breath? Yes with exertion. Over the last 2 months.   Smoking history: Quit smoking cigarettes in January, 1ppd for 50 years.   Prior history of lung problems: No  Family history: Yes, father had kidney cancer. Mother had ovarian cancer.   Exposure to fungus/mold: No  Exposure to tuberculosis: No  Radon exposure: no  Asbestos exposure: no  Uranium exposure: No  Immunosuppressed? No  Difficulty swallowing? No  Weight loss? No  Night sweats? No  Fever or chills? No    Medications:  Current  Outpatient Medications   Medication Sig Dispense Refill    acetaminophen (TYLENOL) 500 MG tablet Take 500-1,000 mg by mouth every 6 hours as needed for mild pain      aspirin (ASA) 81 MG tablet Take 81 mg by mouth daily      cyanocobalamin (VITAMIN B-12) 100 MCG tablet Take 2,000 mcg by mouth daily      furosemide (LASIX) 40 MG tablet Take 1 tablet (40 mg) by mouth daily as needed (Leg swelling) 30 tablet 3    lisinopril (ZESTRIL) 10 MG tablet Take 1 tablet (10 mg) by mouth daily 90 tablet 3    magnesium oxide (MAG-OX) 400 MG tablet Take 1 tablet (400 mg) by mouth daily      Melatonin 10 MG CAPS Take 20 mg by mouth at bedtime      nitroGLYcerin (NITROSTAT) 0.4 MG sublingual tablet Place 1 tablet (0.4 mg) under the tongue every 5 minutes as needed for chest pain 30 tablet 5    pravastatin (PRAVACHOL) 40 MG tablet Take 1 tablet (40 mg) by mouth daily 90 tablet 3    rOPINIRole (REQUIP) 2 MG tablet Take 1 tablet (2 mg) by mouth at bedtime 90 tablet 3    vitamin B complex with vitamin C (VITAMIN  B COMPLEX) tablet Take 1 tablet by mouth daily      VITAMIN D, CHOLECALCIFEROL, PO Take 2,000 Units by mouth daily       Current Facility-Administered Medications   Medication Dose Route Frequency Provider Last Rate Last Admin    aflibercept (EYLEA) injection prefilled syringe 2 mg  2 mg Intravitreal Q28 Days Anu Wilson MD        cyanocobalamin injection 1,000 mcg  1,000 mcg Intramuscular Q30 Days Dillon Goldsmith MD   1,000 mcg at 04/08/22 1449    faricimab-svoa (VABYSMO) intravitreal injection 6 mg  6 mg Intravitreal q28 days Anu Wilson MD   6 mg at 01/26/24 1147         Physical exam:  There were no vitals taken for this visit.  Wt Readings from Last 4 Encounters:   09/14/23 99.8 kg (220 lb)   04/08/22 112.7 kg (248 lb 8 oz)   04/05/19 113.5 kg (250 lb 4.6 oz)   04/01/19 113.5 kg (250 lb 4.8 oz)     General: Well appearing, nonlabored breathing  Neuro: Answering questions appropriately  Psych:  Normal affect

## 2024-08-26 ENCOUNTER — HOSPITAL ENCOUNTER (OUTPATIENT)
Facility: CLINIC | Age: 79
Discharge: HOME | End: 2024-08-26
Attending: INTERNAL MEDICINE | Admitting: INTERNAL MEDICINE
Payer: MEDICARE

## 2024-08-26 ENCOUNTER — HOSPITAL ENCOUNTER (EMERGENCY)
Facility: HOSPITAL | Age: 79
Discharge: HOME | End: 2024-08-27
Attending: STUDENT IN AN ORGANIZED HEALTH CARE EDUCATION/TRAINING PROGRAM | Admitting: STUDENT IN AN ORGANIZED HEALTH CARE EDUCATION/TRAINING PROGRAM
Payer: MEDICARE

## 2024-08-26 VITALS
SYSTOLIC BLOOD PRESSURE: 139 MMHG | HEART RATE: 86 BPM | DIASTOLIC BLOOD PRESSURE: 72 MMHG | RESPIRATION RATE: 16 BRPM | OXYGEN SATURATION: 94 %

## 2024-08-26 DIAGNOSIS — R06.02 SHORTNESS OF BREATH: ICD-10-CM

## 2024-08-26 DIAGNOSIS — R91.8 RIGHT LOWER LOBE LUNG MASS: Primary | ICD-10-CM

## 2024-08-26 DIAGNOSIS — C34.31 SMALL CELL LUNG CANCER, RIGHT LOWER LOBE (H): ICD-10-CM

## 2024-08-26 PROCEDURE — 88172 CYTP DX EVAL FNA 1ST EA SITE: CPT | Mod: 26 | Performed by: PATHOLOGY

## 2024-08-26 PROCEDURE — 99152 MOD SED SAME PHYS/QHP 5/>YRS: CPT | Mod: GC | Performed by: INTERNAL MEDICINE

## 2024-08-26 PROCEDURE — 88360 TUMOR IMMUNOHISTOCHEM/MANUAL: CPT | Mod: 26 | Performed by: PATHOLOGY

## 2024-08-26 PROCEDURE — 84484 ASSAY OF TROPONIN QUANT: CPT | Mod: 91 | Performed by: STUDENT IN AN ORGANIZED HEALTH CARE EDUCATION/TRAINING PROGRAM

## 2024-08-26 PROCEDURE — 88173 CYTOPATH EVAL FNA REPORT: CPT | Mod: 26 | Performed by: PATHOLOGY

## 2024-08-26 PROCEDURE — 85025 COMPLETE CBC W/AUTO DIFF WBC: CPT | Performed by: STUDENT IN AN ORGANIZED HEALTH CARE EDUCATION/TRAINING PROGRAM

## 2024-08-26 PROCEDURE — 88342 IMHCHEM/IMCYTCHM 1ST ANTB: CPT | Mod: 26 | Performed by: PATHOLOGY

## 2024-08-26 PROCEDURE — 31653 BRONCH EBUS SAMPLNG 3/> NODE: CPT | Mod: GC | Performed by: INTERNAL MEDICINE

## 2024-08-26 PROCEDURE — 80048 BASIC METABOLIC PNL TOTAL CA: CPT | Performed by: STUDENT IN AN ORGANIZED HEALTH CARE EDUCATION/TRAINING PROGRAM

## 2024-08-26 PROCEDURE — 96360 HYDRATION IV INFUSION INIT: CPT | Mod: 59

## 2024-08-26 PROCEDURE — 250N000011 HC RX IP 250 OP 636: Performed by: INTERNAL MEDICINE

## 2024-08-26 PROCEDURE — 99152 MOD SED SAME PHYS/QHP 5/>YRS: CPT | Performed by: INTERNAL MEDICINE

## 2024-08-26 PROCEDURE — 88305 TISSUE EXAM BY PATHOLOGIST: CPT | Mod: 26 | Performed by: PATHOLOGY

## 2024-08-26 PROCEDURE — 99153 MOD SED SAME PHYS/QHP EA: CPT | Performed by: INTERNAL MEDICINE

## 2024-08-26 PROCEDURE — 88305 TISSUE EXAM BY PATHOLOGIST: CPT | Mod: TC | Performed by: INTERNAL MEDICINE

## 2024-08-26 PROCEDURE — 93005 ELECTROCARDIOGRAM TRACING: CPT | Performed by: STUDENT IN AN ORGANIZED HEALTH CARE EDUCATION/TRAINING PROGRAM

## 2024-08-26 PROCEDURE — 31653 BRONCH EBUS SAMPLNG 3/> NODE: CPT | Performed by: INTERNAL MEDICINE

## 2024-08-26 PROCEDURE — 99285 EMERGENCY DEPT VISIT HI MDM: CPT | Mod: 25

## 2024-08-26 PROCEDURE — 88342 IMHCHEM/IMCYTCHM 1ST ANTB: CPT | Mod: TC | Performed by: INTERNAL MEDICINE

## 2024-08-26 PROCEDURE — 96361 HYDRATE IV INFUSION ADD-ON: CPT

## 2024-08-26 PROCEDURE — 250N000009 HC RX 250: Performed by: INTERNAL MEDICINE

## 2024-08-26 PROCEDURE — 258N000003 HC RX IP 258 OP 636: Performed by: STUDENT IN AN ORGANIZED HEALTH CARE EDUCATION/TRAINING PROGRAM

## 2024-08-26 PROCEDURE — 31652 BRONCH EBUS SAMPLNG 1/2 NODE: CPT | Performed by: INTERNAL MEDICINE

## 2024-08-26 PROCEDURE — G0500 MOD SEDAT ENDO SERVICE >5YRS: HCPCS | Performed by: INTERNAL MEDICINE

## 2024-08-26 PROCEDURE — 36415 COLL VENOUS BLD VENIPUNCTURE: CPT | Performed by: STUDENT IN AN ORGANIZED HEALTH CARE EDUCATION/TRAINING PROGRAM

## 2024-08-26 PROCEDURE — 88341 IMHCHEM/IMCYTCHM EA ADD ANTB: CPT | Mod: 26 | Performed by: PATHOLOGY

## 2024-08-26 RX ORDER — LIDOCAINE 40 MG/G
CREAM TOPICAL
Status: DISCONTINUED | OUTPATIENT
Start: 2024-08-26 | End: 2024-08-26 | Stop reason: HOSPADM

## 2024-08-26 RX ORDER — FENTANYL CITRATE 50 UG/ML
INJECTION, SOLUTION INTRAMUSCULAR; INTRAVENOUS PRN
Status: DISCONTINUED | OUTPATIENT
Start: 2024-08-26 | End: 2024-08-26 | Stop reason: HOSPADM

## 2024-08-26 RX ORDER — LIDOCAINE HYDROCHLORIDE 10 MG/ML
INJECTION, SOLUTION INFILTRATION; PERINEURAL PRN
Status: DISCONTINUED | OUTPATIENT
Start: 2024-08-26 | End: 2024-08-26 | Stop reason: HOSPADM

## 2024-08-26 RX ORDER — LIDOCAINE HYDROCHLORIDE 40 MG/ML
INJECTION, SOLUTION RETROBULBAR PRN
Status: DISCONTINUED | OUTPATIENT
Start: 2024-08-26 | End: 2024-08-26 | Stop reason: HOSPADM

## 2024-08-26 RX ADMIN — SODIUM CHLORIDE 1000 ML: 9 INJECTION, SOLUTION INTRAVENOUS at 23:59

## 2024-08-26 ASSESSMENT — ACTIVITIES OF DAILY LIVING (ADL)
ADLS_ACUITY_SCORE: 35

## 2024-08-26 ASSESSMENT — COLUMBIA-SUICIDE SEVERITY RATING SCALE - C-SSRS
6. HAVE YOU EVER DONE ANYTHING, STARTED TO DO ANYTHING, OR PREPARED TO DO ANYTHING TO END YOUR LIFE?: NO
1. IN THE PAST MONTH, HAVE YOU WISHED YOU WERE DEAD OR WISHED YOU COULD GO TO SLEEP AND NOT WAKE UP?: NO
2. HAVE YOU ACTUALLY HAD ANY THOUGHTS OF KILLING YOURSELF IN THE PAST MONTH?: NO

## 2024-08-26 NOTE — DISCHARGE INSTRUCTIONS
Post Bronchoscopy Patient Instructions:    August 26, 2024  Jonas Hyman    Your procedure completed (bronchoscopy with EBUS TBNA) without any immediate complications.  You may cough up scant amount of blood for the next 12-24 hours. If you have excessive cough with blood, chest pain, shortness of breath, please report to the closest emergency room.    You may experience low grade (less than 100.5 F) fever next 24 hours, if so, you can take Tylenol. If the fever persists more than 24 hours, please contact to our office or your primary care provider.    Our office will call you with the results of samples taken during the procedure. Please note that you may get a result notification through  My Chart  before us calling you, as the Laboratories are instructed to release the results as soon as they are available to the patients and providers at the same time. Please allow your us 24-48 hours call you to discuss the results.    You may resume your diet as it was prior to procedure.    You may resume your medications after the procedure unless you are instructed to do differently.     Please follow instructions from the nursing staff upon discharge in terms of activity. In general, you should avoid any attention or motor skill requiring activities (e.g., driving or operating any motorized vehicle) for 24 hours as you might be still under the effect of sedation medications. Please make sure an adult to accompany you next 24 hours.     Should you have any question, please do not hesitate to call our office Isela Melton [phone].     Please take a few moments to evaluate the care you received by me on this link: [email or web address]  Interventional Pulmonary Fellow

## 2024-08-27 ENCOUNTER — PATIENT OUTREACH (OUTPATIENT)
Dept: ONCOLOGY | Facility: CLINIC | Age: 79
End: 2024-08-27

## 2024-08-27 ENCOUNTER — APPOINTMENT (OUTPATIENT)
Dept: CT IMAGING | Facility: HOSPITAL | Age: 79
End: 2024-08-27
Attending: STUDENT IN AN ORGANIZED HEALTH CARE EDUCATION/TRAINING PROGRAM
Payer: MEDICARE

## 2024-08-27 VITALS
RESPIRATION RATE: 18 BRPM | TEMPERATURE: 98.1 F | HEART RATE: 72 BPM | DIASTOLIC BLOOD PRESSURE: 73 MMHG | BODY MASS INDEX: 37.22 KG/M2 | SYSTOLIC BLOOD PRESSURE: 162 MMHG | OXYGEN SATURATION: 95 % | WEIGHT: 260 LBS | HEIGHT: 70 IN

## 2024-08-27 LAB
ANION GAP SERPL CALCULATED.3IONS-SCNC: 13 MMOL/L (ref 7–15)
BASOPHILS # BLD AUTO: 0.1 10E3/UL (ref 0–0.2)
BASOPHILS NFR BLD AUTO: 0 %
BUN SERPL-MCNC: 26.5 MG/DL (ref 8–23)
CALCIUM SERPL-MCNC: 8.4 MG/DL (ref 8.8–10.4)
CHLORIDE SERPL-SCNC: 98 MMOL/L (ref 98–107)
CREAT SERPL-MCNC: 1.77 MG/DL (ref 0.67–1.17)
EGFRCR SERPLBLD CKD-EPI 2021: 38 ML/MIN/1.73M2
EOSINOPHIL # BLD AUTO: 0.1 10E3/UL (ref 0–0.7)
EOSINOPHIL NFR BLD AUTO: 1 %
ERYTHROCYTE [DISTWIDTH] IN BLOOD BY AUTOMATED COUNT: 12.7 % (ref 10–15)
GLUCOSE SERPL-MCNC: 127 MG/DL (ref 70–99)
HCO3 SERPL-SCNC: 22 MMOL/L (ref 22–29)
HCT VFR BLD AUTO: 32.2 % (ref 40–53)
HGB BLD-MCNC: 10.8 G/DL (ref 13.3–17.7)
HOLD SPECIMEN: NORMAL
IMM GRANULOCYTES # BLD: 0.1 10E3/UL
IMM GRANULOCYTES NFR BLD: 1 %
LYMPHOCYTES # BLD AUTO: 1.3 10E3/UL (ref 0.8–5.3)
LYMPHOCYTES NFR BLD AUTO: 9 %
MCH RBC QN AUTO: 31.9 PG (ref 26.5–33)
MCHC RBC AUTO-ENTMCNC: 33.5 G/DL (ref 31.5–36.5)
MCV RBC AUTO: 95 FL (ref 78–100)
MONOCYTES # BLD AUTO: 1 10E3/UL (ref 0–1.3)
MONOCYTES NFR BLD AUTO: 7 %
NEUTROPHILS # BLD AUTO: 12.5 10E3/UL (ref 1.6–8.3)
NEUTROPHILS NFR BLD AUTO: 83 %
NRBC # BLD AUTO: 0 10E3/UL
NRBC BLD AUTO-RTO: 0 /100
PLATELET # BLD AUTO: 266 10E3/UL (ref 150–450)
POTASSIUM SERPL-SCNC: 3.9 MMOL/L (ref 3.4–5.3)
RBC # BLD AUTO: 3.39 10E6/UL (ref 4.4–5.9)
SODIUM SERPL-SCNC: 133 MMOL/L (ref 135–145)
TROPONIN T SERPL HS-MCNC: 18 NG/L
TROPONIN T SERPL HS-MCNC: 21 NG/L
WBC # BLD AUTO: 15.1 10E3/UL (ref 4–11)

## 2024-08-27 PROCEDURE — 36415 COLL VENOUS BLD VENIPUNCTURE: CPT | Performed by: STUDENT IN AN ORGANIZED HEALTH CARE EDUCATION/TRAINING PROGRAM

## 2024-08-27 PROCEDURE — 84484 ASSAY OF TROPONIN QUANT: CPT | Performed by: STUDENT IN AN ORGANIZED HEALTH CARE EDUCATION/TRAINING PROGRAM

## 2024-08-27 PROCEDURE — 71260 CT THORAX DX C+: CPT | Mod: MA

## 2024-08-27 PROCEDURE — 250N000011 HC RX IP 250 OP 636: Performed by: STUDENT IN AN ORGANIZED HEALTH CARE EDUCATION/TRAINING PROGRAM

## 2024-08-27 RX ORDER — IOPAMIDOL 755 MG/ML
70 INJECTION, SOLUTION INTRAVASCULAR ONCE
Status: COMPLETED | OUTPATIENT
Start: 2024-08-27 | End: 2024-08-27

## 2024-08-27 RX ADMIN — IOPAMIDOL 70 ML: 755 INJECTION, SOLUTION INTRAVENOUS at 03:42

## 2024-08-27 ASSESSMENT — ACTIVITIES OF DAILY LIVING (ADL)
ADLS_ACUITY_SCORE: 35

## 2024-08-27 NOTE — DISCHARGE INSTRUCTIONS
"Your workup in the emergency department was reassuring, however your CT scan did show that you had a possible \"aspiration event\" after your procedure today, which means that you may have inhaled some of your saliva.  This does not always result in infection, but does increase your risk.  Therefore, you will need to keep an eye on your symptoms and return to the emergency department if you develop fever, difficulty breathing, cough.  "

## 2024-08-27 NOTE — ED TRIAGE NOTES
Pt brought in by ambulance from home due to almost passing out. Pt was walking to the bathroom he felt almost passing out, pt states his head felt some tingling. Pt just came from [facility] this morning for right lung needle biopsy. Pt A/O x4.      Triage Assessment (Adult)       Row Name 08/26/24 7795          Triage Assessment    Airway WDL WDL        Respiratory WDL    Respiratory WDL WDL        Skin Circulation/Temperature WDL    Skin Circulation/Temperature WDL WDL        Cardiac WDL    Cardiac WDL WDL        Peripheral/Neurovascular WDL    Peripheral Neurovascular WDL WDL        Cognitive/Neuro/Behavioral WDL    Cognitive/Neuro/Behavioral WDL WDL

## 2024-08-27 NOTE — ED PROVIDER NOTES
EMERGENCY DEPARTMENT ENCOUNTER      NAME: Jonas Hyman  AGE: 79 year old male  YOB: 1945  MRN: [id number]  EVALUATION DATE & TIME: 2024 11:38 PM    PCP: Dillon Goldsmith    ED PROVIDER: Júnior Rojas MD      Chief Complaint   Patient presents with    Shortness of Breath    near syncope         FINAL IMPRESSION:  1. Shortness of breath          ED COURSE & MEDICAL DECISION MAKIN:41 PM I introduced myself to the patient, obtained patient history, performed a physical exam, and discussed plan for ED workup including potential diagnostic laboratory/imaging studies and interventions.    Pertinent Labs & Imaging studies reviewed. (See chart for details)  79 year old male presents to the Emergency Department for evaluation of SOB, near syncope    ED Course as of 24 0447   Mon Aug 26, 2024   2349 EKG sinus rhythm with right bundle branch block at a rate of 68.  No ST segment changes indicative of emergent ischemia.   Tue Aug 27, 2024   0008 Patient is an 79-year-old male who presents to the emergency department with shortness of breath, near syncope this evening.  This is all in the setting of having an endoscopic bronchial biopsy of a 6 cm right middle lobe lung mass today, likely cancer.  He is well-appearing on exam with trace pedal edema, no abnormal breath sounds or murmurs.  Vitals normal without hypoxia.  Differential diagnosis includes pneumothorax, bleeding from the biopsy site, ACS, inflammation surrounding the biopsy site.   0040 Baseline CKD.  No electrolyte abnormalities.  Leukocytosis of 15.1, which could be related to his procedure today.  Hemoglobin has dropped 2.5 points in the past 3 months.  Initial troponin is 21, will trend at the 2-hour.   0240 Trop not significantly changing, now at 18. Pending CT   0442 CT scan does not show complications from his biopsy today, as there is no pneumothorax, active bleeding.  He does have several small opacities in bilateral  lungs which are consistent with aspiration event, but no focal pneumonia.  Will not treat empirically as pneumonia, and instead instructed the patient to monitor symptoms including watching for shortness of breath, fever, productive cough.  Patient has remained asymptomatic while in the emergency department, has not required oxygen, no tachypnea, and he feels that he is at his baseline.  Therefore will discharge at this time with these return precautions outlined above.       Medical Decision Making  Obtained supplemental history:Supplemental history obtained?: EMS  Reviewed external records: External records reviewed?: No  Care impacted by chronic illness:Diabetes, Heart Disease, and Hypertension  Care significantly affected by social determinants of health:N/A  Did you consider but not order tests?: Work up considered but not performed and documented in chart, if applicable  Did you interpret images independently?: Independent interpretation of ECG and images noted in documentation, when applicable.  Consultation discussion with other provider:Did you involve another provider (consultant, , pharmacy, etc.)?: No  Discharge. No recommendations on prescription strength medication(s). See documentation for any additional details.        At the conclusion of the encounter I discussed the results of all of the tests and the disposition. The questions were answered. The patient or family acknowledged understanding and was agreeable with the care plan.     0 minutes of critical care time     MEDICATIONS GIVEN IN THE EMERGENCY:  Medications   sodium chloride 0.9% BOLUS 1,000 mL (0 mLs Intravenous Stopped 8/27/24 0157)   iopamidol (ISOVUE-370) solution 70 mL (70 mLs Intravenous $Given 8/27/24 4535)       NEW PRESCRIPTIONS STARTED AT TODAY'S ER VISIT  New Prescriptions    No medications on file          =================================================================    HPI    Patient information was obtained from:  Patient and EMS    Use of : N/A         Jonas Hyman is a 79 year old male with a pertinent history of CAD, DM2, HTN,  who presents to this ED by EMS for evaluation of shortness of breath.    Patient had a lung biopsy earlier today after it was found that he has a lung mass. After the biopsy he felt fine and went home. Around 9PM he went to lay down and felt short of breath and lightheaded. He called his son around 10pm asking to bring him to the ER. Before his son was able to bring him here, patient went to the bathroom and said his head started felling tingling and he started losing consciousness, so they called EMS. He also had a temp of 104 which dropped to 99 after taking tylenol.     Per chart review, CT scan from 8/14/24 which reveals a 6cm RML lung mass in additional to enlarged right hilar and mediastinal LN.       PAST MEDICAL HISTORY:  Past Medical History:   Diagnosis Date    Acute MI (H) 2009    Bronchitis     Cardiac arrest (H) 2009    Cardiac arrest (H)     Chemical dependency (H)     Has been in recovery 44 years    Coronary artery disease     Diabetes mellitus (H)     type II    Diabetes mellitus type 2, noninsulin dependent (H)     Diverticula of colon     Diverticulosis of large intestine     Hemorrhoids     Hemorrhoids     History of alcohol dependence (H) 1/12/1975    Created by [facility] Annotation: May 29 2009  5:52PM - Dillon Goldsmith: dry since  1975, also used drugs and marijuana  Replacement Utility updated for latest IMO load    Hypertension     Hypertension     Macular degeneration of right eye     Mixed hyperlipidemia     Myocardial infarction (H)     Retinal detachment 06/23/2014    Vitrectomy Posterior 23 guage, scleral buckle, membrane peel, endolaser gase    Stage 3b chronic kidney disease (H) 11/30/2020    Stented coronary artery 2009    stints times 2    Vitamin B12 deficiency     Vitamin D deficiency        PAST SURGICAL HISTORY:  Past Surgical  History:   Procedure Laterality Date    CATARACT IOL, RT/LT Bilateral     EYE SURGERY      FRACTURE SURGERY      HC REMOVE TONSILS/ADENOIDS,<13 Y/O      Description: Tonsillectomy With Adenoidectomy;  Recorded: 05/29/2009;    HEART CATH STENT COR W/WO PTCA  05/07/2009    Proximal Left Anterior Descending Artery, Proximal Circumflex      HERNIA REPAIR      INGUINAL HERNIA REPAIR      LASER YAG IRIDOTOMY  9/6/2013    Procedure: LASER YAG IRIDOTOMY;  peripheral irridotomy ;  Surgeon: Doroteo Andres MD;  Location: [facility]    OTHER SURGICAL HISTORY      Cath Stent 1 Proximal Left Anterior Descending Artery     PHACOEMULSIFICATION CLEAR CORNEA WITH STANDARD INTRAOCULAR LENS IMPLANT  9/5/2013    Procedure: PHACOEMULSIFICATION CLEAR CORNEA WITH STANDARD INTRAOCULAR LENS IMPLANT;   COMPLEX RIGHT PHACOEMULSIFICATION CLEAR CORNEA WITH ANTERIOR CHAMBER INTRAOCULAR LENS IMPLANT, ANTERIOR VITRECTOMY;  Surgeon: Doroteo Andres MD;  Location: [facility]    PHACOEMULSIFICATION WITH STANDARD INTRAOCULAR LENS IMPLANT Left 12/3/2018    Procedure: Left Eye Phacoemulsification with Intraocular Lens;  Surgeon: Suhail Johnson MD;  Location: [facility]    RETINAL DETACHMENT SURGERY      TONSILLECTOMY  5/24/200/9    VASECTOMY      VITRECTOMY ANTERIOR  9/5/2013    Procedure: VITRECTOMY ANTERIOR;;  Surgeon: Doroteo Andres MD;  Location: [facility]    ZZC MUSCLE-SKIN FLAP,LEG             CURRENT MEDICATIONS:    acetaminophen (TYLENOL) 500 MG tablet  aspirin (ASA) 81 MG tablet  cyanocobalamin (VITAMIN B-12) 100 MCG tablet  furosemide (LASIX) 40 MG tablet  lisinopril (ZESTRIL) 10 MG tablet  magnesium oxide (MAG-OX) 400 MG tablet  Melatonin 10 MG CAPS  nitroGLYcerin (NITROSTAT) 0.4 MG sublingual tablet  pravastatin (PRAVACHOL) 40 MG tablet  rOPINIRole (REQUIP) 2 MG tablet  triamterene-HCTZ (DYAZIDE) 37.5-25 MG capsule  vitamin B complex with vitamin C (VITAMIN  B COMPLEX) tablet  VITAMIN D, CHOLECALCIFEROL, PO        ALLERGIES:  No Known  "Allergies    FAMILY HISTORY:  Family History   Problem Relation Age of Onset    Obesity Father     Glaucoma No family hx of     Macular Degeneration No family hx of     Retinal detachment No family hx of     Kidney Cancer Father     Heart Disease Brother        SOCIAL HISTORY:   Social History     Socioeconomic History    Marital status:    Tobacco Use    Smoking status: Every Day     Current packs/day: 1.00     Average packs/day: 1 pack/day for 68.2 years (68.2 ttl pk-yrs)     Types: Cigarettes     Start date: 6/1/1956    Smokeless tobacco: Never    Tobacco comments:     20 yrs ago-mid 80's, quit again 2007, started again 2012   Vaping Use    Vaping status: Never Used   Substance and Sexual Activity    Alcohol use: No     Comment: from Jan 1975    Drug use: No   Social History Narrative    2 children,         Quit drinking alcohol 1975, jan 12        Son has diabetes and lives in basement        Lives with grandchild who is 11, born 2012     Social Determinants of Health     Interpersonal Safety: Unknown (8/26/2024)    Interpersonal Safety     Do you feel physically and emotionally safe where you currently live?: Patient unable to answer     Within the past 12 months, have you been hit, slapped, kicked or otherwise physically hurt by someone?: Patient unable to answer     Within the past 12 months, have you been humiliated or emotionally abused in other ways by your partner or ex-partner?: Patient unable to answer       VITALS:  /61   Pulse 65   Temp 98.1  F (36.7  C) (Oral)   Resp 23   Ht 1.778 m (5' 10\")   Wt 117.9 kg (260 lb)   SpO2 95%   BMI 37.31 kg/m      PHYSICAL EXAM    Physical Exam  Vitals and nursing note reviewed.   Constitutional:       General: He is not in acute distress.     Appearance: Normal appearance. He is normal weight. He is not ill-appearing.   HENT:      Head: Normocephalic and atraumatic.      Nose: Nose normal.      Mouth/Throat:      Mouth: Mucous membranes " are moist.      Pharynx: Oropharynx is clear.   Eyes:      Extraocular Movements: Extraocular movements intact.      Conjunctiva/sclera: Conjunctivae normal.      Pupils: Pupils are equal, round, and reactive to light.   Cardiovascular:      Rate and Rhythm: Normal rate and regular rhythm.      Pulses: Normal pulses.      Heart sounds: Normal heart sounds. No murmur heard.  Pulmonary:      Effort: Pulmonary effort is normal. No respiratory distress.      Breath sounds: Normal breath sounds.   Abdominal:      General: Abdomen is flat. There is no distension.      Palpations: Abdomen is soft.      Tenderness: There is no abdominal tenderness.   Musculoskeletal:         General: Normal range of motion.      Cervical back: Normal range of motion.      Right lower leg: Edema present.      Left lower leg: Edema present.   Skin:     General: Skin is warm and dry.      Capillary Refill: Capillary refill takes less than 2 seconds.   Neurological:      General: No focal deficit present.      Mental Status: He is alert and oriented to person, place, and time. Mental status is at baseline.   Psychiatric:         Mood and Affect: Mood normal.         Behavior: Behavior normal.         Thought Content: Thought content normal.         Judgment: Judgment normal.            LAB:  All pertinent labs reviewed and interpreted.  Results for orders placed or performed during the hospital encounter of 08/26/24   CT Chest w Contrast    Impression    IMPRESSION:     1.  Multiple small patchy groundglass opacities in the bilateral lower lobes, new from prior study, compatible with aspiration change.    2.  Mildly increased size of right middle lobe and right upper lobe pulmonary masses as well as mediastinal, right hilar and right internal mammary lymphadenopathy.    3.  Stable 2 mm right upper lobe nodule.    4.  Redemonstration of small hiatal hernia, abdominal aortic aneurysm and colonic diverticulosis.   Basic metabolic panel   Result  Value Ref Range    Sodium 133 (L) 135 - 145 mmol/L    Potassium 3.9 3.4 - 5.3 mmol/L    Chloride 98 98 - 107 mmol/L    Carbon Dioxide (CO2) 22 22 - 29 mmol/L    Anion Gap 13 7 - 15 mmol/L    Urea Nitrogen 26.5 (H) 8.0 - 23.0 mg/dL    Creatinine 1.77 (H) 0.67 - 1.17 mg/dL    GFR Estimate 38 (L) >60 mL/min/1.73m2    Calcium 8.4 (L) 8.8 - 10.4 mg/dL    Glucose 127 (H) 70 - 99 mg/dL   Result Value Ref Range    Troponin T, High Sensitivity 21 <=22 ng/L   CBC with platelets and differential   Result Value Ref Range    WBC Count 15.1 (H) 4.0 - 11.0 10e3/uL    RBC Count 3.39 (L) 4.40 - 5.90 10e6/uL    Hemoglobin 10.8 (L) 13.3 - 17.7 g/dL    Hematocrit 32.2 (L) 40.0 - 53.0 %    MCV 95 78 - 100 fL    MCH 31.9 26.5 - 33.0 pg    MCHC 33.5 31.5 - 36.5 g/dL    RDW 12.7 10.0 - 15.0 %    Platelet Count 266 150 - 450 10e3/uL    % Neutrophils 83 %    % Lymphocytes 9 %    % Monocytes 7 %    % Eosinophils 1 %    % Basophils 0 %    % Immature Granulocytes 1 %    NRBCs per 100 WBC 0 <1 /100    Absolute Neutrophils 12.5 (H) 1.6 - 8.3 10e3/uL    Absolute Lymphocytes 1.3 0.8 - 5.3 10e3/uL    Absolute Monocytes 1.0 0.0 - 1.3 10e3/uL    Absolute Eosinophils 0.1 0.0 - 0.7 10e3/uL    Absolute Basophils 0.1 0.0 - 0.2 10e3/uL    Absolute Immature Granulocytes 0.1 <=0.4 10e3/uL    Absolute NRBCs 0.0 10e3/uL   Extra Blue Top Tube   Result Value Ref Range    Hold Specimen JIC    Result Value Ref Range    Troponin T, High Sensitivity 18 <=22 ng/L       RADIOLOGY:  Reviewed all pertinent imaging. Please see official radiology report.  CT Chest w Contrast   Final Result   IMPRESSION:       1.  Multiple small patchy groundglass opacities in the bilateral lower lobes, new from prior study, compatible with aspiration change.      2.  Mildly increased size of right middle lobe and right upper lobe pulmonary masses as well as mediastinal, right hilar and right internal mammary lymphadenopathy.      3.  Stable 2 mm right upper lobe nodule.      4.   Redemonstration of small hiatal hernia, abdominal aortic aneurysm and colonic diverticulosis.          PROCEDURES:   None      [facility] Documentation:   CMS Diagnoses:               I, Marlo Camron, am serving as a scribe to document services personally performed by Júnior Rojas MD based on my observation and the provider's statements to me. I, Júnior Rojas MD, attest that Marlo Lyon is acting in a scribe capacity, has observed my performance of the services and has documented them in accordance with my direction.    Júnior Rojas MD  [facility] EMERGENCY DEPARTMENT  [facility] [facility] [id number]  [phone]       Júnior Rojas MD  08/27/24 1141

## 2024-08-27 NOTE — ED NOTES
Bed: JNED-27  Expected date: 8/26/24  Expected time: 11:34 PM  Means of arrival: Ambulance  Comments:  [facility]. 80 yo male with SOB. Had lung biopsy today. 94 % RA.

## 2024-08-27 NOTE — PROGRESS NOTES
New Patient Oncology Nurse Navigator Note     Referring provider: Dr. Bernarda Morrison    Referring Clinic/Organization: [facility]  Referred to: Medical Oncology  Requested provider (if applicable): First available - did not specify   Referral Received: 08/26/24       Evaluation for :   Diagnosis   R91.8 (ICD-10-CM) - Right lower lobe lung mass     Clinical History (per Nurse review of records provided):      08/26/2024 Fine Needle Aspirate (pending as of 8/27)    08/27/2024 CT chest w/ contrast (bookmarked) showed:   IMPRESSION:      1.  Multiple small patchy groundglass opacities in the bilateral lower lobes, new from prior study, compatible with aspiration change.     2.  Mildly increased size of right middle lobe and right upper lobe pulmonary masses as well as mediastinal, right hilar and right internal mammary lymphadenopathy.     3.  Stable 2 mm right upper lobe nodule.     4.  Redemonstration of small hiatal hernia, abdominal aortic aneurysm and colonic diverticulosis.    Clinical Assessment / Barriers to Care (Per Nurse):  Tobacco History    Smoking Status  Every Day Smoking Start Date  6/1/1956 Current Packs/Day  1 pack/day Average Packs/Day  1 pack/day for 68.2 years (68.2 ttl pk-yrs) Smoking Tobacco Type  Cigarettes since 6/1/1956     Records Location: [facility]   Records Needed: None  Additional testing needed prior to consult: PET  Referral updates and Plan:   8/26: Per inbasket from Dr. Morrison, oncology referral placed and PET ordered.   8/27: Writer called Jonas to discuss the referral. He confirmed he is aware of the referral and is ready to schedule. Writer holding soonest available lung med onc spot for him which is 9/3. All questions were answered. His call was transferred to NPS to schedule.     Cesilia Lindsey, JESSIEN, RN, OCN  [facility] Oncology Nurse Navigator  [phone] / [phone]

## 2024-08-28 ENCOUNTER — PATIENT OUTREACH (OUTPATIENT)
Dept: INTERNAL MEDICINE | Facility: CLINIC | Age: 79
End: 2024-08-28
Payer: MEDICARE

## 2024-08-28 LAB
PATH REPORT.COMMENTS IMP SPEC: ABNORMAL
PATH REPORT.COMMENTS IMP SPEC: YES
PATH REPORT.FINAL DX SPEC: ABNORMAL
PATH REPORT.GROSS SPEC: ABNORMAL
PATH REPORT.MICROSCOPIC SPEC OTHER STN: ABNORMAL
PATH REPORT.RELEVANT HX SPEC: ABNORMAL

## 2024-08-28 NOTE — TELEPHONE ENCOUNTER
Transitions of Care Outreach  Chief Complaint   Patient presents with    [facility] F/U     TCM outreach for ER visit on 8/26       Most Recent Admission Date: 8/26/2024   Most Recent Admission Diagnosis:      Most Recent Discharge Date: 8/27/2024   Most Recent Discharge Diagnosis: Shortness of breath - R06.02     Transitions of Care Assessment    Discharge Assessment  How are you doing now that you are home?: a lot better  How are your symptoms? (Red Flag symptoms escalate to triage hotline per guidelines): Improved  Do you know how to contact your clinic care team if you have future questions or changes to your health status? : Yes  Does the patient have their discharge instructions? : Yes  Does the patient have questions regarding their discharge instructions? : No  Were you started on any new medications or were there changes to any of your previous medications? : No    Follow up Plan     Discharge Follow-Up  Discharge follow up appointment scheduled in alignment with recommended follow up timeframe or Transitions of Risk Category? (Low = within 30 days; Moderate= within 14 days; High= within 7 days): Yes  Discharge Follow Up Appointment Date: 09/04/24  Discharge Follow Up Appointment Scheduled with?: Primary Care Provider    Future Appointments   Date Time Provider Department Center   8/30/2024  2:15 PM [facility] PET CT 1 [facility]   9/3/2024  2:00 PM Dino Madera MD [facility]   9/4/2024 10:30 AM Dillon Goldsmith MD [facility]       Outpatient Plan as outlined on AVS reviewed with patient.    For any urgent concerns, please contact our 24 hour nurse triage line: [phone] ([phone])       Adeel Cancino RN

## 2024-08-29 ENCOUNTER — MYC REFILL (OUTPATIENT)
Dept: INTERNAL MEDICINE | Facility: CLINIC | Age: 79
End: 2024-08-29
Payer: MEDICARE

## 2024-08-29 DIAGNOSIS — I10 ESSENTIAL HYPERTENSION: ICD-10-CM

## 2024-08-29 NOTE — TELEPHONE ENCOUNTER
RECORDS STATUS - ALL OTHER DIAGNOSIS      RECORDS RECEIVED FROM: [facility] - Internal records   DATE RECEIVED: 8/29

## 2024-08-30 ENCOUNTER — HOSPITAL ENCOUNTER (OUTPATIENT)
Dept: PET IMAGING | Facility: HOSPITAL | Age: 79
Discharge: HOME | End: 2024-08-30
Attending: STUDENT IN AN ORGANIZED HEALTH CARE EDUCATION/TRAINING PROGRAM
Payer: MEDICARE

## 2024-08-30 DIAGNOSIS — R91.8 RIGHT LOWER LOBE LUNG MASS: ICD-10-CM

## 2024-08-30 LAB — GLUCOSE BLDC GLUCOMTR-MCNC: 109 MG/DL (ref 70–99)

## 2024-08-30 PROCEDURE — 250N000011 HC RX IP 250 OP 636: Performed by: STUDENT IN AN ORGANIZED HEALTH CARE EDUCATION/TRAINING PROGRAM

## 2024-08-30 PROCEDURE — 82962 GLUCOSE BLOOD TEST: CPT

## 2024-08-30 PROCEDURE — 343N000001 HC RX 343: Performed by: STUDENT IN AN ORGANIZED HEALTH CARE EDUCATION/TRAINING PROGRAM

## 2024-08-30 PROCEDURE — A9552 F18 FDG: HCPCS | Performed by: STUDENT IN AN ORGANIZED HEALTH CARE EDUCATION/TRAINING PROGRAM

## 2024-08-30 PROCEDURE — 78816 PET IMAGE W/CT FULL BODY: CPT | Mod: MG,PI

## 2024-08-30 PROCEDURE — 71260 CT THORAX DX C+: CPT

## 2024-08-30 RX ORDER — LISINOPRIL 10 MG/1
10 TABLET ORAL DAILY
Qty: 90 TABLET | Refills: 3 | OUTPATIENT
Start: 2024-08-30

## 2024-08-30 RX ORDER — IOPAMIDOL 755 MG/ML
100 INJECTION, SOLUTION INTRAVASCULAR ONCE
Status: COMPLETED | OUTPATIENT
Start: 2024-08-30 | End: 2024-08-30

## 2024-08-30 RX ORDER — FLUDEOXYGLUCOSE F 18 200 MCI/ML
7-19 INJECTION, SOLUTION INTRAVENOUS ONCE
Status: COMPLETED | OUTPATIENT
Start: 2024-08-30 | End: 2024-08-30

## 2024-08-30 RX ORDER — LISINOPRIL 10 MG/1
10 TABLET ORAL DAILY
Qty: 90 TABLET | Refills: 0 | Status: SHIPPED | OUTPATIENT
Start: 2024-08-30 | End: 2024-09-04

## 2024-08-30 RX ADMIN — IOPAMIDOL 100 ML: 755 INJECTION, SOLUTION INTRAVENOUS at 15:00

## 2024-08-30 RX ADMIN — FLUDEOXYGLUCOSE F 18 14.56 MILLICURIE: 200 INJECTION, SOLUTION INTRAVENOUS at 13:57

## 2024-08-30 NOTE — TELEPHONE ENCOUNTER
This refill is a duplicate request, previously received or sent.   Sent denial notification to pharmacy.    Mireille Ramos RN, BSN, PHN  [facility]

## 2024-09-01 LAB
ATRIAL RATE - MUSE: 68 BPM
DIASTOLIC BLOOD PRESSURE - MUSE: NORMAL MMHG
INTERPRETATION ECG - MUSE: NORMAL
P AXIS - MUSE: 47 DEGREES
PR INTERVAL - MUSE: 204 MS
QRS DURATION - MUSE: 156 MS
QT - MUSE: 444 MS
QTC - MUSE: 472 MS
R AXIS - MUSE: 31 DEGREES
SYSTOLIC BLOOD PRESSURE - MUSE: NORMAL MMHG
T AXIS - MUSE: 30 DEGREES
VENTRICULAR RATE- MUSE: 68 BPM

## 2024-09-03 ENCOUNTER — ONCOLOGY VISIT (OUTPATIENT)
Dept: ONCOLOGY | Facility: HOSPITAL | Age: 79
End: 2024-09-03
Attending: INTERNAL MEDICINE
Payer: MEDICARE

## 2024-09-03 ENCOUNTER — PRE VISIT (OUTPATIENT)
Dept: ONCOLOGY | Facility: HOSPITAL | Age: 79
End: 2024-09-03
Payer: MEDICARE

## 2024-09-03 VITALS
HEIGHT: 69 IN | RESPIRATION RATE: 20 BRPM | DIASTOLIC BLOOD PRESSURE: 70 MMHG | BODY MASS INDEX: 37.92 KG/M2 | WEIGHT: 256 LBS | HEART RATE: 75 BPM | SYSTOLIC BLOOD PRESSURE: 155 MMHG | OXYGEN SATURATION: 95 % | TEMPERATURE: 97.7 F

## 2024-09-03 DIAGNOSIS — D70.1 CHEMOTHERAPY-INDUCED NEUTROPENIA (H): ICD-10-CM

## 2024-09-03 DIAGNOSIS — C34.31 SMALL CELL LUNG CANCER, RIGHT LOWER LOBE (H): Primary | ICD-10-CM

## 2024-09-03 DIAGNOSIS — N18.32 ANEMIA OF CHRONIC RENAL FAILURE, STAGE 3B (H): ICD-10-CM

## 2024-09-03 DIAGNOSIS — D63.1 ANEMIA OF CHRONIC RENAL FAILURE, STAGE 3B (H): ICD-10-CM

## 2024-09-03 DIAGNOSIS — T45.1X5A CHEMOTHERAPY-INDUCED NEUTROPENIA (H): ICD-10-CM

## 2024-09-03 DIAGNOSIS — R91.8 RIGHT LOWER LOBE LUNG MASS: ICD-10-CM

## 2024-09-03 PROCEDURE — 99204 OFFICE O/P NEW MOD 45 MIN: CPT | Performed by: INTERNAL MEDICINE

## 2024-09-03 PROCEDURE — G2211 COMPLEX E/M VISIT ADD ON: HCPCS | Performed by: INTERNAL MEDICINE

## 2024-09-03 PROCEDURE — 99213 OFFICE O/P EST LOW 20 MIN: CPT | Performed by: INTERNAL MEDICINE

## 2024-09-03 RX ORDER — IBUPROFEN 200 MG
600 TABLET ORAL 2 TIMES DAILY
COMMUNITY

## 2024-09-03 ASSESSMENT — PAIN SCALES - GENERAL: PAINLEVEL: MILD PAIN (3)

## 2024-09-03 NOTE — PROGRESS NOTES
"Oncology Rooming Note    September 3, 2024 2:27 PM   Jonas Hyman is a 79 year old male who presents for:    Chief Complaint   Patient presents with    Oncology Clinic Visit     New patient consult      Initial Vitals: BP (!) 155/70 (BP Location: Left arm, Patient Position: Sitting, Cuff Size: Adult Large)   Pulse 75   Temp 97.7  F (36.5  C) (Tympanic)   Resp 20   Ht 1.74 m (5' 8.5\")   Wt 116.1 kg (256 lb)   SpO2 95%   BMI 38.36 kg/m   Estimated body mass index is 38.36 kg/m  as calculated from the following:    Height as of this encounter: 1.74 m (5' 8.5\").    Weight as of this encounter: 116.1 kg (256 lb). Body surface area is 2.37 meters squared.  Mild Pain (3) Comment: Data Unavailable   No LMP for male patient.  Allergies reviewed: Yes  Medications reviewed: Yes    Medications: Medication refills not needed today.  Pharmacy name entered into [facility]: [id number] [address]     Frailty Screening:   Is the patient here for a new oncology consult visit in cancer care? 1. Yes. Over the past month, have you experienced difficulty or required a caregiver to assist with:   1. Balance, walking or general mobility (including any falls)? YES  2. Completion of self-care tasks such as bathing, dressing, toileting, grooming/hygiene?  NO  3. Concentration or memory that affects your daily life?  NO       Clinical concerns:     RAMON RODRIGUEZ CMA              "

## 2024-09-03 NOTE — LETTER
9/3/2024      Jonas Hyman  [address] [place] [place] [facility] [address]      Dear Colleague,    Thank you for referring your patient, Jonas Hyman, to the [facility]. Please see a copy of my visit note below.    [facility] Hematology and Oncology Consult Note      Patient: Jonas Hyman  MRN: [id number]  Date of Service: Sep 3, 2024      We have been asked by Dr. Morrison to evaluate Jonas Hyman for lung cancer.    Assessment/Plan:    1.  Small cell lung cancer: I personally reviewed his PET scan images.  They show evidence of bone metastases.  We will have to get a biopsy to confirm stage IV disease.  We can also send for next generation sequencing on the specimen.  The diagnosis was reviewed with the patient and his son.  The likely stage as well as reviewed.  We then discussed treatment which would initially consist of  chemoimmunotherapy.  We talked about the schedule of this regimen as well as some of the more common side effects.  These include, but are not limited to, cytopenias, fatigue, nausea, constipation, hair loss, and metabolic derangements.  The goals of therapy were reviewed which include reducing his tumor burden, delaying progression, palliating symptoms, and improving his cancer specific survival.  He was given some written information on these treatment medications to take, and review.  We discussed the need for port and a referral was placed to interventional radiology.  Finally will need an MRI of the brain to complete staging.  The patient had an opportunity to have all his questions answered.  Will anticipate starting chemotherapy in 1 to 2 weeks.  Finally, we discussed his prognosis with and without treatment.  Tumor sent for NGS analysis.    2.  Anemia of chronic kidney disease: Will continue to monitor is hemoglobin during treatment.  Retacrit could be considered if he has a significant drop in his hematocrit.    ECOG  Performance  0    History:    Jonas is an 79-year-old gentleman who was recently found to have evidence of metastatic small cell lung cancer.  He was being evaluated at [facility] by his vascular surgeons and had a CT of the body done on April 14.  This showed a right lower lobe mass with evidence of right hilar and mediastinal adenopathy.  He was referred to pulmonary.  They plan for an endobronchial ultrasound and PET scan.  The EBUS was done prior to the PET scan.  He had biopsies taken over the following hira stations: 4R, 4L, 7.  Pathology showed poorly differentiated small cell lung cancer in stations 4L and 4R.  He was thus referred to medical oncology.    Past History:    Past Medical History:   Diagnosis Date     Acute MI (H) 2009     Bronchitis      Cardiac arrest (H) 2009     Cardiac arrest (H)      Chemical dependency (H)     Has been in recovery 44 years     Coronary artery disease      Diabetes mellitus (H)     type II     Diabetes mellitus type 2, noninsulin dependent (H)      Diverticula of colon      Diverticulosis of large intestine      Hemorrhoids      Hemorrhoids      History of alcohol dependence (H) 1/12/1975    Created by [id number] [facility] Annotation: May 29 2009  5:52PM - Dillon Goldsmith: dry since  1975, also used drugs and marijuana  Replacement Utility updated for latest IMO load     Hypertension      Hypertension      Macular degeneration of right eye      Mixed hyperlipidemia      Myocardial infarction (H)      Retinal detachment 06/23/2014    Vitrectomy Posterior 23 guage, scleral buckle, membrane peel, endolaser gase     Stage 3b chronic kidney disease (H) 11/30/2020     Stented coronary artery 2009    stints times 2     Vitamin B12 deficiency      Vitamin D deficiency     Family History   Problem Relation Age of Onset     Obesity Father      Glaucoma No family hx of      Macular Degeneration No family hx of      Retinal detachment No family hx of      Kidney Cancer Father       Heart Disease Brother       [unfilled] Social History     Socioeconomic History     Marital status:      Spouse name: Not on file     Number of children: Not on file     Years of education: Not on file     Highest education level: Not on file   Occupational History     Not on file   Tobacco Use     Smoking status: Former     Current packs/day: 0.00     Average packs/day: 1 pack/day for 50.6 years (50.6 ttl pk-yrs)     Types: Cigarettes     Start date: 1956     Quit date: 2024     Years since quittin.6     Passive exposure: Current (son living with pt and smokes in basement)     Smokeless tobacco: Never     Tobacco comments:     20 yrs ago-mid 80's, quit again , started again , quit 2024   Vaping Use     Vaping status: Never Used   Substance and Sexual Activity     Alcohol use: No     Comment: from 1975     Drug use: No     Sexual activity: Not on file   Other Topics Concern     Parent/sibling w/ CABG, MI or angioplasty before 65F 55M? Not Asked   Social History Narrative    2 children,         Quit drinking alcohol         Son has diabetes and lives in basement        Lives with grandchild who is 11, born      Social Determinants of Health     Financial Resource Strain: Not on file   Food Insecurity: Not on file   Transportation Needs: Not on file   Physical Activity: Not on file   Stress: Not on file   Social Connections: Not on file   Interpersonal Safety: Unknown (2024)    Interpersonal Safety      Do you feel physically and emotionally safe where you currently live?: Patient unable to answer      Within the past 12 months, have you been hit, slapped, kicked or otherwise physically hurt by someone?: Patient unable to answer      Within the past 12 months, have you been humiliated or emotionally abused in other ways by your partner or ex-partner?: Patient unable to answer   Housing Stability: Not on file        Allergies:    No Known  "Allergies    Review of Systems:    As above in the history.     Review of Systems otherwise Negative for:  General: chills, fever or night sweats  Psychological: anxiety or depression  Ophthalmic: blurry vision, double vision or loss of vision, vision change  ENT: epistaxis, oral lesions, hearing changes  Hematological and Lymphatic: bleeding, bruising, jaundice, swollen lymph nodes  Endocrine: hot flashes, unexpected weight changes  Respiratory: cough, hemoptysis, orthopnea or shortness of breath/[place]  Cardiovascular: chest pain, edema, palpitations or PND  Gastrointestinal: abdominal pain, blood in stools, change in bowel habits, constipation, diarrhea or nausea/vomiting  Genito-Urinary: change in urinary stream, incontinence, frequency/urgency  Musculoskeletal: joint pain, stiffness, swelling, muscle pain  Neurological: dizziness, headaches, numbness/tingling  Dermatological: lumps and rash    Physical Exam:    BP (!) 155/70 (BP Location: Left arm, Patient Position: Sitting, Cuff Size: Adult Large)   Pulse 75   Temp 97.7  F (36.5  C) (Tympanic)   Resp 20   Ht 1.74 m (5' 8.5\")   Wt 116.1 kg (256 lb)   SpO2 95%   BMI 38.36 kg/m      General: patient appears stated age of 79 year old. Nontoxic and in no distress.   HEENT: Head: atraumatic, normocephalic. Sclerae anicteric.  Chest:  Normal respiratory effort  Cardiac:  No edema.   Abdomen: abdomen is non-distended  Extremities: normal tone and muscle bulk.   Skin: no lesions or rash on visible skin. Warm and dry.   CNS: alert and oriented. Grossly non-focal.   Psychiatric: normal mood and affect.     Lab Results:    Recent Results (from the past 168 hour(s))   Glucose by meter   Result Value Ref Range    GLUCOSE BY METER POCT 109 (H) 70 - 99 mg/dL     Imaging Results:    CT Chest/Abdomen/Pelvis w Contrast    Result Date: 8/30/2024  EXAM: PET ONCOLOGY WHOLE BODY, CT CHEST/ABDOMEN/PELVIS W CONTRAST LOCATION: [facility] DATE: 8/30/2024 " INDICATION: Lung cancer, right, staging. Malignant neoplasm of middle lobe, right bronchus or lung. Poorly differentiated neuroendocrine small cell carcinoma. Initial treatment strategy. COMPARISON: CT chest abdomen pelvis 8/14/2024, 11/16/2021 reviewed. CONTRAST: 100 mL Isovue-370 TECHNIQUE: Serum glucose level 109 mg/dL. One hour post intravenous administration of 14.6 mCi F-18 FDG, PET imaging was performed from the skull vertex to feet, utilizing attenuation correction with concurrent axial CT and PET/CT image fusion. Separate diagnostic CT of the chest, abdomen, and pelvis was performed. Dose reduction techniques were used. PET/CT FINDINGS: Large irregular FDG avid pulmonary mass involving the inferior right upper lobe and posterior right middle lobe with extension across the minor fissure as well as direct mediastinal invasion, similar to 8/14/2024 and new since 11/16/2021, measures 7.7 x 5.9 x 2.7 cm and demonstrates marked uptake (SUVmax 19.5), consistent with malignancy. Several enlarged FDG avid intrathoracic lymph nodes, including scattered mediastinal and right hilar regions, consistent with hira metastases. This includes a large right anterior mediastinal hira mass with direct pleural extension measuring 6.0 x 8.2 cm with marked uptake (SUVmax 22.0) as well as nodes involving several right lobar station 12R, interlobar station 11R, hilar station 10R sites as well as right lower paratracheal station 4R, prevascular station 3A and paraesophageal station 8. Additional FDG avid right supraclavicular adenopathy and a right anterior pleural nodule. Several scattered FDG avid skeletal lesions are also present, including medial right clavicle, left scapula, medial left iliac bone (SUVmax 9.6), left inferior pubic ramus and proximal left femur. No suspicious focal uptake in the liver. Normal adrenal glands. Mild irregular opacities in both lower lobes with mild uptake may reflect minor infectious/inflammatory  process. CT FINDINGS: Mild senescent intracranial changes. Marked atherosclerotic calcifications including the coronary arteries. Mild reticular scarring or atelectasis in the lung bases. Few small benign liver cysts. Aortobiiliac stent graft spanning an infrarenal fusiform abdominal aortic aneurysm. Mild colonic diverticulosis. Few small pelvic phleboliths. Small fat-containing left inguinal hernia. Moderate scattered hypertrophic degenerative changes in the spine.     IMPRESSION: Findings suspicious for right lung cancer involving the right upper and middle lobes with metastases involving several lymph nodes above the diaphragm and several sites in the skeleton. If biopsy of a distant metastatic site is desired, a lesion in the medial left iliac bone is a good option for CT-guided biopsy.    PET Oncology Whole Body    Result Date: 8/30/2024  EXAM: PET ONCOLOGY WHOLE BODY, CT CHEST/ABDOMEN/PELVIS W CONTRAST LOCATION: [facility] DATE: 8/30/2024 INDICATION: Lung cancer, right, staging. Malignant neoplasm of middle lobe, right bronchus or lung. Poorly differentiated neuroendocrine small cell carcinoma. Initial treatment strategy. COMPARISON: CT chest abdomen pelvis 8/14/2024, 11/16/2021 reviewed. CONTRAST: 100 mL Isovue-370 TECHNIQUE: Serum glucose level 109 mg/dL. One hour post intravenous administration of 14.6 mCi F-18 FDG, PET imaging was performed from the skull vertex to feet, utilizing attenuation correction with concurrent axial CT and PET/CT image fusion. Separate diagnostic CT of the chest, abdomen, and pelvis was performed. Dose reduction techniques were used. PET/CT FINDINGS: Large irregular FDG avid pulmonary mass involving the inferior right upper lobe and posterior right middle lobe with extension across the minor fissure as well as direct mediastinal invasion, similar to 8/14/2024 and new since 11/16/2021, measures 7.7 x 5.9 x 2.7 cm and demonstrates marked uptake (SUVmax  19.5), consistent with malignancy. Several enlarged FDG avid intrathoracic lymph nodes, including scattered mediastinal and right hilar regions, consistent with hira metastases. This includes a large right anterior mediastinal hira mass with direct pleural extension measuring 6.0 x 8.2 cm with marked uptake (SUVmax 22.0) as well as nodes involving several right lobar station 12R, interlobar station 11R, hilar station 10R sites as well as right lower paratracheal station 4R, prevascular station 3A and paraesophageal station 8. Additional FDG avid right supraclavicular adenopathy and a right anterior pleural nodule. Several scattered FDG avid skeletal lesions are also present, including medial right clavicle, left scapula, medial left iliac bone (SUVmax 9.6), left inferior pubic ramus and proximal left femur. No suspicious focal uptake in the liver. Normal adrenal glands. Mild irregular opacities in both lower lobes with mild uptake may reflect minor infectious/inflammatory process. CT FINDINGS: Mild senescent intracranial changes. Marked atherosclerotic calcifications including the coronary arteries. Mild reticular scarring or atelectasis in the lung bases. Few small benign liver cysts. Aortobiiliac stent graft spanning an infrarenal fusiform abdominal aortic aneurysm. Mild colonic diverticulosis. Few small pelvic phleboliths. Small fat-containing left inguinal hernia. Moderate scattered hypertrophic degenerative changes in the spine.     IMPRESSION: Findings suspicious for right lung cancer involving the right upper and middle lobes with metastases involving several lymph nodes above the diaphragm and several sites in the skeleton. If biopsy of a distant metastatic site is desired, a lesion in the medial left iliac bone is a good option for CT-guided biopsy.    CT Chest w Contrast    Result Date: 8/27/2024  EXAM: CT CHEST W CONTRAST LOCATION: [facility] DATE: 8/27/2024 INDICATION: Recent  EBUS today of RML 6 cm mass, now presents with shortness of breath COMPARISON: 08/14/2024 TECHNIQUE: CT chest with IV contrast. Multiplanar reformats were obtained. Dose reduction techniques were used. CONTRAST: ISOVUE 370 70 ML FINDINGS: LUNGS AND PLEURA: A lobulated mass measuring 8.2 x 6 cm in the right middle lobe is mildly increased from prior study, previously 8 x 6 cm. A medial right upper lobe lung mass with invasion of the anterior mediastinum measures 7.7 x 5.6 cm, also mildly increased, previously 7 x 5.1 cm. Multiple small patchy groundglass opacities are seen in the bilateral lung bases, new from prior study. A small pneumatocele is again noted in the lingula. A 2 mm right upper lobe nodule on series 4 image 45 is stable. No pleural effusion or pneumothorax. MEDIASTINUM/AXILLAE: Mediastinal, right hilar and right internal mammary lymph nodes are stable to mildly increased in size. For example, a right paratracheal node measures 3.4 x 2.6 cm on series 3 image 38, previously 2.8 x 2.2 cm. A 14 mm right internal mammary lymph node is stable. No axillary or supraclavicular lymphadenopathy. Heart size is normal without pericardial effusion. A small hiatal hernia is unchanged. CORONARY ARTERY CALCIFICATION: Advanced three-vessel coronary artery atherosclerotic calcifications. UPPER ABDOMEN: Scattered too small to characterize cystic lesions again seen in the liver, likely cysts. Aortic endoluminal stent graft, celiac artery stent, SMA stent and bilateral renal artery stents redemonstrated. A bilobed abdominal aortic aneurysm measuring up to 4.4 cm is unchanged. Multiple diverticula seen in the visualized portions of the colon without acute diverticulitis. MUSCULOSKELETAL: Mild spinal degenerative changes. No suspicious osseous lesions or acute fractures. A midline sebaceous cyst is seen in the upper back.     IMPRESSION: 1.  Multiple small patchy groundglass opacities in the bilateral lower lobes, new from  prior study, compatible with aspiration change. 2.  Mildly increased size of right middle lobe and right upper lobe pulmonary masses as well as mediastinal, right hilar and right internal mammary lymphadenopathy. 3.  Stable 2 mm right upper lobe nodule. 4.  Redemonstration of small hiatal hernia, abdominal aortic aneurysm and colonic diverticulosis.    US Mesenteric Artery    Result Date: 8/14/2024  EXAM: US KIDNEYS WITH RENAL ARTERY DOPPLER, US MESENTERIC ARTERY Exam performed with color and spectral Doppler analysis. COMPARISON: Ultrasound scan dated 11/16/2021 and CTA dated 8/14/2024. FINDINGS: RENAL: Right kidney: Normal echogenicity and parenchymal thickness. No hydronephrosis. Right renal artery: Negative for stenosis; single stented vessel  well seen.   Left kidney: Parenchymal thinning in the lower pole, which was also noted on the comparison CTA. No hydronephrosis. Left renal artery: Negative for stenosis; single stented vessel  well seen.   Measurements: AORTA: Aorta proximal - (PSV): Approximately 40 cm/s. Right Renal Measurements: Right renal length: 11.3 cm Right segmental artery - upper pole RI: 0.79 Right segmental artery - lower pole RI: 0.71 velocities pick list Left Renal Measurements: Left renal length: 10.7 cm Left segmental artery - upper pole RI: 0.78 Left segmental artery - lower pole RI: 0.79 velocities pick list MESENTERIC: Celiac artery: Stented artery patent. Negative for stenosis. Superior mesenteric artery: Stented artery patent. Negative for stenosis. FASTING VELOCITIES Celiac artery - PSV: 279 cm/s Superior mesenteric artery- PSV: 172 cm/s    Stented celiac, superior mesenteric, and bilateral renal arteries are patent without significant stenosis. Overall, there has been no significant change since the prior examination.    US Kidneys with Renal Artery Doppler    Result Date: 8/14/2024  EXAM: US KIDNEYS WITH RENAL ARTERY DOPPLER, US MESENTERIC ARTERY Exam performed with color and  spectral Doppler analysis. COMPARISON: Ultrasound scan dated 11/16/2021 and CTA dated 8/14/2024. FINDINGS: RENAL: Right kidney: Normal echogenicity and parenchymal thickness. No hydronephrosis. Right renal artery: Negative for stenosis; single stented vessel  well seen.   Left kidney: Parenchymal thinning in the lower pole, which was also noted on the comparison CTA. No hydronephrosis. Left renal artery: Negative for stenosis; single stented vessel  well seen.   Measurements: AORTA: Aorta proximal - (PSV): Approximately 40 cm/s. Right Renal Measurements: Right renal length: 11.3 cm Right segmental artery - upper pole RI: 0.79 Right segmental artery - lower pole RI: 0.71 velocities pick list Left Renal Measurements: Left renal length: 10.7 cm Left segmental artery - upper pole RI: 0.78 Left segmental artery - lower pole RI: 0.79 velocities pick list MESENTERIC: Celiac artery: Stented artery patent. Negative for stenosis. Superior mesenteric artery: Stented artery patent. Negative for stenosis. FASTING VELOCITIES Celiac artery - PSV: 279 cm/s Superior mesenteric artery- PSV: 172 cm/s    Stented celiac, superior mesenteric, and bilateral renal arteries are patent without significant stenosis. Overall, there has been no significant change since the prior examination.    CT Chest Abdomen Pelvis Angiogram with IV Contrast    Result Date: 8/14/2024  EXAM:  CT CHEST ABDOMEN PELVIS ANGIOGRAM WITH IV CONTRAST Including 3D image post-processing with or without AI assistance. COMPARISON:  CT angiogram chest, abdomen, and pelvis with IV contrast 11/16/2021, 01/08/2020, 07/16/2019, and 01/03/2019. FINDINGS: VASCULAR FINDINGS: Surgical changes from fenestrated endograft placement (05/02/2019) with seal zone in the supraceliac abdominal aorta, extension limbs to the distal common iliac arteries, and fenestrated grafts supplying the celiac axis, superior mesenteric artery, right  renal artery, and left renal artery. The extension  limbs, parent graft, and fenestrated grafts remain widely patent. Redemonstrated type II endoleak (series 21, image 765) with third lumbar arterial supply. The sac now measures 73 x 70 mm (axial, previously 66 x 67 mm on 11/16/2021) and 72 x 69 mm (double oblique, previously 66 x 67 mm on 11/16/2021). The positioning of the graft within the sac has changed, with positioning more anterolateral than on the prior. Additionally, there is a broad-based region of remodeling along the left posterolateral sac (series 10, image 332). Similar configuration of the sac near the seal zone at the juxtarenal region. The aortic arch is left-sided, with three branches and conventional branching anatomy. Moderate mixed density atherosclerosis in the arch and descending thoracic aorta with foci of plaque ulceration. The proximal great vessels are minimally stenosed. Scattered calcific coronary artery disease with a LAD stent.   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 Borderline mild stenosis of the right external iliac artery secondary to mixed density plaque. The right common femoral artery  is minimally stenosed secondary to mixed density plaque. High bifurcation of the right common femoral artery at the lower third of the femoral head. Predominantly calcific plaque in the proximal profunda femoris artery causes mild stenosis. Mixed density plaque in the proximal right superficial femoral artery causes mild stenosis. Mixed density plaque causes mild stenosis in the right internal iliac artery. Dilation of the left common iliac artery measuring 18 mm at the distal seal zone of the iliac extension limb. Mixed density plaque in the external iliac artery causes borderline mild stenosis. Predominantly calcific plaque in the left common femoral artery causes minimal stenosis. Mixed density plaque causes mild stenosis in the left internal iliac artery.  Retroaortic left renal vein. ADDITIONAL FINDINGS: Interval development of a right middle lobe mass with extension along the minor fissure, right hilar adenopathy, right paratracheal and anterior mediastinal adenopathy, and an enlarged node in the azygoesophageal recess (series 9, image 165). The right middle lobe mass lesion is contiguous with nodularity along the minor fissure. The mediastinal nodes measure up to 51 x 57 mm anteriorly. Physiologic pericardial fluid. No pleural effusions or pneumothoraces. The gallbladder, spleen, and right adrenal gland are normal in appearance. Right liver lobe subcentimeter cyst or hemangioma unchanged from 2021. Hypoattenuating fat-containing left adrenal nodule consistent with a myelolipoma. Left renal cortical scarring with unchanged atrophy and hypoattenuation involving the left renal inferior pole consistent with ischemic changes in a patient with an accessory arterial supply. Scattered pancreatic calcifications visible over prior inflammatory process. Colonic diverticulosis without adjacent inflammatory changes. The appendix is normal. The large and small bowel are normal in caliber. Small fat-containing inguinal hernias bilaterally. The prostate is enlarged. The urinary bladder is moderately distended with circumferential wall thickening. Bilateral hip osteoarthritis. Dense foci of sclerosis noted in the iliac bones bilaterally consistent with bone islands. No suspicious osseous lesions or fractures. Multilevel thoracolumbar degenerative disc disease. Midthoracic hemangioma. Dense focus of sclerosis in the sternum consistent with a bone island.    1. Interval development of right lower lobe mass lesion with right hilar and mediastinal adenopathy concerning for primary lung cancer with lymph node metastases. 2. Redemonstrated fenestrated thoracoabdominal endograft with patency of the main graft, fenestrated grafts, and iliac extension limbs. Redemonstrated type II endoleak  "with interval increase in the sac size now measuring 72 x 69 mm (double oblique), previously 66 x 67 mm (11/16/2021). 3. 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      Pathology:    Signed by: Dino Madera MD    Oncology Rooming Note    September 3, 2024 2:27 PM   Jonas Hyman is a 79 year old male who presents for:    Chief Complaint   Patient presents with     Oncology Clinic Visit     New patient consult      Initial Vitals: BP (!) 155/70 (BP Location: Left arm, Patient Position: Sitting, Cuff Size: Adult Large)   Pulse 75   Temp 97.7  F (36.5  C) (Tympanic)   Resp 20   Ht 1.74 m (5' 8.5\")   Wt 116.1 kg (256 lb)   SpO2 95%   BMI 38.36 kg/m   Estimated body mass index is 38.36 kg/m  as calculated from the following:    Height as of this encounter: 1.74 m (5' 8.5\").    Weight as of this encounter: 116.1 kg (256 lb). Body surface area is 2.37 meters squared.  Mild Pain (3) Comment: Data Unavailable   No LMP for male patient.  Allergies reviewed: Yes  Medications reviewed: Yes    Medications: Medication refills not needed today.  Pharmacy name entered into [facility]: [id number] - [address]     Frailty Screening:   Is the patient here for a new oncology consult visit in cancer care? 1. Yes. Over the past month, have you experienced difficulty or required a caregiver to assist with:   1. Balance, walking or general mobility (including any falls)? YES  2. Completion of self-care tasks such as bathing, dressing, toileting, grooming/hygiene?  NO  3. Concentration or memory that affects your daily life?  NO       Clinical concerns:     RAMON RODRIGUEZ CMA                Again, thank you for allowing me to participate in the care of your patient.        Sincerely,        Dino Madera MD  "

## 2024-09-03 NOTE — PROGRESS NOTES
[facility] Hematology and Oncology Consult Note      Patient: Jonas Hyman  MRN: [id number]  Date of Service: Sep 3, 2024      We have been asked by Dr. Morrison to evaluate Jonas Hyman for lung cancer.    Assessment/Plan:    1.  Small cell lung cancer: I personally reviewed his PET scan images.  They show evidence of bone metastases.  We will have to get a biopsy to confirm stage IV disease.  We can also send for next generation sequencing on the specimen.  The diagnosis was reviewed with the patient and his son.  The likely stage as well as reviewed.  We then discussed treatment which would initially consist of  chemoimmunotherapy.  We talked about the schedule of this regimen as well as some of the more common side effects.  These include, but are not limited to, cytopenias, fatigue, nausea, constipation, hair loss, and metabolic derangements.  The goals of therapy were reviewed which include reducing his tumor burden, delaying progression, palliating symptoms, and improving his cancer specific survival.  He was given some written information on these treatment medications to take, and review.  We discussed the need for port and a referral was placed to interventional radiology.  Finally will need an MRI of the brain to complete staging.  The patient had an opportunity to have all his questions answered.  Will anticipate starting chemotherapy in 1 to 2 weeks.  Finally, we discussed his prognosis with and without treatment.  Tumor sent for NGS analysis.    2.  Anemia of chronic kidney disease: Will continue to monitor is hemoglobin during treatment.  Retacrit could be considered if he has a significant drop in his hematocrit.    ECOG Performance  0    History:    Jonas is an 79-year-old gentleman who was recently found to have evidence of metastatic small cell lung cancer.  He was being evaluated at [facility] by his vascular surgeons and had a CT of the body done on April 14.  This showed a right  lower lobe mass with evidence of right hilar and mediastinal adenopathy.  He was referred to pulmonary.  They plan for an endobronchial ultrasound and PET scan.  The EBUS was done prior to the PET scan.  He had biopsies taken over the following hira stations: 4R, 4L, 7.  Pathology showed poorly differentiated small cell lung cancer in stations 4L and 4R.  He was thus referred to medical oncology.    Past History:    Past Medical History:   Diagnosis Date    Acute MI (H) 2009    Bronchitis     Cardiac arrest (H) 2009    Cardiac arrest (H)     Chemical dependency (H)     Has been in recovery 44 years    Coronary artery disease     Diabetes mellitus (H)     type II    Diabetes mellitus type 2, noninsulin dependent (H)     Diverticula of colon     Diverticulosis of large intestine     Hemorrhoids     Hemorrhoids     History of alcohol dependence (H) 1/12/1975    Created by [employer] Annotation: May 29 2009  5:52PM - Dillon Goldsmith: dry since  1975, also used drugs and marijuana  Replacement Utility updated for latest IMO load    Hypertension     Hypertension     Macular degeneration of right eye     Mixed hyperlipidemia     Myocardial infarction (H)     Retinal detachment 06/23/2014    Vitrectomy Posterior 23 guage, scleral buckle, membrane peel, endolaser gase    Stage 3b chronic kidney disease (H) 11/30/2020    Stented coronary artery 2009    stints times 2    Vitamin B12 deficiency     Vitamin D deficiency     Family History   Problem Relation Age of Onset    Obesity Father     Glaucoma No family hx of     Macular Degeneration No family hx of     Retinal detachment No family hx of     Kidney Cancer Father     Heart Disease Brother       [unfilled] Social History     Socioeconomic History    Marital status:      Spouse name: Not on file    Number of children: Not on file    Years of education: Not on file    Highest education level: Not on file   Occupational History    Not on file   Tobacco Use     Smoking status: Former     Current packs/day: 0.00     Average packs/day: 1 pack/day for 50.6 years (50.6 ttl pk-yrs)     Types: Cigarettes     Start date: 1956     Quit date: 2024     Years since quittin.6     Passive exposure: Current (son living with pt and smokes in basement)    Smokeless tobacco: Never    Tobacco comments:     20 yrs ago-mid 80's, quit again , started again , quit 2024   Vaping Use    Vaping status: Never Used   Substance and Sexual Activity    Alcohol use: No     Comment: from 1975    Drug use: No    Sexual activity: Not on file   Other Topics Concern    Parent/sibling w/ CABG, MI or angioplasty before 65F 55M? Not Asked   Social History Narrative    2 children,         Quit drinking alcohol         Son has diabetes and lives in basement        Lives with grandchild who is 11, born      Social Determinants of Health     Financial Resource Strain: Not on file   Food Insecurity: Not on file   Transportation Needs: Not on file   Physical Activity: Not on file   Stress: Not on file   Social Connections: Not on file   Interpersonal Safety: Unknown (2024)    Interpersonal Safety     Do you feel physically and emotionally safe where you currently live?: Patient unable to answer     Within the past 12 months, have you been hit, slapped, kicked or otherwise physically hurt by someone?: Patient unable to answer     Within the past 12 months, have you been humiliated or emotionally abused in other ways by your partner or ex-partner?: Patient unable to answer   Housing Stability: Not on file        Allergies:    No Known Allergies    Review of Systems:    As above in the history.     Review of Systems otherwise Negative for:  General: chills, fever or night sweats  Psychological: anxiety or depression  Ophthalmic: blurry vision, double vision or loss of vision, vision change  ENT: epistaxis, oral lesions, hearing changes  Hematological and  "Lymphatic: bleeding, bruising, jaundice, swollen lymph nodes  Endocrine: hot flashes, unexpected weight changes  Respiratory: cough, hemoptysis, orthopnea or shortness of breath/[place]  Cardiovascular: chest pain, edema, palpitations or PND  Gastrointestinal: abdominal pain, blood in stools, change in bowel habits, constipation, diarrhea or nausea/vomiting  Genito-Urinary: change in urinary stream, incontinence, frequency/urgency  Musculoskeletal: joint pain, stiffness, swelling, muscle pain  Neurological: dizziness, headaches, numbness/tingling  Dermatological: lumps and rash    Physical Exam:    BP (!) 155/70 (BP Location: Left arm, Patient Position: Sitting, Cuff Size: Adult Large)   Pulse 75   Temp 97.7  F (36.5  C) (Tympanic)   Resp 20   Ht 1.74 m (5' 8.5\")   Wt 116.1 kg (256 lb)   SpO2 95%   BMI 38.36 kg/m      General: patient appears stated age of 79 year old. Nontoxic and in no distress.   HEENT: Head: atraumatic, normocephalic. Sclerae anicteric.  Chest:  Normal respiratory effort  Cardiac:  No edema.   Abdomen: abdomen is non-distended  Extremities: normal tone and muscle bulk.   Skin: no lesions or rash on visible skin. Warm and dry.   CNS: alert and oriented. Grossly non-focal.   Psychiatric: normal mood and affect.     Lab Results:    Recent Results (from the past 168 hour(s))   Glucose by meter   Result Value Ref Range    GLUCOSE BY METER POCT 109 (H) 70 - 99 mg/dL     Imaging Results:    CT Chest/Abdomen/Pelvis w Contrast    Result Date: 8/30/2024  EXAM: PET ONCOLOGY WHOLE BODY, CT CHEST/ABDOMEN/PELVIS W CONTRAST LOCATION: [facility] DATE: 8/30/2024 INDICATION: Lung cancer, right, staging. Malignant neoplasm of middle lobe, right bronchus or lung. Poorly differentiated neuroendocrine small cell carcinoma. Initial treatment strategy. COMPARISON: CT chest abdomen pelvis 8/14/2024, 11/16/2021 reviewed. CONTRAST: 100 mL Isovue-370 TECHNIQUE: Serum glucose level 109 mg/dL. One " hour post intravenous administration of 14.6 mCi F-18 FDG, PET imaging was performed from the skull vertex to feet, utilizing attenuation correction with concurrent axial CT and PET/CT image fusion. Separate diagnostic CT of the chest, abdomen, and pelvis was performed. Dose reduction techniques were used. PET/CT FINDINGS: Large irregular FDG avid pulmonary mass involving the inferior right upper lobe and posterior right middle lobe with extension across the minor fissure as well as direct mediastinal invasion, similar to 8/14/2024 and new since 11/16/2021, measures 7.7 x 5.9 x 2.7 cm and demonstrates marked uptake (SUVmax 19.5), consistent with malignancy. Several enlarged FDG avid intrathoracic lymph nodes, including scattered mediastinal and right hilar regions, consistent with hira metastases. This includes a large right anterior mediastinal hira mass with direct pleural extension measuring 6.0 x 8.2 cm with marked uptake (SUVmax 22.0) as well as nodes involving several right lobar station 12R, interlobar station 11R, hilar station 10R sites as well as right lower paratracheal station 4R, prevascular station 3A and paraesophageal station 8. Additional FDG avid right supraclavicular adenopathy and a right anterior pleural nodule. Several scattered FDG avid skeletal lesions are also present, including medial right clavicle, left scapula, medial left iliac bone (SUVmax 9.6), left inferior pubic ramus and proximal left femur. No suspicious focal uptake in the liver. Normal adrenal glands. Mild irregular opacities in both lower lobes with mild uptake may reflect minor infectious/inflammatory process. CT FINDINGS: Mild senescent intracranial changes. Marked atherosclerotic calcifications including the coronary arteries. Mild reticular scarring or atelectasis in the lung bases. Few small benign liver cysts. Aortobiiliac stent graft spanning an infrarenal fusiform abdominal aortic aneurysm. Mild colonic  diverticulosis. Few small pelvic phleboliths. Small fat-containing left inguinal hernia. Moderate scattered hypertrophic degenerative changes in the spine.     IMPRESSION: Findings suspicious for right lung cancer involving the right upper and middle lobes with metastases involving several lymph nodes above the diaphragm and several sites in the skeleton. If biopsy of a distant metastatic site is desired, a lesion in the medial left iliac bone is a good option for CT-guided biopsy.    PET Oncology Whole Body    Result Date: 8/30/2024  EXAM: PET ONCOLOGY WHOLE BODY, CT CHEST/ABDOMEN/PELVIS W CONTRAST LOCATION: [facility] DATE: 8/30/2024 INDICATION: Lung cancer, right, staging. Malignant neoplasm of middle lobe, right bronchus or lung. Poorly differentiated neuroendocrine small cell carcinoma. Initial treatment strategy. COMPARISON: CT chest abdomen pelvis 8/14/2024, 11/16/2021 reviewed. CONTRAST: 100 mL Isovue-370 TECHNIQUE: Serum glucose level 109 mg/dL. One hour post intravenous administration of 14.6 mCi F-18 FDG, PET imaging was performed from the skull vertex to feet, utilizing attenuation correction with concurrent axial CT and PET/CT image fusion. Separate diagnostic CT of the chest, abdomen, and pelvis was performed. Dose reduction techniques were used. PET/CT FINDINGS: Large irregular FDG avid pulmonary mass involving the inferior right upper lobe and posterior right middle lobe with extension across the minor fissure as well as direct mediastinal invasion, similar to 8/14/2024 and new since 11/16/2021, measures 7.7 x 5.9 x 2.7 cm and demonstrates marked uptake (SUVmax 19.5), consistent with malignancy. Several enlarged FDG avid intrathoracic lymph nodes, including scattered mediastinal and right hilar regions, consistent with hira metastases. This includes a large right anterior mediastinal hira mass with direct pleural extension measuring 6.0 x 8.2 cm with marked uptake (SUVmax  22.0) as well as nodes involving several right lobar station 12R, interlobar station 11R, hilar station 10R sites as well as right lower paratracheal station 4R, prevascular station 3A and paraesophageal station 8. Additional FDG avid right supraclavicular adenopathy and a right anterior pleural nodule. Several scattered FDG avid skeletal lesions are also present, including medial right clavicle, left scapula, medial left iliac bone (SUVmax 9.6), left inferior pubic ramus and proximal left femur. No suspicious focal uptake in the liver. Normal adrenal glands. Mild irregular opacities in both lower lobes with mild uptake may reflect minor infectious/inflammatory process. CT FINDINGS: Mild senescent intracranial changes. Marked atherosclerotic calcifications including the coronary arteries. Mild reticular scarring or atelectasis in the lung bases. Few small benign liver cysts. Aortobiiliac stent graft spanning an infrarenal fusiform abdominal aortic aneurysm. Mild colonic diverticulosis. Few small pelvic phleboliths. Small fat-containing left inguinal hernia. Moderate scattered hypertrophic degenerative changes in the spine.     IMPRESSION: Findings suspicious for right lung cancer involving the right upper and middle lobes with metastases involving several lymph nodes above the diaphragm and several sites in the skeleton. If biopsy of a distant metastatic site is desired, a lesion in the medial left iliac bone is a good option for CT-guided biopsy.    CT Chest w Contrast    Result Date: 8/27/2024  EXAM: CT CHEST W CONTRAST LOCATION: [facility] DATE: 8/27/2024 INDICATION: Recent EBUS today of RML 6 cm mass, now presents with shortness of breath COMPARISON: 08/14/2024 TECHNIQUE: CT chest with IV contrast. Multiplanar reformats were obtained. Dose reduction techniques were used. CONTRAST: ISOVUE 370 70 ML FINDINGS: LUNGS AND PLEURA: A lobulated mass measuring 8.2 x 6 cm in the right middle lobe  is mildly increased from prior study, previously 8 x 6 cm. A medial right upper lobe lung mass with invasion of the anterior mediastinum measures 7.7 x 5.6 cm, also mildly increased, previously 7 x 5.1 cm. Multiple small patchy groundglass opacities are seen in the bilateral lung bases, new from prior study. A small pneumatocele is again noted in the lingula. A 2 mm right upper lobe nodule on series 4 image 45 is stable. No pleural effusion or pneumothorax. MEDIASTINUM/AXILLAE: Mediastinal, right hilar and right internal mammary lymph nodes are stable to mildly increased in size. For example, a right paratracheal node measures 3.4 x 2.6 cm on series 3 image 38, previously 2.8 x 2.2 cm. A 14 mm right internal mammary lymph node is stable. No axillary or supraclavicular lymphadenopathy. Heart size is normal without pericardial effusion. A small hiatal hernia is unchanged. CORONARY ARTERY CALCIFICATION: Advanced three-vessel coronary artery atherosclerotic calcifications. UPPER ABDOMEN: Scattered too small to characterize cystic lesions again seen in the liver, likely cysts. Aortic endoluminal stent graft, celiac artery stent, SMA stent and bilateral renal artery stents redemonstrated. A bilobed abdominal aortic aneurysm measuring up to 4.4 cm is unchanged. Multiple diverticula seen in the visualized portions of the colon without acute diverticulitis. MUSCULOSKELETAL: Mild spinal degenerative changes. No suspicious osseous lesions or acute fractures. A midline sebaceous cyst is seen in the upper back.     IMPRESSION: 1.  Multiple small patchy groundglass opacities in the bilateral lower lobes, new from prior study, compatible with aspiration change. 2.  Mildly increased size of right middle lobe and right upper lobe pulmonary masses as well as mediastinal, right hilar and right internal mammary lymphadenopathy. 3.  Stable 2 mm right upper lobe nodule. 4.  Redemonstration of small hiatal hernia, abdominal aortic  aneurysm and colonic diverticulosis.    US Mesenteric Artery    Result Date: 8/14/2024  EXAM: US KIDNEYS WITH RENAL ARTERY DOPPLER, US MESENTERIC ARTERY Exam performed with color and spectral Doppler analysis. COMPARISON: Ultrasound scan dated 11/16/2021 and CTA dated 8/14/2024. FINDINGS: RENAL: Right kidney: Normal echogenicity and parenchymal thickness. No hydronephrosis. Right renal artery: Negative for stenosis; single stented vessel  well seen.   Left kidney: Parenchymal thinning in the lower pole, which was also noted on the comparison CTA. No hydronephrosis. Left renal artery: Negative for stenosis; single stented vessel  well seen.   Measurements: AORTA: Aorta proximal - (PSV): Approximately 40 cm/s. Right Renal Measurements: Right renal length: 11.3 cm Right segmental artery - upper pole RI: 0.79 Right segmental artery - lower pole RI: 0.71 velocities pick list Left Renal Measurements: Left renal length: 10.7 cm Left segmental artery - upper pole RI: 0.78 Left segmental artery - lower pole RI: 0.79 velocities pick list MESENTERIC: Celiac artery: Stented artery patent. Negative for stenosis. Superior mesenteric artery: Stented artery patent. Negative for stenosis. FASTING VELOCITIES Celiac artery - PSV: 279 cm/s Superior mesenteric artery- PSV: 172 cm/s    Stented celiac, superior mesenteric, and bilateral renal arteries are patent without significant stenosis. Overall, there has been no significant change since the prior examination.    US Kidneys with Renal Artery Doppler    Result Date: 8/14/2024  EXAM: US KIDNEYS WITH RENAL ARTERY DOPPLER, US MESENTERIC ARTERY Exam performed with color and spectral Doppler analysis. COMPARISON: Ultrasound scan dated 11/16/2021 and CTA dated 8/14/2024. FINDINGS: RENAL: Right kidney: Normal echogenicity and parenchymal thickness. No hydronephrosis. Right renal artery: Negative for stenosis; single stented vessel  well seen.   Left kidney: Parenchymal thinning in the lower  pole, which was also noted on the comparison CTA. No hydronephrosis. Left renal artery: Negative for stenosis; single stented vessel  well seen.   Measurements: AORTA: Aorta proximal - (PSV): Approximately 40 cm/s. Right Renal Measurements: Right renal length: 11.3 cm Right segmental artery - upper pole RI: 0.79 Right segmental artery - lower pole RI: 0.71 velocities pick list Left Renal Measurements: Left renal length: 10.7 cm Left segmental artery - upper pole RI: 0.78 Left segmental artery - lower pole RI: 0.79 velocities pick list MESENTERIC: Celiac artery: Stented artery patent. Negative for stenosis. Superior mesenteric artery: Stented artery patent. Negative for stenosis. FASTING VELOCITIES Celiac artery - PSV: 279 cm/s Superior mesenteric artery- PSV: 172 cm/s    Stented celiac, superior mesenteric, and bilateral renal arteries are patent without significant stenosis. Overall, there has been no significant change since the prior examination.    CT Chest Abdomen Pelvis Angiogram with IV Contrast    Result Date: 8/14/2024  EXAM:  CT CHEST ABDOMEN PELVIS ANGIOGRAM WITH IV CONTRAST Including 3D image post-processing with or without AI assistance. COMPARISON:  CT angiogram chest, abdomen, and pelvis with IV contrast 11/16/2021, 01/08/2020, 07/16/2019, and 01/03/2019. FINDINGS: VASCULAR FINDINGS: Surgical changes from fenestrated endograft placement (05/02/2019) with seal zone in the supraceliac abdominal aorta, extension limbs to the distal common iliac arteries, and fenestrated grafts supplying the celiac axis, superior mesenteric artery, right  renal artery, and left renal artery. The extension limbs, parent graft, and fenestrated grafts remain widely patent. Redemonstrated type II endoleak (series 21, image 765) with third lumbar arterial supply. The sac now measures 73 x 70 mm (axial, previously 66 x 67 mm on 11/16/2021) and 72 x 69 mm (double oblique, previously 66 x 67 mm on 11/16/2021). The positioning of  the graft within the sac has changed, with positioning more anterolateral than on the prior. Additionally, there is a broad-based region of remodeling along the left posterolateral sac (series 10, image 332). Similar configuration of the sac near the seal zone at the juxtarenal region. The aortic arch is left-sided, with three branches and conventional branching anatomy. Moderate mixed density atherosclerosis in the arch and descending thoracic aorta with foci of plaque ulceration. The proximal great vessels are minimally stenosed. Scattered calcific coronary artery disease with a LAD stent.   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 Borderline mild stenosis of the right external iliac artery secondary to mixed density plaque. The right common femoral artery  is minimally stenosed secondary to mixed density plaque. High bifurcation of the right common femoral artery at the lower third of the femoral head. Predominantly calcific plaque in the proximal profunda femoris artery causes mild stenosis. Mixed density plaque in the proximal right superficial femoral artery causes mild stenosis. Mixed density plaque causes mild stenosis in the right internal iliac artery. Dilation of the left common iliac artery measuring 18 mm at the distal seal zone of the iliac extension limb. Mixed density plaque in the external iliac artery causes borderline mild stenosis. Predominantly calcific plaque in the left common femoral artery causes minimal stenosis. Mixed density plaque causes mild stenosis in the left internal iliac artery. Retroaortic left renal vein. ADDITIONAL FINDINGS: Interval development of a right middle lobe mass with extension along the minor fissure, right hilar adenopathy, right paratracheal and anterior mediastinal adenopathy, and an enlarged node in the azygoesophageal recess (series 9, image 165). The right middle lobe mass  lesion is contiguous with nodularity along the minor fissure. The mediastinal nodes measure up to 51 x 57 mm anteriorly. Physiologic pericardial fluid. No pleural effusions or pneumothoraces. The gallbladder, spleen, and right adrenal gland are normal in appearance. Right liver lobe subcentimeter cyst or hemangioma unchanged from 2021. Hypoattenuating fat-containing left adrenal nodule consistent with a myelolipoma. Left renal cortical scarring with unchanged atrophy and hypoattenuation involving the left renal inferior pole consistent with ischemic changes in a patient with an accessory arterial supply. Scattered pancreatic calcifications visible over prior inflammatory process. Colonic diverticulosis without adjacent inflammatory changes. The appendix is normal. The large and small bowel are normal in caliber. Small fat-containing inguinal hernias bilaterally. The prostate is enlarged. The urinary bladder is moderately distended with circumferential wall thickening. Bilateral hip osteoarthritis. Dense foci of sclerosis noted in the iliac bones bilaterally consistent with bone islands. No suspicious osseous lesions or fractures. Multilevel thoracolumbar degenerative disc disease. Midthoracic hemangioma. Dense focus of sclerosis in the sternum consistent with a bone island.    1. Interval development of right lower lobe mass lesion with right hilar and mediastinal adenopathy concerning for primary lung cancer with lymph node metastases. 2. Redemonstrated fenestrated thoracoabdominal endograft with patency of the main graft, fenestrated grafts, and iliac extension limbs. Redemonstrated type II endoleak with interval increase in the sac size now measuring 72 x 69 mm (double oblique), previously 66 x 67 mm (11/16/2021). 3. 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      Pathology:    Signed by: Dino Madera  MD

## 2024-09-04 ENCOUNTER — VIRTUAL VISIT (OUTPATIENT)
Dept: INTERNAL MEDICINE | Facility: CLINIC | Age: 79
End: 2024-09-04
Payer: MEDICARE

## 2024-09-04 ENCOUNTER — TELEPHONE (OUTPATIENT)
Dept: INTERVENTIONAL RADIOLOGY/VASCULAR | Facility: CLINIC | Age: 79
End: 2024-09-04

## 2024-09-04 DIAGNOSIS — Z00.00 PREVENTATIVE HEALTH CARE: ICD-10-CM

## 2024-09-04 DIAGNOSIS — E66.01 SEVERE OBESITY WITH BODY MASS INDEX (BMI) OF 35.0 TO 39.9 WITH SERIOUS COMORBIDITY (H): ICD-10-CM

## 2024-09-04 DIAGNOSIS — I10 ESSENTIAL HYPERTENSION: ICD-10-CM

## 2024-09-04 DIAGNOSIS — I71.40 ABDOMINAL AORTIC ANEURYSM (AAA) WITHOUT RUPTURE, UNSPECIFIED PART (H): ICD-10-CM

## 2024-09-04 DIAGNOSIS — C34.31 SMALL CELL LUNG CANCER, RIGHT LOWER LOBE (H): Primary | ICD-10-CM

## 2024-09-04 DIAGNOSIS — F41.0 ANXIETY ATTACK: ICD-10-CM

## 2024-09-04 PROBLEM — F32.A DEPRESSIVE DISORDER: Status: RESOLVED | Noted: 2019-04-08 | Resolved: 2024-09-04

## 2024-09-04 PROCEDURE — G2211 COMPLEX E/M VISIT ADD ON: HCPCS | Mod: 95 | Performed by: INTERNAL MEDICINE

## 2024-09-04 PROCEDURE — 99214 OFFICE O/P EST MOD 30 MIN: CPT | Mod: 95 | Performed by: INTERNAL MEDICINE

## 2024-09-04 RX ORDER — HEPARIN SODIUM,PORCINE 10 UNIT/ML
5-20 VIAL (ML) INTRAVENOUS DAILY PRN
Status: CANCELLED | OUTPATIENT
Start: 2024-09-10

## 2024-09-04 RX ORDER — ALBUTEROL SULFATE 0.83 MG/ML
2.5 SOLUTION RESPIRATORY (INHALATION)
Status: CANCELLED | OUTPATIENT
Start: 2024-09-19

## 2024-09-04 RX ORDER — ALBUTEROL SULFATE 90 UG/1
1-2 AEROSOL, METERED RESPIRATORY (INHALATION)
Status: CANCELLED
Start: 2024-09-19

## 2024-09-04 RX ORDER — METHYLPREDNISOLONE SODIUM SUCCINATE 125 MG/2ML
125 INJECTION, POWDER, LYOPHILIZED, FOR SOLUTION INTRAMUSCULAR; INTRAVENOUS
Status: CANCELLED
Start: 2024-09-19

## 2024-09-04 RX ORDER — MEPERIDINE HYDROCHLORIDE 25 MG/ML
25 INJECTION INTRAMUSCULAR; INTRAVENOUS; SUBCUTANEOUS EVERY 30 MIN PRN
Status: CANCELLED | OUTPATIENT
Start: 2024-09-19

## 2024-09-04 RX ORDER — DIPHENHYDRAMINE HYDROCHLORIDE 50 MG/ML
50 INJECTION INTRAMUSCULAR; INTRAVENOUS
Status: CANCELLED
Start: 2024-09-18

## 2024-09-04 RX ORDER — HYDROXYZINE HYDROCHLORIDE 25 MG/1
25 TABLET, FILM COATED ORAL 3 TIMES DAILY PRN
Qty: 40 TABLET | Refills: 2 | Status: SHIPPED | OUTPATIENT
Start: 2024-09-04

## 2024-09-04 RX ORDER — PALONOSETRON 0.05 MG/ML
0.25 INJECTION, SOLUTION INTRAVENOUS ONCE
Status: CANCELLED | OUTPATIENT
Start: 2024-09-10

## 2024-09-04 RX ORDER — ALBUTEROL SULFATE 90 UG/1
1-2 AEROSOL, METERED RESPIRATORY (INHALATION)
Status: CANCELLED
Start: 2024-09-18

## 2024-09-04 RX ORDER — LORAZEPAM 2 MG/ML
0.5 INJECTION INTRAMUSCULAR EVERY 4 HOURS PRN
Status: CANCELLED | OUTPATIENT
Start: 2024-09-18

## 2024-09-04 RX ORDER — ALBUTEROL SULFATE 0.83 MG/ML
2.5 SOLUTION RESPIRATORY (INHALATION)
Status: CANCELLED | OUTPATIENT
Start: 2024-09-10

## 2024-09-04 RX ORDER — HEPARIN SODIUM (PORCINE) LOCK FLUSH IV SOLN 100 UNIT/ML 100 UNIT/ML
5 SOLUTION INTRAVENOUS
Status: CANCELLED | OUTPATIENT
Start: 2024-09-19

## 2024-09-04 RX ORDER — MEPERIDINE HYDROCHLORIDE 25 MG/ML
25 INJECTION INTRAMUSCULAR; INTRAVENOUS; SUBCUTANEOUS EVERY 30 MIN PRN
Status: CANCELLED | OUTPATIENT
Start: 2024-09-18

## 2024-09-04 RX ORDER — EPINEPHRINE 1 MG/ML
0.3 INJECTION, SOLUTION INTRAMUSCULAR; SUBCUTANEOUS EVERY 5 MIN PRN
Status: CANCELLED | OUTPATIENT
Start: 2024-09-18

## 2024-09-04 RX ORDER — HEPARIN SODIUM,PORCINE 10 UNIT/ML
5-20 VIAL (ML) INTRAVENOUS DAILY PRN
Status: CANCELLED | OUTPATIENT
Start: 2024-09-18

## 2024-09-04 RX ORDER — METHYLPREDNISOLONE SODIUM SUCCINATE 125 MG/2ML
125 INJECTION, POWDER, LYOPHILIZED, FOR SOLUTION INTRAMUSCULAR; INTRAVENOUS
Status: CANCELLED
Start: 2024-09-10

## 2024-09-04 RX ORDER — EPINEPHRINE 1 MG/ML
0.3 INJECTION, SOLUTION INTRAMUSCULAR; SUBCUTANEOUS EVERY 5 MIN PRN
Status: CANCELLED | OUTPATIENT
Start: 2024-09-10

## 2024-09-04 RX ORDER — DIPHENHYDRAMINE HYDROCHLORIDE 50 MG/ML
50 INJECTION INTRAMUSCULAR; INTRAVENOUS
Status: CANCELLED
Start: 2024-09-19

## 2024-09-04 RX ORDER — METHYLPREDNISOLONE SODIUM SUCCINATE 125 MG/2ML
125 INJECTION, POWDER, LYOPHILIZED, FOR SOLUTION INTRAMUSCULAR; INTRAVENOUS
Status: CANCELLED
Start: 2024-09-18

## 2024-09-04 RX ORDER — ONDANSETRON 8 MG/1
8 TABLET, FILM COATED ORAL EVERY 6 HOURS PRN
Qty: 30 TABLET | Refills: 2 | Status: SHIPPED | OUTPATIENT
Start: 2024-09-09

## 2024-09-04 RX ORDER — LORAZEPAM 2 MG/ML
0.5 INJECTION INTRAMUSCULAR EVERY 4 HOURS PRN
Status: CANCELLED | OUTPATIENT
Start: 2024-09-19

## 2024-09-04 RX ORDER — HEPARIN SODIUM (PORCINE) LOCK FLUSH IV SOLN 100 UNIT/ML 100 UNIT/ML
5 SOLUTION INTRAVENOUS
Status: CANCELLED | OUTPATIENT
Start: 2024-09-18

## 2024-09-04 RX ORDER — DIPHENHYDRAMINE HYDROCHLORIDE 50 MG/ML
50 INJECTION INTRAMUSCULAR; INTRAVENOUS
Status: CANCELLED
Start: 2024-09-10

## 2024-09-04 RX ORDER — ALBUTEROL SULFATE 90 UG/1
1-2 AEROSOL, METERED RESPIRATORY (INHALATION)
Status: CANCELLED
Start: 2024-09-10

## 2024-09-04 RX ORDER — EPINEPHRINE 1 MG/ML
0.3 INJECTION, SOLUTION INTRAMUSCULAR; SUBCUTANEOUS EVERY 5 MIN PRN
Status: CANCELLED | OUTPATIENT
Start: 2024-09-19

## 2024-09-04 RX ORDER — BUSPIRONE HYDROCHLORIDE 10 MG/1
10 TABLET ORAL 3 TIMES DAILY PRN
Qty: 40 TABLET | Refills: 2 | Status: SHIPPED | OUTPATIENT
Start: 2024-09-04

## 2024-09-04 RX ORDER — MEPERIDINE HYDROCHLORIDE 25 MG/ML
25 INJECTION INTRAMUSCULAR; INTRAVENOUS; SUBCUTANEOUS EVERY 30 MIN PRN
Status: CANCELLED | OUTPATIENT
Start: 2024-09-10

## 2024-09-04 RX ORDER — ALBUTEROL SULFATE 0.83 MG/ML
2.5 SOLUTION RESPIRATORY (INHALATION)
Status: CANCELLED | OUTPATIENT
Start: 2024-09-18

## 2024-09-04 RX ORDER — LORAZEPAM 2 MG/ML
0.5 INJECTION INTRAMUSCULAR EVERY 4 HOURS PRN
Status: CANCELLED | OUTPATIENT
Start: 2024-09-10

## 2024-09-04 RX ORDER — HEPARIN SODIUM (PORCINE) LOCK FLUSH IV SOLN 100 UNIT/ML 100 UNIT/ML
5 SOLUTION INTRAVENOUS
Status: CANCELLED | OUTPATIENT
Start: 2024-09-10

## 2024-09-04 RX ORDER — HEPARIN SODIUM,PORCINE 10 UNIT/ML
5-20 VIAL (ML) INTRAVENOUS DAILY PRN
Status: CANCELLED | OUTPATIENT
Start: 2024-09-19

## 2024-09-04 NOTE — PATIENT INSTRUCTIONS
Referral to care coordination for end-of-life planning, forms, insurance etc.    Okay to take Requip 2 to 4 mg at bed    BuSpar 10 mg every 8 hours as needed    Hydroxyzine 25 mg every 8 hours as needed    Okay to stop lisinopril    Consider bradycardic near syncope    [facility] [place] [facility]

## 2024-09-04 NOTE — PROGRESS NOTES
OFFICE VISIT--Video    Jonas is a 79 year old male contacting the clinic today via video, who will use the platform: iPosi for the visit.  Phone # for Doximity, or if Amwell drops:   Telephone Information:   Mobile [phone]          ASSESSMENT and PLAN:  1. Small cell lung cancer, right lower lobe (H)  Discussed options.  Primary care coordination referral for end-of-life, POLST, living well etc.  Discussed treatment versus palliative care  - Primary Care - Care Coordination Referral; Future    2. Anxiety attack  Provide as needed medication.  Avoid controlled substances  - busPIRone (BUSPAR) 10 MG tablet; Take 1 tablet (10 mg) by mouth 3 times daily as needed (anxiety).  Dispense: 40 tablet; Refill: 2  - hydrOXYzine HCl (ATARAX) 25 MG tablet; Take 1 tablet (25 mg) by mouth 3 times daily as needed for itching.  Dispense: 40 tablet; Refill: 2    3. Abdominal aortic aneurysm (AAA) without rupture, unspecified part (H24)  Noted and stable    4. Severe obesity with body mass index (BMI) of 35.0 to 39.9 with serious comorbidity (H)  Weight increased the last few months    5. Preventative health care  - REVIEW OF HEALTH MAINTENANCE PROTOCOL ORDERS    6. Essential hypertension  Stable on current drugs       Patient Instructions   Referral to care coordination for end-of-life planning, forms, insurance etc.    Okay to take Requip 2 to 4 mg at bed    BuSpar 10 mg every 8 hours as needed    Hydroxyzine 25 mg every 8 hours as needed    Okay to stop lisinopril    Consider bradycardic near syncope    [facility] [place] [facility]            Return if symptoms worsen or fail to improve, for using a video visit.       CHIEF COMPLAINT:  Chief Complaint   Patient presents with    Hospital F/U     Facility:  [facility]  Date of visit: 08/26/24  Reason for visit: Shortness of breath  Current Status: Diagnosed with stage 4 lung cancer, going to be doing palliative care through oncology       HISTORY OF  "PRESENT ILLNESS:  Jonas is a 79 year old male contacting the clinic today via video for discussion of recent diagnosis of lung cancer.  Bronchoscopy and biopsy has shown small cell lung cancer metastatic to several sites.  He is reviewing treatment options including palliative care at, chemotherapy, and [facility].  A good friend is influential at the [facility] and they are discussing referral and treatment there.  Concerned about his grandson who is only 12 years old.  Active support group    After bronchoscopy went to the hospital with abrupt shortness of breath.  Although he is quite convinced that this represents a side effect of the bronchoscopy, unable to insert possibility that this represented bradycardia and a bradycardic arrhythmia.  Was in the emergency room on August 26    Discussed questions and options regarding living will and power of  etc.    HPI    REVIEW OF SYSTEMS:   Leg cramps improved on Requip    Today's PHQ-2 Score:       8/16/2024     2:12 PM   PHQ-2 ( 1999 [employer])   Q1: Little interest or pleasure in doing things 0   Q2: Feeling down, depressed or hopeless 0   PHQ-2 Score 0   Q1: Little interest or pleasure in doing things Not at all   Q2: Feeling down, depressed or hopeless Not at all   PHQ-2 Score 0       PFSH:  Social History     Social History Narrative    2 children,         Quit drinking alcohol 1975, jan 12        Son has diabetes and lives in basement        Lives with grandchild who is 11, born 2012       TOBACCO USE:  History   Smoking Status    Former    Types: Cigarettes   Smokeless Tobacco    Never       VITALS:  There were no vitals filed for this visit.  There were no vitals taken for this visit. Estimated body mass index is 38.36 kg/m  as calculated from the following:    Height as of 9/3/24: 1.74 m (5' 8.5\").    Weight as of 9/3/24: 116.1 kg (256 lb).    PHYSICAL EXAM:  (observations via Video)  Alert and oriented.  Talkative.  Comments and very few " questions    MEDICATIONS:   Current Outpatient Medications   Medication Sig Dispense Refill    acetaminophen (TYLENOL) 500 MG tablet Take 1,000-1,500 mg by mouth every 6 hours as needed for mild pain.      aspirin (ASA) 81 MG tablet Take 81 mg by mouth daily      busPIRone (BUSPAR) 10 MG tablet Take 1 tablet (10 mg) by mouth 3 times daily as needed (anxiety). 40 tablet 2    cyanocobalamin (VITAMIN B-12) 100 MCG tablet Take 2,000 mcg by mouth daily      furosemide (LASIX) 40 MG tablet Take 1 tablet (40 mg) by mouth daily as needed (Leg swelling) 30 tablet 3    hydrOXYzine HCl (ATARAX) 25 MG tablet Take 1 tablet (25 mg) by mouth 3 times daily as needed for itching. 40 tablet 2    ibuprofen (ADVIL/MOTRIN) 200 MG tablet Take 600 mg by mouth every 4 hours as needed for pain.      magnesium oxide (MAG-OX) 400 MG tablet Take 1 tablet (400 mg) by mouth daily      Melatonin 10 MG CAPS Take 20 mg by mouth at bedtime      nitroGLYcerin (NITROSTAT) 0.4 MG sublingual tablet Place 1 tablet (0.4 mg) under the tongue every 5 minutes as needed for chest pain 30 tablet 5    pravastatin (PRAVACHOL) 40 MG tablet Take 1 tablet (40 mg) by mouth daily 90 tablet 3    rOPINIRole (REQUIP) 2 MG tablet Take 1 tablet (2 mg) by mouth at bedtime 90 tablet 3    triamterene-HCTZ (DYAZIDE) 37.5-25 MG capsule Take by mouth every morning.      vitamin B complex with vitamin C (VITAMIN  B COMPLEX) tablet Take 1 tablet by mouth daily      VITAMIN D, CHOLECALCIFEROL, PO Take 2,000 Units by mouth daily         Outside Notes summarized: Bronchoscopy and ER note  Labs, x-rays, cardiology, GI tests reviewed: Hemoglobin drop  Recent Labs   Lab Test 08/26/24  2358 07/16/24  1443 09/14/23  2026 08/14/23  1118   HGB 10.8* 13.3   < > 13.8   WBC 15.1* 9.9   < > 8.9   * 133*   < > 141   POTASSIUM 3.9 4.5   < > 5.4*   CR 1.77* 1.66*   < > 1.32*   A1C  --  5.8*  --  6.0*   PSA  --  0.81  --  0.43   B12  --  1,103  --   --    TSH  --  0.93  --   --     < > =  "values in this interval not displayed.     No results found for: \"[id number]\"  Lab Results   Component Value Date    CHOL 168 07/16/2024     New orders:   Orders Placed This Encounter   Procedures    REVIEW OF HEALTH MAINTENANCE PROTOCOL ORDERS    Primary Care - Care Coordination Referral       Independent review of:   Patient would like to receive their AVS by FiTeq    Video-Visit Details  Type of service:  Video Visit      9/4/2024    10:00 AM   Additional Questions   Roomed by MARLENE Moya     Patient has given verbal consent to a Video visit?  Yes  How would you like to obtain your AVS?  [employer]  Will anyone else be joining your video visit, giving supplemental history? No    Originating location (pt location): Home    Distant Location (provider location):  Off-site    Video Start Time: 10:38 AM  Video End time:  11:16 AM  Face to face plus orders: 38 minutes  Documentation time:  3 minutes     The visit lasted a total of 38 minutes    Dillon Goldsmith MD  Internal Medicine  [facility]     "

## 2024-09-04 NOTE — PROGRESS NOTES
"Jonas is a 79 year old who is being evaluated via a billable video visit.    How would you like to obtain your AVS? MyChart  If the video visit is dropped, the invitation should be resent by: Text to cell phone: [phone]  Will anyone else be joining your video visit? No  {If patient encounters technical issues they should call [phone] :550160}    {PROVIDER CHARTING PREFERENCE:842756}    Subjective   Jonas is a 79 year old, presenting for the following health issues:  Hospital F/U (Facility:  [facility]/Date of visit: 08/26/24/Reason for visit: Shortness of breath/Current Status: Diagnosed with stage 4 lung cancer, going to be doing palliative care through oncology)        9/4/2024    10:00 AM   Additional Questions   Roomed by MARLENE Moya     Video Start Time: {video visit start/end time for provider to select:197888}    HPI     ED/UC Followup:    Facility:  [facility]  Date of visit: 08/26/24  Reason for visit: Shortness of breath  Current Status: Diagnosed with stage 4 lung cancer, going to be doing palliative care through oncology  {additonal problems for provider to add (Optional):602161}    {ROS Picklists (Optional):437586}      Objective             Physical Exam   {video visit exam brief selected:772618}    {Diagnostic Test Results (Optional):212511}      Video-Visit Details    Type of service:  Video Visit   Video End Time:{video visit start/end time for provider to select:872348}  Originating Location (pt. Location): {video visit patient location:929740::\"Home\"}  {PROVIDER LOCATION On-site should be selected for visits conducted from your clinic location or adjoining [facility] hospital, academic office, or other nearby [facility] building. Off-site should be selected for all other provider locations, including home:335321}  Distant Location (provider location):  {virtual location provider:147871}  Platform used for Video Visit: {Virtual Visit Platforms:833794::\"AmWell\"}  Signed Electronically by: Dillon ROLLINS" MD Rupal  {Email feedback regarding this note to [email or web address]   :487222}

## 2024-09-05 ENCOUNTER — TELEPHONE (OUTPATIENT)
Dept: INTERNAL MEDICINE | Facility: CLINIC | Age: 79
End: 2024-09-05
Payer: MEDICARE

## 2024-09-05 ENCOUNTER — HOSPITAL ENCOUNTER (OUTPATIENT)
Dept: INTERVENTIONAL RADIOLOGY/VASCULAR | Facility: CLINIC | Age: 79
Discharge: HOME | End: 2024-09-05
Attending: INTERNAL MEDICINE | Admitting: RADIOLOGY
Payer: MEDICARE

## 2024-09-05 VITALS
RESPIRATION RATE: 24 BRPM | OXYGEN SATURATION: 97 % | DIASTOLIC BLOOD PRESSURE: 86 MMHG | SYSTOLIC BLOOD PRESSURE: 180 MMHG | HEART RATE: 96 BPM | TEMPERATURE: 98.2 F

## 2024-09-05 DIAGNOSIS — C34.31 SMALL CELL LUNG CANCER, RIGHT LOWER LOBE (H): Primary | ICD-10-CM

## 2024-09-05 PROCEDURE — 250N000011 HC RX IP 250 OP 636: Performed by: RADIOLOGY

## 2024-09-05 PROCEDURE — 250N000009 HC RX 250: Performed by: INTERNAL MEDICINE

## 2024-09-05 PROCEDURE — 250N000009 HC RX 250: Performed by: RADIOLOGY

## 2024-09-05 PROCEDURE — C1788 PORT, INDWELLING, IMP: HCPCS

## 2024-09-05 PROCEDURE — C1769 GUIDE WIRE: HCPCS

## 2024-09-05 PROCEDURE — 250N000011 HC RX IP 250 OP 636: Performed by: INTERNAL MEDICINE

## 2024-09-05 PROCEDURE — 99152 MOD SED SAME PHYS/QHP 5/>YRS: CPT

## 2024-09-05 PROCEDURE — 272N000500 HC NEEDLE CR2

## 2024-09-05 RX ORDER — CEFAZOLIN SODIUM/WATER 2 G/20 ML
2 SYRINGE (ML) INTRAVENOUS
Status: COMPLETED | OUTPATIENT
Start: 2024-09-05 | End: 2024-09-05

## 2024-09-05 RX ORDER — LIDOCAINE HYDROCHLORIDE AND EPINEPHRINE 10; 10 MG/ML; UG/ML
10 INJECTION, SOLUTION INFILTRATION; PERINEURAL ONCE
Status: COMPLETED | OUTPATIENT
Start: 2024-09-05 | End: 2024-09-05

## 2024-09-05 RX ORDER — NALOXONE HYDROCHLORIDE 0.4 MG/ML
0.4 INJECTION, SOLUTION INTRAMUSCULAR; INTRAVENOUS; SUBCUTANEOUS
Status: DISCONTINUED | OUTPATIENT
Start: 2024-09-05 | End: 2024-09-06 | Stop reason: HOSPADM

## 2024-09-05 RX ORDER — SODIUM CHLORIDE 9 MG/ML
INJECTION, SOLUTION INTRAVENOUS CONTINUOUS
Status: DISCONTINUED | OUTPATIENT
Start: 2024-09-05 | End: 2024-09-06 | Stop reason: HOSPADM

## 2024-09-05 RX ORDER — NALOXONE HYDROCHLORIDE 0.4 MG/ML
0.2 INJECTION, SOLUTION INTRAMUSCULAR; INTRAVENOUS; SUBCUTANEOUS
Status: DISCONTINUED | OUTPATIENT
Start: 2024-09-05 | End: 2024-09-06 | Stop reason: HOSPADM

## 2024-09-05 RX ORDER — LIDOCAINE 40 MG/G
CREAM TOPICAL
Status: DISCONTINUED | OUTPATIENT
Start: 2024-09-05 | End: 2024-09-06 | Stop reason: HOSPADM

## 2024-09-05 RX ORDER — HEPARIN SODIUM (PORCINE) LOCK FLUSH IV SOLN 100 UNIT/ML 100 UNIT/ML
500 SOLUTION INTRAVENOUS ONCE
Status: COMPLETED | OUTPATIENT
Start: 2024-09-05 | End: 2024-09-05

## 2024-09-05 RX ORDER — FENTANYL CITRATE 50 UG/ML
25-50 INJECTION, SOLUTION INTRAMUSCULAR; INTRAVENOUS EVERY 5 MIN PRN
Status: DISCONTINUED | OUTPATIENT
Start: 2024-09-05 | End: 2024-09-06 | Stop reason: HOSPADM

## 2024-09-05 RX ORDER — HEPARIN SODIUM (PORCINE) LOCK FLUSH IV SOLN 100 UNIT/ML 100 UNIT/ML
5-10 SOLUTION INTRAVENOUS
Status: DISCONTINUED | OUTPATIENT
Start: 2024-09-05 | End: 2024-09-06 | Stop reason: HOSPADM

## 2024-09-05 RX ORDER — FLUMAZENIL 0.1 MG/ML
0.2 INJECTION, SOLUTION INTRAVENOUS
Status: DISCONTINUED | OUTPATIENT
Start: 2024-09-05 | End: 2024-09-06 | Stop reason: HOSPADM

## 2024-09-05 RX ORDER — HEPARIN SODIUM,PORCINE 10 UNIT/ML
5-10 VIAL (ML) INTRAVENOUS
Status: DISCONTINUED | OUTPATIENT
Start: 2024-09-05 | End: 2024-09-06 | Stop reason: HOSPADM

## 2024-09-05 RX ORDER — HEPARIN SODIUM,PORCINE 10 UNIT/ML
5-10 VIAL (ML) INTRAVENOUS EVERY 24 HOURS
Status: DISCONTINUED | OUTPATIENT
Start: 2024-09-05 | End: 2024-09-06 | Stop reason: HOSPADM

## 2024-09-05 RX ADMIN — LIDOCAINE HYDROCHLORIDE 10 ML: 10 INJECTION, SOLUTION INFILTRATION; PERINEURAL at 14:40

## 2024-09-05 RX ADMIN — Medication 2 G: at 13:26

## 2024-09-05 RX ADMIN — MIDAZOLAM HYDROCHLORIDE 1 MG: 1 INJECTION, SOLUTION INTRAMUSCULAR; INTRAVENOUS at 14:29

## 2024-09-05 RX ADMIN — FENTANYL CITRATE 50 MCG: 50 INJECTION, SOLUTION INTRAMUSCULAR; INTRAVENOUS at 14:30

## 2024-09-05 RX ADMIN — FENTANYL CITRATE 25 MCG: 50 INJECTION, SOLUTION INTRAMUSCULAR; INTRAVENOUS at 14:44

## 2024-09-05 RX ADMIN — MIDAZOLAM HYDROCHLORIDE 0.5 MG: 1 INJECTION, SOLUTION INTRAMUSCULAR; INTRAVENOUS at 14:39

## 2024-09-05 RX ADMIN — FENTANYL CITRATE 25 MCG: 50 INJECTION, SOLUTION INTRAMUSCULAR; INTRAVENOUS at 14:39

## 2024-09-05 RX ADMIN — HEPARIN 500 UNITS: 100 SYRINGE at 14:46

## 2024-09-05 RX ADMIN — LIDOCAINE HYDROCHLORIDE,EPINEPHRINE BITARTRATE 10 ML: 10; .01 INJECTION, SOLUTION INFILTRATION; PERINEURAL at 14:40

## 2024-09-05 RX ADMIN — MIDAZOLAM HYDROCHLORIDE 0.5 MG: 1 INJECTION, SOLUTION INTRAMUSCULAR; INTRAVENOUS at 14:44

## 2024-09-05 RX ADMIN — SODIUM CHLORIDE 1 BAG: 9 INJECTION, SOLUTION INTRAVENOUS at 14:30

## 2024-09-05 NOTE — TELEPHONE ENCOUNTER
hydrOXYzine HCl (ATARAX) 25 MG tablet 40 tablet 2 9/4/2024 -- No  Sig - Route: Take 1 tablet (25 mg) by mouth 3 times daily as needed for itching. - Oral  Sent to pharmacy as: hydrOXYzine HCl 25 MG Oral Tablet (ATARAX)  Class: E-Prescribe  Order: [id number]  E-Prescribing Status: Receipt confirmed by pharmacy (9/4/2024 11:12 AM CDT)  Prior authorization: Closed - Other     hydrOXYzine HCl (ATARAX) 25 MG tablet 40 tablet 2 9/4/2024 --  Sig:   Take 1 tablet (25 mg) by mouth 3 times daily as needed for itching.    Route:   Oral    PRN Reason(s):   itching    Class:   E-Prescribe      Pharmacy    [id number] [address]    Associated Diagnoses    Anxiety attack [F41.0]      Source Order Set    Order Set Name Order ID   [id number]      Prescribing Provider's NPI: [id number]  Dillon Goldsmith

## 2024-09-05 NOTE — PROCEDURES
[facility]    Procedure: Left chest port placement    Date/Time: 9/5/2024 2:54 PM    Performed by: Dino Miranda MD  Authorized by: Dino Miranda MD  IR Fellow Physician:    Pre Procedure Diagnosis: Right lung cancer  Post Procedure Diagnosis: Same    UNIVERSAL PROTOCOL   Site Marked: Yes  Prior Images Obtained and Reviewed:  Yes  Required items: Required blood products, implants, devices and special equipment available    Patient identity confirmed:  Verbally with patient  Patient was reevaluated immediately before administering moderate or deep sedation or anesthesia  Confirmation Checklist:  Patient's identity using two indicators  Universal Protocol: the Joint Commission Universal Protocol was followed      SEDATION  Patient Sedated: Yes    Sedation Type:  Moderate (conscious) sedation  Vital signs: Vital signs monitored during sedation    Findings: Successful right chest port placement.      PROCEDURE    Length of time physician/provider present for 1:1 monitoring during sedation:  0-22 min    Peewee Miranda MD  Interventional Radiology

## 2024-09-05 NOTE — PRE-PROCEDURE
GENERAL PRE-PROCEDURE:   Procedure:  Port placement  Date/Time:  9/5/2024 1:33 PM    Written consent obtained?: Yes    Risks and benefits: Risks, benefits and alternatives were discussed    Consent given by:  Patient  Patient states understanding of procedure being performed: Yes    Patient's understanding of procedure matches consent: Yes    Procedure consent matches procedure scheduled: Yes    Expected level of sedation:  Moderate  Appropriately NPO:  Yes  ASA Class:  2  Mallampati  :  Grade 2- soft palate, base of uvula, tonsillar pillars, and portion of posterior pharyngeal wall visible  Lungs:  Other (comment)  Lung exam comment:  Breathing comfortably on NC  Heart:  Other (comment)  Heart exam comment:  Regular rate  History & Physical reviewed:  History and physical reviewed and no updates needed  Statement of review:  I have reviewed the lab findings, diagnostic data, medications, and the plan for sedation    Peewee Miranda MD  Interventional Radiology

## 2024-09-05 NOTE — IR NOTE
Patient Name: Jonas Hyman  Medical Record Number: [id number]  Today's Date: 9/5/2024    Procedure: IR Left Chest Port Placement  Proceduralist: Dr. Miranda    Procedure Start: 1429  Procedure end: 1458  Sedation medications administered: 2 mg midazolam and 100 mcg fentanyl   Sedation time: 25 minutes    Other Notes: Pt arrived to IR room 1 from Pre/post bay 3. Consent reviewed. Pt denies any questions or concerns regarding procedure. Pt positioned supine and monitored per protocol. Pt tolerated procedure without any noted complications. VSS on RA. Pt transferred back to Pre/post bay 3.    Discharge criteria met. Discharge instructions given and reviewed with patient and son. No further questions or concerns. Pt brought to hospital entrance via wheelchair and d/c'd home with son.

## 2024-09-05 NOTE — DISCHARGE INSTRUCTIONS
Port Placement Procedure Discharge Instructions:  You had a port placed. A port is a small medical device that is placed under the skin and is connected to a vein with a catheter (thin, flexible tube). Ports can be used to administer IV medications (including chemotherapy), fluids or blood products or for blood lab draws. Please follow the below instructions after your procedure:    Care Instructions:  - If you received sedation for your procedure, do not drive or operate heavy machinery for the rest of the day.  - You may shower beginning tomorrow (post procedure day #1). Do not scrub site until well healed; pat dry gently with a towel.  - You likely have skin adhesive over your port site. Skin adhesive works like a bandage to keep the site covered and protected. Do not use antibiotic ointment or creams/lotions over adhesive as it can break it down. The skin adhesive will peel off on its own (typically in 5-14 days).  - Avoid submerging the port site under water (ex: tub baths, Jacuzzis, lakes, hot tubs and pools) for 10 days or until your site is well healed.  - You may have some discomfort, minimal swelling, redness and/or bruising at your port site/procedure site. You may take over the counter pain medication for discomfort (follow the package directions) or apply an ice pack wrapped in a towel over the site (rotating 20 minutes with ice pack on and 20 minutes with ice pack off) for comfort as needed. It can take several days for these to resolve.  - Avoid heavy lifting (greater than 10 pounds) and strenuous activities for 2 days following your procedure.   - If you experience significant bleeding at site, apply pressure with hands above the clavicle bone, sit upright and seek immediate medical assistance.  - Ports need to be flushed approximately every 4-6 weeks, if not being used more frequently. Follow up with the provider who ordered your port placement for further instructions for this.    Seek medical  evaluation or contact [facility] RN Line at [phone] if you experience the following:  - Uncontrolled bleeding from port site  - Fever (greater than 101 F (38.3C))  - Purulent (yellow/green/foul smelling) drainage from port insertion site  - Increasing pain at port site  - Increasing redness at port site          * Recovery After Conscious Sedation (Adult)  We gave you medicine by vein to make you sleepy or relaxed during your procedure. This may have included both a pain medicine and sleeping medicine. Most of the effects have worn off. But you may still feel sleepy for the next 6 to 8 hours.  Home care  Follow these guidelines when you get home:  You may feel sleepy and clumsy and have poor balance for the next few hours.  A responsible adult should stay with you for the next 8 hours. This person should make sure your condition doesn t get worse.  Don't drink any alcohol for the next 24 hours.  Don't drive, operate dangerous machinery, make important business or personal decisions or sign legal documents during the next 24 hours.  You may vomit (throw up) if you eat too soon after the procedure. If this happens, drink small amounts of water, juice or clear broth. Wait to try solid food until you no longer have nausea (upset stomach).  Note: Your care team may tell you not to take any medicine by mouth for pain or sleep in the next 4 hours. These medicines may react with the medicines you had in the hospital. This could cause a much stronger response than usual.  Follow-up care  Follow up with your care team if you are not alert and back to your usual level of activity within 12 hours.  When to seek medical advice  Call your care team right away if any of these occur:  You still feel sleepy or clumsy after 12 hours, or your sleepiness gets worse  Weakness or dizziness gets worse  Repeated vomiting  If you can't be woken up and someone is staying with you, they should call 911.  For informational purposes only.  Not to replace the advice of your health care provider.  Copyright   2018 [employer]. All rights reserved.

## 2024-09-06 ENCOUNTER — PATIENT OUTREACH (OUTPATIENT)
Dept: CARE COORDINATION | Facility: CLINIC | Age: 79
End: 2024-09-06

## 2024-09-06 LAB
INTERPRETATION: NORMAL
LAB DIRECTOR COMMENTS: NORMAL
LAB DIRECTOR DISCLAIMER: NORMAL
LAB DIRECTOR INTERPRETATION: NORMAL
LAB DIRECTOR METHODOLOGY: NORMAL
LAB DIRECTOR RESULTS: NORMAL
LOCATION OF TASK: NORMAL
SIGNIFICANT RESULTS: NORMAL
SPECIMEN DESCRIPTION: NORMAL
SPECIMEN DESCRIPTION: NORMAL
TEST DETAILS, MDL: NORMAL

## 2024-09-06 PROCEDURE — G0452 MOLECULAR PATHOLOGY INTERPR: HCPCS | Mod: 26 | Performed by: PATHOLOGY

## 2024-09-06 PROCEDURE — G0452 MOLECULAR PATHOLOGY INTERPR: HCPCS | Mod: 26 | Performed by: STUDENT IN AN ORGANIZED HEALTH CARE EDUCATION/TRAINING PROGRAM

## 2024-09-06 PROCEDURE — 81455 SO/HL 51/>GSAP DNA/DNA&RNA: CPT | Performed by: INTERNAL MEDICINE

## 2024-09-06 NOTE — PROGRESS NOTES
Clinic Care Coordination Contact  [facility]/Voicemail    Clinical Data: Care Coordinator Outreach    Outreach Documentation Number of Outreach Attempt   9/6/2024   2:58 PM 1       No answer at time of CHW call today    Plan: Care Coordinator CHW to discuss recent CC referral placed by PCP  Care Coordinator will try to reach patient again in 1-2 business days.    Order Questions    Question Answer   Reason for Referral: Financial Support    Advance Care Planning   ACP: Health Care Directive    Serious illness or Goals of Care   Financial Support: Medication Affordability   Clinical Staff have discussed the Care Coordination Referral with the patient and/or caregiver: Yes              Summer PERKINS  Community Health Worker  [facility] Care Coordination  [email or web address]  [email or web address]  Office: [phone]

## 2024-09-09 ENCOUNTER — PATIENT OUTREACH (OUTPATIENT)
Dept: CARE COORDINATION | Facility: CLINIC | Age: 79
End: 2024-09-09
Payer: MEDICARE

## 2024-09-09 NOTE — LETTER
[facility] CARE COORDINATION  [address] [place] [address]    September 9, 2024    Jonas Hyman  [address]  [place] [address]      Dear Jonas,    I am a clinic community health worker who works with Dillon Goldsmith MD with the [facility]. I wanted to thank you for spending the time to talk with me.  Below is a description of clinic care coordination and how I can further assist you.       The clinic care coordination team is made up of a registered nurse, , financial resource worker and community health worker who understand the health care system. The goal of clinic care coordination is to help you manage your health and improve access to the health care system. Our team works alongside your provider to assist you in determining your health and social needs. We can help you obtain health care and community resources, providing you with necessary information and education. We can work with you through any barriers and develop a care plan that helps coordinate and strengthen the communication between you and your care team.  Our services are voluntary and are offered without charge to you personally.    Please feel free to contact me with any questions or concerns regarding care coordination and what we can offer.      We are focused on providing you with the highest-quality healthcare experience possible.    Sincerely,     Summer PERKINS  Community Health Worker  [facility] Care Coordination  [email or web address]  [email or web address]  Office: [phone]

## 2024-09-09 NOTE — TELEPHONE ENCOUNTER
Central Prior Authorization Team   Phone: [phone]    PA Initiation    Medication: hydrOXYzine HCl (ATARAX) 25 MG tablet      Insurance Company: [employer] Non-Specialty PA's - Phone [phone] Fax [phone]  Pharmacy Filling the Rx: [facility][id number] - [place] [address]  Filling Pharmacy Phone: [phone]  Filling Pharmacy Fax:    Start Date: 9/9/2024

## 2024-09-09 NOTE — PROGRESS NOTES
Clinic Care Coordination Contact  Community Health Worker Initial Outreach            Patient accepts CC: No, patient has a lot going on at this time, has love and support from everyone around him.. Patient will be sent Care Coordination introduction letter for future reference.     CHW will send resources and CC SW contact information via [employer]    Order Questions    Question Answer   Reason for Referral: Financial Support    Advance Care Planning   ACP: Health Care Directive    Serious illness or Goals of Care   Financial Support: Medication Affordability   Clinical Staff have discussed the Care Coordination Referral with the patient and/or caregiver: Yes       Summer PERKINS  Community Health Worker  [facility] Care Coordination  [email or web address]  Office: [phone]

## 2024-09-10 NOTE — TELEPHONE ENCOUNTER
Prior Authorization Approval    Authorization Effective Date: 8/26/2024  Authorization Expiration Date: 12/31/2099  Medication: hydrOXYzine HCl (ATARAX) 25 MG tablet      Reference #:     Insurance Company: [employer] Non-Specialty PA's - Phone [phone] Fax [phone]  Which Pharmacy is filling the prescription (Not needed for infusion/clinic administered): [facility][id number] - [place], [place] - [facility] [address]  Pharmacy Notified: Yes  Patient Notified: Instructed pharmacy to notify patient when script is ready to /ship.

## 2024-09-13 ENCOUNTER — PATIENT OUTREACH (OUTPATIENT)
Dept: ONCOLOGY | Facility: HOSPITAL | Age: 79
End: 2024-09-13
Payer: MEDICARE

## 2024-09-17 ENCOUNTER — INFUSION THERAPY VISIT (OUTPATIENT)
Dept: INFUSION THERAPY | Facility: HOSPITAL | Age: 79
End: 2024-09-17
Attending: INTERNAL MEDICINE
Payer: MEDICARE

## 2024-09-17 ENCOUNTER — ONCOLOGY VISIT (OUTPATIENT)
Dept: ONCOLOGY | Facility: HOSPITAL | Age: 79
End: 2024-09-17
Attending: NURSE PRACTITIONER
Payer: MEDICARE

## 2024-09-17 ENCOUNTER — PATIENT OUTREACH (OUTPATIENT)
Dept: ONCOLOGY | Facility: HOSPITAL | Age: 79
End: 2024-09-17

## 2024-09-17 VITALS
HEART RATE: 85 BPM | RESPIRATION RATE: 22 BRPM | SYSTOLIC BLOOD PRESSURE: 140 MMHG | BODY MASS INDEX: 37.07 KG/M2 | TEMPERATURE: 98.1 F | WEIGHT: 250.3 LBS | OXYGEN SATURATION: 94 % | DIASTOLIC BLOOD PRESSURE: 65 MMHG | HEIGHT: 69 IN

## 2024-09-17 DIAGNOSIS — D70.1 CHEMOTHERAPY-INDUCED NEUTROPENIA (H): ICD-10-CM

## 2024-09-17 DIAGNOSIS — T45.1X5A CHEMOTHERAPY-INDUCED NEUTROPENIA (H): Primary | ICD-10-CM

## 2024-09-17 DIAGNOSIS — C34.31 SMALL CELL LUNG CANCER, RIGHT LOWER LOBE (H): Primary | ICD-10-CM

## 2024-09-17 DIAGNOSIS — T45.1X5A CHEMOTHERAPY-INDUCED NEUTROPENIA (H): ICD-10-CM

## 2024-09-17 DIAGNOSIS — C34.31 SMALL CELL LUNG CANCER, RIGHT LOWER LOBE (H): ICD-10-CM

## 2024-09-17 DIAGNOSIS — D70.1 CHEMOTHERAPY-INDUCED NEUTROPENIA (H): Primary | ICD-10-CM

## 2024-09-17 LAB
ALBUMIN SERPL BCG-MCNC: 3.8 G/DL (ref 3.5–5.2)
ALP SERPL-CCNC: 68 U/L (ref 40–150)
ALT SERPL W P-5'-P-CCNC: 14 U/L (ref 0–70)
ANION GAP SERPL CALCULATED.3IONS-SCNC: 12 MMOL/L (ref 7–15)
AST SERPL W P-5'-P-CCNC: 29 U/L (ref 0–45)
BASOPHILS # BLD AUTO: 0 10E3/UL (ref 0–0.2)
BASOPHILS NFR BLD AUTO: 1 %
BILIRUB SERPL-MCNC: 0.3 MG/DL
BUN SERPL-MCNC: 23.4 MG/DL (ref 8–23)
CALCIUM SERPL-MCNC: 8.9 MG/DL (ref 8.8–10.4)
CHLORIDE SERPL-SCNC: 102 MMOL/L (ref 98–107)
CREAT SERPL-MCNC: 1.83 MG/DL (ref 0.67–1.17)
EGFRCR SERPLBLD CKD-EPI 2021: 37 ML/MIN/1.73M2
EOSINOPHIL # BLD AUTO: 0.2 10E3/UL (ref 0–0.7)
EOSINOPHIL NFR BLD AUTO: 2 %
ERYTHROCYTE [DISTWIDTH] IN BLOOD BY AUTOMATED COUNT: 12.7 % (ref 10–15)
GLUCOSE SERPL-MCNC: 133 MG/DL (ref 70–99)
HCO3 SERPL-SCNC: 25 MMOL/L (ref 22–29)
HCT VFR BLD AUTO: 34.7 % (ref 40–53)
HGB BLD-MCNC: 11.3 G/DL (ref 13.3–17.7)
IMM GRANULOCYTES # BLD: 0 10E3/UL
IMM GRANULOCYTES NFR BLD: 0 %
LYMPHOCYTES # BLD AUTO: 1.4 10E3/UL (ref 0.8–5.3)
LYMPHOCYTES NFR BLD AUTO: 21 %
MCH RBC QN AUTO: 30.9 PG (ref 26.5–33)
MCHC RBC AUTO-ENTMCNC: 32.6 G/DL (ref 31.5–36.5)
MCV RBC AUTO: 95 FL (ref 78–100)
MONOCYTES # BLD AUTO: 0.8 10E3/UL (ref 0–1.3)
MONOCYTES NFR BLD AUTO: 12 %
NEUTROPHILS # BLD AUTO: 4.5 10E3/UL (ref 1.6–8.3)
NEUTROPHILS NFR BLD AUTO: 64 %
NRBC # BLD AUTO: 0 10E3/UL
NRBC BLD AUTO-RTO: 0 /100
PLATELET # BLD AUTO: 326 10E3/UL (ref 150–450)
POTASSIUM SERPL-SCNC: 3.7 MMOL/L (ref 3.4–5.3)
PROT SERPL-MCNC: 6.7 G/DL (ref 6.4–8.3)
RBC # BLD AUTO: 3.66 10E6/UL (ref 4.4–5.9)
SODIUM SERPL-SCNC: 139 MMOL/L (ref 135–145)
TSH SERPL DL<=0.005 MIU/L-ACNC: 2.17 UIU/ML (ref 0.3–4.2)
WBC # BLD AUTO: 6.9 10E3/UL (ref 4–11)

## 2024-09-17 PROCEDURE — 96413 CHEMO IV INFUSION 1 HR: CPT

## 2024-09-17 PROCEDURE — 96417 CHEMO IV INFUS EACH ADDL SEQ: CPT

## 2024-09-17 PROCEDURE — 80053 COMPREHEN METABOLIC PANEL: CPT | Performed by: INTERNAL MEDICINE

## 2024-09-17 PROCEDURE — 96367 TX/PROPH/DG ADDL SEQ IV INF: CPT

## 2024-09-17 PROCEDURE — 36591 DRAW BLOOD OFF VENOUS DEVICE: CPT | Performed by: INTERNAL MEDICINE

## 2024-09-17 PROCEDURE — 258N000003 HC RX IP 258 OP 636: Performed by: INTERNAL MEDICINE

## 2024-09-17 PROCEDURE — G0463 HOSPITAL OUTPT CLINIC VISIT: HCPCS | Mod: 25 | Performed by: NURSE PRACTITIONER

## 2024-09-17 PROCEDURE — 96375 TX/PRO/DX INJ NEW DRUG ADDON: CPT

## 2024-09-17 PROCEDURE — G2211 COMPLEX E/M VISIT ADD ON: HCPCS | Performed by: NURSE PRACTITIONER

## 2024-09-17 PROCEDURE — 85025 COMPLETE CBC W/AUTO DIFF WBC: CPT | Performed by: INTERNAL MEDICINE

## 2024-09-17 PROCEDURE — 99215 OFFICE O/P EST HI 40 MIN: CPT | Performed by: NURSE PRACTITIONER

## 2024-09-17 PROCEDURE — 84443 ASSAY THYROID STIM HORMONE: CPT | Performed by: INTERNAL MEDICINE

## 2024-09-17 PROCEDURE — 250N000011 HC RX IP 250 OP 636: Performed by: INTERNAL MEDICINE

## 2024-09-17 RX ORDER — HEPARIN SODIUM (PORCINE) LOCK FLUSH IV SOLN 100 UNIT/ML 100 UNIT/ML
5 SOLUTION INTRAVENOUS
Status: DISCONTINUED | OUTPATIENT
Start: 2024-09-17 | End: 2024-09-17 | Stop reason: HOSPADM

## 2024-09-17 RX ORDER — ALBUTEROL SULFATE 90 UG/1
1-2 AEROSOL, METERED RESPIRATORY (INHALATION)
Status: DISCONTINUED | OUTPATIENT
Start: 2024-09-17 | End: 2024-09-17 | Stop reason: HOSPADM

## 2024-09-17 RX ORDER — ALBUTEROL SULFATE 0.83 MG/ML
2.5 SOLUTION RESPIRATORY (INHALATION)
Status: DISCONTINUED | OUTPATIENT
Start: 2024-09-17 | End: 2024-09-17 | Stop reason: HOSPADM

## 2024-09-17 RX ORDER — PALONOSETRON 0.05 MG/ML
0.25 INJECTION, SOLUTION INTRAVENOUS ONCE
Status: COMPLETED | OUTPATIENT
Start: 2024-09-17 | End: 2024-09-17

## 2024-09-17 RX ORDER — EPINEPHRINE 1 MG/ML
0.3 INJECTION, SOLUTION INTRAMUSCULAR; SUBCUTANEOUS EVERY 5 MIN PRN
Status: DISCONTINUED | OUTPATIENT
Start: 2024-09-17 | End: 2024-09-17 | Stop reason: HOSPADM

## 2024-09-17 RX ORDER — MEPERIDINE HYDROCHLORIDE 25 MG/ML
25 INJECTION INTRAMUSCULAR; INTRAVENOUS; SUBCUTANEOUS EVERY 30 MIN PRN
Status: DISCONTINUED | OUTPATIENT
Start: 2024-09-17 | End: 2024-09-17 | Stop reason: HOSPADM

## 2024-09-17 RX ORDER — DIPHENHYDRAMINE HYDROCHLORIDE 50 MG/ML
50 INJECTION INTRAMUSCULAR; INTRAVENOUS
Status: DISCONTINUED | OUTPATIENT
Start: 2024-09-17 | End: 2024-09-17 | Stop reason: HOSPADM

## 2024-09-17 RX ORDER — LORAZEPAM 2 MG/ML
0.5 INJECTION INTRAMUSCULAR EVERY 4 HOURS PRN
Status: DISCONTINUED | OUTPATIENT
Start: 2024-09-17 | End: 2024-09-17 | Stop reason: HOSPADM

## 2024-09-17 RX ORDER — METHYLPREDNISOLONE SODIUM SUCCINATE 125 MG/2ML
125 INJECTION, POWDER, LYOPHILIZED, FOR SOLUTION INTRAMUSCULAR; INTRAVENOUS
Status: DISCONTINUED | OUTPATIENT
Start: 2024-09-17 | End: 2024-09-17 | Stop reason: HOSPADM

## 2024-09-17 RX ADMIN — TRILACICLIB 570 MG: 300 INJECTION, POWDER, LYOPHILIZED, FOR SOLUTION INTRAVENOUS at 11:56

## 2024-09-17 RX ADMIN — SODIUM CHLORIDE 180 MG: 9 INJECTION, SOLUTION INTRAVENOUS at 13:10

## 2024-09-17 RX ADMIN — SODIUM CHLORIDE 250 ML: 9 INJECTION, SOLUTION INTRAVENOUS at 09:55

## 2024-09-17 RX ADMIN — CARBOPLATIN 250 MG: 10 INJECTION, SOLUTION INTRAVENOUS at 12:32

## 2024-09-17 RX ADMIN — DEXAMETHASONE SODIUM PHOSPHATE: 10 INJECTION, SOLUTION INTRAMUSCULAR; INTRAVENOUS at 10:05

## 2024-09-17 RX ADMIN — ATEZOLIZUMAB 1200 MG: 1200 INJECTION, SOLUTION INTRAVENOUS at 10:52

## 2024-09-17 RX ADMIN — Medication 5 ML: at 14:12

## 2024-09-17 RX ADMIN — PALONOSETRON 0.25 MG: 0.05 INJECTION, SOLUTION INTRAVENOUS at 09:57

## 2024-09-17 ASSESSMENT — PAIN SCALES - GENERAL: PAINLEVEL: NO PAIN (0)

## 2024-09-17 NOTE — PROGRESS NOTES
[facility]: Cancer Care Plan of Care Education Note                                    Discussion with Patient:                                                      Went over chemotherapy teaching with the patient and went over the contents of the chemotherapy education folder. I was able to answer all the patient's questions to the best of my ability. The patient's son was present during my chemotherapy teach.      Goals          General     Functional (pt-stated)      Notes - Note created  9/17/2024 10:05 AM by Gisel Viveros RN     Goal Statement: I want to maintain or improve my current ability for my activities of daily living.   Date Goal set: 9/17/2024  Barriers: disease burden  Strengths: support, health awareness, and involvement with Care Team  Date to Achieve By: ongoing  Patient expressed understanding of goal:  Yes   Action steps to achieve this goal:  I will rest when needed.  I will communicate with my care team if not able to maintain ADLs.                Assessment:                                                      Assessment completed with:: Patient;Family    Plan of Care Education   Yearly learning assessment completed?: Yes (see Education tab)  Does patient understand diagnosis?: Yes  Does patient understand treatment plan/regimen?: Yes  Preparing for treatment:: Reviewed treatment preparation information with patient (vascular access, day of chemo, visitor policy, what to bring, etc.)  Vascular access education provided for:: Port  Side effect education:: Diarrhea/Constipation;Fatigue;Hair loss;Immune-mediated effects;Infection;Lab value monitoring (anemia, neutropenia, thrombocytopenia);Urinary;Mouth sores;Mylosuppression;Nausea/Vomiting;Neuropathy;Skin changes  Safety/self care at home reviewed with patient:: Yes  Coping - concerns/fears reviewed with patient:: Yes  Plan of Care:: FLOYD follow-up appointment;Lab appointment;Imaging;MD follow-up appointment;Treatment schedule  When  to call provider:: Bleeding;Increased shortness of breath;New/worsening pain;Shaking chills;Temperature >100.4F;Uncontrolled diarrhea/constipation;Uncontrolled nausea/vomiting  Reasons for deferring treatment reviewed with patient:: Yes  Additional education provided for: : Neutropenic precautions;Change in medication/medication education;Bleeding precautions  Procedure education provided for: : Port/PICC placement;Blood product transfusion    Evaluation of Learning  Patient Education Provided: Yes  Readiness:: Acceptance  Method:: Booklet/Handout;Explanation  Response:: Verbalizes understanding      Intervention/Education provided during outreach:                                                       Follow up call in 1-2 weeks  Patient to follow up as scheduled at next appt  Confirmed patient has clinic and triage numbers    Signature:  Gisel Viveros RN

## 2024-09-17 NOTE — LETTER
9/17/2024      Jonas Hyman  [address] [facility] [address] [place] [facility] [address]      Dear Colleague,    Thank you for referring your patient, Jonas Hyman, to the [facility] [place]. Please see a copy of my visit note below.    [facility] Hematology and Oncology Progress Note    Patient: Jonas Hyman  MRN: [id number]  Date of Service: Sep 17, 2024          Reason for Visit    Chief Complaint   Patient presents with     Oncology Clinic Visit     Return visit with labs/infusion related to Small cell lung cancer, right lower lobe.       Assessment and Plan     Cancer Staging   No matching staging information was found for the patient.    .  Small cell lung cancer, extensive stage with bone mets, lymph node mets: Patient is here today to start palliative treatment with carboplatin, etoposide and Atezolizumab.  Discussed the overall picture for extensive stage small cell.  We talked about the incurable nature of the disease.  I talked about how the treatment is palliative.  We did also talk about median survival which is about 3 to 6 months without treatment and 12 to 18 months with treatment.  I told him that we will give 2 cycles of chemotherapy and reimage with a CT scan, then if there is improvement or stability we will do another 2 cycles of full treatment and then another CT scan.  If things are stable at that point we will likely do some maintenance Atezolizumab. Pt had chemo education with RN. They gave verbal consent to start treatment. They felt educated. They understands the risks and benefits of treatment.  they understand how to use the post medications for nausea.  they know the side effects include, but are not limited to: alopecia, diarrhea/constipation, nausea, vomiting, fatigue/weakness, myelosuppression, allergic reaction, peripheral neuropathy, taste alterations, poor appetite. Risk of immunotherapy include: hepatitis, thyroiditis, colitis, pulmonary toxicity, rash and  dry skin, myalgias.   They understand this is with palliative intent.  Will be seen next week for toxicity check.  Will then be seen again in 3 weeks for his second cycle.  We will have him see Dr. Madera with a CT scan for cycle 3.  We did attempt to do a brain MRI with patient last week but he does have a stent from the [facility] and they are trying to figure out if there is any metal in that so we are still waiting to talk to the manufacture.  I will discuss with Dr. Madera that if they do not feel that they can safely do a brain MRI for initial staging, would be okay to do a head CT.  We are also going to do a bone biopsy to prove stage IV disease but at this point the cancer appears to be growing so we would like to get started on treatment.  In the future if there is mixed response or there is any question about the overall disease we will do that in the future.    2.  Mild renal insufficiency: This is a chronic issue for patient.  Encouraged him to stay hydrated, keep blood pressure controlled.  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)     Atherosclerosis of native coronary artery of native heart with angina pectoris (H24)     Chronic coronary artery disease     Cataract     Cystoid macular edema of right eye     Diverticulosis of large intestine without hemorrhage     Dyslipidemia     Essential hypertension     Exudative age-related macular degeneration  of right eye (H)     History of acute anterior wall MI     History of alcohol dependence (H)     Low vitamin B12 level     Smoker     Status post coronary artery stent placement     Total retinal detachment     Type 2 diabetes mellitus with stage 3b chronic kidney disease, without long-term current use of insulin (H)     History of ventricular fibrillation     Vitamin D deficiency     Myelolipoma     S/P repair of abdominal aortic aneurysm using bifurcation graft     Thoracoabdominal aortic aneurysm (TAAA) (H24)     Small cell lung cancer, right lower lobe (H)     Chemotherapy-induced neutropenia (H24)        ______________________________________________________________________________    History of Present Illness    Oncologist: Dr. Madera    Diagnosis: Lung cancer, Small cell. Found incidentally when getting imaging for a vascular procedure at the [facility].   -8/26/24: EBUS done and He had biopsies taken over the following hira stations: 4R, 4L, 7.  Pathology showed poorly differentiated small cell lung cancer in stations 4L and 4R   -8/27/24: CT done and shows Multiple small patchy groundglass opacities in the bilateral lower lobes, new from prior study, compatible with aspiration change. Mildly increased size of right middle lobe and right upper lobe pulmonary masses as well as mediastinal, right hilar and right internal mammary lymphadenopathy.  -8/30/24: PET shows Findings suspicious for right lung cancer involving the right upper and middle lobes with metastases involving several lymph nodes above the diaphragm and several sites in the skeleton. If biopsy of a distant metastatic site is desired, a lesion in the   medial left iliac bone is a good option for CT-guided biopsy  ~Brain MRI attempted but was canceled due to a stent and the fear there may be metal.    Treatment:   -9/17/2024: Patient is here today to start palliative chemo with carboplatin, etoposide and Atezolizumab.    Interim History:  "  Patient is here today to start chemotherapy.  He states he has a really good idea about what to expect.  He understands the plan.  He feels educated and ready to go.  He denies any new bone or back pain but states that over the last month he definitely feels more tired and weak.  He is feeling a little bit more generalized achiness in his bones and joints.  Denies any infectious complaints like fevers or chills.    Review of Systems    Pertinent items are noted in HPI.    Past History    Past Medical History:   Diagnosis Date     Acute MI (H) 2009     Bronchitis      Cardiac arrest (H) 2009     Cardiac arrest (H)      Chemical dependency (H)     Has been in recovery 44 years     Coronary artery disease      Diabetes mellitus (H)     type II     Diabetes mellitus type 2, noninsulin dependent (H)      Diverticula of colon      Diverticulosis of large intestine      Hemorrhoids      Hemorrhoids      History of alcohol dependence (H) 1/12/1975    Created by [employer] [facility] Annotation: May 29 2009  5:52PM - Dillon Goldsmith: dry since  1975, also used drugs and marijuana  Replacement Utility updated for latest IMO load     Hypertension      Hypertension      Macular degeneration of right eye      Mixed hyperlipidemia      Myocardial infarction (H)      Retinal detachment 06/23/2014    Vitrectomy Posterior 23 guage, scleral buckle, membrane peel, endolaser gase     Stage 3b chronic kidney disease (H) 11/30/2020     Stented coronary artery 2009    stints times 2     Vitamin B12 deficiency      Vitamin D deficiency        PHYSICAL EXAM  BP (!) 140/65 (BP Location: Left arm, Patient Position: Sitting, Cuff Size: Adult Regular)   Pulse 85   Temp 98.1  F (36.7  C) (Oral)   Resp 22   Ht 1.74 m (5' 8.5\")   Wt 113.5 kg (250 lb 4.8 oz)   SpO2 94%   BMI 37.50 kg/m      GENERAL: no acute distress. Cooperative in conversation. Here with son who is RN  RESP: Regular respiratory rate. No expiratory wheezes   NEURO: non " focal. Alert and oriented x3.   PSYCH: within normal limits. No depression or anxiety.  SKIN: exposed skin is dry intact.       Lab Results    Recent Results (from the past 168 hour(s))   Comprehensive metabolic panel   Result Value Ref Range    Sodium 139 135 - 145 mmol/L    Potassium 3.7 3.4 - 5.3 mmol/L    Carbon Dioxide (CO2) 25 22 - 29 mmol/L    Anion Gap 12 7 - 15 mmol/L    Urea Nitrogen 23.4 (H) 8.0 - 23.0 mg/dL    Creatinine 1.83 (H) 0.67 - 1.17 mg/dL    GFR Estimate 37 (L) >60 mL/min/1.73m2    Calcium 8.9 8.8 - 10.4 mg/dL    Chloride 102 98 - 107 mmol/L    Glucose 133 (H) 70 - 99 mg/dL    Alkaline Phosphatase 68 40 - 150 U/L    AST 29 0 - 45 U/L    ALT 14 0 - 70 U/L    Protein Total 6.7 6.4 - 8.3 g/dL    Albumin 3.8 3.5 - 5.2 g/dL    Bilirubin Total 0.3 <=1.2 mg/dL   TSH with free T4 reflex   Result Value Ref Range    TSH 2.17 0.30 - 4.20 uIU/mL   CBC with platelets and differential   Result Value Ref Range    WBC Count 6.9 4.0 - 11.0 10e3/uL    RBC Count 3.66 (L) 4.40 - 5.90 10e6/uL    Hemoglobin 11.3 (L) 13.3 - 17.7 g/dL    Hematocrit 34.7 (L) 40.0 - 53.0 %    MCV 95 78 - 100 fL    MCH 30.9 26.5 - 33.0 pg    MCHC 32.6 31.5 - 36.5 g/dL    RDW 12.7 10.0 - 15.0 %    Platelet Count 326 150 - 450 10e3/uL    % Neutrophils 64 %    % Lymphocytes 21 %    % Monocytes 12 %    % Eosinophils 2 %    % Basophils 1 %    % Immature Granulocytes 0 %    NRBCs per 100 WBC 0 <1 /100    Absolute Neutrophils 4.5 1.6 - 8.3 10e3/uL    Absolute Lymphocytes 1.4 0.8 - 5.3 10e3/uL    Absolute Monocytes 0.8 0.0 - 1.3 10e3/uL    Absolute Eosinophils 0.2 0.0 - 0.7 10e3/uL    Absolute Basophils 0.0 0.0 - 0.2 10e3/uL    Absolute Immature Granulocytes 0.0 <=0.4 10e3/uL    Absolute NRBCs 0.0 10e3/uL       Imaging    IR Chest Port Placement > 5 Yrs of Age    Result Date: 9/5/2024  [place] [facility] LOCATION: [facility] DATE: 9/5/2024 PROCEDURE: IMPLANTABLE VENOUS CHEST PORT PLACEMENT (POWER INJECTABLE)  INTERVENTIONAL RADIOLOGIST: SENG Miranda MD. INDICATION: Right lung cancer requiring chemotherapy. CONSENT: The risks, benefits and alternatives of implantable venous chest port placement were discussed with the patient in detail. All questions were answered. Informed consent was given to proceed with the procedure. MODERATE SEDATION: Versed 2 mg IV; Fentanyl 100 mcg IV.  During the timeout, immediately prior to the administration of medications, the patient was reassessed for adequacy to receive conscious sedation. Under physician supervision, Versed and fentanyl were administered for moderate sedation. Pulse oximetry, heart rate and blood pressure were continuously monitored by an independent trained observer. The physician spent 25 minutes of face-to-face sedation time with the patient. CONTRAST: None. ANTIBIOTICS: Ancef 2 g IV. ADDITIONAL MEDICATIONS: Heparin 500 U IV FLUOROSCOPIC TIME: 0.6 minutes. RADIATION DOSE: Air Kerma: 8.6 mGy. COMPLICATIONS: No immediate complications. STERILE BARRIER TECHNIQUE: Maximum sterile barrier technique was used. Cutaneous antisepsis was performed at the operative site with application of 2% chlorhexidine and large sterile drape. Prior to the procedure, the  and assistant performed hand hygiene and wore hat, mask, sterile gown, and sterile gloves during the entire procedure. PROCEDURE: Limited left neck ultrasound demonstrated a patent internal jugular vein; images saved of the permanent patient medical record. The overlying skin and subcutaneous soft tissues were anesthetized using 1% lidocaine with epinephrine. Under ultrasound guidance, the left internal jugular vein was accessed using a micropuncture needle; images saved of the permanent patient medical record. Access was secured using a 4 Albanian transitional sheath. An Amplatz guidewire was advanced into the  distal IVC. Our attention was then turned to the chest wall site. The overlying skin and subcutaneous  soft tissues were anesthetized using 1% lidocaine with epinephrine. Using combination of sharp and blunt dissection, a subcutaneous pocket was created. The catheter  tubing was tunneled in an antegrade fashion from the port pocket to the dermatotomy site. Under direct fluoroscopic visualization, a peel-away sheath was advanced over the wire and positioned within the superior vena cava. Through the peel-away sheath, the catheter tubing was advanced until the tip was in the right atrium. The catheter tubing was cut to length and attached firmly to the port. The port was placed within the subcutaneous pocket and tested. The port tubing was then primed with 500 units of heparin. The port pocket incision was closed with layered absorbable sutures and surgical glue. The dermatotomy site was closed with surgical glue.     IMPRESSION:  1.  Successful implantable venous chest port placement. PLAN: 1. Port is ready to be used.    CT Chest/Abdomen/Pelvis w Contrast    Result Date: 8/30/2024  EXAM: PET ONCOLOGY WHOLE BODY, CT CHEST/ABDOMEN/PELVIS W CONTRAST LOCATION: [facility] DATE: 8/30/2024 INDICATION: Lung cancer, right, staging. Malignant neoplasm of middle lobe, right bronchus or lung. Poorly differentiated neuroendocrine small cell carcinoma. Initial treatment strategy. COMPARISON: CT chest abdomen pelvis 8/14/2024, 11/16/2021 reviewed. CONTRAST: 100 mL Isovue-370 TECHNIQUE: Serum glucose level 109 mg/dL. One hour post intravenous administration of 14.6 mCi F-18 FDG, PET imaging was performed from the skull vertex to feet, utilizing attenuation correction with concurrent axial CT and PET/CT image fusion. Separate diagnostic CT of the chest, abdomen, and pelvis was performed. Dose reduction techniques were used. PET/CT FINDINGS: Large irregular FDG avid pulmonary mass involving the inferior right upper lobe and posterior right middle lobe with extension across the minor fissure as well as direct  mediastinal invasion, similar to 8/14/2024 and new since 11/16/2021, measures 7.7 x 5.9 x 2.7 cm and demonstrates marked uptake (SUVmax 19.5), consistent with malignancy. Several enlarged FDG avid intrathoracic lymph nodes, including scattered mediastinal and right hilar regions, consistent with hira metastases. This includes a large right anterior mediastinal hira mass with direct pleural extension measuring 6.0 x 8.2 cm with marked uptake (SUVmax 22.0) as well as nodes involving several right lobar station 12R, interlobar station 11R, hilar station 10R sites as well as right lower paratracheal station 4R, prevascular station 3A and paraesophageal station 8. Additional FDG avid right supraclavicular adenopathy and a right anterior pleural nodule. Several scattered FDG avid skeletal lesions are also present, including medial right clavicle, left scapula, medial left iliac bone (SUVmax 9.6), left inferior pubic ramus and proximal left femur. No suspicious focal uptake in the liver. Normal adrenal glands. Mild irregular opacities in both lower lobes with mild uptake may reflect minor infectious/inflammatory process. CT FINDINGS: Mild senescent intracranial changes. Marked atherosclerotic calcifications including the coronary arteries. Mild reticular scarring or atelectasis in the lung bases. Few small benign liver cysts. Aortobiiliac stent graft spanning an infrarenal fusiform abdominal aortic aneurysm. Mild colonic diverticulosis. Few small pelvic phleboliths. Small fat-containing left inguinal hernia. Moderate scattered hypertrophic degenerative changes in the spine.     IMPRESSION: Findings suspicious for right lung cancer involving the right upper and middle lobes with metastases involving several lymph nodes above the diaphragm and several sites in the skeleton. If biopsy of a distant metastatic site is desired, a lesion in the medial left iliac bone is a good option for CT-guided biopsy.    PET Oncology Whole  Body    Result Date: 8/30/2024  EXAM: PET ONCOLOGY WHOLE BODY, CT CHEST/ABDOMEN/PELVIS W CONTRAST LOCATION: [facility] DATE: 8/30/2024 INDICATION: Lung cancer, right, staging. Malignant neoplasm of middle lobe, right bronchus or lung. Poorly differentiated neuroendocrine small cell carcinoma. Initial treatment strategy. COMPARISON: CT chest abdomen pelvis 8/14/2024, 11/16/2021 reviewed. CONTRAST: 100 mL Isovue-370 TECHNIQUE: Serum glucose level 109 mg/dL. One hour post intravenous administration of 14.6 mCi F-18 FDG, PET imaging was performed from the skull vertex to feet, utilizing attenuation correction with concurrent axial CT and PET/CT image fusion. Separate diagnostic CT of the chest, abdomen, and pelvis was performed. Dose reduction techniques were used. PET/CT FINDINGS: Large irregular FDG avid pulmonary mass involving the inferior right upper lobe and posterior right middle lobe with extension across the minor fissure as well as direct mediastinal invasion, similar to 8/14/2024 and new since 11/16/2021, measures 7.7 x 5.9 x 2.7 cm and demonstrates marked uptake (SUVmax 19.5), consistent with malignancy. Several enlarged FDG avid intrathoracic lymph nodes, including scattered mediastinal and right hilar regions, consistent with hira metastases. This includes a large right anterior mediastinal hira mass with direct pleural extension measuring 6.0 x 8.2 cm with marked uptake (SUVmax 22.0) as well as nodes involving several right lobar station 12R, interlobar station 11R, hilar station 10R sites as well as right lower paratracheal station 4R, prevascular station 3A and paraesophageal station 8. Additional FDG avid right supraclavicular adenopathy and a right anterior pleural nodule. Several scattered FDG avid skeletal lesions are also present, including medial right clavicle, left scapula, medial left iliac bone (SUVmax 9.6), left inferior pubic ramus and proximal left femur. No  suspicious focal uptake in the liver. Normal adrenal glands. Mild irregular opacities in both lower lobes with mild uptake may reflect minor infectious/inflammatory process. CT FINDINGS: Mild senescent intracranial changes. Marked atherosclerotic calcifications including the coronary arteries. Mild reticular scarring or atelectasis in the lung bases. Few small benign liver cysts. Aortobiiliac stent graft spanning an infrarenal fusiform abdominal aortic aneurysm. Mild colonic diverticulosis. Few small pelvic phleboliths. Small fat-containing left inguinal hernia. Moderate scattered hypertrophic degenerative changes in the spine.     IMPRESSION: Findings suspicious for right lung cancer involving the right upper and middle lobes with metastases involving several lymph nodes above the diaphragm and several sites in the skeleton. If biopsy of a distant metastatic site is desired, a lesion in the medial left iliac bone is a good option for CT-guided biopsy.    CT Chest w Contrast    Result Date: 8/27/2024  EXAM: CT CHEST W CONTRAST LOCATION: [facility] DATE: 8/27/2024 INDICATION: Recent EBUS today of RML 6 cm mass, now presents with shortness of breath COMPARISON: 08/14/2024 TECHNIQUE: CT chest with IV contrast. Multiplanar reformats were obtained. Dose reduction techniques were used. CONTRAST: ISOVUE 370 70 ML FINDINGS: LUNGS AND PLEURA: A lobulated mass measuring 8.2 x 6 cm in the right middle lobe is mildly increased from prior study, previously 8 x 6 cm. A medial right upper lobe lung mass with invasion of the anterior mediastinum measures 7.7 x 5.6 cm, also mildly increased, previously 7 x 5.1 cm. Multiple small patchy groundglass opacities are seen in the bilateral lung bases, new from prior study. A small pneumatocele is again noted in the lingula. A 2 mm right upper lobe nodule on series 4 image 45 is stable. No pleural effusion or pneumothorax. MEDIASTINUM/AXILLAE: Mediastinal, right  hilar and right internal mammary lymph nodes are stable to mildly increased in size. For example, a right paratracheal node measures 3.4 x 2.6 cm on series 3 image 38, previously 2.8 x 2.2 cm. A 14 mm right internal mammary lymph node is stable. No axillary or supraclavicular lymphadenopathy. Heart size is normal without pericardial effusion. A small hiatal hernia is unchanged. CORONARY ARTERY CALCIFICATION: Advanced three-vessel coronary artery atherosclerotic calcifications. UPPER ABDOMEN: Scattered too small to characterize cystic lesions again seen in the liver, likely cysts. Aortic endoluminal stent graft, celiac artery stent, SMA stent and bilateral renal artery stents redemonstrated. A bilobed abdominal aortic aneurysm measuring up to 4.4 cm is unchanged. Multiple diverticula seen in the visualized portions of the colon without acute diverticulitis. MUSCULOSKELETAL: Mild spinal degenerative changes. No suspicious osseous lesions or acute fractures. A midline sebaceous cyst is seen in the upper back.     IMPRESSION: 1.  Multiple small patchy groundglass opacities in the bilateral lower lobes, new from prior study, compatible with aspiration change. 2.  Mildly increased size of right middle lobe and right upper lobe pulmonary masses as well as mediastinal, right hilar and right internal mammary lymphadenopathy. 3.  Stable 2 mm right upper lobe nodule. 4.  Redemonstration of small hiatal hernia, abdominal aortic aneurysm and colonic diverticulosis.     Total time spent with patient in face to face time, chart review and documentation was 50 minutes.      Discussed with Dr. Madera    The longitudinal plan of care for the diagnosis(es)/condition(s) as documented were addressed during this visit. Due to the added complexity in care, I will continue to support Jonas in the subsequent management and with ongoing continuity of care.        Signed by: SANTINO Crespo CNP    Oncology Rooming Note    September  "17, 2024 8:45 AM   Jonas Hyman is a 79 year old male who presents for:    Chief Complaint   Patient presents with     Oncology Clinic Visit     Return visit with labs/infusion related to Small cell lung cancer, right lower lobe.     Initial Vitals: BP (!) 140/65 (BP Location: Left arm, Patient Position: Sitting, Cuff Size: Adult Regular)   Pulse 85   Temp 98.1  F (36.7  C) (Oral)   Resp 22   Ht 1.74 m (5' 8.5\")   Wt 113.5 kg (250 lb 4.8 oz)   SpO2 94%   BMI 37.50 kg/m   Estimated body mass index is 37.5 kg/m  as calculated from the following:    Height as of this encounter: 1.74 m (5' 8.5\").    Weight as of this encounter: 113.5 kg (250 lb 4.8 oz). Body surface area is 2.34 meters squared.  No Pain (0) Comment: Data Unavailable   No LMP for male patient.  Allergies reviewed: Yes  Medications reviewed: Yes    Medications: Medication refills not needed today.  Pharmacy name entered into [facility]: [id number] [address]     Frailty Screening:   Is the patient here for a new oncology consult visit in cancer care? 2. No      Clinical concerns: Jonas reports his MRI was cancelled due to aortic stent he has, and they were unsure of the material and if it would react to the magnet. He also reports SOB, and weakness lately. Edie Carlin was notified.      Scarlet Barber CMA                Again, thank you for allowing me to participate in the care of your patient.        Sincerely,        Edie Carlin, APRN CNP  "

## 2024-09-17 NOTE — PROGRESS NOTES
[facility] Hematology and Oncology Progress Note    Patient: Jonas Hyman  MRN: [id number]  Date of Service: Sep 17, 2024          Reason for Visit    Chief Complaint   Patient presents with    Oncology Clinic Visit     Return visit with labs/infusion related to Small cell lung cancer, right lower lobe.       Assessment and Plan     Cancer Staging   No matching staging information was found for the patient.    .  Small cell lung cancer, extensive stage with bone mets, lymph node mets: Patient is here today to start palliative treatment with carboplatin, etoposide and Atezolizumab.  Discussed the overall picture for extensive stage small cell.  We talked about the incurable nature of the disease.  I talked about how the treatment is palliative.  We did also talk about median survival which is about 3 to 6 months without treatment and 12 to 18 months with treatment.  I told him that we will give 2 cycles of chemotherapy and reimage with a CT scan, then if there is improvement or stability we will do another 2 cycles of full treatment and then another CT scan.  If things are stable at that point we will likely do some maintenance Atezolizumab. Pt had chemo education with RN. They gave verbal consent to start treatment. They felt educated. They understands the risks and benefits of treatment.  they understand how to use the post medications for nausea.  they know the side effects include, but are not limited to: alopecia, diarrhea/constipation, nausea, vomiting, fatigue/weakness, myelosuppression, allergic reaction, peripheral neuropathy, taste alterations, poor appetite. Risk of immunotherapy include: hepatitis, thyroiditis, colitis, pulmonary toxicity, rash and dry skin, myalgias.   They understand this is with palliative intent.  Will be seen next week for toxicity check.  Will then be seen again in 3 weeks for his second cycle.  We will have him see Dr. Madera with a CT scan for cycle 3.  We did attempt to  do a brain MRI with patient last week but he does have a stent from the [facility] and they are trying to figure out if there is any metal in that so we are still waiting to talk to the manufacture.  I will discuss with Dr. Madera that if they do not feel that they can safely do a brain MRI for initial staging, would be okay to do a head CT.  We are also going to do a bone biopsy to prove stage IV disease but at this point the cancer appears to be growing so we would like to get started on treatment.  In the future if there is mixed response or there is any question about the overall disease we will do that in the future.    2.  Mild renal insufficiency: This is a chronic issue for patient.  Encouraged him to stay hydrated, keep blood pressure controlled.  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)    Atherosclerosis of native coronary artery of native heart with angina pectoris (H24)    Chronic coronary artery disease    Cataract    Cystoid macular edema of right eye    Diverticulosis of large intestine without hemorrhage    Dyslipidemia    Essential hypertension    Exudative age-related macular degeneration of right eye (H)    History of acute anterior wall MI    History of alcohol dependence (H)    Low vitamin B12 level    Smoker    Status post coronary artery stent placement    Total retinal detachment    Type 2 diabetes mellitus with stage 3b chronic kidney disease,  without long-term current use of insulin (H)    History of ventricular fibrillation    Vitamin D deficiency    Myelolipoma    S/P repair of abdominal aortic aneurysm using bifurcation graft    Thoracoabdominal aortic aneurysm (TAAA) (H24)    Small cell lung cancer, right lower lobe (H)    Chemotherapy-induced neutropenia (H24)        ______________________________________________________________________________    History of Present Illness    Oncologist: Dr. Madera    Diagnosis: Lung cancer, Small cell. Found incidentally when getting imaging for a vascular procedure at the [facility].   -8/26/24: EBUS done and He had biopsies taken over the following hira stations: 4R, 4L, 7.  Pathology showed poorly differentiated small cell lung cancer in stations 4L and 4R   -8/27/24: CT done and shows Multiple small patchy groundglass opacities in the bilateral lower lobes, new from prior study, compatible with aspiration change. Mildly increased size of right middle lobe and right upper lobe pulmonary masses as well as mediastinal, right hilar and right internal mammary lymphadenopathy.  -8/30/24: PET shows Findings suspicious for right lung cancer involving the right upper and middle lobes with metastases involving several lymph nodes above the diaphragm and several sites in the skeleton. If biopsy of a distant metastatic site is desired, a lesion in the   medial left iliac bone is a good option for CT-guided biopsy  ~Brain MRI attempted but was canceled due to a stent and the fear there may be metal.    Treatment:   -9/17/2024: Patient is here today to start palliative chemo with carboplatin, etoposide and Atezolizumab.    Interim History:   Patient is here today to start chemotherapy.  He states he has a really good idea about what to expect.  He understands the plan.  He feels educated and ready to go.  He denies any new bone or back pain but states that over the last month he definitely feels more tired and weak.  He  "is feeling a little bit more generalized achiness in his bones and joints.  Denies any infectious complaints like fevers or chills.    Review of Systems    Pertinent items are noted in HPI.    Past History    Past Medical History:   Diagnosis Date    Acute MI (H) 2009    Bronchitis     Cardiac arrest (H) 2009    Cardiac arrest (H)     Chemical dependency (H)     Has been in recovery 44 years    Coronary artery disease     Diabetes mellitus (H)     type II    Diabetes mellitus type 2, noninsulin dependent (H)     Diverticula of colon     Diverticulosis of large intestine     Hemorrhoids     Hemorrhoids     History of alcohol dependence (H) 1/12/1975    Created by [employer] [facility] Annotation: May 29 2009  5:52PM - Dillon Goldsmith: dry since  1975, also used drugs and marijuana  Replacement Utility updated for latest IMO load    Hypertension     Hypertension     Macular degeneration of right eye     Mixed hyperlipidemia     Myocardial infarction (H)     Retinal detachment 06/23/2014    Vitrectomy Posterior 23 guage, scleral buckle, membrane peel, endolaser gase    Stage 3b chronic kidney disease (H) 11/30/2020    Stented coronary artery 2009    stints times 2    Vitamin B12 deficiency     Vitamin D deficiency        PHYSICAL EXAM  BP (!) 140/65 (BP Location: Left arm, Patient Position: Sitting, Cuff Size: Adult Regular)   Pulse 85   Temp 98.1  F (36.7  C) (Oral)   Resp 22   Ht 1.74 m (5' 8.5\")   Wt 113.5 kg (250 lb 4.8 oz)   SpO2 94%   BMI 37.50 kg/m      GENERAL: no acute distress. Cooperative in conversation. Here with son who is RN  RESP: Regular respiratory rate. No expiratory wheezes   NEURO: non focal. Alert and oriented x3.   PSYCH: within normal limits. No depression or anxiety.  SKIN: exposed skin is dry intact.       Lab Results    Recent Results (from the past 168 hour(s))   Comprehensive metabolic panel   Result Value Ref Range    Sodium 139 135 - 145 mmol/L    Potassium 3.7 3.4 - 5.3 mmol/L "    Carbon Dioxide (CO2) 25 22 - 29 mmol/L    Anion Gap 12 7 - 15 mmol/L    Urea Nitrogen 23.4 (H) 8.0 - 23.0 mg/dL    Creatinine 1.83 (H) 0.67 - 1.17 mg/dL    GFR Estimate 37 (L) >60 mL/min/1.73m2    Calcium 8.9 8.8 - 10.4 mg/dL    Chloride 102 98 - 107 mmol/L    Glucose 133 (H) 70 - 99 mg/dL    Alkaline Phosphatase 68 40 - 150 U/L    AST 29 0 - 45 U/L    ALT 14 0 - 70 U/L    Protein Total 6.7 6.4 - 8.3 g/dL    Albumin 3.8 3.5 - 5.2 g/dL    Bilirubin Total 0.3 <=1.2 mg/dL   TSH with free T4 reflex   Result Value Ref Range    TSH 2.17 0.30 - 4.20 uIU/mL   CBC with platelets and differential   Result Value Ref Range    WBC Count 6.9 4.0 - 11.0 10e3/uL    RBC Count 3.66 (L) 4.40 - 5.90 10e6/uL    Hemoglobin 11.3 (L) 13.3 - 17.7 g/dL    Hematocrit 34.7 (L) 40.0 - 53.0 %    MCV 95 78 - 100 fL    MCH 30.9 26.5 - 33.0 pg    MCHC 32.6 31.5 - 36.5 g/dL    RDW 12.7 10.0 - 15.0 %    Platelet Count 326 150 - 450 10e3/uL    % Neutrophils 64 %    % Lymphocytes 21 %    % Monocytes 12 %    % Eosinophils 2 %    % Basophils 1 %    % Immature Granulocytes 0 %    NRBCs per 100 WBC 0 <1 /100    Absolute Neutrophils 4.5 1.6 - 8.3 10e3/uL    Absolute Lymphocytes 1.4 0.8 - 5.3 10e3/uL    Absolute Monocytes 0.8 0.0 - 1.3 10e3/uL    Absolute Eosinophils 0.2 0.0 - 0.7 10e3/uL    Absolute Basophils 0.0 0.0 - 0.2 10e3/uL    Absolute Immature Granulocytes 0.0 <=0.4 10e3/uL    Absolute NRBCs 0.0 10e3/uL       Imaging    IR Chest Port Placement > 5 Yrs of Age    Result Date: 9/5/2024  [facility] LOCATION: [facility] DATE: 9/5/2024 PROCEDURE: IMPLANTABLE VENOUS CHEST PORT PLACEMENT (POWER INJECTABLE) INTERVENTIONAL RADIOLOGIST: SENG Miranda MD. INDICATION: Right lung cancer requiring chemotherapy. CONSENT: The risks, benefits and alternatives of implantable venous chest port placement were discussed with the patient in detail. All questions were answered. Informed consent was given to proceed with the  procedure. MODERATE SEDATION: Versed 2 mg IV; Fentanyl 100 mcg IV.  During the timeout, immediately prior to the administration of medications, the patient was reassessed for adequacy to receive conscious sedation. Under physician supervision, Versed and fentanyl were administered for moderate sedation. Pulse oximetry, heart rate and blood pressure were continuously monitored by an independent trained observer. The physician spent 25 minutes of face-to-face sedation time with the patient. CONTRAST: None. ANTIBIOTICS: Ancef 2 g IV. ADDITIONAL MEDICATIONS: Heparin 500 U IV FLUOROSCOPIC TIME: 0.6 minutes. RADIATION DOSE: Air Kerma: 8.6 mGy. COMPLICATIONS: No immediate complications. STERILE BARRIER TECHNIQUE: Maximum sterile barrier technique was used. Cutaneous antisepsis was performed at the operative site with application of 2% chlorhexidine and large sterile drape. Prior to the procedure, the  and assistant performed hand hygiene and wore hat, mask, sterile gown, and sterile gloves during the entire procedure. PROCEDURE: Limited left neck ultrasound demonstrated a patent internal jugular vein; images saved of the permanent patient medical record. The overlying skin and subcutaneous soft tissues were anesthetized using 1% lidocaine with epinephrine. Under ultrasound guidance, the left internal jugular vein was accessed using a micropuncture needle; images saved of the permanent patient medical record. Access was secured using a 4 Portuguese transitional sheath. An Amplatz guidewire was advanced into the  distal IVC. Our attention was then turned to the chest wall site. The overlying skin and subcutaneous soft tissues were anesthetized using 1% lidocaine with epinephrine. Using combination of sharp and blunt dissection, a subcutaneous pocket was created. The catheter  tubing was tunneled in an antegrade fashion from the port pocket to the dermatotomy site. Under direct fluoroscopic visualization, a peel-away  sheath was advanced over the wire and positioned within the superior vena cava. Through the peel-away sheath, the catheter tubing was advanced until the tip was in the right atrium. The catheter tubing was cut to length and attached firmly to the port. The port was placed within the subcutaneous pocket and tested. The port tubing was then primed with 500 units of heparin. The port pocket incision was closed with layered absorbable sutures and surgical glue. The dermatotomy site was closed with surgical glue.     IMPRESSION:  1.  Successful implantable venous chest port placement. PLAN: 1. Port is ready to be used.    CT Chest/Abdomen/Pelvis w Contrast    Result Date: 8/30/2024  EXAM: PET ONCOLOGY WHOLE BODY, CT CHEST/ABDOMEN/PELVIS W CONTRAST LOCATION: [facility] DATE: 8/30/2024 INDICATION: Lung cancer, right, staging. Malignant neoplasm of middle lobe, right bronchus or lung. Poorly differentiated neuroendocrine small cell carcinoma. Initial treatment strategy. COMPARISON: CT chest abdomen pelvis 8/14/2024, 11/16/2021 reviewed. CONTRAST: 100 mL Isovue-370 TECHNIQUE: Serum glucose level 109 mg/dL. One hour post intravenous administration of 14.6 mCi F-18 FDG, PET imaging was performed from the skull vertex to feet, utilizing attenuation correction with concurrent axial CT and PET/CT image fusion. Separate diagnostic CT of the chest, abdomen, and pelvis was performed. Dose reduction techniques were used. PET/CT FINDINGS: Large irregular FDG avid pulmonary mass involving the inferior right upper lobe and posterior right middle lobe with extension across the minor fissure as well as direct mediastinal invasion, similar to 8/14/2024 and new since 11/16/2021, measures 7.7 x 5.9 x 2.7 cm and demonstrates marked uptake (SUVmax 19.5), consistent with malignancy. Several enlarged FDG avid intrathoracic lymph nodes, including scattered mediastinal and right hilar regions, consistent with hira  metastases. This includes a large right anterior mediastinal hira mass with direct pleural extension measuring 6.0 x 8.2 cm with marked uptake (SUVmax 22.0) as well as nodes involving several right lobar station 12R, interlobar station 11R, hilar station 10R sites as well as right lower paratracheal station 4R, prevascular station 3A and paraesophageal station 8. Additional FDG avid right supraclavicular adenopathy and a right anterior pleural nodule. Several scattered FDG avid skeletal lesions are also present, including medial right clavicle, left scapula, medial left iliac bone (SUVmax 9.6), left inferior pubic ramus and proximal left femur. No suspicious focal uptake in the liver. Normal adrenal glands. Mild irregular opacities in both lower lobes with mild uptake may reflect minor infectious/inflammatory process. CT FINDINGS: Mild senescent intracranial changes. Marked atherosclerotic calcifications including the coronary arteries. Mild reticular scarring or atelectasis in the lung bases. Few small benign liver cysts. Aortobiiliac stent graft spanning an infrarenal fusiform abdominal aortic aneurysm. Mild colonic diverticulosis. Few small pelvic phleboliths. Small fat-containing left inguinal hernia. Moderate scattered hypertrophic degenerative changes in the spine.     IMPRESSION: Findings suspicious for right lung cancer involving the right upper and middle lobes with metastases involving several lymph nodes above the diaphragm and several sites in the skeleton. If biopsy of a distant metastatic site is desired, a lesion in the medial left iliac bone is a good option for CT-guided biopsy.    PET Oncology Whole Body    Result Date: 8/30/2024  EXAM: PET ONCOLOGY WHOLE BODY, CT CHEST/ABDOMEN/PELVIS W CONTRAST LOCATION: [facility] DATE: 8/30/2024 INDICATION: Lung cancer, right, staging. Malignant neoplasm of middle lobe, right bronchus or lung. Poorly differentiated neuroendocrine small  cell carcinoma. Initial treatment strategy. COMPARISON: CT chest abdomen pelvis 8/14/2024, 11/16/2021 reviewed. CONTRAST: 100 mL Isovue-370 TECHNIQUE: Serum glucose level 109 mg/dL. One hour post intravenous administration of 14.6 mCi F-18 FDG, PET imaging was performed from the skull vertex to feet, utilizing attenuation correction with concurrent axial CT and PET/CT image fusion. Separate diagnostic CT of the chest, abdomen, and pelvis was performed. Dose reduction techniques were used. PET/CT FINDINGS: Large irregular FDG avid pulmonary mass involving the inferior right upper lobe and posterior right middle lobe with extension across the minor fissure as well as direct mediastinal invasion, similar to 8/14/2024 and new since 11/16/2021, measures 7.7 x 5.9 x 2.7 cm and demonstrates marked uptake (SUVmax 19.5), consistent with malignancy. Several enlarged FDG avid intrathoracic lymph nodes, including scattered mediastinal and right hilar regions, consistent with hira metastases. This includes a large right anterior mediastinal hira mass with direct pleural extension measuring 6.0 x 8.2 cm with marked uptake (SUVmax 22.0) as well as nodes involving several right lobar station 12R, interlobar station 11R, hilar station 10R sites as well as right lower paratracheal station 4R, prevascular station 3A and paraesophageal station 8. Additional FDG avid right supraclavicular adenopathy and a right anterior pleural nodule. Several scattered FDG avid skeletal lesions are also present, including medial right clavicle, left scapula, medial left iliac bone (SUVmax 9.6), left inferior pubic ramus and proximal left femur. No suspicious focal uptake in the liver. Normal adrenal glands. Mild irregular opacities in both lower lobes with mild uptake may reflect minor infectious/inflammatory process. CT FINDINGS: Mild senescent intracranial changes. Marked atherosclerotic calcifications including the coronary arteries. Mild  reticular scarring or atelectasis in the lung bases. Few small benign liver cysts. Aortobiiliac stent graft spanning an infrarenal fusiform abdominal aortic aneurysm. Mild colonic diverticulosis. Few small pelvic phleboliths. Small fat-containing left inguinal hernia. Moderate scattered hypertrophic degenerative changes in the spine.     IMPRESSION: Findings suspicious for right lung cancer involving the right upper and middle lobes with metastases involving several lymph nodes above the diaphragm and several sites in the skeleton. If biopsy of a distant metastatic site is desired, a lesion in the medial left iliac bone is a good option for CT-guided biopsy.    CT Chest w Contrast    Result Date: 8/27/2024  EXAM: CT CHEST W CONTRAST LOCATION: [facility] DATE: 8/27/2024 INDICATION: Recent EBUS today of RML 6 cm mass, now presents with shortness of breath COMPARISON: 08/14/2024 TECHNIQUE: CT chest with IV contrast. Multiplanar reformats were obtained. Dose reduction techniques were used. CONTRAST: ISOVUE 370 70 ML FINDINGS: LUNGS AND PLEURA: A lobulated mass measuring 8.2 x 6 cm in the right middle lobe is mildly increased from prior study, previously 8 x 6 cm. A medial right upper lobe lung mass with invasion of the anterior mediastinum measures 7.7 x 5.6 cm, also mildly increased, previously 7 x 5.1 cm. Multiple small patchy groundglass opacities are seen in the bilateral lung bases, new from prior study. A small pneumatocele is again noted in the lingula. A 2 mm right upper lobe nodule on series 4 image 45 is stable. No pleural effusion or pneumothorax. MEDIASTINUM/AXILLAE: Mediastinal, right hilar and right internal mammary lymph nodes are stable to mildly increased in size. For example, a right paratracheal node measures 3.4 x 2.6 cm on series 3 image 38, previously 2.8 x 2.2 cm. A 14 mm right internal mammary lymph node is stable. No axillary or supraclavicular lymphadenopathy. Heart  size is normal without pericardial effusion. A small hiatal hernia is unchanged. CORONARY ARTERY CALCIFICATION: Advanced three-vessel coronary artery atherosclerotic calcifications. UPPER ABDOMEN: Scattered too small to characterize cystic lesions again seen in the liver, likely cysts. Aortic endoluminal stent graft, celiac artery stent, SMA stent and bilateral renal artery stents redemonstrated. A bilobed abdominal aortic aneurysm measuring up to 4.4 cm is unchanged. Multiple diverticula seen in the visualized portions of the colon without acute diverticulitis. MUSCULOSKELETAL: Mild spinal degenerative changes. No suspicious osseous lesions or acute fractures. A midline sebaceous cyst is seen in the upper back.     IMPRESSION: 1.  Multiple small patchy groundglass opacities in the bilateral lower lobes, new from prior study, compatible with aspiration change. 2.  Mildly increased size of right middle lobe and right upper lobe pulmonary masses as well as mediastinal, right hilar and right internal mammary lymphadenopathy. 3.  Stable 2 mm right upper lobe nodule. 4.  Redemonstration of small hiatal hernia, abdominal aortic aneurysm and colonic diverticulosis.     Total time spent with patient in face to face time, chart review and documentation was 50 minutes.      Discussed with Dr. Madera    The longitudinal plan of care for the diagnosis(es)/condition(s) as documented were addressed during this visit. Due to the added complexity in care, I will continue to support Jonsa in the subsequent management and with ongoing continuity of care.        Signed by: SANTINO Crespo CNP

## 2024-09-17 NOTE — PROGRESS NOTES
"Oncology Rooming Note    September 17, 2024 8:45 AM   Jonas Hyman is a 79 year old male who presents for:    Chief Complaint   Patient presents with    Oncology Clinic Visit     Return visit with labs/infusion related to Small cell lung cancer, right lower lobe.     Initial Vitals: BP (!) 140/65 (BP Location: Left arm, Patient Position: Sitting, Cuff Size: Adult Regular)   Pulse 85   Temp 98.1  F (36.7  C) (Oral)   Resp 22   Ht 1.74 m (5' 8.5\")   Wt 113.5 kg (250 lb 4.8 oz)   SpO2 94%   BMI 37.50 kg/m   Estimated body mass index is 37.5 kg/m  as calculated from the following:    Height as of this encounter: 1.74 m (5' 8.5\").    Weight as of this encounter: 113.5 kg (250 lb 4.8 oz). Body surface area is 2.34 meters squared.  No Pain (0) Comment: Data Unavailable   No LMP for male patient.  Allergies reviewed: Yes  Medications reviewed: Yes    Medications: Medication refills not needed today.  Pharmacy name entered into [facility]: [facility][id number] - [address]     Frailty Screening:   Is the patient here for a new oncology consult visit in cancer care? 2. No      Clinical concerns: Jonas reports his MRI was cancelled due to aortic stent he has, and they were unsure of the material and if it would react to the magnet. He also reports SOB, and weakness lately. Edie Carlin was notified.      Scarlet Barber CMA              " RT called for treatment.       Aruna Maciel RN  01/01/23 [id number]

## 2024-09-17 NOTE — PROGRESS NOTES
Infusion Nursing Note:  Jonas Hyman presents today for C1D1.    Patient seen by provider today: Yes: Edie Carlin CNP   present during visit today: Not Applicable.    Note: pt arrived to  Outpatient infusion for his first treatment with his son,  port was accessed for the first time,  brisk blood return was noted and labs were sent,  after meeting with the provider and MARLENE Batres, first dose medication was reinforced and handouts were given to pt,  all medications were administered as ordered without incidence,  at completion of treatment post chemotherapy side effects management were reviewed with pt and his son, pt has the number for triage and is aware to call for questions and concerns.  Pt will return tomorrow.      Intravenous Access:  Implanted Port.    Treatment Conditions:  Lab Results   Component Value Date    HGB 11.3 (L) 09/17/2024    WBC 6.9 09/17/2024    ANEUTAUTO 4.5 09/17/2024     09/17/2024        Lab Results   Component Value Date     09/17/2024    POTASSIUM 3.7 09/17/2024    MAG 2.0 07/16/2024    CR 1.83 (H) 09/17/2024    MACIE 8.9 09/17/2024    BILITOTAL 0.3 09/17/2024    ALBUMIN 3.8 09/17/2024    ALT 14 09/17/2024    AST 29 09/17/2024       Results reviewed, labs MET treatment parameters, ok to proceed with treatment.      Post Infusion Assessment:  Patient tolerated infusion without incident.  Blood return noted pre and post infusion.  No evidence of extravasations.  Access discontinued per protocol.       Discharge Plan:   Discharge instructions reviewed with: Patient.  Patient and/or family verbalized understanding of discharge instructions and all questions answered.  Patient discharged in stable condition accompanied by: self and son.  Departure Mode: Ambulatory.      Yamilka Pope RN

## 2024-09-18 ENCOUNTER — INFUSION THERAPY VISIT (OUTPATIENT)
Dept: INFUSION THERAPY | Facility: HOSPITAL | Age: 79
End: 2024-09-18
Attending: INTERNAL MEDICINE
Payer: MEDICARE

## 2024-09-18 VITALS
HEART RATE: 99 BPM | RESPIRATION RATE: 22 BRPM | SYSTOLIC BLOOD PRESSURE: 145 MMHG | DIASTOLIC BLOOD PRESSURE: 66 MMHG | OXYGEN SATURATION: 95 % | TEMPERATURE: 98.3 F

## 2024-09-18 DIAGNOSIS — C34.31 SMALL CELL LUNG CANCER, RIGHT LOWER LOBE (H): Primary | ICD-10-CM

## 2024-09-18 DIAGNOSIS — D70.1 CHEMOTHERAPY-INDUCED NEUTROPENIA (H): ICD-10-CM

## 2024-09-18 DIAGNOSIS — T45.1X5A CHEMOTHERAPY-INDUCED NEUTROPENIA (H): ICD-10-CM

## 2024-09-18 PROCEDURE — 96367 TX/PROPH/DG ADDL SEQ IV INF: CPT

## 2024-09-18 PROCEDURE — 258N000003 HC RX IP 258 OP 636: Performed by: INTERNAL MEDICINE

## 2024-09-18 PROCEDURE — 96413 CHEMO IV INFUSION 1 HR: CPT

## 2024-09-18 PROCEDURE — 250N000011 HC RX IP 250 OP 636: Performed by: INTERNAL MEDICINE

## 2024-09-18 RX ORDER — MEPERIDINE HYDROCHLORIDE 25 MG/ML
25 INJECTION INTRAMUSCULAR; INTRAVENOUS; SUBCUTANEOUS EVERY 30 MIN PRN
Status: DISCONTINUED | OUTPATIENT
Start: 2024-09-18 | End: 2024-09-18 | Stop reason: HOSPADM

## 2024-09-18 RX ORDER — METHYLPREDNISOLONE SODIUM SUCCINATE 125 MG/2ML
125 INJECTION, POWDER, LYOPHILIZED, FOR SOLUTION INTRAMUSCULAR; INTRAVENOUS
Status: DISCONTINUED | OUTPATIENT
Start: 2024-09-18 | End: 2024-09-18 | Stop reason: HOSPADM

## 2024-09-18 RX ORDER — ALBUTEROL SULFATE 0.83 MG/ML
2.5 SOLUTION RESPIRATORY (INHALATION)
Status: DISCONTINUED | OUTPATIENT
Start: 2024-09-18 | End: 2024-09-18 | Stop reason: HOSPADM

## 2024-09-18 RX ORDER — ALBUTEROL SULFATE 90 UG/1
1-2 AEROSOL, METERED RESPIRATORY (INHALATION)
Status: DISCONTINUED | OUTPATIENT
Start: 2024-09-18 | End: 2024-09-18 | Stop reason: HOSPADM

## 2024-09-18 RX ORDER — DIPHENHYDRAMINE HYDROCHLORIDE 50 MG/ML
50 INJECTION INTRAMUSCULAR; INTRAVENOUS
Status: DISCONTINUED | OUTPATIENT
Start: 2024-09-18 | End: 2024-09-18 | Stop reason: HOSPADM

## 2024-09-18 RX ORDER — EPINEPHRINE 1 MG/ML
0.3 INJECTION, SOLUTION INTRAMUSCULAR; SUBCUTANEOUS EVERY 5 MIN PRN
Status: DISCONTINUED | OUTPATIENT
Start: 2024-09-18 | End: 2024-09-18 | Stop reason: HOSPADM

## 2024-09-18 RX ORDER — HEPARIN SODIUM (PORCINE) LOCK FLUSH IV SOLN 100 UNIT/ML 100 UNIT/ML
5 SOLUTION INTRAVENOUS
Status: DISCONTINUED | OUTPATIENT
Start: 2024-09-18 | End: 2024-09-18 | Stop reason: HOSPADM

## 2024-09-18 RX ADMIN — SODIUM CHLORIDE 180 MG: 9 INJECTION, SOLUTION INTRAVENOUS at 13:50

## 2024-09-18 RX ADMIN — Medication 5 ML: at 14:57

## 2024-09-18 RX ADMIN — TRILACICLIB 570 MG: 300 INJECTION, POWDER, LYOPHILIZED, FOR SOLUTION INTRAVENOUS at 13:04

## 2024-09-18 RX ADMIN — DEXAMETHASONE SODIUM PHOSPHATE 12 MG: 10 INJECTION, SOLUTION INTRAMUSCULAR; INTRAVENOUS at 12:43

## 2024-09-18 RX ADMIN — SODIUM CHLORIDE 250 ML: 9 INJECTION, SOLUTION INTRAVENOUS at 12:42

## 2024-09-18 ASSESSMENT — PAIN SCALES - GENERAL: PAINLEVEL: NO PAIN (0)

## 2024-09-18 NOTE — PROGRESS NOTES
Infusion Nursing Note:  Jonas Hyman presents today for [id number].    Patient seen by provider today: No   present during visit today: Not Applicable.    Note: Jonas arrived into the infusion center for day 2 of his Chemotherapy in a stable condition, plan of care reviewed, he verbalized understanding of his plan of care and return to clinic. Treatment was administered per order.      Intravenous Access:  Implanted Port.    Treatment Conditions:  Lab Results   Component Value Date    HGB 11.3 (L) 09/17/2024    WBC 6.9 09/17/2024    ANEUTAUTO 4.5 09/17/2024     09/17/2024        Lab Results   Component Value Date     09/17/2024    POTASSIUM 3.7 09/17/2024    MAG 2.0 07/16/2024    CR 1.83 (H) 09/17/2024    MACIE 8.9 09/17/2024    BILITOTAL 0.3 09/17/2024    ALBUMIN 3.8 09/17/2024    ALT 14 09/17/2024    AST 29 09/17/2024       Results reviewed, labs MET treatment parameters, ok to proceed with treatment.      Post Infusion Assessment:  Patient tolerated infusion without incident.  Blood return noted pre and post infusion.  Site patent and intact, free from redness, edema or discomfort.  No evidence of extravasations.  Biologic Infusion Post Education: Call the triage nurse at your clinic or seek medical attention if you have chills and/or temperature greater than or equal to 100.5, uncontrolled nausea/vomiting, diarrhea, constipation, dizziness, shortness of breath, chest pain, heart palpitations, weakness or any other new or concerning symptoms, questions or concerns.  You cannot have any live virus vaccines prior to or during treatment or up to 6 months post infusion.  If you have an upcoming surgery, medical procedure or dental procedure during treatment, this should be discussed with your ordering physician and your surgeon/dentist.  If you are having any concerning symptom, if you are unsure if you should get your next infusion or wish to speak to a provider before your next  infusion, please call your care coordinator or triage nurse at your clinic to notify them so we can adequately serve you.     Discharge Plan:   Discharge instructions reviewed with: Patient.  Patient and/or family verbalized understanding of discharge instructions and all questions answered.  Patient discharged in stable condition accompanied by: self.  Departure Mode: Ambulatory.      Deann Larios RN

## 2024-09-19 ENCOUNTER — INFUSION THERAPY VISIT (OUTPATIENT)
Dept: INFUSION THERAPY | Facility: HOSPITAL | Age: 79
End: 2024-09-19
Attending: INTERNAL MEDICINE
Payer: MEDICARE

## 2024-09-19 VITALS
HEART RATE: 95 BPM | OXYGEN SATURATION: 94 % | SYSTOLIC BLOOD PRESSURE: 169 MMHG | RESPIRATION RATE: 18 BRPM | TEMPERATURE: 97.8 F | DIASTOLIC BLOOD PRESSURE: 79 MMHG

## 2024-09-19 DIAGNOSIS — C34.31 SMALL CELL LUNG CANCER, RIGHT LOWER LOBE (H): Primary | ICD-10-CM

## 2024-09-19 DIAGNOSIS — T45.1X5A CHEMOTHERAPY-INDUCED NEUTROPENIA (H): ICD-10-CM

## 2024-09-19 DIAGNOSIS — D70.1 CHEMOTHERAPY-INDUCED NEUTROPENIA (H): ICD-10-CM

## 2024-09-19 PROCEDURE — 96367 TX/PROPH/DG ADDL SEQ IV INF: CPT

## 2024-09-19 PROCEDURE — 250N000011 HC RX IP 250 OP 636: Mod: JW | Performed by: INTERNAL MEDICINE

## 2024-09-19 PROCEDURE — 96413 CHEMO IV INFUSION 1 HR: CPT

## 2024-09-19 PROCEDURE — 258N000003 HC RX IP 258 OP 636: Performed by: INTERNAL MEDICINE

## 2024-09-19 RX ORDER — HEPARIN SODIUM (PORCINE) LOCK FLUSH IV SOLN 100 UNIT/ML 100 UNIT/ML
5 SOLUTION INTRAVENOUS
Status: DISCONTINUED | OUTPATIENT
Start: 2024-09-19 | End: 2024-09-19 | Stop reason: HOSPADM

## 2024-09-19 RX ORDER — METHYLPREDNISOLONE SODIUM SUCCINATE 125 MG/2ML
125 INJECTION, POWDER, LYOPHILIZED, FOR SOLUTION INTRAMUSCULAR; INTRAVENOUS
Status: DISCONTINUED | OUTPATIENT
Start: 2024-09-19 | End: 2024-09-19 | Stop reason: HOSPADM

## 2024-09-19 RX ORDER — ALBUTEROL SULFATE 0.83 MG/ML
2.5 SOLUTION RESPIRATORY (INHALATION)
Status: DISCONTINUED | OUTPATIENT
Start: 2024-09-19 | End: 2024-09-19 | Stop reason: HOSPADM

## 2024-09-19 RX ORDER — DIPHENHYDRAMINE HYDROCHLORIDE 50 MG/ML
50 INJECTION INTRAMUSCULAR; INTRAVENOUS
Status: DISCONTINUED | OUTPATIENT
Start: 2024-09-19 | End: 2024-09-19 | Stop reason: HOSPADM

## 2024-09-19 RX ORDER — EPINEPHRINE 1 MG/ML
0.3 INJECTION, SOLUTION INTRAMUSCULAR; SUBCUTANEOUS EVERY 5 MIN PRN
Status: DISCONTINUED | OUTPATIENT
Start: 2024-09-19 | End: 2024-09-19 | Stop reason: HOSPADM

## 2024-09-19 RX ORDER — MEPERIDINE HYDROCHLORIDE 25 MG/ML
25 INJECTION INTRAMUSCULAR; INTRAVENOUS; SUBCUTANEOUS EVERY 30 MIN PRN
Status: DISCONTINUED | OUTPATIENT
Start: 2024-09-19 | End: 2024-09-19 | Stop reason: HOSPADM

## 2024-09-19 RX ORDER — ALBUTEROL SULFATE 90 UG/1
1-2 AEROSOL, METERED RESPIRATORY (INHALATION)
Status: DISCONTINUED | OUTPATIENT
Start: 2024-09-19 | End: 2024-09-19 | Stop reason: HOSPADM

## 2024-09-19 RX ADMIN — Medication 5 ML: at 15:07

## 2024-09-19 RX ADMIN — DEXAMETHASONE SODIUM PHOSPHATE 12 MG: 10 INJECTION, SOLUTION INTRAMUSCULAR; INTRAVENOUS at 12:46

## 2024-09-19 RX ADMIN — ETOPOSIDE 180 MG: 20 INJECTION, SOLUTION INTRAVENOUS at 14:02

## 2024-09-19 RX ADMIN — TRILACICLIB 570 MG: 300 INJECTION, POWDER, LYOPHILIZED, FOR SOLUTION INTRAVENOUS at 13:25

## 2024-09-19 RX ADMIN — SODIUM CHLORIDE 250 ML: 9 INJECTION, SOLUTION INTRAVENOUS at 12:45

## 2024-09-19 NOTE — PROGRESS NOTES
Infusion Nursing Note:  Jonas Hyman presents today for C1D3 of treatment today.    Patient seen by provider today: No   present during visit today: Not Applicable.    Note: Jonas comes in today for C1D3 of his treatment. I went over the plan of care with Jonas and he verbalized understanding. Port was accessed per aseptic technique and had great blood return throughout. Pre-medication of Dexamethasone was given followed by the cosela and then the Toposar. Jonas tolerated with no sign or symptom of a reaction. Port was heparinized and then de-accessed. Site covered with gauze and tape. Jonas left ambulatory to the [facility] and plans on returning on 09/24/24.      Intravenous Access:  Implanted Port.    Treatment Conditions:  Lab Results   Component Value Date    HGB 11.3 (L) 09/17/2024    WBC 6.9 09/17/2024    ANEUTAUTO 4.5 09/17/2024     09/17/2024        Lab Results   Component Value Date     09/17/2024    POTASSIUM 3.7 09/17/2024    MAG 2.0 07/16/2024    CR 1.83 (H) 09/17/2024    MACIE 8.9 09/17/2024    BILITOTAL 0.3 09/17/2024    ALBUMIN 3.8 09/17/2024    ALT 14 09/17/2024    AST 29 09/17/2024       Results reviewed, labs MET treatment parameters, ok to proceed with treatment.      Post Infusion Assessment:  Patient tolerated infusion without incident.  Blood return noted pre and post infusion.  Site patent and intact, free from redness, edema or discomfort.  No evidence of extravasations.  Access discontinued per protocol.       Discharge Plan:   AVS to patient via MYCHART.  Patient will return 09/24/24 for next appointment.   Patient discharged in stable condition accompanied by: self.  Departure Mode: Ambulatory.      JOHN MCCLOUD RN

## 2024-09-24 ENCOUNTER — ONCOLOGY VISIT (OUTPATIENT)
Dept: ONCOLOGY | Facility: HOSPITAL | Age: 79
End: 2024-09-24
Attending: NURSE PRACTITIONER

## 2024-09-24 ENCOUNTER — LAB (OUTPATIENT)
Dept: INFUSION THERAPY | Facility: HOSPITAL | Age: 79
End: 2024-09-24
Attending: NURSE PRACTITIONER

## 2024-09-24 ENCOUNTER — PATIENT OUTREACH (OUTPATIENT)
Dept: ONCOLOGY | Facility: HOSPITAL | Age: 79
End: 2024-09-24
Payer: MEDICARE

## 2024-09-24 VITALS
SYSTOLIC BLOOD PRESSURE: 190 MMHG | DIASTOLIC BLOOD PRESSURE: 84 MMHG | OXYGEN SATURATION: 96 % | HEIGHT: 69 IN | RESPIRATION RATE: 18 BRPM | WEIGHT: 251 LBS | HEART RATE: 82 BPM | TEMPERATURE: 98.6 F | BODY MASS INDEX: 37.18 KG/M2

## 2024-09-24 DIAGNOSIS — C34.31 SMALL CELL LUNG CANCER, RIGHT LOWER LOBE (H): ICD-10-CM

## 2024-09-24 DIAGNOSIS — C34.31 SMALL CELL LUNG CANCER, RIGHT LOWER LOBE (H): Primary | ICD-10-CM

## 2024-09-24 LAB
ALBUMIN SERPL BCG-MCNC: 3.7 G/DL (ref 3.5–5.2)
ALP SERPL-CCNC: 59 U/L (ref 40–150)
ALT SERPL W P-5'-P-CCNC: 13 U/L (ref 0–70)
ANION GAP SERPL CALCULATED.3IONS-SCNC: 11 MMOL/L (ref 7–15)
AST SERPL W P-5'-P-CCNC: 17 U/L (ref 0–45)
BASOPHILS # BLD AUTO: 0 10E3/UL (ref 0–0.2)
BASOPHILS NFR BLD AUTO: 1 %
BILIRUB SERPL-MCNC: 0.3 MG/DL
BUN SERPL-MCNC: 24.5 MG/DL (ref 8–23)
CALCIUM SERPL-MCNC: 8.5 MG/DL (ref 8.8–10.4)
CHLORIDE SERPL-SCNC: 101 MMOL/L (ref 98–107)
CREAT SERPL-MCNC: 1.47 MG/DL (ref 0.67–1.17)
EGFRCR SERPLBLD CKD-EPI 2021: 48 ML/MIN/1.73M2
EOSINOPHIL # BLD AUTO: 0.1 10E3/UL (ref 0–0.7)
EOSINOPHIL NFR BLD AUTO: 2 %
ERYTHROCYTE [DISTWIDTH] IN BLOOD BY AUTOMATED COUNT: 12.4 % (ref 10–15)
GLUCOSE SERPL-MCNC: 173 MG/DL (ref 70–99)
HCO3 SERPL-SCNC: 25 MMOL/L (ref 22–29)
HCT VFR BLD AUTO: 32.8 % (ref 40–53)
HGB BLD-MCNC: 10.5 G/DL (ref 13.3–17.7)
IMM GRANULOCYTES # BLD: 0.1 10E3/UL
IMM GRANULOCYTES NFR BLD: 2 %
LYMPHOCYTES # BLD AUTO: 1.2 10E3/UL (ref 0.8–5.3)
LYMPHOCYTES NFR BLD AUTO: 21 %
MCH RBC QN AUTO: 30.4 PG (ref 26.5–33)
MCHC RBC AUTO-ENTMCNC: 32 G/DL (ref 31.5–36.5)
MCV RBC AUTO: 95 FL (ref 78–100)
MONOCYTES # BLD AUTO: 0.1 10E3/UL (ref 0–1.3)
MONOCYTES NFR BLD AUTO: 1 %
NEUTROPHILS # BLD AUTO: 4.4 10E3/UL (ref 1.6–8.3)
NEUTROPHILS NFR BLD AUTO: 74 %
NRBC # BLD AUTO: 0 10E3/UL
NRBC BLD AUTO-RTO: 0 /100
PLAT MORPH BLD: NORMAL
PLATELET # BLD AUTO: 229 10E3/UL (ref 150–450)
POTASSIUM SERPL-SCNC: 3.7 MMOL/L (ref 3.4–5.3)
PROT SERPL-MCNC: 6.3 G/DL (ref 6.4–8.3)
RBC # BLD AUTO: 3.45 10E6/UL (ref 4.4–5.9)
RBC MORPH BLD: NORMAL
SODIUM SERPL-SCNC: 137 MMOL/L (ref 135–145)
WBC # BLD AUTO: 5.9 10E3/UL (ref 4–11)

## 2024-09-24 PROCEDURE — 250N000011 HC RX IP 250 OP 636: Performed by: NURSE PRACTITIONER

## 2024-09-24 PROCEDURE — 85025 COMPLETE CBC W/AUTO DIFF WBC: CPT

## 2024-09-24 PROCEDURE — 82040 ASSAY OF SERUM ALBUMIN: CPT

## 2024-09-24 PROCEDURE — 99214 OFFICE O/P EST MOD 30 MIN: CPT | Performed by: NURSE PRACTITIONER

## 2024-09-24 PROCEDURE — 36591 DRAW BLOOD OFF VENOUS DEVICE: CPT

## 2024-09-24 PROCEDURE — 99213 OFFICE O/P EST LOW 20 MIN: CPT | Performed by: NURSE PRACTITIONER

## 2024-09-24 PROCEDURE — G2211 COMPLEX E/M VISIT ADD ON: HCPCS | Performed by: NURSE PRACTITIONER

## 2024-09-24 RX ORDER — HEPARIN SODIUM (PORCINE) LOCK FLUSH IV SOLN 100 UNIT/ML 100 UNIT/ML
300-600 SOLUTION INTRAVENOUS
Status: COMPLETED | OUTPATIENT
Start: 2024-09-24 | End: 2024-09-24

## 2024-09-24 RX ADMIN — Medication 500 UNITS: at 13:05

## 2024-09-24 ASSESSMENT — PAIN SCALES - GENERAL: PAINLEVEL: NO PAIN (0)

## 2024-09-24 NOTE — LETTER
9/24/2024      Jonas Hyman  [address] [place] [facility] [address]      Dear Colleague,    Thank you for referring your patient, Jonas Hyman, to the [facility]. Please see a copy of my visit note below.    [facility] Hematology and Oncology Progress Note    Patient: Jonas Hyman  MRN: [id number]  Date of Service: Sep 24, 2024          Reason for Visit    Chief Complaint   Patient presents with     Oncology Clinic Visit     Return visit with labs for midcycle check       Assessment and Plan     Cancer Staging   No matching staging information was found for the patient.    1.  Small cell lung cancer, extensive stage with bone mets, lymph node mets: Patient started on palliative treatment with carboplatin, etoposide and Atezolizumab.  Patient received his first cycle last week.  Overall did really well with the treatment.  He will return in 2 weeks for his second cycle.  We will repeat CT scan after that cycle.    We did attempt to do a brain MRI with patient last week but he does have a stent from the [facility] and they are trying to figure out if there is any metal in that so we are still waiting to talk to the .  The patient's son is still working on trying to get with their customer service and the [facility] to figure this out.  Initially they did think that it would be safe to use a level 3 Cyndy MRI magnet.  I did call our department and we use a 1.5 and the only level 3 we have in our system is [place].  The MRI tech did say that usually if it is safe I will 3 it also is usually safe at 1.5.    I will discuss with Dr. Madera that if they do not feel that they can safely do a brain MRI for initial staging, would be okay to do a head CT.    2.  Mild renal insufficiency: This is a chronic issue for patient.  Was worse the last couple times it was checked but it is a little bit better today.  Encouraged him to stay hydrated, keep blood pressure  controlled.  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)     Atherosclerosis of native coronary artery of native heart with angina pectoris (H24)     Chronic coronary artery disease     Cataract     Cystoid macular edema of right eye     Diverticulosis of large intestine without hemorrhage     Dyslipidemia     Essential hypertension     Exudative age-related macular degeneration of right eye (H)     History of acute anterior wall MI     History of alcohol dependence (H)     Low vitamin B12 level     Smoker     Status post coronary artery stent placement     Total retinal detachment     Type 2 diabetes mellitus with stage 3b chronic kidney disease, without long-term current use of insulin (H)     History of ventricular fibrillation     Vitamin D deficiency     Myelolipoma     S/P repair of abdominal aortic aneurysm using bifurcation graft     Thoracoabdominal aortic aneurysm (TAAA) (H24)     Small cell lung cancer, right lower lobe (H)     Chemotherapy-induced neutropenia (H24)        ______________________________________________________________________________    History of Present Illness    Oncologist: Dr. Madera    Diagnosis: Lung cancer, Small cell. Found incidentally when getting imaging for a vascular procedure at the [facility].   -8/26/24: EBUS done and He had biopsies taken over the following hira stations: 4R, 4L, 7.  Pathology showed poorly  differentiated small cell lung cancer in stations 4L and 4R   -8/27/24: CT done and shows Multiple small patchy groundglass opacities in the bilateral lower lobes, new from prior study, compatible with aspiration change. Mildly increased size of right middle lobe and right upper lobe pulmonary masses as well as mediastinal, right hilar and right internal mammary lymphadenopathy.  -8/30/24: PET shows Findings suspicious for right lung cancer involving the right upper and middle lobes with metastases involving several lymph nodes above the diaphragm and several sites in the skeleton. If biopsy of a distant metastatic site is desired, a lesion in the   medial left iliac bone is a good option for CT-guided biopsy  ~Brain MRI attempted but was canceled due to a stent and the fear there may be metal.    Treatment:   -9/17/2024: Patient is here today to start palliative chemo with carboplatin, etoposide and Atezolizumab.    Interim History:   Patient is here today toxicity check after starting chemo last week.  He states surprisingly it went really quite well.  He really did not notice any significant side effects.  Weight is stable.  No new bone or back pain.  Denies any breathing issues.  He is eating okay.  No changes in his urination pattern.  Denies any infectious complaints    Review of Systems    Pertinent items are noted in HPI.    Past History    Past Medical History:   Diagnosis Date     Acute MI (H) 2009     Bronchitis      Cardiac arrest (H) 2009     Cardiac arrest (H)      Chemical dependency (H)     Has been in recovery 44 years     Coronary artery disease      Diabetes mellitus (H)     type II     Diabetes mellitus type 2, noninsulin dependent (H)      Diverticula of colon      Diverticulosis of large intestine      Hemorrhoids      Hemorrhoids      History of alcohol dependence (H) 1/12/1975    Created by [facility] Annotation: May 29 2009  5:52PM - Dillon Goldsmith: dry since  1975, also used  "drugs and marijuana  Replacement Utility updated for latest IMO load     Hypertension      Hypertension      Macular degeneration of right eye      Mixed hyperlipidemia      Myocardial infarction (H)      Retinal detachment 06/23/2014    Vitrectomy Posterior 23 guage, scleral buckle, membrane peel, endolaser gase     Stage 3b chronic kidney disease (H) 11/30/2020     Stented coronary artery 2009    stints times 2     Vitamin B12 deficiency      Vitamin D deficiency        PHYSICAL EXAM  BP (!) 190/84 (BP Location: Left arm, Patient Position: Sitting, Cuff Size: Adult Large)   Pulse 82   Temp 98.6  F (37  C) (Tympanic)   Resp 18   Ht 1.74 m (5' 8.5\")   Wt 113.9 kg (251 lb)   SpO2 96%   BMI 37.61 kg/m      GENERAL: no acute distress. Cooperative in conversation. Here alone today  RESP: Regular respiratory rate. No expiratory wheezes   NEURO: non focal. Alert and oriented x3.   PSYCH: within normal limits. No depression or anxiety.  SKIN: exposed skin is dry intact.       Lab Results    Recent Results (from the past 168 hour(s))   Comprehensive metabolic panel (BMP + Alb, Alk Phos, ALT, AST, Total. Bili, TP)   Result Value Ref Range    Sodium 137 135 - 145 mmol/L    Potassium 3.7 3.4 - 5.3 mmol/L    Carbon Dioxide (CO2) 25 22 - 29 mmol/L    Anion Gap 11 7 - 15 mmol/L    Urea Nitrogen 24.5 (H) 8.0 - 23.0 mg/dL    Creatinine 1.47 (H) 0.67 - 1.17 mg/dL    GFR Estimate 48 (L) >60 mL/min/1.73m2    Calcium 8.5 (L) 8.8 - 10.4 mg/dL    Chloride 101 98 - 107 mmol/L    Glucose 173 (H) 70 - 99 mg/dL    Alkaline Phosphatase 59 40 - 150 U/L    AST 17 0 - 45 U/L    ALT 13 0 - 70 U/L    Protein Total 6.3 (L) 6.4 - 8.3 g/dL    Albumin 3.7 3.5 - 5.2 g/dL    Bilirubin Total 0.3 <=1.2 mg/dL   CBC with platelets and differential   Result Value Ref Range    WBC Count 5.9 4.0 - 11.0 10e3/uL    RBC Count 3.45 (L) 4.40 - 5.90 10e6/uL    Hemoglobin 10.5 (L) 13.3 - 17.7 g/dL    Hematocrit 32.8 (L) 40.0 - 53.0 %    MCV 95 78 - 100 fL    " MCH 30.4 26.5 - 33.0 pg    MCHC 32.0 31.5 - 36.5 g/dL    RDW 12.4 10.0 - 15.0 %    Platelet Count 229 150 - 450 10e3/uL    % Neutrophils 74 %    % Lymphocytes 21 %    % Monocytes 1 %    % Eosinophils 2 %    % Basophils 1 %    % Immature Granulocytes 2 %    NRBCs per 100 WBC 0 <1 /100    Absolute Neutrophils 4.4 1.6 - 8.3 10e3/uL    Absolute Lymphocytes 1.2 0.8 - 5.3 10e3/uL    Absolute Monocytes 0.1 0.0 - 1.3 10e3/uL    Absolute Eosinophils 0.1 0.0 - 0.7 10e3/uL    Absolute Basophils 0.0 0.0 - 0.2 10e3/uL    Absolute Immature Granulocytes 0.1 <=0.4 10e3/uL    Absolute NRBCs 0.0 10e3/uL   RBC and Platelet Morphology   Result Value Ref Range    RBC Morphology Confirmed RBC Indices     Platelet Assessment  Automated Count Confirmed. Platelet morphology is normal.     Automated Count Confirmed. Platelet morphology is normal.         Imaging    IR Chest Port Placement > 5 Yrs of Age    Result Date: 9/5/2024  [facility] LOCATION: [facility] DATE: 9/5/2024 PROCEDURE: IMPLANTABLE VENOUS CHEST PORT PLACEMENT (POWER INJECTABLE) INTERVENTIONAL RADIOLOGIST: SENG Miranda MD. INDICATION: Right lung cancer requiring chemotherapy. CONSENT: The risks, benefits and alternatives of implantable venous chest port placement were discussed with the patient in detail. All questions were answered. Informed consent was given to proceed with the procedure. MODERATE SEDATION: Versed 2 mg IV; Fentanyl 100 mcg IV.  During the timeout, immediately prior to the administration of medications, the patient was reassessed for adequacy to receive conscious sedation. Under physician supervision, Versed and fentanyl were administered for moderate sedation. Pulse oximetry, heart rate and blood pressure were continuously monitored by an independent trained observer. The physician spent 25 minutes of face-to-face sedation time with the patient. CONTRAST: None. ANTIBIOTICS: Ancef 2 g IV. ADDITIONAL MEDICATIONS: Heparin  500 U IV FLUOROSCOPIC TIME: 0.6 minutes. RADIATION DOSE: Air Kerma: 8.6 mGy. COMPLICATIONS: No immediate complications. STERILE BARRIER TECHNIQUE: Maximum sterile barrier technique was used. Cutaneous antisepsis was performed at the operative site with application of 2% chlorhexidine and large sterile drape. Prior to the procedure, the  and assistant performed hand hygiene and wore hat, mask, sterile gown, and sterile gloves during the entire procedure. PROCEDURE: Limited left neck ultrasound demonstrated a patent internal jugular vein; images saved of the permanent patient medical record. The overlying skin and subcutaneous soft tissues were anesthetized using 1% lidocaine with epinephrine. Under ultrasound guidance, the left internal jugular vein was accessed using a micropuncture needle; images saved of the permanent patient medical record. Access was secured using a 4 Occitan transitional sheath. An Amplatz guidewire was advanced into the  distal IVC. Our attention was then turned to the chest wall site. The overlying skin and subcutaneous soft tissues were anesthetized using 1% lidocaine with epinephrine. Using combination of sharp and blunt dissection, a subcutaneous pocket was created. The catheter  tubing was tunneled in an antegrade fashion from the port pocket to the dermatotomy site. Under direct fluoroscopic visualization, a peel-away sheath was advanced over the wire and positioned within the superior vena cava. Through the peel-away sheath, the catheter tubing was advanced until the tip was in the right atrium. The catheter tubing was cut to length and attached firmly to the port. The port was placed within the subcutaneous pocket and tested. The port tubing was then primed with 500 units of heparin. The port pocket incision was closed with layered absorbable sutures and surgical glue. The dermatotomy site was closed with surgical glue.     IMPRESSION:  1.  Successful implantable venous chest  port placement. PLAN: 1. Port is ready to be used.    CT Chest/Abdomen/Pelvis w Contrast    Result Date: 8/30/2024  EXAM: PET ONCOLOGY WHOLE BODY, CT CHEST/ABDOMEN/PELVIS W CONTRAST LOCATION: [facility] DATE: 8/30/2024 INDICATION: Lung cancer, right, staging. Malignant neoplasm of middle lobe, right bronchus or lung. Poorly differentiated neuroendocrine small cell carcinoma. Initial treatment strategy. COMPARISON: CT chest abdomen pelvis 8/14/2024, 11/16/2021 reviewed. CONTRAST: 100 mL Isovue-370 TECHNIQUE: Serum glucose level 109 mg/dL. One hour post intravenous administration of 14.6 mCi F-18 FDG, PET imaging was performed from the skull vertex to feet, utilizing attenuation correction with concurrent axial CT and PET/CT image fusion. Separate diagnostic CT of the chest, abdomen, and pelvis was performed. Dose reduction techniques were used. PET/CT FINDINGS: Large irregular FDG avid pulmonary mass involving the inferior right upper lobe and posterior right middle lobe with extension across the minor fissure as well as direct mediastinal invasion, similar to 8/14/2024 and new since 11/16/2021, measures 7.7 x 5.9 x 2.7 cm and demonstrates marked uptake (SUVmax 19.5), consistent with malignancy. Several enlarged FDG avid intrathoracic lymph nodes, including scattered mediastinal and right hilar regions, consistent with hira metastases. This includes a large right anterior mediastinal hira mass with direct pleural extension measuring 6.0 x 8.2 cm with marked uptake (SUVmax 22.0) as well as nodes involving several right lobar station 12R, interlobar station 11R, hilar station 10R sites as well as right lower paratracheal station 4R, prevascular station 3A and paraesophageal station 8. Additional FDG avid right supraclavicular adenopathy and a right anterior pleural nodule. Several scattered FDG avid skeletal lesions are also present, including medial right clavicle, left scapula, medial left  iliac bone (SUVmax 9.6), left inferior pubic ramus and proximal left femur. No suspicious focal uptake in the liver. Normal adrenal glands. Mild irregular opacities in both lower lobes with mild uptake may reflect minor infectious/inflammatory process. CT FINDINGS: Mild senescent intracranial changes. Marked atherosclerotic calcifications including the coronary arteries. Mild reticular scarring or atelectasis in the lung bases. Few small benign liver cysts. Aortobiiliac stent graft spanning an infrarenal fusiform abdominal aortic aneurysm. Mild colonic diverticulosis. Few small pelvic phleboliths. Small fat-containing left inguinal hernia. Moderate scattered hypertrophic degenerative changes in the spine.     IMPRESSION: Findings suspicious for right lung cancer involving the right upper and middle lobes with metastases involving several lymph nodes above the diaphragm and several sites in the skeleton. If biopsy of a distant metastatic site is desired, a lesion in the medial left iliac bone is a good option for CT-guided biopsy.    PET Oncology Whole Body    Result Date: 8/30/2024  EXAM: PET ONCOLOGY WHOLE BODY, CT CHEST/ABDOMEN/PELVIS W CONTRAST LOCATION: [facility] DATE: 8/30/2024 INDICATION: Lung cancer, right, staging. Malignant neoplasm of middle lobe, right bronchus or lung. Poorly differentiated neuroendocrine small cell carcinoma. Initial treatment strategy. COMPARISON: CT chest abdomen pelvis 8/14/2024, 11/16/2021 reviewed. CONTRAST: 100 mL Isovue-370 TECHNIQUE: Serum glucose level 109 mg/dL. One hour post intravenous administration of 14.6 mCi F-18 FDG, PET imaging was performed from the skull vertex to feet, utilizing attenuation correction with concurrent axial CT and PET/CT image fusion. Separate diagnostic CT of the chest, abdomen, and pelvis was performed. Dose reduction techniques were used. PET/CT FINDINGS: Large irregular FDG avid pulmonary mass involving the inferior  right upper lobe and posterior right middle lobe with extension across the minor fissure as well as direct mediastinal invasion, similar to 8/14/2024 and new since 11/16/2021, measures 7.7 x 5.9 x 2.7 cm and demonstrates marked uptake (SUVmax 19.5), consistent with malignancy. Several enlarged FDG avid intrathoracic lymph nodes, including scattered mediastinal and right hilar regions, consistent with hira metastases. This includes a large right anterior mediastinal hira mass with direct pleural extension measuring 6.0 x 8.2 cm with marked uptake (SUVmax 22.0) as well as nodes involving several right lobar station 12R, interlobar station 11R, hilar station 10R sites as well as right lower paratracheal station 4R, prevascular station 3A and paraesophageal station 8. Additional FDG avid right supraclavicular adenopathy and a right anterior pleural nodule. Several scattered FDG avid skeletal lesions are also present, including medial right clavicle, left scapula, medial left iliac bone (SUVmax 9.6), left inferior pubic ramus and proximal left femur. No suspicious focal uptake in the liver. Normal adrenal glands. Mild irregular opacities in both lower lobes with mild uptake may reflect minor infectious/inflammatory process. CT FINDINGS: Mild senescent intracranial changes. Marked atherosclerotic calcifications including the coronary arteries. Mild reticular scarring or atelectasis in the lung bases. Few small benign liver cysts. Aortobiiliac stent graft spanning an infrarenal fusiform abdominal aortic aneurysm. Mild colonic diverticulosis. Few small pelvic phleboliths. Small fat-containing left inguinal hernia. Moderate scattered hypertrophic degenerative changes in the spine.     IMPRESSION: Findings suspicious for right lung cancer involving the right upper and middle lobes with metastases involving several lymph nodes above the diaphragm and several sites in the skeleton. If biopsy of a distant metastatic site is  desired, a lesion in the medial left iliac bone is a good option for CT-guided biopsy.    CT Chest w Contrast    Result Date: 8/27/2024  EXAM: CT CHEST W CONTRAST LOCATION: [facility] DATE: 8/27/2024 INDICATION: Recent EBUS today of RML 6 cm mass, now presents with shortness of breath COMPARISON: 08/14/2024 TECHNIQUE: CT chest with IV contrast. Multiplanar reformats were obtained. Dose reduction techniques were used. CONTRAST: ISOVUE 370 70 ML FINDINGS: LUNGS AND PLEURA: A lobulated mass measuring 8.2 x 6 cm in the right middle lobe is mildly increased from prior study, previously 8 x 6 cm. A medial right upper lobe lung mass with invasion of the anterior mediastinum measures 7.7 x 5.6 cm, also mildly increased, previously 7 x 5.1 cm. Multiple small patchy groundglass opacities are seen in the bilateral lung bases, new from prior study. A small pneumatocele is again noted in the lingula. A 2 mm right upper lobe nodule on series 4 image 45 is stable. No pleural effusion or pneumothorax. MEDIASTINUM/AXILLAE: Mediastinal, right hilar and right internal mammary lymph nodes are stable to mildly increased in size. For example, a right paratracheal node measures 3.4 x 2.6 cm on series 3 image 38, previously 2.8 x 2.2 cm. A 14 mm right internal mammary lymph node is stable. No axillary or supraclavicular lymphadenopathy. Heart size is normal without pericardial effusion. A small hiatal hernia is unchanged. CORONARY ARTERY CALCIFICATION: Advanced three-vessel coronary artery atherosclerotic calcifications. UPPER ABDOMEN: Scattered too small to characterize cystic lesions again seen in the liver, likely cysts. Aortic endoluminal stent graft, celiac artery stent, SMA stent and bilateral renal artery stents redemonstrated. A bilobed abdominal aortic aneurysm measuring up to 4.4 cm is unchanged. Multiple diverticula seen in the visualized portions of the colon without acute diverticulitis.  "MUSCULOSKELETAL: Mild spinal degenerative changes. No suspicious osseous lesions or acute fractures. A midline sebaceous cyst is seen in the upper back.     IMPRESSION: 1.  Multiple small patchy groundglass opacities in the bilateral lower lobes, new from prior study, compatible with aspiration change. 2.  Mildly increased size of right middle lobe and right upper lobe pulmonary masses as well as mediastinal, right hilar and right internal mammary lymphadenopathy. 3.  Stable 2 mm right upper lobe nodule. 4.  Redemonstration of small hiatal hernia, abdominal aortic aneurysm and colonic diverticulosis.         The longitudinal plan of care for the diagnosis(es)/condition(s) as documented were addressed during this visit. Due to the added complexity in care, I will continue to support Jonas in the subsequent management and with ongoing continuity of care.        Signed by: SANTINO Crespo CNP    Oncology Rooming Note    September 24, 2024 1:35 PM   Jonas Hyman is a 79 year old male who presents for:    Chief Complaint   Patient presents with     Oncology Clinic Visit     Return visit with labs for midcycle check     Initial Vitals: BP (!) 190/84 (BP Location: Left arm, Patient Position: Sitting, Cuff Size: Adult Large)   Pulse 82   Temp 98.6  F (37  C) (Tympanic)   Resp 18   Ht 1.74 m (5' 8.5\")   Wt 113.9 kg (251 lb)   SpO2 96%   BMI 37.61 kg/m   Estimated body mass index is 37.61 kg/m  as calculated from the following:    Height as of this encounter: 1.74 m (5' 8.5\").    Weight as of this encounter: 113.9 kg (251 lb). Body surface area is 2.35 meters squared.  No Pain (0) Comment: Data Unavailable   No LMP for male patient.  Allergies reviewed: Yes  Medications reviewed: Yes    Medications: Medication refills not needed today.  Pharmacy name entered into [facility]: [id number] - [place], [place] - [facility]    Frailty Screening:   Is the patient here for a new oncology consult " visit in cancer care? 2. No      Clinical concerns:       RAMON RODRIGUEZ CMA                Again, thank you for allowing me to participate in the care of your patient.        Sincerely,        SANTINO Crespo CNP

## 2024-09-24 NOTE — PROGRESS NOTES
"Oncology Rooming Note    September 24, 2024 1:35 PM   Jonas Hyman is a 79 year old male who presents for:    Chief Complaint   Patient presents with    Oncology Clinic Visit     Return visit with labs for midcycle check     Initial Vitals: BP (!) 190/84 (BP Location: Left arm, Patient Position: Sitting, Cuff Size: Adult Large)   Pulse 82   Temp 98.6  F (37  C) (Tympanic)   Resp 18   Ht 1.74 m (5' 8.5\")   Wt 113.9 kg (251 lb)   SpO2 96%   BMI 37.61 kg/m   Estimated body mass index is 37.61 kg/m  as calculated from the following:    Height as of this encounter: 1.74 m (5' 8.5\").    Weight as of this encounter: 113.9 kg (251 lb). Body surface area is 2.35 meters squared.  No Pain (0) Comment: Data Unavailable   No LMP for male patient.  Allergies reviewed: Yes  Medications reviewed: Yes    Medications: Medication refills not needed today.  Pharmacy name entered into [facility]: [id number] - [place] [address] [address]    Frailty Screening:   Is the patient here for a new oncology consult visit in cancer care? 2. No      Clinical concerns:       RAMON RODRIGUEZ CMA              "

## 2024-09-24 NOTE — PROGRESS NOTES
[facility] Hematology and Oncology Progress Note    Patient: Jonas Hyman  MRN: [id number]  Date of Service: Sep 24, 2024          Reason for Visit    Chief Complaint   Patient presents with    Oncology Clinic Visit     Return visit with labs for midcycle check       Assessment and Plan     Cancer Staging   No matching staging information was found for the patient.    1.  Small cell lung cancer, extensive stage with bone mets, lymph node mets: Patient started on palliative treatment with carboplatin, etoposide and Atezolizumab.  Patient received his first cycle last week.  Overall did really well with the treatment.  He will return in 2 weeks for his second cycle.  We will repeat CT scan after that cycle.    We did attempt to do a brain MRI with patient last week but he does have a stent from the [facility] and they are trying to figure out if there is any metal in that so we are still waiting to talk to the .  The patient's son is still working on trying to get with their customer service and the [facility] to figure this out.  Initially they did think that it would be safe to use a level 3 Cyndy MRI magnet.  I did call our department and we use a 1.5 and the only level 3 we have in our system is [place].  The MRI tech did say that usually if it is safe I will 3 it also is usually safe at 1.5.    I will discuss with Dr. Madera that if they do not feel that they can safely do a brain MRI for initial staging, would be okay to do a head CT.    2.  Mild renal insufficiency: This is a chronic issue for patient.  Was worse the last couple times it was checked but it is a little bit better today.  Encouraged him to stay hydrated, keep blood pressure controlled.  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)    Atherosclerosis of native coronary artery of native heart with angina pectoris (H24)    Chronic coronary artery disease    Cataract    Cystoid macular edema of right eye    Diverticulosis of large intestine without hemorrhage    Dyslipidemia    Essential hypertension    Exudative age-related macular degeneration of right eye (H)    History of acute anterior wall MI    History of alcohol dependence (H)    Low vitamin B12 level    Smoker    Status post coronary artery stent placement    Total retinal detachment    Type 2 diabetes mellitus with stage 3b chronic kidney disease, without long-term current use of insulin (H)    History of ventricular fibrillation    Vitamin D deficiency    Myelolipoma    S/P repair of abdominal aortic aneurysm using bifurcation graft    Thoracoabdominal aortic aneurysm (TAAA) (H24)    Small cell lung cancer, right lower lobe (H)    Chemotherapy-induced neutropenia (H24)        ______________________________________________________________________________    History of Present Illness    Oncologist: Dr. Madera    Diagnosis: Lung cancer, Small cell. Found incidentally when getting imaging for a vascular procedure at the [facility].   -8/26/24: EBUS done and He had biopsies taken over the following hira stations: 4R, 4L, 7.  Pathology showed poorly differentiated small cell lung cancer in stations 4L and 4R   -8/27/24: CT done and shows Multiple small patchy groundglass opacities in the bilateral lower lobes, new from prior study, compatible with aspiration change. Mildly increased size of right middle lobe and right upper lobe  pulmonary masses as well as mediastinal, right hilar and right internal mammary lymphadenopathy.  -8/30/24: PET shows Findings suspicious for right lung cancer involving the right upper and middle lobes with metastases involving several lymph nodes above the diaphragm and several sites in the skeleton. If biopsy of a distant metastatic site is desired, a lesion in the   medial left iliac bone is a good option for CT-guided biopsy  ~Brain MRI attempted but was canceled due to a stent and the fear there may be metal.    Treatment:   -9/17/2024: Patient is here today to start palliative chemo with carboplatin, etoposide and Atezolizumab.    Interim History:   Patient is here today toxicity check after starting chemo last week.  He states surprisingly it went really quite well.  He really did not notice any significant side effects.  Weight is stable.  No new bone or back pain.  Denies any breathing issues.  He is eating okay.  No changes in his urination pattern.  Denies any infectious complaints    Review of Systems    Pertinent items are noted in HPI.    Past History    Past Medical History:   Diagnosis Date    Acute MI (H) 2009    Bronchitis     Cardiac arrest (H) 2009    Cardiac arrest (H)     Chemical dependency (H)     Has been in recovery 44 years    Coronary artery disease     Diabetes mellitus (H)     type II    Diabetes mellitus type 2, noninsulin dependent (H)     Diverticula of colon     Diverticulosis of large intestine     Hemorrhoids     Hemorrhoids     History of alcohol dependence (H) 1/12/1975    Created by [facility] Annotation: May 29 2009  5:52PM - Dillon Goldsmith: dry since  1975, also used drugs and marijuana  Replacement Utility updated for latest IMO load    Hypertension     Hypertension     Macular degeneration of right eye     Mixed hyperlipidemia     Myocardial infarction (H)     Retinal detachment 06/23/2014    Vitrectomy Posterior 23 guage, scleral buckle, membrane peel,  "endolaser gase    Stage 3b chronic kidney disease (H) 11/30/2020    Stented coronary artery 2009    stints times 2    Vitamin B12 deficiency     Vitamin D deficiency        PHYSICAL EXAM  BP (!) 190/84 (BP Location: Left arm, Patient Position: Sitting, Cuff Size: Adult Large)   Pulse 82   Temp 98.6  F (37  C) (Tympanic)   Resp 18   Ht 1.74 m (5' 8.5\")   Wt 113.9 kg (251 lb)   SpO2 96%   BMI 37.61 kg/m      GENERAL: no acute distress. Cooperative in conversation. Here alone today  RESP: Regular respiratory rate. No expiratory wheezes   NEURO: non focal. Alert and oriented x3.   PSYCH: within normal limits. No depression or anxiety.  SKIN: exposed skin is dry intact.       Lab Results    Recent Results (from the past 168 hour(s))   Comprehensive metabolic panel (BMP + Alb, Alk Phos, ALT, AST, Total. Bili, TP)   Result Value Ref Range    Sodium 137 135 - 145 mmol/L    Potassium 3.7 3.4 - 5.3 mmol/L    Carbon Dioxide (CO2) 25 22 - 29 mmol/L    Anion Gap 11 7 - 15 mmol/L    Urea Nitrogen 24.5 (H) 8.0 - 23.0 mg/dL    Creatinine 1.47 (H) 0.67 - 1.17 mg/dL    GFR Estimate 48 (L) >60 mL/min/1.73m2    Calcium 8.5 (L) 8.8 - 10.4 mg/dL    Chloride 101 98 - 107 mmol/L    Glucose 173 (H) 70 - 99 mg/dL    Alkaline Phosphatase 59 40 - 150 U/L    AST 17 0 - 45 U/L    ALT 13 0 - 70 U/L    Protein Total 6.3 (L) 6.4 - 8.3 g/dL    Albumin 3.7 3.5 - 5.2 g/dL    Bilirubin Total 0.3 <=1.2 mg/dL   CBC with platelets and differential   Result Value Ref Range    WBC Count 5.9 4.0 - 11.0 10e3/uL    RBC Count 3.45 (L) 4.40 - 5.90 10e6/uL    Hemoglobin 10.5 (L) 13.3 - 17.7 g/dL    Hematocrit 32.8 (L) 40.0 - 53.0 %    MCV 95 78 - 100 fL    MCH 30.4 26.5 - 33.0 pg    MCHC 32.0 31.5 - 36.5 g/dL    RDW 12.4 10.0 - 15.0 %    Platelet Count 229 150 - 450 10e3/uL    % Neutrophils 74 %    % Lymphocytes 21 %    % Monocytes 1 %    % Eosinophils 2 %    % Basophils 1 %    % Immature Granulocytes 2 %    NRBCs per 100 WBC 0 <1 /100    Absolute " Neutrophils 4.4 1.6 - 8.3 10e3/uL    Absolute Lymphocytes 1.2 0.8 - 5.3 10e3/uL    Absolute Monocytes 0.1 0.0 - 1.3 10e3/uL    Absolute Eosinophils 0.1 0.0 - 0.7 10e3/uL    Absolute Basophils 0.0 0.0 - 0.2 10e3/uL    Absolute Immature Granulocytes 0.1 <=0.4 10e3/uL    Absolute NRBCs 0.0 10e3/uL   RBC and Platelet Morphology   Result Value Ref Range    RBC Morphology Confirmed RBC Indices     Platelet Assessment  Automated Count Confirmed. Platelet morphology is normal.     Automated Count Confirmed. Platelet morphology is normal.         Imaging    IR Chest Port Placement > 5 Yrs of Age    Result Date: 9/5/2024  [facility] LOCATION: [facility] DATE: 9/5/2024 PROCEDURE: IMPLANTABLE VENOUS CHEST PORT PLACEMENT (POWER INJECTABLE) INTERVENTIONAL RADIOLOGIST: SENG Miranda MD. INDICATION: Right lung cancer requiring chemotherapy. CONSENT: The risks, benefits and alternatives of implantable venous chest port placement were discussed with the patient in detail. All questions were answered. Informed consent was given to proceed with the procedure. MODERATE SEDATION: Versed 2 mg IV; Fentanyl 100 mcg IV.  During the timeout, immediately prior to the administration of medications, the patient was reassessed for adequacy to receive conscious sedation. Under physician supervision, Versed and fentanyl were administered for moderate sedation. Pulse oximetry, heart rate and blood pressure were continuously monitored by an independent trained observer. The physician spent 25 minutes of face-to-face sedation time with the patient. CONTRAST: None. ANTIBIOTICS: Ancef 2 g IV. ADDITIONAL MEDICATIONS: Heparin 500 U IV FLUOROSCOPIC TIME: 0.6 minutes. RADIATION DOSE: Air Kerma: 8.6 mGy. COMPLICATIONS: No immediate complications. STERILE BARRIER TECHNIQUE: Maximum sterile barrier technique was used. Cutaneous antisepsis was performed at the operative site with application of 2% chlorhexidine and large  sterile drape. Prior to the procedure, the  and assistant performed hand hygiene and wore hat, mask, sterile gown, and sterile gloves during the entire procedure. PROCEDURE: Limited left neck ultrasound demonstrated a patent internal jugular vein; images saved of the permanent patient medical record. The overlying skin and subcutaneous soft tissues were anesthetized using 1% lidocaine with epinephrine. Under ultrasound guidance, the left internal jugular vein was accessed using a micropuncture needle; images saved of the permanent patient medical record. Access was secured using a 4 [place]ian transitional sheath. An Amplatz guidewire was advanced into the  distal IVC. Our attention was then turned to the chest wall site. The overlying skin and subcutaneous soft tissues were anesthetized using 1% lidocaine with epinephrine. Using combination of sharp and blunt dissection, a subcutaneous pocket was created. The catheter  tubing was tunneled in an antegrade fashion from the port pocket to the dermatotomy site. Under direct fluoroscopic visualization, a peel-away sheath was advanced over the wire and positioned within the superior vena cava. Through the peel-away sheath, the catheter tubing was advanced until the tip was in the right atrium. The catheter tubing was cut to length and attached firmly to the port. The port was placed within the subcutaneous pocket and tested. The port tubing was then primed with 500 units of heparin. The port pocket incision was closed with layered absorbable sutures and surgical glue. The dermatotomy site was closed with surgical glue.     IMPRESSION:  1.  Successful implantable venous chest port placement. PLAN: 1. Port is ready to be used.    CT Chest/Abdomen/Pelvis w Contrast    Result Date: 8/30/2024  EXAM: PET ONCOLOGY WHOLE BODY, CT CHEST/ABDOMEN/PELVIS W CONTRAST LOCATION: [facility] DATE: 8/30/2024 INDICATION: Lung cancer, right, staging. Malignant  neoplasm of middle lobe, right bronchus or lung. Poorly differentiated neuroendocrine small cell carcinoma. Initial treatment strategy. COMPARISON: CT chest abdomen pelvis 8/14/2024, 11/16/2021 reviewed. CONTRAST: 100 mL Isovue-370 TECHNIQUE: Serum glucose level 109 mg/dL. One hour post intravenous administration of 14.6 mCi F-18 FDG, PET imaging was performed from the skull vertex to feet, utilizing attenuation correction with concurrent axial CT and PET/CT image fusion. Separate diagnostic CT of the chest, abdomen, and pelvis was performed. Dose reduction techniques were used. PET/CT FINDINGS: Large irregular FDG avid pulmonary mass involving the inferior right upper lobe and posterior right middle lobe with extension across the minor fissure as well as direct mediastinal invasion, similar to 8/14/2024 and new since 11/16/2021, measures 7.7 x 5.9 x 2.7 cm and demonstrates marked uptake (SUVmax 19.5), consistent with malignancy. Several enlarged FDG avid intrathoracic lymph nodes, including scattered mediastinal and right hilar regions, consistent with hira metastases. This includes a large right anterior mediastinal hira mass with direct pleural extension measuring 6.0 x 8.2 cm with marked uptake (SUVmax 22.0) as well as nodes involving several right lobar station 12R, interlobar station 11R, hilar station 10R sites as well as right lower paratracheal station 4R, prevascular station 3A and paraesophageal station 8. Additional FDG avid right supraclavicular adenopathy and a right anterior pleural nodule. Several scattered FDG avid skeletal lesions are also present, including medial right clavicle, left scapula, medial left iliac bone (SUVmax 9.6), left inferior pubic ramus and proximal left femur. No suspicious focal uptake in the liver. Normal adrenal glands. Mild irregular opacities in both lower lobes with mild uptake may reflect minor infectious/inflammatory process. CT FINDINGS: Mild senescent intracranial  changes. Marked atherosclerotic calcifications including the coronary arteries. Mild reticular scarring or atelectasis in the lung bases. Few small benign liver cysts. Aortobiiliac stent graft spanning an infrarenal fusiform abdominal aortic aneurysm. Mild colonic diverticulosis. Few small pelvic phleboliths. Small fat-containing left inguinal hernia. Moderate scattered hypertrophic degenerative changes in the spine.     IMPRESSION: Findings suspicious for right lung cancer involving the right upper and middle lobes with metastases involving several lymph nodes above the diaphragm and several sites in the skeleton. If biopsy of a distant metastatic site is desired, a lesion in the medial left iliac bone is a good option for CT-guided biopsy.    PET Oncology Whole Body    Result Date: 8/30/2024  EXAM: PET ONCOLOGY WHOLE BODY, CT CHEST/ABDOMEN/PELVIS W CONTRAST LOCATION: [facility] DATE: 8/30/2024 INDICATION: Lung cancer, right, staging. Malignant neoplasm of middle lobe, right bronchus or lung. Poorly differentiated neuroendocrine small cell carcinoma. Initial treatment strategy. COMPARISON: CT chest abdomen pelvis 8/14/2024, 11/16/2021 reviewed. CONTRAST: 100 mL Isovue-370 TECHNIQUE: Serum glucose level 109 mg/dL. One hour post intravenous administration of 14.6 mCi F-18 FDG, PET imaging was performed from the skull vertex to feet, utilizing attenuation correction with concurrent axial CT and PET/CT image fusion. Separate diagnostic CT of the chest, abdomen, and pelvis was performed. Dose reduction techniques were used. PET/CT FINDINGS: Large irregular FDG avid pulmonary mass involving the inferior right upper lobe and posterior right middle lobe with extension across the minor fissure as well as direct mediastinal invasion, similar to 8/14/2024 and new since 11/16/2021, measures 7.7 x 5.9 x 2.7 cm and demonstrates marked uptake (SUVmax 19.5), consistent with malignancy. Several enlarged FDG  avid intrathoracic lymph nodes, including scattered mediastinal and right hilar regions, consistent with hira metastases. This includes a large right anterior mediastinal hira mass with direct pleural extension measuring 6.0 x 8.2 cm with marked uptake (SUVmax 22.0) as well as nodes involving several right lobar station 12R, interlobar station 11R, hilar station 10R sites as well as right lower paratracheal station 4R, prevascular station 3A and paraesophageal station 8. Additional FDG avid right supraclavicular adenopathy and a right anterior pleural nodule. Several scattered FDG avid skeletal lesions are also present, including medial right clavicle, left scapula, medial left iliac bone (SUVmax 9.6), left inferior pubic ramus and proximal left femur. No suspicious focal uptake in the liver. Normal adrenal glands. Mild irregular opacities in both lower lobes with mild uptake may reflect minor infectious/inflammatory process. CT FINDINGS: Mild senescent intracranial changes. Marked atherosclerotic calcifications including the coronary arteries. Mild reticular scarring or atelectasis in the lung bases. Few small benign liver cysts. Aortobiiliac stent graft spanning an infrarenal fusiform abdominal aortic aneurysm. Mild colonic diverticulosis. Few small pelvic phleboliths. Small fat-containing left inguinal hernia. Moderate scattered hypertrophic degenerative changes in the spine.     IMPRESSION: Findings suspicious for right lung cancer involving the right upper and middle lobes with metastases involving several lymph nodes above the diaphragm and several sites in the skeleton. If biopsy of a distant metastatic site is desired, a lesion in the medial left iliac bone is a good option for CT-guided biopsy.    CT Chest w Contrast    Result Date: 8/27/2024  EXAM: CT CHEST W CONTRAST LOCATION: [facility] DATE: 8/27/2024 INDICATION: Recent EBUS today of RML 6 cm mass, now presents with shortness  of breath COMPARISON: 08/14/2024 TECHNIQUE: CT chest with IV contrast. Multiplanar reformats were obtained. Dose reduction techniques were used. CONTRAST: ISOVUE 370 70 ML FINDINGS: LUNGS AND PLEURA: A lobulated mass measuring 8.2 x 6 cm in the right middle lobe is mildly increased from prior study, previously 8 x 6 cm. A medial right upper lobe lung mass with invasion of the anterior mediastinum measures 7.7 x 5.6 cm, also mildly increased, previously 7 x 5.1 cm. Multiple small patchy groundglass opacities are seen in the bilateral lung bases, new from prior study. A small pneumatocele is again noted in the lingula. A 2 mm right upper lobe nodule on series 4 image 45 is stable. No pleural effusion or pneumothorax. MEDIASTINUM/AXILLAE: Mediastinal, right hilar and right internal mammary lymph nodes are stable to mildly increased in size. For example, a right paratracheal node measures 3.4 x 2.6 cm on series 3 image 38, previously 2.8 x 2.2 cm. A 14 mm right internal mammary lymph node is stable. No axillary or supraclavicular lymphadenopathy. Heart size is normal without pericardial effusion. A small hiatal hernia is unchanged. CORONARY ARTERY CALCIFICATION: Advanced three-vessel coronary artery atherosclerotic calcifications. UPPER ABDOMEN: Scattered too small to characterize cystic lesions again seen in the liver, likely cysts. Aortic endoluminal stent graft, celiac artery stent, SMA stent and bilateral renal artery stents redemonstrated. A bilobed abdominal aortic aneurysm measuring up to 4.4 cm is unchanged. Multiple diverticula seen in the visualized portions of the colon without acute diverticulitis. MUSCULOSKELETAL: Mild spinal degenerative changes. No suspicious osseous lesions or acute fractures. A midline sebaceous cyst is seen in the upper back.     IMPRESSION: 1.  Multiple small patchy groundglass opacities in the bilateral lower lobes, new from prior study, compatible with aspiration change. 2.  Mildly  increased size of right middle lobe and right upper lobe pulmonary masses as well as mediastinal, right hilar and right internal mammary lymphadenopathy. 3.  Stable 2 mm right upper lobe nodule. 4.  Redemonstration of small hiatal hernia, abdominal aortic aneurysm and colonic diverticulosis.         The longitudinal plan of care for the diagnosis(es)/condition(s) as documented were addressed during this visit. Due to the added complexity in care, I will continue to support Jonas in the subsequent management and with ongoing continuity of care.        Signed by: SANTINO Crespo CNP

## 2024-09-24 NOTE — PROGRESS NOTES
[facility]: Cancer Care                                                                                          Called the patient to check in on him and to see how he was doing post chemo infusion.  Patient stated that he feels good, denies any nausea or vomiting, and mainly complains of fatigue.  I then asked the patient what the status update was regarding gathering information of his plates for his MRI scan.  Patient stated that his son was still researching with the  to identify if there is any metal in the stent. I then stated that a CT scan of the head could be an option if the son is unable to gather the information but the patient refused and stated that he would prefer an MRI scan instead.  Patient stated that he was going to bring some information that he found regarding his stent to his appointment today 9/24.  Patient wanted a physical copy of an advance care directive form to fill out.  I stated that I will put in a social work referral and have the  reach out to the patient regarding advance care planning.  Patient verbalized understanding.    Signature:  Gisel Viveros RN

## 2024-09-25 ENCOUNTER — PATIENT OUTREACH (OUTPATIENT)
Dept: CARE COORDINATION | Facility: CLINIC | Age: 79
End: 2024-09-25
Payer: MEDICARE

## 2024-09-25 NOTE — PROGRESS NOTES
Social Work - Intervention  [facility]  Data/Intervention:    Patient Name: Jonas Hyman Goes By: Jonas BARBERB/Age: 1945 (79 year old)     Visit Type: Telephone  Referral Source: SANDRINE Baptiste  Reason for Referral: Health Care Directive      Psychosocial Information/Concerns:  Jonas is an 79 year old diagnosed with small cell lung cancer. He follows with Dr. Madera. He expressed interest in assistance with a health care directive.      Intervention/Education/Resources Provided:  Called pt to introduce self and assess needs. He has a HCD but it isn't as detailed as he would like. He inquired about how to get the longer HCD form. SW agreed to print a copy and leave at  for him to  next week at his appt. Provided education re SW role and other support available. He declined any additional needs, but will call if anything changes.      Assessment/Plan:  Provided patient with contact information and availability.    CATHERINE Archuleta, [facility]  Adult Oncology Clinics  [place] (M,W), [place] (T) & [place] ()  *I am off Friday  Office: [phone]

## 2024-10-09 ENCOUNTER — LAB (OUTPATIENT)
Dept: INFUSION THERAPY | Facility: HOSPITAL | Age: 79
End: 2024-10-09
Attending: NURSE PRACTITIONER
Payer: MEDICARE

## 2024-10-09 ENCOUNTER — ONCOLOGY VISIT (OUTPATIENT)
Dept: ONCOLOGY | Facility: HOSPITAL | Age: 79
End: 2024-10-09
Attending: NURSE PRACTITIONER
Payer: MEDICARE

## 2024-10-09 VITALS
HEART RATE: 82 BPM | DIASTOLIC BLOOD PRESSURE: 79 MMHG | TEMPERATURE: 98 F | BODY MASS INDEX: 37.91 KG/M2 | SYSTOLIC BLOOD PRESSURE: 188 MMHG | RESPIRATION RATE: 16 BRPM | WEIGHT: 253 LBS | OXYGEN SATURATION: 96 %

## 2024-10-09 DIAGNOSIS — C34.31 SMALL CELL LUNG CANCER, RIGHT LOWER LOBE (H): ICD-10-CM

## 2024-10-09 DIAGNOSIS — D70.1 CHEMOTHERAPY-INDUCED NEUTROPENIA (H): ICD-10-CM

## 2024-10-09 DIAGNOSIS — T45.1X5A CHEMOTHERAPY-INDUCED NEUTROPENIA (H): Primary | ICD-10-CM

## 2024-10-09 DIAGNOSIS — T45.1X5A CHEMOTHERAPY-INDUCED NEUTROPENIA (H): ICD-10-CM

## 2024-10-09 DIAGNOSIS — D70.1 CHEMOTHERAPY-INDUCED NEUTROPENIA (H): Primary | ICD-10-CM

## 2024-10-09 DIAGNOSIS — C34.11 MALIGNANT NEOPLASM OF UPPER LOBE OF RIGHT LUNG (H): Primary | ICD-10-CM

## 2024-10-09 LAB
ALBUMIN SERPL BCG-MCNC: 3.7 G/DL (ref 3.5–5.2)
ALP SERPL-CCNC: 66 U/L (ref 40–150)
ALT SERPL W P-5'-P-CCNC: 13 U/L (ref 0–70)
ANION GAP SERPL CALCULATED.3IONS-SCNC: 11 MMOL/L (ref 7–15)
AST SERPL W P-5'-P-CCNC: 18 U/L (ref 0–45)
BASOPHILS # BLD AUTO: 0.1 10E3/UL (ref 0–0.2)
BASOPHILS NFR BLD AUTO: 1 %
BILIRUB SERPL-MCNC: 0.3 MG/DL
BUN SERPL-MCNC: 13.3 MG/DL (ref 8–23)
CALCIUM SERPL-MCNC: 8.3 MG/DL (ref 8.8–10.4)
CHLORIDE SERPL-SCNC: 96 MMOL/L (ref 98–107)
CREAT SERPL-MCNC: 1.34 MG/DL (ref 0.67–1.17)
EGFRCR SERPLBLD CKD-EPI 2021: 54 ML/MIN/1.73M2
EOSINOPHIL # BLD AUTO: 0.1 10E3/UL (ref 0–0.7)
EOSINOPHIL NFR BLD AUTO: 2 %
ERYTHROCYTE [DISTWIDTH] IN BLOOD BY AUTOMATED COUNT: 13.7 % (ref 10–15)
GLUCOSE SERPL-MCNC: 121 MG/DL (ref 70–99)
HCO3 SERPL-SCNC: 24 MMOL/L (ref 22–29)
HCT VFR BLD AUTO: 31.8 % (ref 40–53)
HGB BLD-MCNC: 10.5 G/DL (ref 13.3–17.7)
IMM GRANULOCYTES # BLD: 0.1 10E3/UL
IMM GRANULOCYTES NFR BLD: 2 %
LYMPHOCYTES # BLD AUTO: 1.4 10E3/UL (ref 0.8–5.3)
LYMPHOCYTES NFR BLD AUTO: 34 %
MCH RBC QN AUTO: 30.8 PG (ref 26.5–33)
MCHC RBC AUTO-ENTMCNC: 33 G/DL (ref 31.5–36.5)
MCV RBC AUTO: 93 FL (ref 78–100)
MONOCYTES # BLD AUTO: 0.7 10E3/UL (ref 0–1.3)
MONOCYTES NFR BLD AUTO: 17 %
NEUTROPHILS # BLD AUTO: 1.8 10E3/UL (ref 1.6–8.3)
NEUTROPHILS NFR BLD AUTO: 44 %
NRBC # BLD AUTO: 0 10E3/UL
NRBC BLD AUTO-RTO: 0 /100
PLATELET # BLD AUTO: 399 10E3/UL (ref 150–450)
POTASSIUM SERPL-SCNC: 4.3 MMOL/L (ref 3.4–5.3)
PROT SERPL-MCNC: 6 G/DL (ref 6.4–8.3)
RBC # BLD AUTO: 3.41 10E6/UL (ref 4.4–5.9)
SODIUM SERPL-SCNC: 131 MMOL/L (ref 135–145)
TSH SERPL DL<=0.005 MIU/L-ACNC: 1.02 UIU/ML (ref 0.3–4.2)
WBC # BLD AUTO: 4.1 10E3/UL (ref 4–11)

## 2024-10-09 PROCEDURE — 85025 COMPLETE CBC W/AUTO DIFF WBC: CPT | Performed by: NURSE PRACTITIONER

## 2024-10-09 PROCEDURE — 96413 CHEMO IV INFUSION 1 HR: CPT

## 2024-10-09 PROCEDURE — 96367 TX/PROPH/DG ADDL SEQ IV INF: CPT

## 2024-10-09 PROCEDURE — 96375 TX/PRO/DX INJ NEW DRUG ADDON: CPT

## 2024-10-09 PROCEDURE — 84443 ASSAY THYROID STIM HORMONE: CPT | Performed by: NURSE PRACTITIONER

## 2024-10-09 PROCEDURE — G0463 HOSPITAL OUTPT CLINIC VISIT: HCPCS | Mod: 25 | Performed by: NURSE PRACTITIONER

## 2024-10-09 PROCEDURE — 258N000003 HC RX IP 258 OP 636: Performed by: NURSE PRACTITIONER

## 2024-10-09 PROCEDURE — G2211 COMPLEX E/M VISIT ADD ON: HCPCS | Performed by: NURSE PRACTITIONER

## 2024-10-09 PROCEDURE — 99214 OFFICE O/P EST MOD 30 MIN: CPT | Performed by: NURSE PRACTITIONER

## 2024-10-09 PROCEDURE — 250N000011 HC RX IP 250 OP 636: Performed by: NURSE PRACTITIONER

## 2024-10-09 PROCEDURE — 36591 DRAW BLOOD OFF VENOUS DEVICE: CPT | Performed by: NURSE PRACTITIONER

## 2024-10-09 PROCEDURE — 80053 COMPREHEN METABOLIC PANEL: CPT | Performed by: NURSE PRACTITIONER

## 2024-10-09 PROCEDURE — 96417 CHEMO IV INFUS EACH ADDL SEQ: CPT

## 2024-10-09 RX ORDER — DIPHENHYDRAMINE HYDROCHLORIDE 50 MG/ML
50 INJECTION INTRAMUSCULAR; INTRAVENOUS
Status: CANCELLED
Start: 2024-10-09

## 2024-10-09 RX ORDER — PALONOSETRON 0.05 MG/ML
0.25 INJECTION, SOLUTION INTRAVENOUS ONCE
Status: COMPLETED | OUTPATIENT
Start: 2024-10-09 | End: 2024-10-09

## 2024-10-09 RX ORDER — DIPHENHYDRAMINE HYDROCHLORIDE 50 MG/ML
50 INJECTION INTRAMUSCULAR; INTRAVENOUS
Status: CANCELLED
Start: 2024-10-10

## 2024-10-09 RX ORDER — ALBUTEROL SULFATE 0.83 MG/ML
2.5 SOLUTION RESPIRATORY (INHALATION)
Status: CANCELLED | OUTPATIENT
Start: 2024-10-10

## 2024-10-09 RX ORDER — HEPARIN SODIUM (PORCINE) LOCK FLUSH IV SOLN 100 UNIT/ML 100 UNIT/ML
5 SOLUTION INTRAVENOUS
Status: CANCELLED | OUTPATIENT
Start: 2024-10-09

## 2024-10-09 RX ORDER — PALONOSETRON 0.05 MG/ML
0.25 INJECTION, SOLUTION INTRAVENOUS ONCE
Status: CANCELLED | OUTPATIENT
Start: 2024-10-09

## 2024-10-09 RX ORDER — METHYLPREDNISOLONE SODIUM SUCCINATE 125 MG/2ML
125 INJECTION INTRAMUSCULAR; INTRAVENOUS
Status: CANCELLED
Start: 2024-10-11

## 2024-10-09 RX ORDER — MEPERIDINE HYDROCHLORIDE 25 MG/ML
25 INJECTION INTRAMUSCULAR; INTRAVENOUS; SUBCUTANEOUS EVERY 30 MIN PRN
Status: CANCELLED | OUTPATIENT
Start: 2024-10-11

## 2024-10-09 RX ORDER — EPINEPHRINE 1 MG/ML
0.3 INJECTION, SOLUTION INTRAMUSCULAR; SUBCUTANEOUS EVERY 5 MIN PRN
Status: CANCELLED | OUTPATIENT
Start: 2024-10-10

## 2024-10-09 RX ORDER — HEPARIN SODIUM,PORCINE 10 UNIT/ML
5-20 VIAL (ML) INTRAVENOUS DAILY PRN
Status: CANCELLED | OUTPATIENT
Start: 2024-10-09

## 2024-10-09 RX ORDER — HEPARIN SODIUM (PORCINE) LOCK FLUSH IV SOLN 100 UNIT/ML 100 UNIT/ML
5 SOLUTION INTRAVENOUS
Status: CANCELLED | OUTPATIENT
Start: 2024-10-11

## 2024-10-09 RX ORDER — LORAZEPAM 2 MG/ML
0.5 INJECTION INTRAMUSCULAR EVERY 4 HOURS PRN
Status: CANCELLED | OUTPATIENT
Start: 2024-10-09

## 2024-10-09 RX ORDER — HEPARIN SODIUM,PORCINE 10 UNIT/ML
5-20 VIAL (ML) INTRAVENOUS DAILY PRN
Status: CANCELLED | OUTPATIENT
Start: 2024-10-10

## 2024-10-09 RX ORDER — HEPARIN SODIUM (PORCINE) LOCK FLUSH IV SOLN 100 UNIT/ML 100 UNIT/ML
5 SOLUTION INTRAVENOUS
Status: DISCONTINUED | OUTPATIENT
Start: 2024-10-09 | End: 2024-10-09 | Stop reason: HOSPADM

## 2024-10-09 RX ORDER — DIPHENHYDRAMINE HYDROCHLORIDE 50 MG/ML
50 INJECTION INTRAMUSCULAR; INTRAVENOUS
Status: CANCELLED
Start: 2024-10-11

## 2024-10-09 RX ORDER — HEPARIN SODIUM (PORCINE) LOCK FLUSH IV SOLN 100 UNIT/ML 100 UNIT/ML
5 SOLUTION INTRAVENOUS
Status: CANCELLED | OUTPATIENT
Start: 2024-10-10

## 2024-10-09 RX ORDER — HEPARIN SODIUM,PORCINE 10 UNIT/ML
5-20 VIAL (ML) INTRAVENOUS DAILY PRN
Status: CANCELLED | OUTPATIENT
Start: 2024-10-11

## 2024-10-09 RX ORDER — ALBUTEROL SULFATE 0.83 MG/ML
2.5 SOLUTION RESPIRATORY (INHALATION)
Status: CANCELLED | OUTPATIENT
Start: 2024-10-11

## 2024-10-09 RX ORDER — EPINEPHRINE 1 MG/ML
0.3 INJECTION, SOLUTION INTRAMUSCULAR; SUBCUTANEOUS EVERY 5 MIN PRN
Status: CANCELLED | OUTPATIENT
Start: 2024-10-09

## 2024-10-09 RX ORDER — METHYLPREDNISOLONE SODIUM SUCCINATE 125 MG/2ML
125 INJECTION INTRAMUSCULAR; INTRAVENOUS
Status: CANCELLED
Start: 2024-10-10

## 2024-10-09 RX ORDER — ALBUTEROL SULFATE 0.83 MG/ML
2.5 SOLUTION RESPIRATORY (INHALATION)
Status: CANCELLED | OUTPATIENT
Start: 2024-10-09

## 2024-10-09 RX ORDER — METHYLPREDNISOLONE SODIUM SUCCINATE 125 MG/2ML
125 INJECTION INTRAMUSCULAR; INTRAVENOUS
Status: CANCELLED
Start: 2024-10-09

## 2024-10-09 RX ORDER — LORAZEPAM 2 MG/ML
0.5 INJECTION INTRAMUSCULAR EVERY 4 HOURS PRN
Status: CANCELLED | OUTPATIENT
Start: 2024-10-10

## 2024-10-09 RX ORDER — MEPERIDINE HYDROCHLORIDE 25 MG/ML
25 INJECTION INTRAMUSCULAR; INTRAVENOUS; SUBCUTANEOUS EVERY 30 MIN PRN
Status: CANCELLED | OUTPATIENT
Start: 2024-10-09

## 2024-10-09 RX ORDER — LORAZEPAM 2 MG/ML
0.5 INJECTION INTRAMUSCULAR EVERY 4 HOURS PRN
Status: CANCELLED | OUTPATIENT
Start: 2024-10-11

## 2024-10-09 RX ORDER — EPINEPHRINE 1 MG/ML
0.3 INJECTION, SOLUTION INTRAMUSCULAR; SUBCUTANEOUS EVERY 5 MIN PRN
Status: CANCELLED | OUTPATIENT
Start: 2024-10-11

## 2024-10-09 RX ORDER — ALBUTEROL SULFATE 90 UG/1
1-2 INHALANT RESPIRATORY (INHALATION)
Status: CANCELLED
Start: 2024-10-10

## 2024-10-09 RX ORDER — ALBUTEROL SULFATE 90 UG/1
1-2 INHALANT RESPIRATORY (INHALATION)
Status: CANCELLED
Start: 2024-10-09

## 2024-10-09 RX ORDER — MEPERIDINE HYDROCHLORIDE 25 MG/ML
25 INJECTION INTRAMUSCULAR; INTRAVENOUS; SUBCUTANEOUS EVERY 30 MIN PRN
Status: CANCELLED | OUTPATIENT
Start: 2024-10-10

## 2024-10-09 RX ORDER — ALBUTEROL SULFATE 90 UG/1
1-2 INHALANT RESPIRATORY (INHALATION)
Status: CANCELLED
Start: 2024-10-11

## 2024-10-09 RX ADMIN — SODIUM CHLORIDE 100 ML: 9 INJECTION, SOLUTION INTRAVENOUS at 12:27

## 2024-10-09 RX ADMIN — ATEZOLIZUMAB 1200 MG: 1200 INJECTION, SOLUTION INTRAVENOUS at 13:06

## 2024-10-09 RX ADMIN — DEXAMETHASONE SODIUM PHOSPHATE: 10 INJECTION, SOLUTION INTRAMUSCULAR; INTRAVENOUS at 12:40

## 2024-10-09 RX ADMIN — CARBOPLATIN 400 MG: 10 INJECTION, SOLUTION INTRAVENOUS at 14:33

## 2024-10-09 RX ADMIN — TRILACICLIB 570 MG: 300 INJECTION, POWDER, LYOPHILIZED, FOR SOLUTION INTRAVENOUS at 13:40

## 2024-10-09 RX ADMIN — Medication 5 ML: at 16:10

## 2024-10-09 RX ADMIN — ETOPOSIDE 235 MG: 20 INJECTION, SOLUTION INTRAVENOUS at 15:04

## 2024-10-09 RX ADMIN — PALONOSETRON HYDROCHLORIDE 0.25 MG: 0.25 INJECTION INTRAVENOUS at 12:28

## 2024-10-09 ASSESSMENT — PAIN SCALES - GENERAL: PAINLEVEL: NO PAIN (0)

## 2024-10-09 NOTE — PROGRESS NOTES
[facility] Hematology and Oncology Progress Note    Patient: Jonas Hyman  MRN: [id number]  Date of Service: Oct 9, 2024          Reason for Visit    Chief Complaint   Patient presents with    Oncology Clinic Visit     Return visit with lab and infusion. Small cell lung cancer, right lower lobe.       Assessment and Plan     Cancer Staging   No matching staging information was found for the patient.    1.  Small cell lung cancer, extensive stage with bone mets, lymph node mets: Patient started on palliative treatment with carboplatin, etoposide and Atezolizumab.  Will get cycle 2 today.  He did get a 25% reduction with his first cycle but he did really quite well with very minimal side effects so we will try 100% dosing today.  If that does not go well we will then reduce his next cycle.  He will get a CT scan after this cycle and see Dr. Madera before cycle 3.    We did attempt to do a brain MRI with patient last week but he does have a stent from the [facility] and they are trying to figure out if there is any metal in that so we are still waiting to talk to the .  Patient is status post endovascular complex aortic aneurysm repair.  He had a fenestrated/branched stent graft.  The patient's son was really unable to actually get through the customer service and from the  in more detail.  When discussed with Dr. Madera he said it probably would be okay to do a brain MRI but for now as long as he is asymptomatic we will do a head CT with contrast.      2.  Mild renal insufficiency: This is a chronic issue for patient.  Was worse the last couple times it was checked but it is a little bit better today.  Encouraged him to stay hydrated, keep blood pressure controlled.  We will monitor with treatment    3. Anemia: chemo induced and usually cumulative. Overall asymptomatic except fatigue. Continue to monitor.       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)    Atherosclerosis of native coronary artery of native heart with angina pectoris (H)    Chronic coronary artery disease    Cataract    Cystoid macular edema of right eye    Diverticulosis of large intestine without hemorrhage    Dyslipidemia    Essential hypertension    Exudative age-related macular degeneration of right eye (H)    History of acute anterior wall MI    History of alcohol dependence (H)    Low vitamin B12 level    Smoker    Status post coronary artery stent placement    Total retinal detachment    Type 2 diabetes mellitus with stage 3b chronic kidney disease, without long-term current use of insulin (H)    History of ventricular fibrillation    Vitamin D deficiency    Myelolipoma    S/P repair of abdominal aortic aneurysm using bifurcation graft    Thoracoabdominal aortic aneurysm (TAAA) (H)    Small cell lung cancer, right lower lobe (H)    Chemotherapy-induced neutropenia (H)        ______________________________________________________________________________    History of Present Illness    Oncologist: Dr. Madera    Diagnosis: Lung cancer, Small cell. Found incidentally when getting imaging for a vascular procedure at the [facility].   -8/26/24: EBUS done and He had biopsies taken over the following hira stations: 4R, 4L, 7.  Pathology showed poorly differentiated small cell lung cancer in stations 4L and 4R   -8/27/24: CT done and shows Multiple small patchy groundglass opacities in the bilateral lower lobes, new  from prior study, compatible with aspiration change. Mildly increased size of right middle lobe and right upper lobe pulmonary masses as well as mediastinal, right hilar and right internal mammary lymphadenopathy.  -8/30/24: PET shows Findings suspicious for right lung cancer involving the right upper and middle lobes with metastases involving several lymph nodes above the diaphragm and several sites in the skeleton. If biopsy of a distant metastatic site is desired, a lesion in the   medial left iliac bone is a good option for CT-guided biopsy  ~Brain MRI attempted but was canceled due to a stent and the fear that is may be incompatible with MRI    Treatment:   -9/17/2024: Patient started palliative chemo with carboplatin, etoposide and Atezolizumab.  Today is cycle 2    Interim History:   Patient is here today to continue on treatment.  He states that overall the for cycle really went quite well.  He does continue to have some fatigue which really is not that new.  He says he still has a little bit of discomfort on the right side of his body and his lung when he lays on that side.  He says he does not sleep well and that again is a chronic issue for him.  He is eating and drinking pretty normally.  No nausea or vomiting.  Has any significant respiratory symptoms.  Weight is stable    Review of Systems    Pertinent items are noted in HPI.    Past History    Past Medical History:   Diagnosis Date    Acute MI (H) 2009    Bronchitis     Cardiac arrest (H) 2009    Cardiac arrest (H)     Chemical dependency (H)     Has been in recovery 49 years    Coronary artery disease     Diabetes mellitus (H)     type II    Diabetes mellitus type 2, noninsulin dependent (H)     Diverticula of colon     Diverticulosis of large intestine     Hemorrhoids     Hemorrhoids     History of alcohol dependence (H) 01/12/1975    Created by [facility] Annotation: May 29 2009  5:52PM - Dillon Goldsmith: dry since  1975, also used  drugs and marijuana  Replacement Utility updated for latest IMO load    Hypertension     Hypertension     Macular degeneration of right eye     Mixed hyperlipidemia     Myocardial infarction (H)     Retinal detachment 06/23/2014    Vitrectomy Posterior 23 guage, scleral buckle, membrane peel, endolaser gase    Stage 3b chronic kidney disease (H) 11/30/2020    Stented coronary artery 2009    stints times 2    Vitamin B12 deficiency     Vitamin D deficiency        PHYSICAL EXAM  BP (!) 188/79 (BP Location: Left arm, Patient Position: Sitting, Cuff Size: Adult Large)   Pulse 82   Temp 98  F (36.7  C) (Oral)   Resp 16   Wt 114.8 kg (253 lb)   SpO2 96%   BMI 37.91 kg/m      GENERAL: no acute distress. Cooperative in conversation. Here with son today  RESP: Regular respiratory rate. No expiratory wheezes   NEURO: non focal. Alert and oriented x3.   PSYCH: within normal limits. No depression or anxiety.  SKIN: exposed skin is dry intact.       Lab Results    Recent Results (from the past 168 hour(s))   Comprehensive metabolic panel   Result Value Ref Range    Sodium 131 (L) 135 - 145 mmol/L    Potassium 4.3 3.4 - 5.3 mmol/L    Carbon Dioxide (CO2) 24 22 - 29 mmol/L    Anion Gap 11 7 - 15 mmol/L    Urea Nitrogen 13.3 8.0 - 23.0 mg/dL    Creatinine 1.34 (H) 0.67 - 1.17 mg/dL    GFR Estimate 54 (L) >60 mL/min/1.73m2    Calcium 8.3 (L) 8.8 - 10.4 mg/dL    Chloride 96 (L) 98 - 107 mmol/L    Glucose 121 (H) 70 - 99 mg/dL    Alkaline Phosphatase 66 40 - 150 U/L    AST 18 0 - 45 U/L    ALT 13 0 - 70 U/L    Protein Total 6.0 (L) 6.4 - 8.3 g/dL    Albumin 3.7 3.5 - 5.2 g/dL    Bilirubin Total 0.3 <=1.2 mg/dL   TSH with free T4 reflex   Result Value Ref Range    TSH 1.02 0.30 - 4.20 uIU/mL   CBC with platelets and differential   Result Value Ref Range    WBC Count 4.1 4.0 - 11.0 10e3/uL    RBC Count 3.41 (L) 4.40 - 5.90 10e6/uL    Hemoglobin 10.5 (L) 13.3 - 17.7 g/dL    Hematocrit 31.8 (L) 40.0 - 53.0 %    MCV 93 78 - 100 fL     MCH 30.8 26.5 - 33.0 pg    MCHC 33.0 31.5 - 36.5 g/dL    RDW 13.7 10.0 - 15.0 %    Platelet Count 399 150 - 450 10e3/uL    % Neutrophils 44 %    % Lymphocytes 34 %    % Monocytes 17 %    % Eosinophils 2 %    % Basophils 1 %    % Immature Granulocytes 2 %    NRBCs per 100 WBC 0 <1 /100    Absolute Neutrophils 1.8 1.6 - 8.3 10e3/uL    Absolute Lymphocytes 1.4 0.8 - 5.3 10e3/uL    Absolute Monocytes 0.7 0.0 - 1.3 10e3/uL    Absolute Eosinophils 0.1 0.0 - 0.7 10e3/uL    Absolute Basophils 0.1 0.0 - 0.2 10e3/uL    Absolute Immature Granulocytes 0.1 <=0.4 10e3/uL    Absolute NRBCs 0.0 10e3/uL         Imaging    No results found.        The longitudinal plan of care for the diagnosis(es)/condition(s) as documented were addressed during this visit. Due to the added complexity in care, I will continue to support Jonas in the subsequent management and with ongoing continuity of care.        Signed by: SANTINO Crespo CNP

## 2024-10-09 NOTE — PROGRESS NOTES
Infusion Nursing Note:  Jonas Hyman presents today for cycle 2 day 1 atezolizumab/carboplatin/etoposide.    Patient seen by provider today: Yes: Edie Carlin NP   present during visit today: Not Applicable.    Note: Jonas arrived ambulatory and in stable condition. Port accessed prior to provider appointment. He was premedicated and treatment administered per orders. Will return on 10/10 for next appointment.      Intravenous Access:  Labs drawn without difficulty.  Implanted Port.    Treatment Conditions:  Lab Results   Component Value Date    HGB 10.5 (L) 10/09/2024    WBC 4.1 10/09/2024    ANEUTAUTO 1.8 10/09/2024     10/09/2024        Lab Results   Component Value Date     (L) 10/09/2024    POTASSIUM 4.3 10/09/2024    MAG 2.0 07/16/2024    CR 1.34 (H) 10/09/2024    MACIE 8.3 (L) 10/09/2024    BILITOTAL 0.3 10/09/2024    ALBUMIN 3.7 10/09/2024    ALT 13 10/09/2024    AST 18 10/09/2024       Results reviewed, labs MET treatment parameters, ok to proceed with treatment.      Post Infusion Assessment:  Patient tolerated infusion without incident.  Blood return noted pre and post infusion.  Site patent and intact, free from redness, edema or discomfort.  No evidence of extravasations.  Access discontinued per protocol.       Discharge Plan:   Patient and/or family verbalized understanding of discharge instructions and all questions answered.  AVS to patient via [employer].  Patient will return 10/10 for next appointment.   Patient discharged in stable condition accompanied by: self.  Departure Mode: Ambulatory.      Belen Devlin RN

## 2024-10-09 NOTE — LETTER
"10/9/2024      Jonas Hyman  [address] [place] [address]      Dear Colleague,    Thank you for referring your patient, Jonas Hyman, to the [facility]. Please see a copy of my visit note below.    Oncology Rooming Note    October 9, 2024 10:41 AM   Jonas Hyman is a 79 year old male who presents for:    Chief Complaint   Patient presents with     Oncology Clinic Visit     Return visit with lab and infusion. Small cell lung cancer, right lower lobe.     Initial Vitals: BP (!) 188/79 (BP Location: Left arm, Patient Position: Sitting, Cuff Size: Adult Large)   Pulse 82   Temp 98  F (36.7  C) (Oral)   Resp 16   Wt 114.8 kg (253 lb)   SpO2 96%   BMI 37.91 kg/m   Estimated body mass index is 37.91 kg/m  as calculated from the following:    Height as of 9/24/24: 1.74 m (5' 8.5\").    Weight as of this encounter: 114.8 kg (253 lb). Body surface area is 2.36 meters squared.  No Pain (0) Comment: Data Unavailable   No LMP for male patient.  Allergies reviewed: Yes  Medications reviewed: Yes    Medications: Medication refills not needed today.  [facility] into [id number] [place] [address] [facility]     Frailty Screening:   Is the patient here for a new oncology consult visit in cancer care? 2. No      Clinical concerns: fatigue and weakness.   Edie was notified.      [place], [facility] Hematology and Oncology Progress Note    Patient: Jonas Hyman  MRN: [id number]  Date of Service: Oct 9, 2024          Reason for Visit    Chief Complaint   Patient presents with     Oncology Clinic Visit     Return visit with lab and infusion. Small cell lung cancer, right lower lobe.       Assessment and Plan     Cancer Staging   No matching staging information was found for the patient.    1.  Small cell lung cancer, extensive stage with bone mets, lymph node mets: Patient started on palliative " treatment with carboplatin, etoposide and Atezolizumab.  Will get cycle 2 today.  He did get a 25% reduction with his first cycle but he did really quite well with very minimal side effects so we will try 100% dosing today.  If that does not go well we will then reduce his next cycle.  He will get a CT scan after this cycle and see Dr. Madera before cycle 3.    We did attempt to do a brain MRI with patient last week but he does have a stent from the [facility] and they are trying to figure out if there is any metal in that so we are still waiting to talk to the .  Patient is status post endovascular complex aortic aneurysm repair.  He had a fenestrated/branched stent graft.  The patient's son was really unable to actually get through the customer service and from the  in more detail.  When discussed with Dr. Madera he said it probably would be okay to do a brain MRI but for now as long as he is asymptomatic we will do a head CT with contrast.      2.  Mild renal insufficiency: This is a chronic issue for patient.  Was worse the last couple times it was checked but it is a little bit better today.  Encouraged him to stay hydrated, keep blood pressure controlled.  We will monitor with treatment    3. Anemia: chemo induced and usually cumulative. Overall asymptomatic except fatigue. Continue to monitor.       ECOG Performance    1 - Can't do physically strenuous work, but fully ambyulatory and can do light sedentary work    Distress Screening (within last 30 days)    No data recorded     Pain  Pain Score: No Pain (0)    Problem List    Patient Active Problem List   Diagnosis     Pelvic mass     Abdominal aortic aneurysm (AAA) without rupture (H)     Pancreatic cyst     Severe obesity with body mass index (BMI) of 35.0 to 39.9 with serious comorbidity (H)     Tobacco dependence syndrome     Right bundle branch block     Hepatic cyst     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)     Atherosclerosis of native  coronary artery of native heart with angina pectoris (H)     Chronic coronary artery disease     Cataract     Cystoid macular edema of right eye     Diverticulosis of large intestine without hemorrhage     Dyslipidemia     Essential hypertension     Exudative age-related macular degeneration of right eye (H)     History of acute anterior wall MI     History of alcohol dependence (H)     Low vitamin B12 level     Smoker     Status post coronary artery stent placement     Total retinal detachment     Type 2 diabetes mellitus with stage 3b chronic kidney disease, without long-term current use of insulin (H)     History of ventricular fibrillation     Vitamin D deficiency     Myelolipoma     S/P repair of abdominal aortic aneurysm using bifurcation graft     Thoracoabdominal aortic aneurysm (TAAA) (H)     Small cell lung cancer, right lower lobe (H)     Chemotherapy-induced neutropenia (H)        ______________________________________________________________________________    History of Present Illness    Oncologist: Dr. Madera    Diagnosis: Lung cancer, Small cell. Found incidentally when getting imaging for a vascular procedure at the [facility].   -8/26/24: EBUS done and He had biopsies taken over the following hira stations: 4R, 4L, 7.  Pathology showed poorly differentiated small cell lung cancer in stations 4L and 4R   -8/27/24: CT done and shows Multiple small patchy groundglass opacities in the bilateral lower lobes, new from prior study, compatible with aspiration change. Mildly increased size of right middle lobe and right upper lobe pulmonary masses as well as mediastinal, right hilar and right internal mammary lymphadenopathy.  -8/30/24: PET shows Findings suspicious for right lung cancer involving the right upper and middle lobes with metastases involving several lymph nodes above the diaphragm and several sites in the skeleton. If biopsy of a distant metastatic site is desired, a lesion in the   medial  left iliac bone is a good option for CT-guided biopsy  ~Brain MRI attempted but was canceled due to a stent and the fear that is may be incompatible with MRI    Treatment:   -9/17/2024: Patient started palliative chemo with carboplatin, etoposide and Atezolizumab.  Today is cycle 2    Interim History:   Patient is here today to continue on treatment.  He states that overall the for cycle really went quite well.  He does continue to have some fatigue which really is not that new.  He says he still has a little bit of discomfort on the right side of his body and his lung when he lays on that side.  He says he does not sleep well and that again is a chronic issue for him.  He is eating and drinking pretty normally.  No nausea or vomiting.  Has any significant respiratory symptoms.  Weight is stable    Review of Systems    Pertinent items are noted in HPI.    Past History    Past Medical History:   Diagnosis Date     Acute MI (H) 2009     Bronchitis      Cardiac arrest (H) 2009     Cardiac arrest (H)      Chemical dependency (H)     Has been in recovery 49 years     Coronary artery disease      Diabetes mellitus (H)     type II     Diabetes mellitus type 2, noninsulin dependent (H)      Diverticula of colon      Diverticulosis of large intestine      Hemorrhoids      Hemorrhoids      History of alcohol dependence (H) 01/12/1975    Created by [facility] Annotation: May 29 2009  5:52PM - Dillon Goldsmith: dry since  1975, also used drugs and marijuana  Replacement Utility updated for latest IMO load     Hypertension      Hypertension      Macular degeneration of right eye      Mixed hyperlipidemia      Myocardial infarction (H)      Retinal detachment 06/23/2014    Vitrectomy Posterior 23 guage, scleral buckle, membrane peel, endolaser gase     Stage 3b chronic kidney disease (H) 11/30/2020     Stented coronary artery 2009    stints times 2     Vitamin B12 deficiency      Vitamin D deficiency        PHYSICAL  EXAM  BP (!) 188/79 (BP Location: Left arm, Patient Position: Sitting, Cuff Size: Adult Large)   Pulse 82   Temp 98  F (36.7  C) (Oral)   Resp 16   Wt 114.8 kg (253 lb)   SpO2 96%   BMI 37.91 kg/m      GENERAL: no acute distress. Cooperative in conversation. Here with son today  RESP: Regular respiratory rate. No expiratory wheezes   NEURO: non focal. Alert and oriented x3.   PSYCH: within normal limits. No depression or anxiety.  SKIN: exposed skin is dry intact.       Lab Results    Recent Results (from the past 168 hour(s))   Comprehensive metabolic panel   Result Value Ref Range    Sodium 131 (L) 135 - 145 mmol/L    Potassium 4.3 3.4 - 5.3 mmol/L    Carbon Dioxide (CO2) 24 22 - 29 mmol/L    Anion Gap 11 7 - 15 mmol/L    Urea Nitrogen 13.3 8.0 - 23.0 mg/dL    Creatinine 1.34 (H) 0.67 - 1.17 mg/dL    GFR Estimate 54 (L) >60 mL/min/1.73m2    Calcium 8.3 (L) 8.8 - 10.4 mg/dL    Chloride 96 (L) 98 - 107 mmol/L    Glucose 121 (H) 70 - 99 mg/dL    Alkaline Phosphatase 66 40 - 150 U/L    AST 18 0 - 45 U/L    ALT 13 0 - 70 U/L    Protein Total 6.0 (L) 6.4 - 8.3 g/dL    Albumin 3.7 3.5 - 5.2 g/dL    Bilirubin Total 0.3 <=1.2 mg/dL   TSH with free T4 reflex   Result Value Ref Range    TSH 1.02 0.30 - 4.20 uIU/mL   CBC with platelets and differential   Result Value Ref Range    WBC Count 4.1 4.0 - 11.0 10e3/uL    RBC Count 3.41 (L) 4.40 - 5.90 10e6/uL    Hemoglobin 10.5 (L) 13.3 - 17.7 g/dL    Hematocrit 31.8 (L) 40.0 - 53.0 %    MCV 93 78 - 100 fL    MCH 30.8 26.5 - 33.0 pg    MCHC 33.0 31.5 - 36.5 g/dL    RDW 13.7 10.0 - 15.0 %    Platelet Count 399 150 - 450 10e3/uL    % Neutrophils 44 %    % Lymphocytes 34 %    % Monocytes 17 %    % Eosinophils 2 %    % Basophils 1 %    % Immature Granulocytes 2 %    NRBCs per 100 WBC 0 <1 /100    Absolute Neutrophils 1.8 1.6 - 8.3 10e3/uL    Absolute Lymphocytes 1.4 0.8 - 5.3 10e3/uL    Absolute Monocytes 0.7 0.0 - 1.3 10e3/uL    Absolute Eosinophils 0.1 0.0 - 0.7 10e3/uL     Absolute Basophils 0.1 0.0 - 0.2 10e3/uL    Absolute Immature Granulocytes 0.1 <=0.4 10e3/uL    Absolute NRBCs 0.0 10e3/uL         Imaging    No results found.        The longitudinal plan of care for the diagnosis(es)/condition(s) as documented were addressed during this visit. Due to the added complexity in care, I will continue to support Jonas in the subsequent management and with ongoing continuity of care.        Signed by: SANTINO Crespo CNP      Again, thank you for allowing me to participate in the care of your patient.        Sincerely,        SANTINO Crespo CNP

## 2024-10-09 NOTE — PROGRESS NOTES
"Oncology Rooming Note    October 9, 2024 10:41 AM   Jonas Hyman is a 79 year old male who presents for:    Chief Complaint   Patient presents with    Oncology Clinic Visit     Return visit with lab and infusion. Small cell lung cancer, right lower lobe.     Initial Vitals: BP (!) 188/79 (BP Location: Left arm, Patient Position: Sitting, Cuff Size: Adult Large)   Pulse 82   Temp 98  F (36.7  C) (Oral)   Resp 16   Wt 114.8 kg (253 lb)   SpO2 96%   BMI 37.91 kg/m   Estimated body mass index is 37.91 kg/m  as calculated from the following:    Height as of 9/24/24: 1.74 m (5' 8.5\").    Weight as of this encounter: 114.8 kg (253 lb). Body surface area is 2.36 meters squared.  No Pain (0) Comment: Data Unavailable   No LMP for male patient.  Allergies reviewed: Yes  Medications reviewed: Yes    Medications: Medication refills not needed today.  Pharmacy name entered into [facility]: [facility] #[id number] - [place] [address] [address]    Frailty Screening:   Is the patient here for a new oncology consult visit in cancer care? 2. No      Clinical concerns: fatigue and weakness.   Edie was notified.      [place] [facility]            "

## 2024-10-10 ENCOUNTER — INFUSION THERAPY VISIT (OUTPATIENT)
Dept: INFUSION THERAPY | Facility: HOSPITAL | Age: 79
End: 2024-10-10
Attending: INTERNAL MEDICINE
Payer: MEDICARE

## 2024-10-10 VITALS
OXYGEN SATURATION: 96 % | HEART RATE: 97 BPM | SYSTOLIC BLOOD PRESSURE: 133 MMHG | RESPIRATION RATE: 18 BRPM | DIASTOLIC BLOOD PRESSURE: 81 MMHG | TEMPERATURE: 98.3 F

## 2024-10-10 DIAGNOSIS — C34.31 SMALL CELL LUNG CANCER, RIGHT LOWER LOBE (H): Primary | ICD-10-CM

## 2024-10-10 DIAGNOSIS — D70.1 CHEMOTHERAPY-INDUCED NEUTROPENIA (H): ICD-10-CM

## 2024-10-10 DIAGNOSIS — T45.1X5A CHEMOTHERAPY-INDUCED NEUTROPENIA (H): ICD-10-CM

## 2024-10-10 PROCEDURE — 250N000011 HC RX IP 250 OP 636: Performed by: NURSE PRACTITIONER

## 2024-10-10 PROCEDURE — 96367 TX/PROPH/DG ADDL SEQ IV INF: CPT

## 2024-10-10 PROCEDURE — 250N000011 HC RX IP 250 OP 636: Performed by: INTERNAL MEDICINE

## 2024-10-10 PROCEDURE — 258N000003 HC RX IP 258 OP 636: Performed by: NURSE PRACTITIONER

## 2024-10-10 PROCEDURE — 96413 CHEMO IV INFUSION 1 HR: CPT

## 2024-10-10 PROCEDURE — 96375 TX/PRO/DX INJ NEW DRUG ADDON: CPT

## 2024-10-10 RX ORDER — METHYLPREDNISOLONE SODIUM SUCCINATE 125 MG/2ML
125 INJECTION INTRAMUSCULAR; INTRAVENOUS
Status: DISCONTINUED | OUTPATIENT
Start: 2024-10-10 | End: 2024-10-10 | Stop reason: HOSPADM

## 2024-10-10 RX ORDER — ALBUTEROL SULFATE 90 UG/1
1-2 INHALANT RESPIRATORY (INHALATION)
Status: DISCONTINUED | OUTPATIENT
Start: 2024-10-10 | End: 2024-10-10 | Stop reason: HOSPADM

## 2024-10-10 RX ORDER — ALBUTEROL SULFATE 0.83 MG/ML
2.5 SOLUTION RESPIRATORY (INHALATION)
Status: DISCONTINUED | OUTPATIENT
Start: 2024-10-10 | End: 2024-10-10 | Stop reason: HOSPADM

## 2024-10-10 RX ORDER — MEPERIDINE HYDROCHLORIDE 25 MG/ML
25 INJECTION INTRAMUSCULAR; INTRAVENOUS; SUBCUTANEOUS EVERY 30 MIN PRN
Status: DISCONTINUED | OUTPATIENT
Start: 2024-10-10 | End: 2024-10-10 | Stop reason: HOSPADM

## 2024-10-10 RX ORDER — DIPHENHYDRAMINE HYDROCHLORIDE 50 MG/ML
50 INJECTION INTRAMUSCULAR; INTRAVENOUS
Status: DISCONTINUED | OUTPATIENT
Start: 2024-10-10 | End: 2024-10-10 | Stop reason: HOSPADM

## 2024-10-10 RX ORDER — DEXAMETHASONE SODIUM PHOSPHATE 10 MG/ML
12 INJECTION, SOLUTION INTRAMUSCULAR; INTRAVENOUS ONCE
Status: COMPLETED | OUTPATIENT
Start: 2024-10-10 | End: 2024-10-10

## 2024-10-10 RX ORDER — HEPARIN SODIUM (PORCINE) LOCK FLUSH IV SOLN 100 UNIT/ML 100 UNIT/ML
5 SOLUTION INTRAVENOUS
Status: DISCONTINUED | OUTPATIENT
Start: 2024-10-10 | End: 2024-10-10 | Stop reason: HOSPADM

## 2024-10-10 RX ORDER — SODIUM CHLORIDE 9 MG/ML
100 INJECTION, SOLUTION INTRAVENOUS ONCE
Status: COMPLETED | OUTPATIENT
Start: 2024-10-10 | End: 2024-10-10

## 2024-10-10 RX ORDER — EPINEPHRINE 1 MG/ML
0.3 INJECTION, SOLUTION INTRAMUSCULAR; SUBCUTANEOUS EVERY 5 MIN PRN
Status: DISCONTINUED | OUTPATIENT
Start: 2024-10-10 | End: 2024-10-10 | Stop reason: HOSPADM

## 2024-10-10 RX ADMIN — TRILACICLIB 570 MG: 300 INJECTION, POWDER, LYOPHILIZED, FOR SOLUTION INTRAVENOUS at 10:42

## 2024-10-10 RX ADMIN — SODIUM CHLORIDE 100 ML: 9 INJECTION, SOLUTION INTRAVENOUS at 10:28

## 2024-10-10 RX ADMIN — ETOPOSIDE 235 MG: 20 INJECTION, SOLUTION INTRAVENOUS at 11:16

## 2024-10-10 RX ADMIN — DEXAMETHASONE SODIUM PHOSPHATE 12 MG: 10 INJECTION, SOLUTION INTRAMUSCULAR; INTRAVENOUS at 10:30

## 2024-10-10 RX ADMIN — Medication 5 ML: at 12:28

## 2024-10-10 NOTE — PROGRESS NOTES
Infusion Nursing Note:  Jonas Hyman presents today fo cycle2 day 2.    Patient seen by provider today: No   present during visit today: Not Applicable.    Note: VSS.  Pt assessed and feeling well today.  He received treatment as ordered.  Each medication given today was reviewed prior to administration.  He received treatment as ordered.      Intravenous Access:  Implanted Port.    Treatment Conditions:  Not Applicable.      Post Infusion Assessment:  Patient tolerated infusion without incident.  Blood return noted pre and post infusion.  Site patent and intact, free from redness, edema or discomfort.  No evidence of extravasations.  Access discontinued per protocol.       Discharge Plan:   Patient discharged in stable condition accompanied by: self.  Departure Mode: Ambulatory.      Flora Fournier RN

## 2024-10-11 ENCOUNTER — INFUSION THERAPY VISIT (OUTPATIENT)
Dept: INFUSION THERAPY | Facility: HOSPITAL | Age: 79
End: 2024-10-11
Attending: INTERNAL MEDICINE
Payer: MEDICARE

## 2024-10-11 VITALS
RESPIRATION RATE: 18 BRPM | DIASTOLIC BLOOD PRESSURE: 81 MMHG | TEMPERATURE: 98.4 F | HEART RATE: 84 BPM | SYSTOLIC BLOOD PRESSURE: 174 MMHG | OXYGEN SATURATION: 100 %

## 2024-10-11 DIAGNOSIS — C34.31 SMALL CELL LUNG CANCER, RIGHT LOWER LOBE (H): Primary | ICD-10-CM

## 2024-10-11 DIAGNOSIS — T45.1X5A CHEMOTHERAPY-INDUCED NEUTROPENIA (H): ICD-10-CM

## 2024-10-11 DIAGNOSIS — D70.1 CHEMOTHERAPY-INDUCED NEUTROPENIA (H): ICD-10-CM

## 2024-10-11 PROCEDURE — 96375 TX/PRO/DX INJ NEW DRUG ADDON: CPT

## 2024-10-11 PROCEDURE — 96367 TX/PROPH/DG ADDL SEQ IV INF: CPT

## 2024-10-11 PROCEDURE — 96413 CHEMO IV INFUSION 1 HR: CPT

## 2024-10-11 PROCEDURE — 250N000011 HC RX IP 250 OP 636: Performed by: NURSE PRACTITIONER

## 2024-10-11 PROCEDURE — 258N000003 HC RX IP 258 OP 636: Performed by: NURSE PRACTITIONER

## 2024-10-11 RX ORDER — ALBUTEROL SULFATE 90 UG/1
1-2 INHALANT RESPIRATORY (INHALATION)
Status: DISCONTINUED | OUTPATIENT
Start: 2024-10-11 | End: 2024-10-11 | Stop reason: HOSPADM

## 2024-10-11 RX ORDER — HEPARIN SODIUM (PORCINE) LOCK FLUSH IV SOLN 100 UNIT/ML 100 UNIT/ML
5 SOLUTION INTRAVENOUS
Status: DISCONTINUED | OUTPATIENT
Start: 2024-10-11 | End: 2024-10-11 | Stop reason: HOSPADM

## 2024-10-11 RX ORDER — EPINEPHRINE 1 MG/ML
0.3 INJECTION, SOLUTION INTRAMUSCULAR; SUBCUTANEOUS EVERY 5 MIN PRN
Status: DISCONTINUED | OUTPATIENT
Start: 2024-10-11 | End: 2024-10-11 | Stop reason: HOSPADM

## 2024-10-11 RX ORDER — DEXAMETHASONE SODIUM PHOSPHATE 10 MG/ML
12 INJECTION, SOLUTION INTRAMUSCULAR; INTRAVENOUS ONCE
Status: COMPLETED | OUTPATIENT
Start: 2024-10-11 | End: 2024-10-11

## 2024-10-11 RX ORDER — MEPERIDINE HYDROCHLORIDE 25 MG/ML
25 INJECTION INTRAMUSCULAR; INTRAVENOUS; SUBCUTANEOUS EVERY 30 MIN PRN
Status: DISCONTINUED | OUTPATIENT
Start: 2024-10-11 | End: 2024-10-11 | Stop reason: HOSPADM

## 2024-10-11 RX ORDER — METHYLPREDNISOLONE SODIUM SUCCINATE 125 MG/2ML
125 INJECTION INTRAMUSCULAR; INTRAVENOUS
Status: DISCONTINUED | OUTPATIENT
Start: 2024-10-11 | End: 2024-10-11 | Stop reason: HOSPADM

## 2024-10-11 RX ORDER — ALBUTEROL SULFATE 0.83 MG/ML
2.5 SOLUTION RESPIRATORY (INHALATION)
Status: DISCONTINUED | OUTPATIENT
Start: 2024-10-11 | End: 2024-10-11 | Stop reason: HOSPADM

## 2024-10-11 RX ORDER — DIPHENHYDRAMINE HYDROCHLORIDE 50 MG/ML
50 INJECTION INTRAMUSCULAR; INTRAVENOUS
Status: DISCONTINUED | OUTPATIENT
Start: 2024-10-11 | End: 2024-10-11 | Stop reason: HOSPADM

## 2024-10-11 RX ADMIN — Medication 5 ML: at 16:00

## 2024-10-11 RX ADMIN — SODIUM CHLORIDE 100 ML: 9 INJECTION, SOLUTION INTRAVENOUS at 13:53

## 2024-10-11 RX ADMIN — ETOPOSIDE 235 MG: 20 INJECTION, SOLUTION INTRAVENOUS at 14:40

## 2024-10-11 RX ADMIN — TRILACICLIB 570 MG: 300 INJECTION, POWDER, LYOPHILIZED, FOR SOLUTION INTRAVENOUS at 14:05

## 2024-10-11 RX ADMIN — DEXAMETHASONE SODIUM PHOSPHATE 12 MG: 10 INJECTION, SOLUTION INTRAMUSCULAR; INTRAVENOUS at 13:54

## 2024-10-11 NOTE — PROGRESS NOTES
Infusion Nursing Note:  Jonas KUNZ Boogie presents today for D3C2.    Patient seen by provider today: No   present during visit today: Not Applicable.    Note: N/A.      Intravenous Access:  Implanted Port.    Treatment Conditions:  Not Applicable.      Post Infusion Assessment:  Patient tolerated infusion without incident.  Blood return noted pre and post infusion.  Site patent and intact, free from redness, edema or discomfort.  No evidence of extravasations.  Access discontinued per protocol.       Discharge Plan:   Patient and/or family verbalized understanding of discharge instructions and all questions answered.  Patient discharged in stable condition accompanied by: self.  Departure Mode: Ambulatory.      Myesha Verdugo RN

## 2024-10-24 NOTE — TELEPHONE ENCOUNTER
Non-Reassuring FHR Writer attempted to contact patient to schedule GI procedure. Left message on unidentified voicemail with callback number [phone].    Jono Cole on 8/22/2024 at 1:15 PM

## 2024-10-25 ENCOUNTER — HOSPITAL ENCOUNTER (OUTPATIENT)
Dept: CT IMAGING | Facility: HOSPITAL | Age: 79
Discharge: HOME | End: 2024-10-25
Attending: NURSE PRACTITIONER
Payer: MEDICARE

## 2024-10-25 DIAGNOSIS — C34.31 SMALL CELL LUNG CANCER, RIGHT LOWER LOBE (H): ICD-10-CM

## 2024-10-25 DIAGNOSIS — C34.11 MALIGNANT NEOPLASM OF UPPER LOBE OF RIGHT LUNG (H): ICD-10-CM

## 2024-10-25 PROCEDURE — G1010 CDSM STANSON: HCPCS

## 2024-10-25 PROCEDURE — 74177 CT ABD & PELVIS W/CONTRAST: CPT | Mod: MG

## 2024-10-25 PROCEDURE — 250N000011 HC RX IP 250 OP 636: Performed by: NURSE PRACTITIONER

## 2024-10-25 RX ORDER — IOPAMIDOL 755 MG/ML
90 INJECTION, SOLUTION INTRAVASCULAR ONCE
Status: COMPLETED | OUTPATIENT
Start: 2024-10-25 | End: 2024-10-25

## 2024-10-25 RX ORDER — HEPARIN SODIUM (PORCINE) LOCK FLUSH IV SOLN 100 UNIT/ML 100 UNIT/ML
500 SOLUTION INTRAVENOUS ONCE
Status: COMPLETED | OUTPATIENT
Start: 2024-10-25 | End: 2024-10-25

## 2024-10-25 RX ADMIN — IOPAMIDOL 90 ML: 755 INJECTION, SOLUTION INTRAVENOUS at 12:16

## 2024-10-25 RX ADMIN — HEPARIN SODIUM (PORCINE) LOCK FLUSH IV SOLN 100 UNIT/ML 500 UNITS: 100 SOLUTION at 12:12

## 2024-10-29 ENCOUNTER — LAB (OUTPATIENT)
Dept: INFUSION THERAPY | Facility: HOSPITAL | Age: 79
End: 2024-10-29
Payer: MEDICARE

## 2024-10-29 ENCOUNTER — INFUSION THERAPY VISIT (OUTPATIENT)
Dept: INFUSION THERAPY | Facility: HOSPITAL | Age: 79
End: 2024-10-29
Payer: MEDICARE

## 2024-10-29 ENCOUNTER — PATIENT OUTREACH (OUTPATIENT)
Dept: ONCOLOGY | Facility: HOSPITAL | Age: 79
End: 2024-10-29

## 2024-10-29 ENCOUNTER — ONCOLOGY VISIT (OUTPATIENT)
Dept: ONCOLOGY | Facility: HOSPITAL | Age: 79
End: 2024-10-29
Payer: MEDICARE

## 2024-10-29 VITALS
WEIGHT: 249.2 LBS | DIASTOLIC BLOOD PRESSURE: 81 MMHG | HEIGHT: 69 IN | RESPIRATION RATE: 16 BRPM | TEMPERATURE: 97.9 F | HEART RATE: 78 BPM | OXYGEN SATURATION: 97 % | SYSTOLIC BLOOD PRESSURE: 173 MMHG | BODY MASS INDEX: 36.91 KG/M2

## 2024-10-29 DIAGNOSIS — T45.1X5A CHEMOTHERAPY-INDUCED NEUTROPENIA (H): ICD-10-CM

## 2024-10-29 DIAGNOSIS — D70.1 CHEMOTHERAPY-INDUCED NEUTROPENIA (H): Primary | ICD-10-CM

## 2024-10-29 DIAGNOSIS — C34.31 SMALL CELL LUNG CANCER, RIGHT LOWER LOBE (H): ICD-10-CM

## 2024-10-29 DIAGNOSIS — D70.1 CHEMOTHERAPY-INDUCED NEUTROPENIA (H): ICD-10-CM

## 2024-10-29 DIAGNOSIS — T45.1X5A CHEMOTHERAPY-INDUCED NEUTROPENIA (H): Primary | ICD-10-CM

## 2024-10-29 DIAGNOSIS — C34.31 SMALL CELL LUNG CANCER, RIGHT LOWER LOBE (H): Primary | ICD-10-CM

## 2024-10-29 LAB
ALBUMIN SERPL BCG-MCNC: 3.8 G/DL (ref 3.5–5.2)
ALP SERPL-CCNC: 67 U/L (ref 40–150)
ALT SERPL W P-5'-P-CCNC: 15 U/L (ref 0–70)
ANION GAP SERPL CALCULATED.3IONS-SCNC: 11 MMOL/L (ref 7–15)
AST SERPL W P-5'-P-CCNC: 16 U/L (ref 0–45)
BASOPHILS # BLD AUTO: 0.1 10E3/UL (ref 0–0.2)
BASOPHILS NFR BLD AUTO: 1 %
BILIRUB SERPL-MCNC: 0.2 MG/DL
BUN SERPL-MCNC: 17.4 MG/DL (ref 8–23)
CALCIUM SERPL-MCNC: 9.1 MG/DL (ref 8.8–10.4)
CHLORIDE SERPL-SCNC: 100 MMOL/L (ref 98–107)
CREAT SERPL-MCNC: 1.29 MG/DL (ref 0.67–1.17)
EGFRCR SERPLBLD CKD-EPI 2021: 56 ML/MIN/1.73M2
EOSINOPHIL # BLD AUTO: 0.1 10E3/UL (ref 0–0.7)
EOSINOPHIL NFR BLD AUTO: 3 %
ERYTHROCYTE [DISTWIDTH] IN BLOOD BY AUTOMATED COUNT: 14.9 % (ref 10–15)
GLUCOSE SERPL-MCNC: 141 MG/DL (ref 70–99)
HCO3 SERPL-SCNC: 25 MMOL/L (ref 22–29)
HCT VFR BLD AUTO: 31.7 % (ref 40–53)
HGB BLD-MCNC: 9.9 G/DL (ref 13.3–17.7)
IMM GRANULOCYTES # BLD: 0.1 10E3/UL
IMM GRANULOCYTES NFR BLD: 2 %
LYMPHOCYTES # BLD AUTO: 1.1 10E3/UL (ref 0.8–5.3)
LYMPHOCYTES NFR BLD AUTO: 26 %
MCH RBC QN AUTO: 30.7 PG (ref 26.5–33)
MCHC RBC AUTO-ENTMCNC: 31.2 G/DL (ref 31.5–36.5)
MCV RBC AUTO: 98 FL (ref 78–100)
MONOCYTES # BLD AUTO: 0.7 10E3/UL (ref 0–1.3)
MONOCYTES NFR BLD AUTO: 18 %
NEUTROPHILS # BLD AUTO: 2 10E3/UL (ref 1.6–8.3)
NEUTROPHILS NFR BLD AUTO: 49 %
NRBC # BLD AUTO: 0 10E3/UL
NRBC BLD AUTO-RTO: 0 /100
PLATELET # BLD AUTO: 390 10E3/UL (ref 150–450)
POTASSIUM SERPL-SCNC: 3.8 MMOL/L (ref 3.4–5.3)
PROT SERPL-MCNC: 6.5 G/DL (ref 6.4–8.3)
RBC # BLD AUTO: 3.22 10E6/UL (ref 4.4–5.9)
SODIUM SERPL-SCNC: 136 MMOL/L (ref 135–145)
TSH SERPL DL<=0.005 MIU/L-ACNC: 1.9 UIU/ML (ref 0.3–4.2)
WBC # BLD AUTO: 4.1 10E3/UL (ref 4–11)

## 2024-10-29 PROCEDURE — 250N000011 HC RX IP 250 OP 636: Mod: JZ | Performed by: INTERNAL MEDICINE

## 2024-10-29 PROCEDURE — 80053 COMPREHEN METABOLIC PANEL: CPT | Performed by: NURSE PRACTITIONER

## 2024-10-29 PROCEDURE — 96375 TX/PRO/DX INJ NEW DRUG ADDON: CPT

## 2024-10-29 PROCEDURE — 36591 DRAW BLOOD OFF VENOUS DEVICE: CPT | Performed by: NURSE PRACTITIONER

## 2024-10-29 PROCEDURE — G2211 COMPLEX E/M VISIT ADD ON: HCPCS | Performed by: INTERNAL MEDICINE

## 2024-10-29 PROCEDURE — 96367 TX/PROPH/DG ADDL SEQ IV INF: CPT

## 2024-10-29 PROCEDURE — 96417 CHEMO IV INFUS EACH ADDL SEQ: CPT

## 2024-10-29 PROCEDURE — 85004 AUTOMATED DIFF WBC COUNT: CPT | Performed by: NURSE PRACTITIONER

## 2024-10-29 PROCEDURE — 99215 OFFICE O/P EST HI 40 MIN: CPT | Performed by: INTERNAL MEDICINE

## 2024-10-29 PROCEDURE — 258N000003 HC RX IP 258 OP 636: Performed by: INTERNAL MEDICINE

## 2024-10-29 PROCEDURE — 85018 HEMOGLOBIN: CPT | Performed by: NURSE PRACTITIONER

## 2024-10-29 PROCEDURE — G0463 HOSPITAL OUTPT CLINIC VISIT: HCPCS | Performed by: INTERNAL MEDICINE

## 2024-10-29 PROCEDURE — 84443 ASSAY THYROID STIM HORMONE: CPT | Performed by: NURSE PRACTITIONER

## 2024-10-29 PROCEDURE — 96413 CHEMO IV INFUSION 1 HR: CPT

## 2024-10-29 RX ORDER — DIPHENHYDRAMINE HYDROCHLORIDE 50 MG/ML
50 INJECTION INTRAMUSCULAR; INTRAVENOUS
Status: CANCELLED
Start: 2024-11-01

## 2024-10-29 RX ORDER — HEPARIN SODIUM (PORCINE) LOCK FLUSH IV SOLN 100 UNIT/ML 100 UNIT/ML
5 SOLUTION INTRAVENOUS
OUTPATIENT
Start: 2024-11-21

## 2024-10-29 RX ORDER — HEPARIN SODIUM (PORCINE) LOCK FLUSH IV SOLN 100 UNIT/ML 100 UNIT/ML
5 SOLUTION INTRAVENOUS
Status: CANCELLED | OUTPATIENT
Start: 2024-10-31

## 2024-10-29 RX ORDER — DIPHENHYDRAMINE HYDROCHLORIDE 50 MG/ML
25 INJECTION INTRAMUSCULAR; INTRAVENOUS
Start: 2024-11-22

## 2024-10-29 RX ORDER — EPINEPHRINE 1 MG/ML
0.3 INJECTION, SOLUTION INTRAMUSCULAR; SUBCUTANEOUS EVERY 5 MIN PRN
OUTPATIENT
Start: 2024-11-20

## 2024-10-29 RX ORDER — METHYLPREDNISOLONE SODIUM SUCCINATE 40 MG/ML
40 INJECTION INTRAMUSCULAR; INTRAVENOUS
Start: 2024-11-20

## 2024-10-29 RX ORDER — LORAZEPAM 2 MG/ML
0.5 INJECTION INTRAMUSCULAR EVERY 4 HOURS PRN
Status: CANCELLED | OUTPATIENT
Start: 2024-10-30

## 2024-10-29 RX ORDER — DIPHENHYDRAMINE HYDROCHLORIDE 50 MG/ML
50 INJECTION INTRAMUSCULAR; INTRAVENOUS
Status: CANCELLED
Start: 2024-10-30

## 2024-10-29 RX ORDER — DIPHENHYDRAMINE HYDROCHLORIDE 50 MG/ML
25 INJECTION INTRAMUSCULAR; INTRAVENOUS
Status: CANCELLED
Start: 2024-10-30

## 2024-10-29 RX ORDER — METHYLPREDNISOLONE SODIUM SUCCINATE 40 MG/ML
40 INJECTION INTRAMUSCULAR; INTRAVENOUS
Start: 2024-11-22

## 2024-10-29 RX ORDER — ALBUTEROL SULFATE 0.83 MG/ML
2.5 SOLUTION RESPIRATORY (INHALATION)
Status: CANCELLED | OUTPATIENT
Start: 2024-11-01

## 2024-10-29 RX ORDER — EPINEPHRINE 1 MG/ML
0.3 INJECTION, SOLUTION INTRAMUSCULAR; SUBCUTANEOUS EVERY 5 MIN PRN
Status: CANCELLED | OUTPATIENT
Start: 2024-10-30

## 2024-10-29 RX ORDER — HEPARIN SODIUM (PORCINE) LOCK FLUSH IV SOLN 100 UNIT/ML 100 UNIT/ML
5 SOLUTION INTRAVENOUS
Status: CANCELLED | OUTPATIENT
Start: 2024-11-01

## 2024-10-29 RX ORDER — DIPHENHYDRAMINE HYDROCHLORIDE 50 MG/ML
25 INJECTION INTRAMUSCULAR; INTRAVENOUS
Status: DISCONTINUED | OUTPATIENT
Start: 2024-10-29 | End: 2024-10-29 | Stop reason: HOSPADM

## 2024-10-29 RX ORDER — METHYLPREDNISOLONE SODIUM SUCCINATE 40 MG/ML
40 INJECTION INTRAMUSCULAR; INTRAVENOUS
Start: 2024-11-21

## 2024-10-29 RX ORDER — ALBUTEROL SULFATE 0.83 MG/ML
2.5 SOLUTION RESPIRATORY (INHALATION)
Status: CANCELLED | OUTPATIENT
Start: 2024-10-31

## 2024-10-29 RX ORDER — HEPARIN SODIUM,PORCINE 10 UNIT/ML
5-20 VIAL (ML) INTRAVENOUS DAILY PRN
Status: CANCELLED | OUTPATIENT
Start: 2024-10-31

## 2024-10-29 RX ORDER — METHYLPREDNISOLONE SODIUM SUCCINATE 40 MG/ML
40 INJECTION INTRAMUSCULAR; INTRAVENOUS
Status: DISCONTINUED | OUTPATIENT
Start: 2024-10-29 | End: 2024-10-29 | Stop reason: HOSPADM

## 2024-10-29 RX ORDER — HEPARIN SODIUM,PORCINE 10 UNIT/ML
5-20 VIAL (ML) INTRAVENOUS DAILY PRN
Status: CANCELLED | OUTPATIENT
Start: 2024-11-01

## 2024-10-29 RX ORDER — DIPHENHYDRAMINE HYDROCHLORIDE 50 MG/ML
50 INJECTION INTRAMUSCULAR; INTRAVENOUS
Start: 2024-11-20

## 2024-10-29 RX ORDER — ALBUTEROL SULFATE 90 UG/1
1-2 INHALANT RESPIRATORY (INHALATION)
Start: 2024-11-21

## 2024-10-29 RX ORDER — EPINEPHRINE 1 MG/ML
0.3 INJECTION, SOLUTION INTRAMUSCULAR; SUBCUTANEOUS EVERY 5 MIN PRN
OUTPATIENT
Start: 2024-11-21

## 2024-10-29 RX ORDER — PALONOSETRON 0.05 MG/ML
0.25 INJECTION, SOLUTION INTRAVENOUS ONCE
OUTPATIENT
Start: 2024-11-20

## 2024-10-29 RX ORDER — EPINEPHRINE 1 MG/ML
0.3 INJECTION, SOLUTION INTRAMUSCULAR; SUBCUTANEOUS EVERY 5 MIN PRN
Status: CANCELLED | OUTPATIENT
Start: 2024-10-31

## 2024-10-29 RX ORDER — HEPARIN SODIUM,PORCINE 10 UNIT/ML
5-20 VIAL (ML) INTRAVENOUS DAILY PRN
OUTPATIENT
Start: 2024-11-21

## 2024-10-29 RX ORDER — HEPARIN SODIUM,PORCINE 10 UNIT/ML
5-20 VIAL (ML) INTRAVENOUS DAILY PRN
OUTPATIENT
Start: 2024-11-20

## 2024-10-29 RX ORDER — EPINEPHRINE 1 MG/ML
0.3 INJECTION, SOLUTION INTRAMUSCULAR; SUBCUTANEOUS EVERY 5 MIN PRN
Status: CANCELLED | OUTPATIENT
Start: 2024-11-01

## 2024-10-29 RX ORDER — MEPERIDINE HYDROCHLORIDE 25 MG/ML
25 INJECTION INTRAMUSCULAR; INTRAVENOUS; SUBCUTANEOUS
OUTPATIENT
Start: 2024-11-22

## 2024-10-29 RX ORDER — HEPARIN SODIUM (PORCINE) LOCK FLUSH IV SOLN 100 UNIT/ML 100 UNIT/ML
5 SOLUTION INTRAVENOUS
Status: DISCONTINUED | OUTPATIENT
Start: 2024-10-29 | End: 2024-10-29 | Stop reason: HOSPADM

## 2024-10-29 RX ORDER — ALBUTEROL SULFATE 90 UG/1
1-2 INHALANT RESPIRATORY (INHALATION)
Status: CANCELLED
Start: 2024-10-31

## 2024-10-29 RX ORDER — ALBUTEROL SULFATE 0.83 MG/ML
2.5 SOLUTION RESPIRATORY (INHALATION)
OUTPATIENT
Start: 2024-11-20

## 2024-10-29 RX ORDER — MEPERIDINE HYDROCHLORIDE 25 MG/ML
25 INJECTION INTRAMUSCULAR; INTRAVENOUS; SUBCUTANEOUS
OUTPATIENT
Start: 2024-11-20

## 2024-10-29 RX ORDER — DIPHENHYDRAMINE HYDROCHLORIDE 50 MG/ML
25 INJECTION INTRAMUSCULAR; INTRAVENOUS
Start: 2024-11-21

## 2024-10-29 RX ORDER — DIPHENHYDRAMINE HYDROCHLORIDE 50 MG/ML
50 INJECTION INTRAMUSCULAR; INTRAVENOUS
Status: CANCELLED
Start: 2024-10-31

## 2024-10-29 RX ORDER — METHYLPREDNISOLONE SODIUM SUCCINATE 40 MG/ML
40 INJECTION INTRAMUSCULAR; INTRAVENOUS
Status: CANCELLED
Start: 2024-10-31

## 2024-10-29 RX ORDER — MEPERIDINE HYDROCHLORIDE 25 MG/ML
25 INJECTION INTRAMUSCULAR; INTRAVENOUS; SUBCUTANEOUS
OUTPATIENT
Start: 2024-11-21

## 2024-10-29 RX ORDER — DIPHENHYDRAMINE HYDROCHLORIDE 50 MG/ML
25 INJECTION INTRAMUSCULAR; INTRAVENOUS
Status: CANCELLED
Start: 2024-10-31

## 2024-10-29 RX ORDER — ALBUTEROL SULFATE 0.83 MG/ML
2.5 SOLUTION RESPIRATORY (INHALATION)
OUTPATIENT
Start: 2024-11-22

## 2024-10-29 RX ORDER — ALBUTEROL SULFATE 0.83 MG/ML
2.5 SOLUTION RESPIRATORY (INHALATION)
Status: DISCONTINUED | OUTPATIENT
Start: 2024-10-29 | End: 2024-10-29 | Stop reason: HOSPADM

## 2024-10-29 RX ORDER — MEPERIDINE HYDROCHLORIDE 25 MG/ML
25 INJECTION INTRAMUSCULAR; INTRAVENOUS; SUBCUTANEOUS
Status: DISCONTINUED | OUTPATIENT
Start: 2024-10-29 | End: 2024-10-29 | Stop reason: HOSPADM

## 2024-10-29 RX ORDER — PALONOSETRON 0.05 MG/ML
0.25 INJECTION, SOLUTION INTRAVENOUS ONCE
Status: CANCELLED | OUTPATIENT
Start: 2024-10-30

## 2024-10-29 RX ORDER — ALBUTEROL SULFATE 0.83 MG/ML
2.5 SOLUTION RESPIRATORY (INHALATION)
Status: CANCELLED | OUTPATIENT
Start: 2024-10-30

## 2024-10-29 RX ORDER — LORAZEPAM 2 MG/ML
0.5 INJECTION INTRAMUSCULAR EVERY 4 HOURS PRN
OUTPATIENT
Start: 2024-11-22

## 2024-10-29 RX ORDER — EPINEPHRINE 1 MG/ML
0.3 INJECTION, SOLUTION INTRAMUSCULAR; SUBCUTANEOUS EVERY 5 MIN PRN
Status: DISCONTINUED | OUTPATIENT
Start: 2024-10-29 | End: 2024-10-29 | Stop reason: HOSPADM

## 2024-10-29 RX ORDER — DIPHENHYDRAMINE HYDROCHLORIDE 50 MG/ML
50 INJECTION INTRAMUSCULAR; INTRAVENOUS
Status: DISCONTINUED | OUTPATIENT
Start: 2024-10-29 | End: 2024-10-29 | Stop reason: HOSPADM

## 2024-10-29 RX ORDER — DIPHENHYDRAMINE HYDROCHLORIDE 50 MG/ML
25 INJECTION INTRAMUSCULAR; INTRAVENOUS
Status: CANCELLED
Start: 2024-11-01

## 2024-10-29 RX ORDER — HEPARIN SODIUM (PORCINE) LOCK FLUSH IV SOLN 100 UNIT/ML 100 UNIT/ML
5 SOLUTION INTRAVENOUS
OUTPATIENT
Start: 2024-11-22

## 2024-10-29 RX ORDER — HEPARIN SODIUM,PORCINE 10 UNIT/ML
5-20 VIAL (ML) INTRAVENOUS DAILY PRN
Status: CANCELLED | OUTPATIENT
Start: 2024-10-30

## 2024-10-29 RX ORDER — HEPARIN SODIUM (PORCINE) LOCK FLUSH IV SOLN 100 UNIT/ML 100 UNIT/ML
5 SOLUTION INTRAVENOUS
OUTPATIENT
Start: 2024-11-20

## 2024-10-29 RX ORDER — LORAZEPAM 2 MG/ML
0.5 INJECTION INTRAMUSCULAR EVERY 4 HOURS PRN
OUTPATIENT
Start: 2024-11-20

## 2024-10-29 RX ORDER — ALBUTEROL SULFATE 90 UG/1
1-2 INHALANT RESPIRATORY (INHALATION)
Start: 2024-11-20

## 2024-10-29 RX ORDER — ALBUTEROL SULFATE 90 UG/1
1-2 INHALANT RESPIRATORY (INHALATION)
Status: CANCELLED
Start: 2024-11-01

## 2024-10-29 RX ORDER — EPINEPHRINE 1 MG/ML
0.3 INJECTION, SOLUTION INTRAMUSCULAR; SUBCUTANEOUS EVERY 5 MIN PRN
OUTPATIENT
Start: 2024-11-22

## 2024-10-29 RX ORDER — DIPHENHYDRAMINE HYDROCHLORIDE 50 MG/ML
50 INJECTION INTRAMUSCULAR; INTRAVENOUS
Start: 2024-11-22

## 2024-10-29 RX ORDER — METHYLPREDNISOLONE SODIUM SUCCINATE 40 MG/ML
40 INJECTION INTRAMUSCULAR; INTRAVENOUS
Status: CANCELLED
Start: 2024-11-01

## 2024-10-29 RX ORDER — HEPARIN SODIUM (PORCINE) LOCK FLUSH IV SOLN 100 UNIT/ML 100 UNIT/ML
5 SOLUTION INTRAVENOUS
Status: CANCELLED | OUTPATIENT
Start: 2024-10-30

## 2024-10-29 RX ORDER — DIPHENHYDRAMINE HYDROCHLORIDE 50 MG/ML
25 INJECTION INTRAMUSCULAR; INTRAVENOUS
Start: 2024-11-20

## 2024-10-29 RX ORDER — LORAZEPAM 2 MG/ML
0.5 INJECTION INTRAMUSCULAR EVERY 4 HOURS PRN
Status: CANCELLED | OUTPATIENT
Start: 2024-10-31

## 2024-10-29 RX ORDER — PALONOSETRON 0.05 MG/ML
0.25 INJECTION, SOLUTION INTRAVENOUS ONCE
Status: COMPLETED | OUTPATIENT
Start: 2024-10-29 | End: 2024-10-29

## 2024-10-29 RX ORDER — MEPERIDINE HYDROCHLORIDE 25 MG/ML
25 INJECTION INTRAMUSCULAR; INTRAVENOUS; SUBCUTANEOUS
Status: CANCELLED | OUTPATIENT
Start: 2024-10-31

## 2024-10-29 RX ORDER — METHYLPREDNISOLONE SODIUM SUCCINATE 40 MG/ML
40 INJECTION INTRAMUSCULAR; INTRAVENOUS
Status: CANCELLED
Start: 2024-10-30

## 2024-10-29 RX ORDER — HEPARIN SODIUM,PORCINE 10 UNIT/ML
5-20 VIAL (ML) INTRAVENOUS DAILY PRN
OUTPATIENT
Start: 2024-11-22

## 2024-10-29 RX ORDER — MEPERIDINE HYDROCHLORIDE 25 MG/ML
25 INJECTION INTRAMUSCULAR; INTRAVENOUS; SUBCUTANEOUS
Status: CANCELLED | OUTPATIENT
Start: 2024-11-01

## 2024-10-29 RX ORDER — ALBUTEROL SULFATE 90 UG/1
1-2 INHALANT RESPIRATORY (INHALATION)
Status: DISCONTINUED | OUTPATIENT
Start: 2024-10-29 | End: 2024-10-29 | Stop reason: HOSPADM

## 2024-10-29 RX ORDER — ALBUTEROL SULFATE 90 UG/1
1-2 INHALANT RESPIRATORY (INHALATION)
Start: 2024-11-22

## 2024-10-29 RX ORDER — DIPHENHYDRAMINE HYDROCHLORIDE 50 MG/ML
50 INJECTION INTRAMUSCULAR; INTRAVENOUS
Start: 2024-11-21

## 2024-10-29 RX ORDER — ALBUTEROL SULFATE 90 UG/1
1-2 INHALANT RESPIRATORY (INHALATION)
Status: CANCELLED
Start: 2024-10-30

## 2024-10-29 RX ORDER — LORAZEPAM 2 MG/ML
0.5 INJECTION INTRAMUSCULAR EVERY 4 HOURS PRN
Status: CANCELLED | OUTPATIENT
Start: 2024-11-01

## 2024-10-29 RX ORDER — ALBUTEROL SULFATE 0.83 MG/ML
2.5 SOLUTION RESPIRATORY (INHALATION)
OUTPATIENT
Start: 2024-11-21

## 2024-10-29 RX ORDER — MEPERIDINE HYDROCHLORIDE 25 MG/ML
25 INJECTION INTRAMUSCULAR; INTRAVENOUS; SUBCUTANEOUS
Status: CANCELLED | OUTPATIENT
Start: 2024-10-30

## 2024-10-29 RX ORDER — LORAZEPAM 2 MG/ML
0.5 INJECTION INTRAMUSCULAR EVERY 4 HOURS PRN
OUTPATIENT
Start: 2024-11-21

## 2024-10-29 RX ADMIN — PALONOSETRON HYDROCHLORIDE 0.25 MG: 0.25 INJECTION INTRAVENOUS at 11:03

## 2024-10-29 RX ADMIN — ETOPOSIDE 235 MG: 20 INJECTION, SOLUTION INTRAVENOUS at 13:42

## 2024-10-29 RX ADMIN — TRILACICLIB 570 MG: 300 INJECTION, POWDER, LYOPHILIZED, FOR SOLUTION INTRAVENOUS at 12:18

## 2024-10-29 RX ADMIN — CARBOPLATIN 405 MG: 450 INJECTION, SOLUTION INTRAVENOUS at 13:08

## 2024-10-29 RX ADMIN — Medication 5 ML: at 14:50

## 2024-10-29 RX ADMIN — ATEZOLIZUMAB 1200 MG: 1200 INJECTION, SOLUTION INTRAVENOUS at 11:41

## 2024-10-29 RX ADMIN — SODIUM CHLORIDE 250 ML: 9 INJECTION, SOLUTION INTRAVENOUS at 10:55

## 2024-10-29 RX ADMIN — DEXAMETHASONE SODIUM PHOSPHATE: 10 INJECTION, SOLUTION INTRAMUSCULAR; INTRAVENOUS at 11:17

## 2024-10-29 ASSESSMENT — PAIN SCALES - GENERAL: PAINLEVEL_OUTOF10: NO PAIN (0)

## 2024-10-29 NOTE — PROGRESS NOTES
[facility] Progress Note    Patient: Jonas Hyman  MRN: [id number]  Date of Service: Oct 29, 2024        Assessment and Plan:    1.  Small cell lung cancer: He has completed two cycles of chemoimmunotherapy.  He is here for cycle 3.  He had a restaging CT scan done a few days ago.  I personally reviewed the images and shared them with Jonas and interpreted them for him.  Overall, he has had a few tumors and lymph nodes have shrunk significantly.  He is also tolerating his treatment quite well with minimal side effects.  Significant response to treatment.  Will not make any changes in his dosing today.  Reviewed with him that we will complete 2 more cycles and then repeat a PET scan.  Assuming continued response we will proceed then with maintenance immunotherapy.  Questions were answered.      2.  Insoomnia:  This is an ongoing problem.  I told him he can try to Requip tablets at night instead of 1.  He has tried melatonin.  Also has prescriptions for BuSpar and hydroxyzine.    3.  RLS: He is going to try a higher dose of Requip.  I also suggested a trial of oral iron.    Medical decision Making:  I spent 45 minutes in the care of this patient today, which included time necessary for preparation for the visit, face to face time with the patient, communication of recommendations to the care team, and documentation time.    ECOG Performance  1    Diagnosis:    1.  Small cell lung cancer: Diagnosed August 2024.  Endobronchial ultrasound was performed 8/26/2024 and biopsies were taken of the  following hira stations: 4R, 4L, 7.  Pathology showed poorly differentiated small cell lung cancer in stations 4L and 4R.  PET scan imaging showed a primary right-sided lung cancer involving the right upper and middle lobes with metastases involving lymph nodes above and below the diaphragm and several sites of the skeleton.  NGS: No pathologic mutations noted.  No fusion events  noted.    Treatment:    Carboplatin/etoposide/atezolizumab initiated September 17, 2024.    Interim History:    Jonas returns today for follow-up visit. Overall he is feeling okay.  Tolerating his treatment well.  No acute complaints.  He has continued insomnia that secondary to restless leg syndrome.    Review of Systems:  As above in the history.     Review of Systems otherwise Negative for:  General: chills, fever or night sweats  Psychological: anxiety or depression  Ophthalmic: blurry vision, double vision or loss of vision, vision change  ENT: epistaxis, oral lesions, hearing changes  Hematological and Lymphatic: bleeding, bruising, jaundice, swollen lymph nodes  Endocrine: hot flashes, unexpected weight changes  Respiratory: cough, hemoptysis, orthopnea or shortness of breath/[place]  Cardiovascular: chest pain, edema, palpitations or PND  Gastrointestinal: abdominal pain, blood in stools, change in bowel habits, constipation, diarrhea or nausea/vomiting  Genito-Urinary: change in urinary stream, incontinence, frequency/urgency  Musculoskeletal: joint pain, stiffness, swelling, muscle pain  Neurological: dizziness, headaches, numbness/tingling  Dermatological: lumps and rash    Past History:    Past Medical History:   Diagnosis Date    Acute MI (H) 2009    Bronchitis     Cardiac arrest (H) 2009    Cardiac arrest (H)     Chemical dependency (H)     Has been in recovery 49 years    Coronary artery disease     Diabetes mellitus (H)     type II    Diabetes mellitus type 2, noninsulin dependent (H)     Diverticula of colon     Diverticulosis of large intestine     Hemorrhoids     Hemorrhoids     History of alcohol dependence (H) 01/12/1975    Created by [facility] Annotation: May 29 2009  5:52PM - Dillon Goldsmith: dry since  1975, also used drugs and marijuana  Replacement Utility updated for latest IMO load    Hypertension     Hypertension     Macular degeneration of right eye     Mixed hyperlipidemia  "    Myocardial infarction (H)     Retinal detachment 06/23/2014    Vitrectomy Posterior 23 guage, scleral buckle, membrane peel, endolaser gase    Stage 3b chronic kidney disease (H) 11/30/2020    Stented coronary artery 2009    stints times 2    Vitamin B12 deficiency     Vitamin D deficiency      Physical Exam:    BP (!) 173/81 (BP Location: Left arm, Patient Position: Sitting, Cuff Size: Adult Regular)   Pulse 78   Temp 97.9  F (36.6  C) (Tympanic)   Resp 16   Ht 1.74 m (5' 8.5\")   Wt 113 kg (249 lb 3.2 oz)   SpO2 97%   BMI 37.34 kg/m      General: patient appears stated age of 79 year old. Nontoxic and in no distress.   HEENT: Head: atraumatic, normocephalic. Sclerae anicteric.  Chest:  Normal respiratory effort  Cardiac:  No edema.   Abdomen: abdomen is non-distended  Extremities: normal tone and muscle bulk.  Skin: no lesions or rash on visible skin. Warm and dry.   CNS: alert and oriented. Grossly non-focal.   Psychiatric: normal mood and affect.     Lab Results:    Recent Results (from the past week)   Comprehensive metabolic panel   Result Value Ref Range    Sodium 136 135 - 145 mmol/L    Potassium 3.8 3.4 - 5.3 mmol/L    Carbon Dioxide (CO2) 25 22 - 29 mmol/L    Anion Gap 11 7 - 15 mmol/L    Urea Nitrogen 17.4 8.0 - 23.0 mg/dL    Creatinine 1.29 (H) 0.67 - 1.17 mg/dL    GFR Estimate 56 (L) >60 mL/min/1.73m2    Calcium 9.1 8.8 - 10.4 mg/dL    Chloride 100 98 - 107 mmol/L    Glucose 141 (H) 70 - 99 mg/dL    Alkaline Phosphatase 67 40 - 150 U/L    AST 16 0 - 45 U/L    ALT 15 0 - 70 U/L    Protein Total 6.5 6.4 - 8.3 g/dL    Albumin 3.8 3.5 - 5.2 g/dL    Bilirubin Total 0.2 <=1.2 mg/dL   TSH with free T4 reflex   Result Value Ref Range    TSH 1.90 0.30 - 4.20 uIU/mL   CBC with platelets and differential   Result Value Ref Range    WBC Count 4.1 4.0 - 11.0 10e3/uL    RBC Count 3.22 (L) 4.40 - 5.90 10e6/uL    Hemoglobin 9.9 (L) 13.3 - 17.7 g/dL    Hematocrit 31.7 (L) 40.0 - 53.0 %    MCV 98 78 - 100 fL    " MCH 30.7 26.5 - 33.0 pg    MCHC 31.2 (L) 31.5 - 36.5 g/dL    RDW 14.9 10.0 - 15.0 %    Platelet Count 390 150 - 450 10e3/uL    % Neutrophils 49 %    % Lymphocytes 26 %    % Monocytes 18 %    % Eosinophils 3 %    % Basophils 1 %    % Immature Granulocytes 2 %    NRBCs per 100 WBC 0 <1 /100    Absolute Neutrophils 2.0 1.6 - 8.3 10e3/uL    Absolute Lymphocytes 1.1 0.8 - 5.3 10e3/uL    Absolute Monocytes 0.7 0.0 - 1.3 10e3/uL    Absolute Eosinophils 0.1 0.0 - 0.7 10e3/uL    Absolute Basophils 0.1 0.0 - 0.2 10e3/uL    Absolute Immature Granulocytes 0.1 <=0.4 10e3/uL    Absolute NRBCs 0.0 10e3/uL     Imaging:    CT Chest/Abdomen/Pelvis w Contrast    Result Date: 10/26/2024  EXAM: CT CHEST/ABDOMEN/PELVIS W CONTRAST LOCATION: [facility] DATE: 10/25/2024 INDICATION:  Small cell lung cancer, right lower lobe (H) COMPARISON: Whole body PET/CT from 08/30/2024. TECHNIQUE: CT scan of the chest, abdomen, and pelvis was performed following injection of IV contrast. Multiplanar reformats were obtained. Dose reduction techniques were used. CONTRAST: isovue 370 90ml FINDINGS: LUNGS, MEDIASTINUM, AND PLEURA: Marked improvement in malignancy in right middle and right upper lobes, compared to August 2024 PET/CT. Specifically; *  Reduced size of the dominant mass straddling the minor fissure, currently 5.1 x 2.2 x 1.2 cm (previously 8.0 x 3.8 x 3.1 cm, measurements of prior study are my own). *  Reduced size of the paramediastinal right upper lobe/mediastinal mass, currently 4.4 x 3.0 x 3.2 cm (previously 8.6 x 6.2 x 5.4 cm). *  Reduced size of right upper paratracheal 1.4 cm lymph node (previously 2.4 cm) *  Reduced size of 1.2 cm right hilar lymph node (previously 2.5 cm). Measurements on combination of series 4, 5, 7, and 8. Subtle bilateral centrilobular groundglass nodular opacities are new, possibly related to chemotherapy. No new suspicious pulmonary nodules. Juxtapleural 6 mm nodule abutting the left  hemidiaphragm is favored to represent a pulmonary ligament lymph node,  a benign finding. No pleural effusion or pleural-based nodules. No endotracheal or endobronchial nodules. REMAINDER OF THE CHEST: Mild cardiomegaly with trace pericardial effusion. 3 vessel coronary artery disease with likely stent in proximal LAD. Left IJ chest port tip terminates in RA/SVC junction. HEPATOBILIARY: Stable tiny simple benign liver cysts. Normal gallbladder. PANCREAS: Scattered pancreatic calcifications, unchanged, typical of a result of chronic calcific pancreatitis. No acute peripancreatic inflammation or fluid. SPLEEN: Normal. No splenomegaly. ADRENAL GLANDS: Normal. KIDNEYS/BLADDER: Bilateral renal atrophy, unchanged. BOWEL: No gastric or bowel distention. Normal appendix. Diverticulosis coli. LYMPH NODES: No enlarged lymph nodes in the abdomen or pelvis. VASCULATURE: Previous endovascular abdominal aortic aneurysm repair with excluded aneurysm measuring 7.2 cm, unchanged. The study is not performed with a noncontrast study, but there is high index of suspicion for type II endoleak at the level of L3 given contrast blush. No periaortic inflammation or xiao retroperitoneal hemorrhage. PELVIC ORGANS: Normal. MUSCULOSKELETAL: No lytic or blastic osseous metastasis. Several stable benign-appearing sclerotic bone islands throughout the chest, abdomen, and pelvis.     IMPRESSION: 1.  When compared to August 2024, the right upper and right middle lobe lung malignancy is markedly reduced in size. 2.  No metastasis in the abdomen or pelvis. 3.  Stable suspected type II endoleak following prior endovascular abdominal aortic aneurysm repair. The excluded aneurysm measures 7.2 cm.    CT Head w/o & w Contrast    Result Date: 10/26/2024  EXAM: CT HEAD W/O and W CONTRAST LOCATION: [facility] DATE: 10/25/2024 INDICATION:  Malignant neoplasm of upper lobe of right lung (H) COMPARISON:  PET/CT 8/30/2024. CONTRAST:  Isovue 370 90 mL TECHNIQUE: Routine CT Head without and with IV contrast. Dose reduction techniques were used. FINDINGS: INTRACRANIAL CONTENTS:  Ill-defined area of subcortical hyperdensity in the right inferior and posterior frontal lobe (series 7, image 13). No intracranial hemorrhage, extraaxial collection, or mass effect.  No CT evidence of acute infarct. Mild presumed  chronic small vessel ischemic changes. Chronic lacunar infarcts in the deep gray nuclei bilaterally. Mild generalized volume loss. No hydrocephalus. No pathologic enhancement identified. VISUALIZED ORBITS/SINUSES/MASTOIDS: No intraorbital abnormality. No paranasal sinus mucosal disease. No middle ear or mastoid effusion. BONES/SOFT TISSUES: No acute abnormality.     IMPRESSION: 1.  Ill-defined subcortical hyperdensity in the right inferior posterior frontal lobe without surrounding edema or mass effect. This would be an atypical appearance for metastasis and alternative differential consideration would include an underlying vascular lesion such as a cavernous malformation. Contrast enhanced MRI is the preferred modality to evaluate for intracranial metastases.       Signed by: Dino Madera MD

## 2024-10-29 NOTE — PROGRESS NOTES
[facility]: Cancer Care                                                                                          Called [facility] surgery to inquire about the type of metal that is in the patient's aortic stent that was placed five years ago at [facility]. The  took a message to give to Dr. Schaefer' nurse to give me a call back. Patient unable to schedule brain MRI until MRI department gets the information regarding the type of metal in the patient's aortic stent.     Signature:  Gisel Viveros RN

## 2024-10-29 NOTE — LETTER
"10/29/2024      Jonas Hyman  [address] [place]  [place] [address]      Dear Colleague,    Thank you for referring your patient, Jonas Hyman, to the [facility]. Please see a copy of my visit note below.    Oncology Rooming Note    October 29, 2024 9:25 AM   Jonas Hyman is a 79 year old male who presents for:    Chief Complaint   Patient presents with     Oncology Clinic Visit     3 week return visit with labs/infusion, review CT of 10/25, related to Malignant neoplasm of upper lobe of right lung.     Initial Vitals: BP (!) 173/81 (BP Location: Left arm, Patient Position: Sitting, Cuff Size: Adult Regular)   Pulse 78   Temp 97.9  F (36.6  C) (Tympanic)   Resp 16   Ht 1.74 m (5' 8.5\")   Wt 113 kg (249 lb 3.2 oz)   SpO2 97%   BMI 37.34 kg/m   Estimated body mass index is 37.34 kg/m  as calculated from the following:    Height as of this encounter: 1.74 m (5' 8.5\").    Weight as of this encounter: 113 kg (249 lb 3.2 oz). Body surface area is 2.34 meters squared.  No Pain (0) Comment: Data Unavailable   No LMP for male patient.  Allergies reviewed: Yes  Medications reviewed: Yes    Medications: Medication refills not needed today.  Pharmacy name entered into [employer]: [facility][id number] - [place] [id number] [place]     Frailty Screening:   Is the patient here for a new oncology consult visit in cancer care? 2. No      Clinical concerns: Jonas continues to have increased fatigue. He also continues to have restless legs, along with tingling of feet at night.  He notes cramping of feet, and also reports needing to walk with cane for long distances. He reports insomnia, and states the Melatonin doesn't help, so he discontinued this. He also notes a new blemish on his ring finger of his left hand, which he thinks may be a wart.   Dr. Madera was notified.      Scarlet Barber, [facility] Hematology and Oncology Progress Note    Patient: " Jonas Hyman  MRN: [id number]  Date of Service: Oct 29, 2024        Assessment and Plan:    1.  Small cell lung cancer: He has completed two cycles of chemoimmunotherapy.  He is here for cycle 3.  He had a restaging CT scan done a few days ago.  I personally reviewed the images and shared them with Jonas and interpreted them for him.  Overall, he has had a few tumors and lymph nodes have shrunk significantly.  He is also tolerating his treatment quite well with minimal side effects.  Significant response to treatment.  Will not make any changes in his dosing today.  Reviewed with him that we will complete 2 more cycles and then repeat a PET scan.  Assuming continued response we will proceed then with maintenance immunotherapy.  Questions were answered.      2.  Insoomnia:  This is an ongoing problem.  I told him he can try to Requip tablets at night instead of 1.  He has tried melatonin.  Also has prescriptions for BuSpar and hydroxyzine.    3.  RLS: He is going to try a higher dose of Requip.  I also suggested a trial of oral iron.    Medical decision Making:  I spent 45 minutes in the care of this patient today, which included time necessary for preparation for the visit, face to face time with the patient, communication of recommendations to the care team, and documentation time.    ECOG Performance  1    Diagnosis:    1.  Small cell lung cancer: Diagnosed August 2024.  Endobronchial ultrasound was performed 8/26/2024 and biopsies were taken of the  following hira stations: 4R, 4L, 7.  Pathology showed poorly differentiated small cell lung cancer in stations 4L and 4R.  PET scan imaging showed a primary right-sided lung cancer involving the right upper and middle lobes with metastases involving lymph nodes above and below the diaphragm and several sites of the skeleton.  NGS: No pathologic mutations noted.  No fusion events noted.    Treatment:    Carboplatin/etoposide/atezolizumab initiated September 17,  2024.    Interim History:    Jonas returns today for follow-up visit. Overall he is feeling okay.  Tolerating his treatment well.  No acute complaints.  He has continued insomnia that secondary to restless leg syndrome.    Review of Systems:  As above in the history.     Review of Systems otherwise Negative for:  General: chills, fever or night sweats  Psychological: anxiety or depression  Ophthalmic: blurry vision, double vision or loss of vision, vision change  ENT: epistaxis, oral lesions, hearing changes  Hematological and Lymphatic: bleeding, bruising, jaundice, swollen lymph nodes  Endocrine: hot flashes, unexpected weight changes  Respiratory: cough, hemoptysis, orthopnea or shortness of breath/JACKSON  Cardiovascular: chest pain, edema, palpitations or PND  Gastrointestinal: abdominal pain, blood in stools, change in bowel habits, constipation, diarrhea or nausea/vomiting  Genito-Urinary: change in urinary stream, incontinence, frequency/urgency  Musculoskeletal: joint pain, stiffness, swelling, muscle pain  Neurological: dizziness, headaches, numbness/tingling  Dermatological: lumps and rash    Past History:    Past Medical History:   Diagnosis Date     Acute MI (H) 2009     Bronchitis      Cardiac arrest (H) 2009     Cardiac arrest (H)      Chemical dependency (H)     Has been in recovery 49 years     Coronary artery disease      Diabetes mellitus (H)     type II     Diabetes mellitus type 2, noninsulin dependent (H)      Diverticula of colon      Diverticulosis of large intestine      Hemorrhoids      Hemorrhoids      History of alcohol dependence (H) 01/12/1975    Created by [facility] Annotation: May 29 2009  5:52PM - Dillon Goldsmith: dry since  1975, also used drugs and marijuana  Replacement Utility updated for latest IMO load     Hypertension      Hypertension      Macular degeneration of right eye      Mixed hyperlipidemia      Myocardial infarction (H)      Retinal detachment 06/23/2014  "   Vitrectomy Posterior 23 guage, scleral buckle, membrane peel, endolaser gase     Stage 3b chronic kidney disease (H) 11/30/2020     Stented coronary artery 2009    stints times 2     Vitamin B12 deficiency      Vitamin D deficiency      Physical Exam:    BP (!) 173/81 (BP Location: Left arm, Patient Position: Sitting, Cuff Size: Adult Regular)   Pulse 78   Temp 97.9  F (36.6  C) (Tympanic)   Resp 16   Ht 1.74 m (5' 8.5\")   Wt 113 kg (249 lb 3.2 oz)   SpO2 97%   BMI 37.34 kg/m      General: patient appears stated age of 79 year old. Nontoxic and in no distress.   HEENT: Head: atraumatic, normocephalic. Sclerae anicteric.  Chest:  Normal respiratory effort  Cardiac:  No edema.   Abdomen: abdomen is non-distended  Extremities: normal tone and muscle bulk.  Skin: no lesions or rash on visible skin. Warm and dry.   CNS: alert and oriented. Grossly non-focal.   Psychiatric: normal mood and affect.     Lab Results:    Recent Results (from the past week)   Comprehensive metabolic panel   Result Value Ref Range    Sodium 136 135 - 145 mmol/L    Potassium 3.8 3.4 - 5.3 mmol/L    Carbon Dioxide (CO2) 25 22 - 29 mmol/L    Anion Gap 11 7 - 15 mmol/L    Urea Nitrogen 17.4 8.0 - 23.0 mg/dL    Creatinine 1.29 (H) 0.67 - 1.17 mg/dL    GFR Estimate 56 (L) >60 mL/min/1.73m2    Calcium 9.1 8.8 - 10.4 mg/dL    Chloride 100 98 - 107 mmol/L    Glucose 141 (H) 70 - 99 mg/dL    Alkaline Phosphatase 67 40 - 150 U/L    AST 16 0 - 45 U/L    ALT 15 0 - 70 U/L    Protein Total 6.5 6.4 - 8.3 g/dL    Albumin 3.8 3.5 - 5.2 g/dL    Bilirubin Total 0.2 <=1.2 mg/dL   TSH with free T4 reflex   Result Value Ref Range    TSH 1.90 0.30 - 4.20 uIU/mL   CBC with platelets and differential   Result Value Ref Range    WBC Count 4.1 4.0 - 11.0 10e3/uL    RBC Count 3.22 (L) 4.40 - 5.90 10e6/uL    Hemoglobin 9.9 (L) 13.3 - 17.7 g/dL    Hematocrit 31.7 (L) 40.0 - 53.0 %    MCV 98 78 - 100 fL    MCH 30.7 26.5 - 33.0 pg    MCHC 31.2 (L) 31.5 - 36.5 g/dL "    RDW 14.9 10.0 - 15.0 %    Platelet Count 390 150 - 450 10e3/uL    % Neutrophils 49 %    % Lymphocytes 26 %    % Monocytes 18 %    % Eosinophils 3 %    % Basophils 1 %    % Immature Granulocytes 2 %    NRBCs per 100 WBC 0 <1 /100    Absolute Neutrophils 2.0 1.6 - 8.3 10e3/uL    Absolute Lymphocytes 1.1 0.8 - 5.3 10e3/uL    Absolute Monocytes 0.7 0.0 - 1.3 10e3/uL    Absolute Eosinophils 0.1 0.0 - 0.7 10e3/uL    Absolute Basophils 0.1 0.0 - 0.2 10e3/uL    Absolute Immature Granulocytes 0.1 <=0.4 10e3/uL    Absolute NRBCs 0.0 10e3/uL     Imaging:    CT Chest/Abdomen/Pelvis w Contrast    Result Date: 10/26/2024  EXAM: CT CHEST/ABDOMEN/PELVIS W CONTRAST LOCATION: [facility] DATE: 10/25/2024 INDICATION:  Small cell lung cancer, right lower lobe (H) COMPARISON: Whole body PET/CT from 08/30/2024. TECHNIQUE: CT scan of the chest, abdomen, and pelvis was performed following injection of IV contrast. Multiplanar reformats were obtained. Dose reduction techniques were used. CONTRAST: isovue 370 90ml FINDINGS: LUNGS, MEDIASTINUM, AND PLEURA: Marked improvement in malignancy in right middle and right upper lobes, compared to August 2024 PET/CT. Specifically; *  Reduced size of the dominant mass straddling the minor fissure, currently 5.1 x 2.2 x 1.2 cm (previously 8.0 x 3.8 x 3.1 cm, measurements of prior study are my own). *  Reduced size of the paramediastinal right upper lobe/mediastinal mass, currently 4.4 x 3.0 x 3.2 cm (previously 8.6 x 6.2 x 5.4 cm). *  Reduced size of right upper paratracheal 1.4 cm lymph node (previously 2.4 cm) *  Reduced size of 1.2 cm right hilar lymph node (previously 2.5 cm). Measurements on combination of series 4, 5, 7, and 8. Subtle bilateral centrilobular groundglass nodular opacities are new, possibly related to chemotherapy. No new suspicious pulmonary nodules. Juxtapleural 6 mm nodule abutting the left hemidiaphragm is favored to represent a pulmonary ligament  lymph node,  a benign finding. No pleural effusion or pleural-based nodules. No endotracheal or endobronchial nodules. REMAINDER OF THE CHEST: Mild cardiomegaly with trace pericardial effusion. 3 vessel coronary artery disease with likely stent in proximal LAD. Left IJ chest port tip terminates in RA/SVC junction. HEPATOBILIARY: Stable tiny simple benign liver cysts. Normal gallbladder. PANCREAS: Scattered pancreatic calcifications, unchanged, typical of a result of chronic calcific pancreatitis. No acute peripancreatic inflammation or fluid. SPLEEN: Normal. No splenomegaly. ADRENAL GLANDS: Normal. KIDNEYS/BLADDER: Bilateral renal atrophy, unchanged. BOWEL: No gastric or bowel distention. Normal appendix. Diverticulosis coli. LYMPH NODES: No enlarged lymph nodes in the abdomen or pelvis. VASCULATURE: Previous endovascular abdominal aortic aneurysm repair with excluded aneurysm measuring 7.2 cm, unchanged. The study is not performed with a noncontrast study, but there is high index of suspicion for type II endoleak at the level of L3 given contrast blush. No periaortic inflammation or xiao retroperitoneal hemorrhage. PELVIC ORGANS: Normal. MUSCULOSKELETAL: No lytic or blastic osseous metastasis. Several stable benign-appearing sclerotic bone islands throughout the chest, abdomen, and pelvis.     IMPRESSION: 1.  When compared to August 2024, the right upper and right middle lobe lung malignancy is markedly reduced in size. 2.  No metastasis in the abdomen or pelvis. 3.  Stable suspected type II endoleak following prior endovascular abdominal aortic aneurysm repair. The excluded aneurysm measures 7.2 cm.    CT Head w/o & w Contrast    Result Date: 10/26/2024  EXAM: CT HEAD W/O and W CONTRAST LOCATION: [facility] DATE: 10/25/2024 INDICATION:  Malignant neoplasm of upper lobe of right lung (H) COMPARISON:  PET/CT 8/30/2024. CONTRAST: Isovue 370 90 mL TECHNIQUE: Routine CT Head without and with  IV contrast. Dose reduction techniques were used. FINDINGS: INTRACRANIAL CONTENTS:  Ill-defined area of subcortical hyperdensity in the right inferior and posterior frontal lobe (series 7, image 13). No intracranial hemorrhage, extraaxial collection, or mass effect.  No CT evidence of acute infarct. Mild presumed  chronic small vessel ischemic changes. Chronic lacunar infarcts in the deep gray nuclei bilaterally. Mild generalized volume loss. No hydrocephalus. No pathologic enhancement identified. VISUALIZED ORBITS/SINUSES/MASTOIDS: No intraorbital abnormality. No paranasal sinus mucosal disease. No middle ear or mastoid effusion. BONES/SOFT TISSUES: No acute abnormality.     IMPRESSION: 1.  Ill-defined subcortical hyperdensity in the right inferior posterior frontal lobe without surrounding edema or mass effect. This would be an atypical appearance for metastasis and alternative differential consideration would include an underlying vascular lesion such as a cavernous malformation. Contrast enhanced MRI is the preferred modality to evaluate for intracranial metastases.       Signed by: Dino Madera MD      Again, thank you for allowing me to participate in the care of your patient.        Sincerely,        Dino Madera MD

## 2024-10-29 NOTE — PROGRESS NOTES
"Oncology Rooming Note    October 29, 2024 9:25 AM   Jonas Hyman is a 79 year old male who presents for:    Chief Complaint   Patient presents with    Oncology Clinic Visit     3 week return visit with labs/infusion, review CT of 10/25, related to Malignant neoplasm of upper lobe of right lung.     Initial Vitals: BP (!) 173/81 (BP Location: Left arm, Patient Position: Sitting, Cuff Size: Adult Regular)   Pulse 78   Temp 97.9  F (36.6  C) (Tympanic)   Resp 16   Ht 1.74 m (5' 8.5\")   Wt 113 kg (249 lb 3.2 oz)   SpO2 97%   BMI 37.34 kg/m   Estimated body mass index is 37.34 kg/m  as calculated from the following:    Height as of this encounter: 1.74 m (5' 8.5\").    Weight as of this encounter: 113 kg (249 lb 3.2 oz). Body surface area is 2.34 meters squared.  No Pain (0) Comment: Data Unavailable   No LMP for male patient.  Allergies reviewed: Yes  Medications reviewed: Yes    Medications: Medication refills not needed today.  Pharmacy name entered into [facility]: [facility] #[id number] - [address]     Frailty Screening:   Is the patient here for a new oncology consult visit in cancer care? 2. No      Clinical concerns: Jonas continues to have increased fatigue. He also continues to have restless legs, along with tingling of feet at night.  He notes cramping of feet, and also reports needing to walk with cane for long distances. He reports insomnia, and states the Melatonin doesn't help, so he discontinued this. He also notes a new blemish on his ring finger of his left hand, which he thinks may be a wart.   Dr. Madera was notified.      Scarlet Barber, [facility]              "

## 2024-10-29 NOTE — PROGRESS NOTES
Infusion Nursing Note:  Jonas Hyman presents today for Cycle 3 day 1 treatment with Atezolizumab, Carboplatin and Etoposide.    Patient seen by provider today: Yes: Dr Madera   present during visit today: Not Applicable.    Note: Jonas was educated on his plan of care and each medication given was reviewed prior to administration.  He received treatment as ordered.      Intravenous Access:  Implanted Port.    Treatment Conditions:  Lab Results   Component Value Date    HGB 9.9 (L) 10/29/2024    WBC 4.1 10/29/2024    ANEUTAUTO 2.0 10/29/2024     10/29/2024        Lab Results   Component Value Date     10/29/2024    POTASSIUM 3.8 10/29/2024    MAG 2.0 07/16/2024    CR 1.29 (H) 10/29/2024    MACIE 9.1 10/29/2024    BILITOTAL 0.2 10/29/2024    ALBUMIN 3.8 10/29/2024    ALT 15 10/29/2024    AST 16 10/29/2024       Results reviewed, labs MET treatment parameters, ok to proceed with treatment.      Post Infusion Assessment:  Patient tolerated infusion without incident.  Blood return noted pre and post infusion.  Site patent and intact, free from redness, edema or discomfort.  No evidence of extravasations.  Access discontinued per protocol.       Discharge Plan:   Patient discharged in stable condition accompanied by: self.  Departure Mode: Ambulatory.      Flora Fournier RN

## 2024-10-30 ENCOUNTER — INFUSION THERAPY VISIT (OUTPATIENT)
Dept: INFUSION THERAPY | Facility: HOSPITAL | Age: 79
End: 2024-10-30
Payer: MEDICARE

## 2024-10-30 VITALS
DIASTOLIC BLOOD PRESSURE: 71 MMHG | RESPIRATION RATE: 16 BRPM | TEMPERATURE: 98.1 F | HEART RATE: 80 BPM | SYSTOLIC BLOOD PRESSURE: 144 MMHG | OXYGEN SATURATION: 96 %

## 2024-10-30 DIAGNOSIS — D70.1 CHEMOTHERAPY-INDUCED NEUTROPENIA (H): ICD-10-CM

## 2024-10-30 DIAGNOSIS — T45.1X5A CHEMOTHERAPY-INDUCED NEUTROPENIA (H): ICD-10-CM

## 2024-10-30 DIAGNOSIS — C34.31 SMALL CELL LUNG CANCER, RIGHT LOWER LOBE (H): Primary | ICD-10-CM

## 2024-10-30 PROCEDURE — 258N000003 HC RX IP 258 OP 636: Performed by: INTERNAL MEDICINE

## 2024-10-30 PROCEDURE — 96367 TX/PROPH/DG ADDL SEQ IV INF: CPT

## 2024-10-30 PROCEDURE — 250N000011 HC RX IP 250 OP 636: Mod: JW | Performed by: INTERNAL MEDICINE

## 2024-10-30 PROCEDURE — 96413 CHEMO IV INFUSION 1 HR: CPT

## 2024-10-30 PROCEDURE — 250N000011 HC RX IP 250 OP 636: Performed by: NURSE PRACTITIONER

## 2024-10-30 PROCEDURE — 96375 TX/PRO/DX INJ NEW DRUG ADDON: CPT

## 2024-10-30 RX ORDER — HEPARIN SODIUM (PORCINE) LOCK FLUSH IV SOLN 100 UNIT/ML 100 UNIT/ML
5 SOLUTION INTRAVENOUS
Status: DISCONTINUED | OUTPATIENT
Start: 2024-10-30 | End: 2024-10-30 | Stop reason: HOSPADM

## 2024-10-30 RX ORDER — DEXAMETHASONE SODIUM PHOSPHATE 10 MG/ML
12 INJECTION, SOLUTION INTRAMUSCULAR; INTRAVENOUS ONCE
Status: COMPLETED | OUTPATIENT
Start: 2024-10-30 | End: 2024-10-30

## 2024-10-30 RX ADMIN — Medication 5 ML: at 14:15

## 2024-10-30 RX ADMIN — SODIUM CHLORIDE 250 ML: 9 INJECTION, SOLUTION INTRAVENOUS at 12:01

## 2024-10-30 RX ADMIN — DEXAMETHASONE SODIUM PHOSPHATE 12 MG: 10 INJECTION, SOLUTION INTRAMUSCULAR; INTRAVENOUS at 12:01

## 2024-10-30 RX ADMIN — ETOPOSIDE 235 MG: 20 INJECTION, SOLUTION INTRAVENOUS at 12:56

## 2024-10-30 RX ADMIN — TRILACICLIB 570 MG: 300 INJECTION, POWDER, LYOPHILIZED, FOR SOLUTION INTRAVENOUS at 12:15

## 2024-10-30 NOTE — PROGRESS NOTES
Infusion Nursing Note:  Jonas Hyman presents today for cycle 3 day 2 etoposide.    Patient seen by provider today: No   present during visit today: Not Applicable.    Note: Jonas arrived ambulatory and in stable condition. Port accessed using sterile technique, good blood return noted, and labs collected. He was premedicated and treatment administered per orders. Will return on 10/31 for next appointment.      Intravenous Access:  Labs drawn without difficulty.  Implanted Port.    Treatment Conditions:  Lab Results   Component Value Date    HGB 9.9 (L) 10/29/2024    WBC 4.1 10/29/2024    ANEUTAUTO 2.0 10/29/2024     10/29/2024        Lab Results   Component Value Date     10/29/2024    POTASSIUM 3.8 10/29/2024    MAG 2.0 07/16/2024    CR 1.29 (H) 10/29/2024    MACIE 9.1 10/29/2024    BILITOTAL 0.2 10/29/2024    ALBUMIN 3.8 10/29/2024    ALT 15 10/29/2024    AST 16 10/29/2024       Results reviewed, labs MET treatment parameters, ok to proceed with treatment.      Post Infusion Assessment:  Patient tolerated infusion without incident.  Blood return noted pre and post infusion.  Site patent and intact, free from redness, edema or discomfort.  No evidence of extravasations.  Access discontinued per protocol.       Discharge Plan:   Patient and/or family verbalized understanding of discharge instructions and all questions answered.  AVS to patient via [employer].  Patient will return 10/31 for next appointment.   Patient discharged in stable condition accompanied by: self.  Departure Mode: Ambulatory.      Belen Devlin RN

## 2024-10-31 ENCOUNTER — INFUSION THERAPY VISIT (OUTPATIENT)
Dept: INFUSION THERAPY | Facility: HOSPITAL | Age: 79
End: 2024-10-31
Payer: MEDICARE

## 2024-10-31 VITALS
RESPIRATION RATE: 16 BRPM | DIASTOLIC BLOOD PRESSURE: 80 MMHG | OXYGEN SATURATION: 97 % | HEART RATE: 80 BPM | SYSTOLIC BLOOD PRESSURE: 168 MMHG | TEMPERATURE: 98.3 F

## 2024-10-31 DIAGNOSIS — D70.1 CHEMOTHERAPY-INDUCED NEUTROPENIA (H): ICD-10-CM

## 2024-10-31 DIAGNOSIS — T45.1X5A CHEMOTHERAPY-INDUCED NEUTROPENIA (H): ICD-10-CM

## 2024-10-31 DIAGNOSIS — C34.31 SMALL CELL LUNG CANCER, RIGHT LOWER LOBE (H): Primary | ICD-10-CM

## 2024-10-31 PROCEDURE — 258N000003 HC RX IP 258 OP 636: Performed by: INTERNAL MEDICINE

## 2024-10-31 PROCEDURE — 96367 TX/PROPH/DG ADDL SEQ IV INF: CPT

## 2024-10-31 PROCEDURE — 96413 CHEMO IV INFUSION 1 HR: CPT

## 2024-10-31 PROCEDURE — 250N000011 HC RX IP 250 OP 636: Performed by: INTERNAL MEDICINE

## 2024-10-31 RX ORDER — EPINEPHRINE 1 MG/ML
0.3 INJECTION, SOLUTION INTRAMUSCULAR; SUBCUTANEOUS EVERY 5 MIN PRN
Status: DISCONTINUED | OUTPATIENT
Start: 2024-10-31 | End: 2024-10-31 | Stop reason: HOSPADM

## 2024-10-31 RX ORDER — ALBUTEROL SULFATE 90 UG/1
1-2 INHALANT RESPIRATORY (INHALATION)
Status: DISCONTINUED | OUTPATIENT
Start: 2024-10-31 | End: 2024-10-31 | Stop reason: HOSPADM

## 2024-10-31 RX ORDER — HEPARIN SODIUM (PORCINE) LOCK FLUSH IV SOLN 100 UNIT/ML 100 UNIT/ML
5 SOLUTION INTRAVENOUS
Status: DISCONTINUED | OUTPATIENT
Start: 2024-10-31 | End: 2024-10-31 | Stop reason: HOSPADM

## 2024-10-31 RX ORDER — DIPHENHYDRAMINE HYDROCHLORIDE 50 MG/ML
25 INJECTION INTRAMUSCULAR; INTRAVENOUS
Status: DISCONTINUED | OUTPATIENT
Start: 2024-10-31 | End: 2024-10-31 | Stop reason: HOSPADM

## 2024-10-31 RX ORDER — MEPERIDINE HYDROCHLORIDE 25 MG/ML
25 INJECTION INTRAMUSCULAR; INTRAVENOUS; SUBCUTANEOUS
Status: DISCONTINUED | OUTPATIENT
Start: 2024-10-31 | End: 2024-10-31 | Stop reason: HOSPADM

## 2024-10-31 RX ORDER — DIPHENHYDRAMINE HYDROCHLORIDE 50 MG/ML
50 INJECTION INTRAMUSCULAR; INTRAVENOUS
Status: DISCONTINUED | OUTPATIENT
Start: 2024-10-31 | End: 2024-10-31 | Stop reason: HOSPADM

## 2024-10-31 RX ORDER — ALBUTEROL SULFATE 0.83 MG/ML
2.5 SOLUTION RESPIRATORY (INHALATION)
Status: DISCONTINUED | OUTPATIENT
Start: 2024-10-31 | End: 2024-10-31 | Stop reason: HOSPADM

## 2024-10-31 RX ORDER — METHYLPREDNISOLONE SODIUM SUCCINATE 40 MG/ML
40 INJECTION INTRAMUSCULAR; INTRAVENOUS
Status: DISCONTINUED | OUTPATIENT
Start: 2024-10-31 | End: 2024-10-31 | Stop reason: HOSPADM

## 2024-10-31 RX ADMIN — Medication 5 ML: at 14:14

## 2024-10-31 RX ADMIN — ETOPOSIDE 235 MG: 20 INJECTION, SOLUTION INTRAVENOUS at 12:45

## 2024-10-31 RX ADMIN — TRILACICLIB 570 MG: 300 INJECTION, POWDER, LYOPHILIZED, FOR SOLUTION INTRAVENOUS at 12:11

## 2024-10-31 RX ADMIN — SODIUM CHLORIDE 250 ML: 9 INJECTION, SOLUTION INTRAVENOUS at 11:47

## 2024-10-31 RX ADMIN — DEXAMETHASONE SODIUM PHOSPHATE 12 MG: 10 INJECTION, SOLUTION INTRAMUSCULAR; INTRAVENOUS at 11:47

## 2024-10-31 NOTE — PROGRESS NOTES
Infusion Nursing Note:  Jonas ZE Hyman presents today for D3C3.    Patient seen by provider today: No   present during visit today: Not Applicable.    Note: N/A.      Intravenous Access:  Implanted Port.    Treatment Conditions:  Not Applicable.      Post Infusion Assessment:  Patient tolerated infusion without incident.  Blood return noted pre and post infusion.  Site patent and intact, free from redness, edema or discomfort.  No evidence of extravasations.  Access discontinued per protocol.       Discharge Plan:   Patient discharged in stable condition accompanied by: self.  Departure Mode: Ambulatory.      Myesha Verdugo RN

## 2024-11-06 ENCOUNTER — TELEPHONE (OUTPATIENT)
Dept: INTERNAL MEDICINE | Facility: CLINIC | Age: 79
End: 2024-11-06
Payer: MEDICARE

## 2024-11-06 DIAGNOSIS — R30.0 DYSURIA: Primary | ICD-10-CM

## 2024-11-06 RX ORDER — SULFAMETHOXAZOLE AND TRIMETHOPRIM 800; 160 MG/1; MG/1
1 TABLET ORAL 2 TIMES DAILY
Qty: 20 TABLET | Refills: 0 | Status: SHIPPED | OUTPATIENT
Start: 2024-11-06 | End: 2024-11-16

## 2024-11-06 NOTE — TELEPHONE ENCOUNTER
Contacting clinic to report urinary infection.     Current symptoms- painful urination, burning, frequency and feeling as though he is not emptying bladder completely. Symptoms started 11/5 morning. Denies fevers. Denies abdominal pain.     Constipation that resolved with suppository and laxative    Restless legs that he reports he talked to Dr. Goldsmith about.     Requesting UA or antibiotic prescription.     History of small cell lung cancer and is receiving treatment.       Advised to return call if fevers, abdominal pain, or worsening symptoms develop if he has not heard from PCP/clinic.

## 2024-11-06 NOTE — CONFIDENTIAL NOTE
Ideally check urinalysis first.  If positive could begin sulfa antibiotic for 10 days    Alternatively could begin sulfa antibiotic first and check urinalysis if no improvement or when convenient    Orders sent

## 2024-11-07 ENCOUNTER — LAB (OUTPATIENT)
Dept: LAB | Facility: CLINIC | Age: 79
End: 2024-11-07
Payer: MEDICARE

## 2024-11-07 DIAGNOSIS — R30.0 DYSURIA: ICD-10-CM

## 2024-11-07 LAB
ALBUMIN UR-MCNC: NEGATIVE MG/DL
APPEARANCE UR: CLEAR
BACTERIA #/AREA URNS HPF: ABNORMAL /HPF
BILIRUB UR QL STRIP: NEGATIVE
COLOR UR AUTO: YELLOW
GLUCOSE UR STRIP-MCNC: NEGATIVE MG/DL
HGB UR QL STRIP: ABNORMAL
KETONES UR STRIP-MCNC: NEGATIVE MG/DL
LEUKOCYTE ESTERASE UR QL STRIP: ABNORMAL
NITRATE UR QL: POSITIVE
PH UR STRIP: 6 [PH] (ref 5–8)
RBC #/AREA URNS AUTO: ABNORMAL /HPF
SP GR UR STRIP: 1.01 (ref 1–1.03)
SQUAMOUS #/AREA URNS AUTO: ABNORMAL /LPF
UROBILINOGEN UR STRIP-ACNC: 0.2 E.U./DL
WBC #/AREA URNS AUTO: ABNORMAL /HPF
WBC CLUMPS #/AREA URNS HPF: PRESENT /HPF

## 2024-11-07 PROCEDURE — 87086 URINE CULTURE/COLONY COUNT: CPT

## 2024-11-07 PROCEDURE — 81001 URINALYSIS AUTO W/SCOPE: CPT

## 2024-11-09 LAB — BACTERIA UR CULT: NORMAL

## 2024-11-11 ENCOUNTER — PATIENT OUTREACH (OUTPATIENT)
Dept: ONCOLOGY | Facility: HOSPITAL | Age: 79
End: 2024-11-11
Payer: MEDICARE

## 2024-11-11 NOTE — PROGRESS NOTES
[facility]: Cancer Care                                                                                          Called patient and left a voice message stating that the patient or the patient's son will need to reach out to the patient's [facility] vascular surgery team to get the specific medical information of his aortic stent that was placed 5 years ago.  I stated that we need that information in order to schedule the brain MRI over at the [facility] per the [facility] MRI's instructions.  Provided our clinic callback number for the patient to call back once he has that metal information of his aortic stent.    Signature:  Gisel Viveros RN

## 2024-11-20 ENCOUNTER — INFUSION THERAPY VISIT (OUTPATIENT)
Dept: INFUSION THERAPY | Facility: HOSPITAL | Age: 79
End: 2024-11-20
Attending: NURSE PRACTITIONER
Payer: MEDICARE

## 2024-11-20 ENCOUNTER — ONCOLOGY VISIT (OUTPATIENT)
Dept: ONCOLOGY | Facility: HOSPITAL | Age: 79
End: 2024-11-20
Attending: NURSE PRACTITIONER
Payer: MEDICARE

## 2024-11-20 ENCOUNTER — TELEPHONE (OUTPATIENT)
Dept: ONCOLOGY | Facility: HOSPITAL | Age: 79
End: 2024-11-20

## 2024-11-20 VITALS
HEART RATE: 85 BPM | WEIGHT: 253.9 LBS | RESPIRATION RATE: 16 BRPM | BODY MASS INDEX: 38.04 KG/M2 | TEMPERATURE: 98.9 F | DIASTOLIC BLOOD PRESSURE: 80 MMHG | SYSTOLIC BLOOD PRESSURE: 179 MMHG | OXYGEN SATURATION: 95 %

## 2024-11-20 DIAGNOSIS — C34.11 MALIGNANT NEOPLASM OF UPPER LOBE OF RIGHT LUNG (H): Primary | ICD-10-CM

## 2024-11-20 DIAGNOSIS — D70.1 CHEMOTHERAPY-INDUCED NEUTROPENIA (H): ICD-10-CM

## 2024-11-20 DIAGNOSIS — T45.1X5A CHEMOTHERAPY-INDUCED NEUTROPENIA (H): ICD-10-CM

## 2024-11-20 DIAGNOSIS — T45.1X5A CHEMOTHERAPY-INDUCED NEUTROPENIA (H): Primary | ICD-10-CM

## 2024-11-20 DIAGNOSIS — C34.31 SMALL CELL LUNG CANCER, RIGHT LOWER LOBE (H): ICD-10-CM

## 2024-11-20 DIAGNOSIS — D70.1 CHEMOTHERAPY-INDUCED NEUTROPENIA (H): Primary | ICD-10-CM

## 2024-11-20 DIAGNOSIS — C34.31 SMALL CELL LUNG CANCER, RIGHT LOWER LOBE (H): Primary | ICD-10-CM

## 2024-11-20 DIAGNOSIS — G62.9 NEUROPATHY: ICD-10-CM

## 2024-11-20 LAB
ALBUMIN SERPL BCG-MCNC: 3.8 G/DL (ref 3.5–5.2)
ALP SERPL-CCNC: 69 U/L (ref 40–150)
ALT SERPL W P-5'-P-CCNC: 16 U/L (ref 0–70)
ANION GAP SERPL CALCULATED.3IONS-SCNC: 10 MMOL/L (ref 7–15)
AST SERPL W P-5'-P-CCNC: 18 U/L (ref 0–45)
BASOPHILS # BLD AUTO: 0.1 10E3/UL (ref 0–0.2)
BASOPHILS NFR BLD AUTO: 1 %
BILIRUB SERPL-MCNC: 0.3 MG/DL
BUN SERPL-MCNC: 22.2 MG/DL (ref 8–23)
CALCIUM SERPL-MCNC: 8.7 MG/DL (ref 8.8–10.4)
CHLORIDE SERPL-SCNC: 104 MMOL/L (ref 98–107)
CREAT SERPL-MCNC: 1.21 MG/DL (ref 0.67–1.17)
EGFRCR SERPLBLD CKD-EPI 2021: 61 ML/MIN/1.73M2
EOSINOPHIL # BLD AUTO: 0.1 10E3/UL (ref 0–0.7)
EOSINOPHIL NFR BLD AUTO: 4 %
ERYTHROCYTE [DISTWIDTH] IN BLOOD BY AUTOMATED COUNT: 16.7 % (ref 10–15)
GLUCOSE SERPL-MCNC: 128 MG/DL (ref 70–99)
HCO3 SERPL-SCNC: 23 MMOL/L (ref 22–29)
HCT VFR BLD AUTO: 32.6 % (ref 40–53)
HGB BLD-MCNC: 10.6 G/DL (ref 13.3–17.7)
IMM GRANULOCYTES # BLD: 0.1 10E3/UL
IMM GRANULOCYTES NFR BLD: 2 %
LYMPHOCYTES # BLD AUTO: 1.4 10E3/UL (ref 0.8–5.3)
LYMPHOCYTES NFR BLD AUTO: 37 %
MCH RBC QN AUTO: 32.3 PG (ref 26.5–33)
MCHC RBC AUTO-ENTMCNC: 32.5 G/DL (ref 31.5–36.5)
MCV RBC AUTO: 99 FL (ref 78–100)
MONOCYTES # BLD AUTO: 0.6 10E3/UL (ref 0–1.3)
MONOCYTES NFR BLD AUTO: 17 %
NEUTROPHILS # BLD AUTO: 1.4 10E3/UL (ref 1.6–8.3)
NEUTROPHILS NFR BLD AUTO: 39 %
NRBC # BLD AUTO: 0 10E3/UL
NRBC BLD AUTO-RTO: 0 /100
PLATELET # BLD AUTO: 287 10E3/UL (ref 150–450)
POTASSIUM SERPL-SCNC: 4.5 MMOL/L (ref 3.4–5.3)
PROT SERPL-MCNC: 6.2 G/DL (ref 6.4–8.3)
RBC # BLD AUTO: 3.28 10E6/UL (ref 4.4–5.9)
SODIUM SERPL-SCNC: 137 MMOL/L (ref 135–145)
TSH SERPL DL<=0.005 MIU/L-ACNC: 1.74 UIU/ML (ref 0.3–4.2)
WBC # BLD AUTO: 3.7 10E3/UL (ref 4–11)

## 2024-11-20 PROCEDURE — 85048 AUTOMATED LEUKOCYTE COUNT: CPT | Performed by: INTERNAL MEDICINE

## 2024-11-20 PROCEDURE — 96367 TX/PROPH/DG ADDL SEQ IV INF: CPT

## 2024-11-20 PROCEDURE — 85018 HEMOGLOBIN: CPT | Performed by: INTERNAL MEDICINE

## 2024-11-20 PROCEDURE — 258N000003 HC RX IP 258 OP 636: Performed by: INTERNAL MEDICINE

## 2024-11-20 PROCEDURE — 85004 AUTOMATED DIFF WBC COUNT: CPT | Performed by: INTERNAL MEDICINE

## 2024-11-20 PROCEDURE — 36591 DRAW BLOOD OFF VENOUS DEVICE: CPT | Performed by: INTERNAL MEDICINE

## 2024-11-20 PROCEDURE — 96417 CHEMO IV INFUS EACH ADDL SEQ: CPT

## 2024-11-20 PROCEDURE — 84443 ASSAY THYROID STIM HORMONE: CPT | Performed by: INTERNAL MEDICINE

## 2024-11-20 PROCEDURE — 250N000011 HC RX IP 250 OP 636: Mod: JZ | Performed by: INTERNAL MEDICINE

## 2024-11-20 PROCEDURE — G2211 COMPLEX E/M VISIT ADD ON: HCPCS | Performed by: NURSE PRACTITIONER

## 2024-11-20 PROCEDURE — 96375 TX/PRO/DX INJ NEW DRUG ADDON: CPT

## 2024-11-20 PROCEDURE — G0463 HOSPITAL OUTPT CLINIC VISIT: HCPCS | Performed by: NURSE PRACTITIONER

## 2024-11-20 PROCEDURE — 82247 BILIRUBIN TOTAL: CPT | Performed by: INTERNAL MEDICINE

## 2024-11-20 PROCEDURE — 82040 ASSAY OF SERUM ALBUMIN: CPT | Performed by: INTERNAL MEDICINE

## 2024-11-20 PROCEDURE — 96413 CHEMO IV INFUSION 1 HR: CPT

## 2024-11-20 PROCEDURE — 99214 OFFICE O/P EST MOD 30 MIN: CPT | Performed by: NURSE PRACTITIONER

## 2024-11-20 RX ORDER — METHYLPREDNISOLONE SODIUM SUCCINATE 40 MG/ML
40 INJECTION INTRAMUSCULAR; INTRAVENOUS
Status: DISCONTINUED | OUTPATIENT
Start: 2024-11-20 | End: 2024-11-20 | Stop reason: HOSPADM

## 2024-11-20 RX ORDER — HEPARIN SODIUM (PORCINE) LOCK FLUSH IV SOLN 100 UNIT/ML 100 UNIT/ML
5 SOLUTION INTRAVENOUS
Status: DISCONTINUED | OUTPATIENT
Start: 2024-11-20 | End: 2024-11-20 | Stop reason: HOSPADM

## 2024-11-20 RX ORDER — ALBUTEROL SULFATE 90 UG/1
1-2 INHALANT RESPIRATORY (INHALATION)
Status: DISCONTINUED | OUTPATIENT
Start: 2024-11-20 | End: 2024-11-20 | Stop reason: HOSPADM

## 2024-11-20 RX ORDER — MEPERIDINE HYDROCHLORIDE 25 MG/ML
25 INJECTION INTRAMUSCULAR; INTRAVENOUS; SUBCUTANEOUS
Status: DISCONTINUED | OUTPATIENT
Start: 2024-11-20 | End: 2024-11-20 | Stop reason: HOSPADM

## 2024-11-20 RX ORDER — PALONOSETRON 0.05 MG/ML
0.25 INJECTION, SOLUTION INTRAVENOUS ONCE
Status: COMPLETED | OUTPATIENT
Start: 2024-11-20 | End: 2024-11-20

## 2024-11-20 RX ORDER — EPINEPHRINE 1 MG/ML
0.3 INJECTION, SOLUTION INTRAMUSCULAR; SUBCUTANEOUS EVERY 5 MIN PRN
Status: DISCONTINUED | OUTPATIENT
Start: 2024-11-20 | End: 2024-11-20 | Stop reason: HOSPADM

## 2024-11-20 RX ORDER — DIPHENHYDRAMINE HYDROCHLORIDE 50 MG/ML
50 INJECTION INTRAMUSCULAR; INTRAVENOUS
Status: DISCONTINUED | OUTPATIENT
Start: 2024-11-20 | End: 2024-11-20 | Stop reason: HOSPADM

## 2024-11-20 RX ORDER — GABAPENTIN 100 MG/1
100 CAPSULE ORAL AT BEDTIME
Qty: 60 CAPSULE | Refills: 3 | Status: SHIPPED | OUTPATIENT
Start: 2024-11-20

## 2024-11-20 RX ORDER — ALBUTEROL SULFATE 0.83 MG/ML
2.5 SOLUTION RESPIRATORY (INHALATION)
Status: DISCONTINUED | OUTPATIENT
Start: 2024-11-20 | End: 2024-11-20 | Stop reason: HOSPADM

## 2024-11-20 RX ORDER — DIPHENHYDRAMINE HYDROCHLORIDE 50 MG/ML
25 INJECTION INTRAMUSCULAR; INTRAVENOUS
Status: DISCONTINUED | OUTPATIENT
Start: 2024-11-20 | End: 2024-11-20 | Stop reason: HOSPADM

## 2024-11-20 RX ADMIN — ETOPOSIDE 235 MG: 20 INJECTION, SOLUTION INTRAVENOUS at 13:01

## 2024-11-20 RX ADMIN — DEXAMETHASONE SODIUM PHOSPHATE: 10 INJECTION, SOLUTION INTRAMUSCULAR; INTRAVENOUS at 10:35

## 2024-11-20 RX ADMIN — TRILACICLIB 570 MG: 300 INJECTION, POWDER, LYOPHILIZED, FOR SOLUTION INTRAVENOUS at 11:41

## 2024-11-20 RX ADMIN — PALONOSETRON HYDROCHLORIDE 0.25 MG: 0.25 INJECTION INTRAVENOUS at 10:28

## 2024-11-20 RX ADMIN — ATEZOLIZUMAB 1200 MG: 1200 INJECTION, SOLUTION INTRAVENOUS at 10:58

## 2024-11-20 RX ADMIN — SODIUM CHLORIDE 250 ML: 9 INJECTION, SOLUTION INTRAVENOUS at 10:27

## 2024-11-20 RX ADMIN — Medication 5 ML: at 14:09

## 2024-11-20 RX ADMIN — CARBOPLATIN 430 MG: 450 INJECTION, SOLUTION INTRAVENOUS at 12:28

## 2024-11-20 ASSESSMENT — PAIN SCALES - GENERAL: PAINLEVEL_OUTOF10: MODERATE PAIN (4)

## 2024-11-20 NOTE — PROGRESS NOTES
Infusion Nursing Note:  Jonas Hyman presents today for cycle 4 day 1 atezolizumab/cosela/etoposide/carboplatin.    Patient seen by provider today: Yes: Edie Carlin NP   present during visit today: Not Applicable.    Note: Jonas arrived ambulatory and in stable condition. Reports fatigue today. Port accessed using sterile technique, good blood return noted, and labs collected. He was premedicated and treatment administered per orders.      Intravenous Access:  Labs drawn without difficulty.  Implanted Port.    Treatment Conditions:  Lab Results   Component Value Date    HGB 10.6 (L) 11/20/2024    WBC 3.7 (L) 11/20/2024    ANEUTAUTO 1.4 (L) 11/20/2024     11/20/2024        Lab Results   Component Value Date     11/20/2024    POTASSIUM 4.5 11/20/2024    MAG 2.0 07/16/2024    CR 1.21 (H) 11/20/2024    MACIE 8.7 (L) 11/20/2024    BILITOTAL 0.3 11/20/2024    ALBUMIN 3.8 11/20/2024    ALT 16 11/20/2024    AST 18 11/20/2024       Results reviewed, labs MET treatment parameters, ok to proceed with treatment.      Post Infusion Assessment:  Patient tolerated infusion without incident.  Blood return noted pre and post infusion.  Site patent and intact, free from redness, edema or discomfort.  No evidence of extravasations.  Access discontinued per protocol.       Discharge Plan:   Patient and/or family verbalized understanding of discharge instructions and all questions answered.  AVS to patient via [employer].  Patient will return 11/21 for next appointment.   Patient discharged in stable condition accompanied by: self.  Departure Mode: Ambulatory.      Belen Devlin RN

## 2024-11-20 NOTE — TELEPHONE ENCOUNTER
I received a phone call today from [facility] pharmacy.  They are calling looking for clarification on the gabapentin prescription that was sent over.  Specifically, they are wondering if the patient can take 1 to 2 tablets at bedtime or just 1 tablet at bedtime.  Per Edie Carlin CNP, patient can take 1 to 2 tablets at bedtime.  The pharmacy has no other questions at this time.    Mey Sweet RN on 11/20/2024 at 3:04 PM

## 2024-11-20 NOTE — PROGRESS NOTES
"Oncology Rooming Note    November 20, 2024 9:14 AM   Jonas Hyman is a 79 year old male who presents for:    Chief Complaint   Patient presents with    Oncology Clinic Visit     Return visit with lab and infusion. Small cell lung cancer, right lower lobe.     Initial Vitals: BP (!) 179/80 (BP Location: Left arm, Patient Position: Sitting, Cuff Size: Adult Large)   Pulse 85   Temp 98.9  F (37.2  C) (Oral)   Resp 16   Wt 115.2 kg (253 lb 14.4 oz)   SpO2 95%   BMI 38.04 kg/m   Estimated body mass index is 38.04 kg/m  as calculated from the following:    Height as of 10/29/24: 1.74 m (5' 8.5\").    Weight as of this encounter: 115.2 kg (253 lb 14.4 oz). Body surface area is 2.36 meters squared.  Moderate Pain (4) Comment: Data Unavailable   No LMP for male patient.  Allergies reviewed: Yes  Medications reviewed: Yes    Medications: Medication refills not needed today.  Pharmacy name entered into [facility]: [facility] #[id number] - [place] [id number] [address]    Frailty Screening:   Is the patient here for a new oncology consult visit in cancer care? 2. No      Clinical concerns: none       [place] CMA              "

## 2024-11-20 NOTE — LETTER
11/20/2024      Jonas Hyman  [address] [place] [facility] [address]      Dear Colleague,    Thank you for referring your patient, Jonas Hyman, to the [facility] [place]. Please see a copy of my visit note below.    [facility] Hematology and Oncology Progress Note    Patient: Jonas Hyman  MRN: [id number]  Date of Service: Nov 20, 2024          Reason for Visit    Chief Complaint   Patient presents with     Oncology Clinic Visit     Return visit with lab and infusion. Small cell lung cancer, right lower lobe.       Assessment and Plan     Cancer Staging   No matching staging information was found for the patient.    1.  Small cell lung cancer, extensive stage with bone mets, lymph node mets: Patient started on palliative treatment with carboplatin, etoposide and Atezolizumab. He had a good response after 2 cycle. Here today for cycle 4.  He will get full dose today.  He will return in 3 weeks with a PET scan.  His PET scan is showing improvement, we will likely stop the carboplatin and etoposide and continue Atezolizumab.    We did attempt to do a brain MRI with patient last week but he does have a stent from the [facility] and they are trying to figure out if there is any metal in that so we are still waiting to talk to the .  Patient is status post endovascular complex aortic aneurysm repair.  He had a fenestrated/branched stent graft.  The patient's son was really unable to actually get through the customer service and from the  in more detail.  When discussed with Dr. Madera he said it probably would be okay to do a brain MRI but for now as long as he is asymptomatic we will do a head CT with contrast.      2.  Mild renal insufficiency: This is a chronic issue for patient.  Continue to improve.  Encouraged him to stay hydrated, keep blood pressure controlled.  We will monitor with treatment    3. Anemia: chemo induced and usually cumulative.  Overall asymptomatic except fatigue. Continue to monitor.     4.  Restless legs/neuropathy: Patient feels like he is having altered sleep because of this.  He had some of the neuropathy before he even started on treatment.  I did tell him we could try some low-dose gabapentin.  We will give 1 to 200 mg at bedtime to see if it might help.  I did tell him it could make him sleepy to monitor for side effects.      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)     Atherosclerosis of native coronary artery of native heart with angina pectoris (H)     Chronic coronary artery disease     Cataract     Cystoid macular edema of right eye     Diverticulosis of large intestine without hemorrhage     Dyslipidemia     Essential hypertension     Exudative age-related macular degeneration of right eye (H)     History of acute anterior wall MI     History of alcohol dependence (H)     Low vitamin B12 level     Smoker     Status post coronary artery stent placement     Total retinal detachment     Type 2 diabetes mellitus with stage 3b chronic kidney disease, without long-term current use of insulin (H)     History of ventricular fibrillation     Vitamin D deficiency     Myelolipoma     S/P repair of abdominal aortic aneurysm using bifurcation graft     Thoracoabdominal aortic aneurysm (TAAA) (H)     Small cell lung cancer, right lower lobe (H)     Chemotherapy-induced neutropenia (H)         ______________________________________________________________________________    History of Present Illness    Oncologist: Dr. Madera    Diagnosis: Lung cancer, Small cell. Found incidentally when getting imaging for a vascular procedure at the [facility].   -8/26/24: EBUS done and He had biopsies taken over the following hira stations: 4R, 4L, 7.  Pathology showed poorly differentiated small cell lung cancer in stations 4L and 4R   -8/27/24: CT done and shows Multiple small patchy groundglass opacities in the bilateral lower lobes, new from prior study, compatible with aspiration change. Mildly increased size of right middle lobe and right upper lobe pulmonary masses as well as mediastinal, right hilar and right internal mammary lymphadenopathy.  -8/30/24: PET shows Findings suspicious for right lung cancer involving the right upper and middle lobes with metastases involving several lymph nodes above the diaphragm and several sites in the skeleton. If biopsy of a distant metastatic site is desired, a lesion in the   medial left iliac bone is a good option for CT-guided biopsy  ~Brain MRI attempted but was canceled due to a stent and the fear that is may be incompatible with MRI    Treatment:   -9/17/2024: Patient started palliative chemo with carboplatin, etoposide and Atezolizumab.  Today is cycle 4    Interim History:   Patient is here today to continue on treatment.  He feels like it is going okay but he does feel like the third cycle was harder and he had a lot more fatigue.  He is also noticing sleep issues because he has restless legs with some burning and tingling.  He said he had some of the symptoms before chemotherapy but they seem to be getting worse and are affecting his sleep.  He states there was 48 hours in between these cycles where he really did not even sleep.  Patient denies any infectious complaints or fevers.    Review of Systems    Pertinent items are noted in HPI.    Past  History    Past Medical History:   Diagnosis Date     Acute MI (H) 2009     Bronchitis      Cardiac arrest (H) 2009     Cardiac arrest (H)      Chemical dependency (H)     Has been in recovery 49 years     Coronary artery disease      Diabetes mellitus (H)     type II     Diabetes mellitus type 2, noninsulin dependent (H)      Diverticula of colon      Diverticulosis of large intestine      Hemorrhoids      Hemorrhoids      History of alcohol dependence (H) 01/12/1975    Created by [facility] Annotation: May 29 2009  5:52PM - Dillon Goldsmith: dry since  1975, also used drugs and marijuana  Replacement Utility updated for latest IMO load     Hypertension      Hypertension      Macular degeneration of right eye      Mixed hyperlipidemia      Myocardial infarction (H)      Retinal detachment 06/23/2014    Vitrectomy Posterior 23 guage, scleral buckle, membrane peel, endolaser gase     Stage 3b chronic kidney disease (H) 11/30/2020     Stented coronary artery 2009    stints times 2     Vitamin B12 deficiency      Vitamin D deficiency        PHYSICAL EXAM  BP (!) 179/80 (BP Location: Left arm, Patient Position: Sitting, Cuff Size: Adult Large)   Pulse 85   Temp 98.9  F (37.2  C) (Oral)   Resp 16   Wt 115.2 kg (253 lb 14.4 oz)   SpO2 95%   BMI 38.04 kg/m      GENERAL: no acute distress. Cooperative in conversation. Here with son today  RESP: Regular respiratory rate. No expiratory wheezes   NEURO: non focal. Alert and oriented x3.   PSYCH: within normal limits. No depression or anxiety.  SKIN: exposed skin is dry intact.       Lab Results    Recent Results (from the past week)   Comprehensive metabolic panel   Result Value Ref Range    Sodium 137 135 - 145 mmol/L    Potassium 4.5 3.4 - 5.3 mmol/L    Carbon Dioxide (CO2) 23 22 - 29 mmol/L    Anion Gap 10 7 - 15 mmol/L    Urea Nitrogen 22.2 8.0 - 23.0 mg/dL    Creatinine 1.21 (H) 0.67 - 1.17 mg/dL    GFR Estimate 61 >60 mL/min/1.73m2    Calcium 8.7 (L)  8.8 - 10.4 mg/dL    Chloride 104 98 - 107 mmol/L    Glucose 128 (H) 70 - 99 mg/dL    Alkaline Phosphatase 69 40 - 150 U/L    AST 18 0 - 45 U/L    ALT 16 0 - 70 U/L    Protein Total 6.2 (L) 6.4 - 8.3 g/dL    Albumin 3.8 3.5 - 5.2 g/dL    Bilirubin Total 0.3 <=1.2 mg/dL   TSH with free T4 reflex   Result Value Ref Range    TSH 1.74 0.30 - 4.20 uIU/mL   CBC with platelets and differential   Result Value Ref Range    WBC Count 3.7 (L) 4.0 - 11.0 10e3/uL    RBC Count 3.28 (L) 4.40 - 5.90 10e6/uL    Hemoglobin 10.6 (L) 13.3 - 17.7 g/dL    Hematocrit 32.6 (L) 40.0 - 53.0 %    MCV 99 78 - 100 fL    MCH 32.3 26.5 - 33.0 pg    MCHC 32.5 31.5 - 36.5 g/dL    RDW 16.7 (H) 10.0 - 15.0 %    Platelet Count 287 150 - 450 10e3/uL    % Neutrophils 39 %    % Lymphocytes 37 %    % Monocytes 17 %    % Eosinophils 4 %    % Basophils 1 %    % Immature Granulocytes 2 %    NRBCs per 100 WBC 0 <1 /100    Absolute Neutrophils 1.4 (L) 1.6 - 8.3 10e3/uL    Absolute Lymphocytes 1.4 0.8 - 5.3 10e3/uL    Absolute Monocytes 0.6 0.0 - 1.3 10e3/uL    Absolute Eosinophils 0.1 0.0 - 0.7 10e3/uL    Absolute Basophils 0.1 0.0 - 0.2 10e3/uL    Absolute Immature Granulocytes 0.1 <=0.4 10e3/uL    Absolute NRBCs 0.0 10e3/uL           Imaging    CT Chest/Abdomen/Pelvis w Contrast    Result Date: 10/26/2024  EXAM: CT CHEST/ABDOMEN/PELVIS W CONTRAST LOCATION: [facility] DATE: 10/25/2024 INDICATION:  Small cell lung cancer, right lower lobe (H) COMPARISON: Whole body PET/CT from 08/30/2024. TECHNIQUE: CT scan of the chest, abdomen, and pelvis was performed following injection of IV contrast. Multiplanar reformats were obtained. Dose reduction techniques were used. CONTRAST: isovue 370 90ml FINDINGS: LUNGS, MEDIASTINUM, AND PLEURA: Marked improvement in malignancy in right middle and right upper lobes, compared to August 2024 PET/CT. Specifically; *  Reduced size of the dominant mass straddling the minor fissure, currently 5.1 x 2.2 x 1.2  cm (previously 8.0 x 3.8 x 3.1 cm, measurements of prior study are my own). *  Reduced size of the paramediastinal right upper lobe/mediastinal mass, currently 4.4 x 3.0 x 3.2 cm (previously 8.6 x 6.2 x 5.4 cm). *  Reduced size of right upper paratracheal 1.4 cm lymph node (previously 2.4 cm) *  Reduced size of 1.2 cm right hilar lymph node (previously 2.5 cm). Measurements on combination of series 4, 5, 7, and 8. Subtle bilateral centrilobular groundglass nodular opacities are new, possibly related to chemotherapy. No new suspicious pulmonary nodules. Juxtapleural 6 mm nodule abutting the left hemidiaphragm is favored to represent a pulmonary ligament lymph node,  a benign finding. No pleural effusion or pleural-based nodules. No endotracheal or endobronchial nodules. REMAINDER OF THE CHEST: Mild cardiomegaly with trace pericardial effusion. 3 vessel coronary artery disease with likely stent in proximal LAD. Left IJ chest port tip terminates in RA/SVC junction. HEPATOBILIARY: Stable tiny simple benign liver cysts. Normal gallbladder. PANCREAS: Scattered pancreatic calcifications, unchanged, typical of a result of chronic calcific pancreatitis. No acute peripancreatic inflammation or fluid. SPLEEN: Normal. No splenomegaly. ADRENAL GLANDS: Normal. KIDNEYS/BLADDER: Bilateral renal atrophy, unchanged. BOWEL: No gastric or bowel distention. Normal appendix. Diverticulosis coli. LYMPH NODES: No enlarged lymph nodes in the abdomen or pelvis. VASCULATURE: Previous endovascular abdominal aortic aneurysm repair with excluded aneurysm measuring 7.2 cm, unchanged. The study is not performed with a noncontrast study, but there is high index of suspicion for type II endoleak at the level of L3 given contrast blush. No periaortic inflammation or xiao retroperitoneal hemorrhage. PELVIC ORGANS: Normal. MUSCULOSKELETAL: No lytic or blastic osseous metastasis. Several stable benign-appearing sclerotic bone islands throughout the  chest, abdomen, and pelvis.     IMPRESSION: 1.  When compared to August 2024, the right upper and right middle lobe lung malignancy is markedly reduced in size. 2.  No metastasis in the abdomen or pelvis. 3.  Stable suspected type II endoleak following prior endovascular abdominal aortic aneurysm repair. The excluded aneurysm measures 7.2 cm.    CT Head w/o & w Contrast    Result Date: 10/26/2024  EXAM: CT HEAD W/O and W CONTRAST LOCATION: [facility] DATE: 10/25/2024 INDICATION:  Malignant neoplasm of upper lobe of right lung (H) COMPARISON:  PET/CT 8/30/2024. CONTRAST: Isovue 370 90 mL TECHNIQUE: Routine CT Head without and with IV contrast. Dose reduction techniques were used. FINDINGS: INTRACRANIAL CONTENTS:  Ill-defined area of subcortical hyperdensity in the right inferior and posterior frontal lobe (series 7, image 13). No intracranial hemorrhage, extraaxial collection, or mass effect.  No CT evidence of acute infarct. Mild presumed  chronic small vessel ischemic changes. Chronic lacunar infarcts in the deep gray nuclei bilaterally. Mild generalized volume loss. No hydrocephalus. No pathologic enhancement identified. VISUALIZED ORBITS/SINUSES/MASTOIDS: No intraorbital abnormality. No paranasal sinus mucosal disease. No middle ear or mastoid effusion. BONES/SOFT TISSUES: No acute abnormality.     IMPRESSION: 1.  Ill-defined subcortical hyperdensity in the right inferior posterior frontal lobe without surrounding edema or mass effect. This would be an atypical appearance for metastasis and alternative differential consideration would include an underlying vascular lesion such as a cavernous malformation. Contrast enhanced MRI is the preferred modality to evaluate for intracranial metastases.         The longitudinal plan of care for the diagnosis(es)/condition(s) as documented were addressed during this visit. Due to the added complexity in care, I will continue to support Jonas in the  "subsequent management and with ongoing continuity of care.        Signed by: SANTINO Crespo CNP    Oncology Rooming Note    November 20, 2024 9:14 AM   Jonas Hyman is a 79 year old male who presents for:    Chief Complaint   Patient presents with     Oncology Clinic Visit     Return visit with lab and infusion. Small cell lung cancer, right lower lobe.     Initial Vitals: BP (!) 179/80 (BP Location: Left arm, Patient Position: Sitting, Cuff Size: Adult Large)   Pulse 85   Temp 98.9  F (37.2  C) (Oral)   Resp 16   Wt 115.2 kg (253 lb 14.4 oz)   SpO2 95%   BMI 38.04 kg/m   Estimated body mass index is 38.04 kg/m  as calculated from the following:    Height as of 10/29/24: 1.74 m (5' 8.5\").    Weight as of this encounter: 115.2 kg (253 lb 14.4 oz). Body surface area is 2.36 meters squared.  Moderate Pain (4) Comment: Data Unavailable   No LMP for male patient.  Allergies reviewed: Yes  Medications reviewed: Yes    Medications: Medication refills not needed today.  Pharmacy name entered into [employer]: [id number] - [place] [id number] [address]    Frailty Screening:   Is the patient here for a new oncology consult visit in cancer care? 2. No      Clinical concerns: none       [place] CMA                Again, thank you for allowing me to participate in the care of your patient.        Sincerely,        SANTINO Crespo CNP  "

## 2024-11-20 NOTE — PROGRESS NOTES
[facility] Hematology and Oncology Progress Note    Patient: Jonas Hyman  MRN: [id number]  Date of Service: Nov 20, 2024          Reason for Visit    Chief Complaint   Patient presents with    Oncology Clinic Visit     Return visit with lab and infusion. Small cell lung cancer, right lower lobe.       Assessment and Plan     Cancer Staging   No matching staging information was found for the patient.    1.  Small cell lung cancer, extensive stage with bone mets, lymph node mets: Patient started on palliative treatment with carboplatin, etoposide and Atezolizumab. He had a good response after 2 cycle. Here today for cycle 4.  He will get full dose today.  He will return in 3 weeks with a PET scan.  His PET scan is showing improvement, we will likely stop the carboplatin and etoposide and continue Atezolizumab.    We did attempt to do a brain MRI with patient last week but he does have a stent from the [facility] and they are trying to figure out if there is any metal in that so we are still waiting to talk to the .  Patient is status post endovascular complex aortic aneurysm repair.  He had a fenestrated/branched stent graft.  The patient's son was really unable to actually get through the customer service and from the  in more detail.  When discussed with Dr. Madera he said it probably would be okay to do a brain MRI but for now as long as he is asymptomatic we will do a head CT with contrast.      2.  Mild renal insufficiency: This is a chronic issue for patient.  Continue to improve.  Encouraged him to stay hydrated, keep blood pressure controlled.  We will monitor with treatment    3. Anemia: chemo induced and usually cumulative. Overall asymptomatic except fatigue. Continue to monitor.     4.  Restless legs/neuropathy: Patient feels like he is having altered sleep because of this.  He had some of the neuropathy before he even started on treatment.  I did tell him we could try  some low-dose gabapentin.  We will give 1 to 200 mg at bedtime to see if it might help.  I did tell him it could make him sleepy to monitor for side effects.      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)    Atherosclerosis of native coronary artery of native heart with angina pectoris (H)    Chronic coronary artery disease    Cataract    Cystoid macular edema of right eye    Diverticulosis of large intestine without hemorrhage    Dyslipidemia    Essential hypertension    Exudative age-related macular degeneration of right eye (H)    History of acute anterior wall MI    History of alcohol dependence (H)    Low vitamin B12 level    Smoker    Status post coronary artery stent placement    Total retinal detachment    Type 2 diabetes mellitus with stage 3b chronic kidney disease, without long-term current use of insulin (H)    History of ventricular fibrillation    Vitamin D deficiency    Myelolipoma    S/P repair of abdominal aortic aneurysm using bifurcation graft    Thoracoabdominal aortic aneurysm (TAAA) (H)    Small cell lung cancer, right lower lobe (H)    Chemotherapy-induced neutropenia (H)        ______________________________________________________________________________    History of Present Illness    Oncologist: Dr. Madera    Diagnosis: Lung cancer, Small cell. Found incidentally when getting imaging for a vascular procedure at the [facility].   -8/26/24: EBUS done and He had  biopsies taken over the following hira stations: 4R, 4L, 7.  Pathology showed poorly differentiated small cell lung cancer in stations 4L and 4R   -8/27/24: CT done and shows Multiple small patchy groundglass opacities in the bilateral lower lobes, new from prior study, compatible with aspiration change. Mildly increased size of right middle lobe and right upper lobe pulmonary masses as well as mediastinal, right hilar and right internal mammary lymphadenopathy.  -8/30/24: PET shows Findings suspicious for right lung cancer involving the right upper and middle lobes with metastases involving several lymph nodes above the diaphragm and several sites in the skeleton. If biopsy of a distant metastatic site is desired, a lesion in the   medial left iliac bone is a good option for CT-guided biopsy  ~Brain MRI attempted but was canceled due to a stent and the fear that is may be incompatible with MRI    Treatment:   -9/17/2024: Patient started palliative chemo with carboplatin, etoposide and Atezolizumab.  Today is cycle 4    Interim History:   Patient is here today to continue on treatment.  He feels like it is going okay but he does feel like the third cycle was harder and he had a lot more fatigue.  He is also noticing sleep issues because he has restless legs with some burning and tingling.  He said he had some of the symptoms before chemotherapy but they seem to be getting worse and are affecting his sleep.  He states there was 48 hours in between these cycles where he really did not even sleep.  Patient denies any infectious complaints or fevers.    Review of Systems    Pertinent items are noted in HPI.    Past History    Past Medical History:   Diagnosis Date    Acute MI (H) 2009    Bronchitis     Cardiac arrest (H) 2009    Cardiac arrest (H)     Chemical dependency (H)     Has been in recovery 49 years    Coronary artery disease     Diabetes mellitus (H)     type II    Diabetes mellitus type 2, noninsulin  dependent (H)     Diverticula of colon     Diverticulosis of large intestine     Hemorrhoids     Hemorrhoids     History of alcohol dependence (H) 01/12/1975    Created by [facility] Annotation: May 29 2009  5:52PM - Dillon Goldsmith: dry since  1975, also used drugs and marijuana  Replacement Utility updated for latest IMO load    Hypertension     Hypertension     Macular degeneration of right eye     Mixed hyperlipidemia     Myocardial infarction (H)     Retinal detachment 06/23/2014    Vitrectomy Posterior 23 guage, scleral buckle, membrane peel, endolaser gase    Stage 3b chronic kidney disease (H) 11/30/2020    Stented coronary artery 2009    stints times 2    Vitamin B12 deficiency     Vitamin D deficiency        PHYSICAL EXAM  BP (!) 179/80 (BP Location: Left arm, Patient Position: Sitting, Cuff Size: Adult Large)   Pulse 85   Temp 98.9  F (37.2  C) (Oral)   Resp 16   Wt 115.2 kg (253 lb 14.4 oz)   SpO2 95%   BMI 38.04 kg/m      GENERAL: no acute distress. Cooperative in conversation. Here with son today  RESP: Regular respiratory rate. No expiratory wheezes   NEURO: non focal. Alert and oriented x3.   PSYCH: within normal limits. No depression or anxiety.  SKIN: exposed skin is dry intact.       Lab Results    Recent Results (from the past week)   Comprehensive metabolic panel   Result Value Ref Range    Sodium 137 135 - 145 mmol/L    Potassium 4.5 3.4 - 5.3 mmol/L    Carbon Dioxide (CO2) 23 22 - 29 mmol/L    Anion Gap 10 7 - 15 mmol/L    Urea Nitrogen 22.2 8.0 - 23.0 mg/dL    Creatinine 1.21 (H) 0.67 - 1.17 mg/dL    GFR Estimate 61 >60 mL/min/1.73m2    Calcium 8.7 (L) 8.8 - 10.4 mg/dL    Chloride 104 98 - 107 mmol/L    Glucose 128 (H) 70 - 99 mg/dL    Alkaline Phosphatase 69 40 - 150 U/L    AST 18 0 - 45 U/L    ALT 16 0 - 70 U/L    Protein Total 6.2 (L) 6.4 - 8.3 g/dL    Albumin 3.8 3.5 - 5.2 g/dL    Bilirubin Total 0.3 <=1.2 mg/dL   TSH with free T4 reflex   Result Value Ref Range    TSH  1.74 0.30 - 4.20 uIU/mL   CBC with platelets and differential   Result Value Ref Range    WBC Count 3.7 (L) 4.0 - 11.0 10e3/uL    RBC Count 3.28 (L) 4.40 - 5.90 10e6/uL    Hemoglobin 10.6 (L) 13.3 - 17.7 g/dL    Hematocrit 32.6 (L) 40.0 - 53.0 %    MCV 99 78 - 100 fL    MCH 32.3 26.5 - 33.0 pg    MCHC 32.5 31.5 - 36.5 g/dL    RDW 16.7 (H) 10.0 - 15.0 %    Platelet Count 287 150 - 450 10e3/uL    % Neutrophils 39 %    % Lymphocytes 37 %    % Monocytes 17 %    % Eosinophils 4 %    % Basophils 1 %    % Immature Granulocytes 2 %    NRBCs per 100 WBC 0 <1 /100    Absolute Neutrophils 1.4 (L) 1.6 - 8.3 10e3/uL    Absolute Lymphocytes 1.4 0.8 - 5.3 10e3/uL    Absolute Monocytes 0.6 0.0 - 1.3 10e3/uL    Absolute Eosinophils 0.1 0.0 - 0.7 10e3/uL    Absolute Basophils 0.1 0.0 - 0.2 10e3/uL    Absolute Immature Granulocytes 0.1 <=0.4 10e3/uL    Absolute NRBCs 0.0 10e3/uL           Imaging    CT Chest/Abdomen/Pelvis w Contrast    Result Date: 10/26/2024  EXAM: CT CHEST/ABDOMEN/PELVIS W CONTRAST LOCATION: [facility] DATE: 10/25/2024 INDICATION:  Small cell lung cancer, right lower lobe (H) COMPARISON: Whole body PET/CT from 08/30/2024. TECHNIQUE: CT scan of the chest, abdomen, and pelvis was performed following injection of IV contrast. Multiplanar reformats were obtained. Dose reduction techniques were used. CONTRAST: isovue 370 90ml FINDINGS: LUNGS, MEDIASTINUM, AND PLEURA: Marked improvement in malignancy in right middle and right upper lobes, compared to August 2024 PET/CT. Specifically; *  Reduced size of the dominant mass straddling the minor fissure, currently 5.1 x 2.2 x 1.2 cm (previously 8.0 x 3.8 x 3.1 cm, measurements of prior study are my own). *  Reduced size of the paramediastinal right upper lobe/mediastinal mass, currently 4.4 x 3.0 x 3.2 cm (previously 8.6 x 6.2 x 5.4 cm). *  Reduced size of right upper paratracheal 1.4 cm lymph node (previously 2.4 cm) *  Reduced size of 1.2 cm right  hilar lymph node (previously 2.5 cm). Measurements on combination of series 4, 5, 7, and 8. Subtle bilateral centrilobular groundglass nodular opacities are new, possibly related to chemotherapy. No new suspicious pulmonary nodules. Juxtapleural 6 mm nodule abutting the left hemidiaphragm is favored to represent a pulmonary ligament lymph node,  a benign finding. No pleural effusion or pleural-based nodules. No endotracheal or endobronchial nodules. REMAINDER OF THE CHEST: Mild cardiomegaly with trace pericardial effusion. 3 vessel coronary artery disease with likely stent in proximal LAD. Left IJ chest port tip terminates in RA/SVC junction. HEPATOBILIARY: Stable tiny simple benign liver cysts. Normal gallbladder. PANCREAS: Scattered pancreatic calcifications, unchanged, typical of a result of chronic calcific pancreatitis. No acute peripancreatic inflammation or fluid. SPLEEN: Normal. No splenomegaly. ADRENAL GLANDS: Normal. KIDNEYS/BLADDER: Bilateral renal atrophy, unchanged. BOWEL: No gastric or bowel distention. Normal appendix. Diverticulosis coli. LYMPH NODES: No enlarged lymph nodes in the abdomen or pelvis. VASCULATURE: Previous endovascular abdominal aortic aneurysm repair with excluded aneurysm measuring 7.2 cm, unchanged. The study is not performed with a noncontrast study, but there is high index of suspicion for type II endoleak at the level of L3 given contrast blush. No periaortic inflammation or xiao retroperitoneal hemorrhage. PELVIC ORGANS: Normal. MUSCULOSKELETAL: No lytic or blastic osseous metastasis. Several stable benign-appearing sclerotic bone islands throughout the chest, abdomen, and pelvis.     IMPRESSION: 1.  When compared to August 2024, the right upper and right middle lobe lung malignancy is markedly reduced in size. 2.  No metastasis in the abdomen or pelvis. 3.  Stable suspected type II endoleak following prior endovascular abdominal aortic aneurysm repair. The excluded aneurysm  measures 7.2 cm.    CT Head w/o & w Contrast    Result Date: 10/26/2024  EXAM: CT HEAD W/O and W CONTRAST LOCATION: [facility] DATE: 10/25/2024 INDICATION:  Malignant neoplasm of upper lobe of right lung (H) COMPARISON:  PET/CT 8/30/2024. CONTRAST: Isovue 370 90 mL TECHNIQUE: Routine CT Head without and with IV contrast. Dose reduction techniques were used. FINDINGS: INTRACRANIAL CONTENTS:  Ill-defined area of subcortical hyperdensity in the right inferior and posterior frontal lobe (series 7, image 13). No intracranial hemorrhage, extraaxial collection, or mass effect.  No CT evidence of acute infarct. Mild presumed  chronic small vessel ischemic changes. Chronic lacunar infarcts in the deep gray nuclei bilaterally. Mild generalized volume loss. No hydrocephalus. No pathologic enhancement identified. VISUALIZED ORBITS/SINUSES/MASTOIDS: No intraorbital abnormality. No paranasal sinus mucosal disease. No middle ear or mastoid effusion. BONES/SOFT TISSUES: No acute abnormality.     IMPRESSION: 1.  Ill-defined subcortical hyperdensity in the right inferior posterior frontal lobe without surrounding edema or mass effect. This would be an atypical appearance for metastasis and alternative differential consideration would include an underlying vascular lesion such as a cavernous malformation. Contrast enhanced MRI is the preferred modality to evaluate for intracranial metastases.         The longitudinal plan of care for the diagnosis(es)/condition(s) as documented were addressed during this visit. Due to the added complexity in care, I will continue to support Jonas in the subsequent management and with ongoing continuity of care.        Signed by: SANTINO Crespo CNP

## 2024-11-21 ENCOUNTER — INFUSION THERAPY VISIT (OUTPATIENT)
Dept: INFUSION THERAPY | Facility: HOSPITAL | Age: 79
End: 2024-11-21
Payer: MEDICARE

## 2024-11-21 VITALS
RESPIRATION RATE: 14 BRPM | SYSTOLIC BLOOD PRESSURE: 148 MMHG | TEMPERATURE: 99.3 F | OXYGEN SATURATION: 95 % | HEART RATE: 103 BPM | DIASTOLIC BLOOD PRESSURE: 69 MMHG

## 2024-11-21 DIAGNOSIS — D70.1 CHEMOTHERAPY-INDUCED NEUTROPENIA (H): ICD-10-CM

## 2024-11-21 DIAGNOSIS — T45.1X5A CHEMOTHERAPY-INDUCED NEUTROPENIA (H): ICD-10-CM

## 2024-11-21 DIAGNOSIS — C34.31 SMALL CELL LUNG CANCER, RIGHT LOWER LOBE (H): Primary | ICD-10-CM

## 2024-11-21 PROCEDURE — 250N000011 HC RX IP 250 OP 636: Performed by: INTERNAL MEDICINE

## 2024-11-21 PROCEDURE — 258N000003 HC RX IP 258 OP 636: Performed by: INTERNAL MEDICINE

## 2024-11-21 RX ORDER — HEPARIN SODIUM (PORCINE) LOCK FLUSH IV SOLN 100 UNIT/ML 100 UNIT/ML
5 SOLUTION INTRAVENOUS
Status: DISCONTINUED | OUTPATIENT
Start: 2024-11-21 | End: 2024-11-21 | Stop reason: HOSPADM

## 2024-11-21 RX ORDER — DEXAMETHASONE SODIUM PHOSPHATE 10 MG/ML
12 INJECTION, SOLUTION INTRAMUSCULAR; INTRAVENOUS ONCE
Status: COMPLETED | OUTPATIENT
Start: 2024-11-21 | End: 2024-11-21

## 2024-11-21 RX ADMIN — SODIUM CHLORIDE 250 ML: 9 INJECTION, SOLUTION INTRAVENOUS at 09:53

## 2024-11-21 RX ADMIN — TRILACICLIB 570 MG: 300 INJECTION, POWDER, LYOPHILIZED, FOR SOLUTION INTRAVENOUS at 10:09

## 2024-11-21 RX ADMIN — DEXAMETHASONE SODIUM PHOSPHATE 12 MG: 10 INJECTION, SOLUTION INTRAMUSCULAR; INTRAVENOUS at 10:03

## 2024-11-21 RX ADMIN — Medication 5 ML: at 11:58

## 2024-11-21 RX ADMIN — ETOPOSIDE 235 MG: 20 INJECTION, SOLUTION INTRAVENOUS at 10:41

## 2024-11-21 NOTE — PROGRESS NOTES
Infusion Nursing Note:  Jonas Hyman presents today for Cycle 4 day 2 treatment.    Patient seen by provider today: No   present during visit today: Not Applicable.    Note: VSS.  Pt assessed and feeling well today.  He was educated on his plan of care and each medication given today.  He received treatment as ordered.      Intravenous Access:  Implanted Port.    Treatment Conditions:  Not Applicable.      Post Infusion Assessment:  Patient tolerated infusion without incident.  Blood return noted pre and post infusion.  Site patent and intact, free from redness, edema or discomfort.  No evidence of extravasations.  Access discontinued per protocol.       Discharge Plan:   Patient discharged in stable condition accompanied by: self.  Departure Mode: Ambulatory.      Flora Fournier RN

## 2024-12-09 ENCOUNTER — HOSPITAL ENCOUNTER (OUTPATIENT)
Dept: PET IMAGING | Facility: HOSPITAL | Age: 79
Discharge: HOME | End: 2024-12-09
Attending: NURSE PRACTITIONER | Admitting: NURSE PRACTITIONER
Payer: MEDICARE

## 2024-12-09 DIAGNOSIS — C34.11 MALIGNANT NEOPLASM OF UPPER LOBE OF RIGHT LUNG (H): ICD-10-CM

## 2024-12-09 LAB — GLUCOSE BLDC GLUCOMTR-MCNC: 110 MG/DL (ref 70–99)

## 2024-12-09 PROCEDURE — G1010 CDSM STANSON: HCPCS | Mod: PS

## 2024-12-09 PROCEDURE — 82962 GLUCOSE BLOOD TEST: CPT

## 2024-12-09 PROCEDURE — A9552 F18 FDG: HCPCS | Performed by: NURSE PRACTITIONER

## 2024-12-09 PROCEDURE — 78816 PET IMAGE W/CT FULL BODY: CPT | Mod: MG,PS

## 2024-12-09 PROCEDURE — 343N000001 HC RX 343 MED OP 636: Performed by: NURSE PRACTITIONER

## 2024-12-09 RX ORDER — FLUDEOXYGLUCOSE F 18 200 MCI/ML
7-17 INJECTION, SOLUTION INTRAVENOUS ONCE
Status: COMPLETED | OUTPATIENT
Start: 2024-12-09 | End: 2024-12-09

## 2024-12-09 RX ADMIN — FLUDEOXYGLUCOSE F 18 13.8 MILLICURIE: 200 INJECTION, SOLUTION INTRAVENOUS at 11:45

## 2024-12-11 ENCOUNTER — INFUSION THERAPY VISIT (OUTPATIENT)
Dept: INFUSION THERAPY | Facility: HOSPITAL | Age: 79
End: 2024-12-11
Attending: INTERNAL MEDICINE
Payer: MEDICARE

## 2024-12-11 ENCOUNTER — ONCOLOGY VISIT (OUTPATIENT)
Dept: ONCOLOGY | Facility: HOSPITAL | Age: 79
End: 2024-12-11
Attending: INTERNAL MEDICINE
Payer: MEDICARE

## 2024-12-11 VITALS
SYSTOLIC BLOOD PRESSURE: 132 MMHG | WEIGHT: 249.5 LBS | HEART RATE: 101 BPM | OXYGEN SATURATION: 96 % | BODY MASS INDEX: 37.38 KG/M2 | DIASTOLIC BLOOD PRESSURE: 62 MMHG | TEMPERATURE: 99.3 F | RESPIRATION RATE: 16 BRPM

## 2024-12-11 DIAGNOSIS — T45.1X5A CHEMOTHERAPY-INDUCED NEUTROPENIA (H): Primary | ICD-10-CM

## 2024-12-11 DIAGNOSIS — D70.1 CHEMOTHERAPY-INDUCED NEUTROPENIA (H): ICD-10-CM

## 2024-12-11 DIAGNOSIS — D70.1 CHEMOTHERAPY-INDUCED NEUTROPENIA (H): Primary | ICD-10-CM

## 2024-12-11 DIAGNOSIS — C34.31 SMALL CELL LUNG CANCER, RIGHT LOWER LOBE (H): Primary | ICD-10-CM

## 2024-12-11 DIAGNOSIS — T45.1X5A CHEMOTHERAPY-INDUCED NEUTROPENIA (H): ICD-10-CM

## 2024-12-11 DIAGNOSIS — C79.51 MALIGNANT NEOPLASM METASTATIC TO BONE (H): ICD-10-CM

## 2024-12-11 DIAGNOSIS — C34.31 SMALL CELL LUNG CANCER, RIGHT LOWER LOBE (H): ICD-10-CM

## 2024-12-11 LAB
ALBUMIN SERPL BCG-MCNC: 3.8 G/DL (ref 3.5–5.2)
ALP SERPL-CCNC: 78 U/L (ref 40–150)
ALT SERPL W P-5'-P-CCNC: 12 U/L (ref 0–70)
ANION GAP SERPL CALCULATED.3IONS-SCNC: 10 MMOL/L (ref 7–15)
AST SERPL W P-5'-P-CCNC: 16 U/L (ref 0–45)
BASOPHILS # BLD AUTO: 0.1 10E3/UL (ref 0–0.2)
BASOPHILS NFR BLD AUTO: 1 %
BILIRUB SERPL-MCNC: 0.4 MG/DL
BUN SERPL-MCNC: 23.9 MG/DL (ref 8–23)
CALCIUM SERPL-MCNC: 8.9 MG/DL (ref 8.8–10.4)
CHLORIDE SERPL-SCNC: 99 MMOL/L (ref 98–107)
CREAT SERPL-MCNC: 1.26 MG/DL (ref 0.67–1.17)
EGFRCR SERPLBLD CKD-EPI 2021: 58 ML/MIN/1.73M2
EOSINOPHIL # BLD AUTO: 0.1 10E3/UL (ref 0–0.7)
EOSINOPHIL NFR BLD AUTO: 2 %
ERYTHROCYTE [DISTWIDTH] IN BLOOD BY AUTOMATED COUNT: 15.8 % (ref 10–15)
GLUCOSE SERPL-MCNC: 140 MG/DL (ref 70–99)
HCO3 SERPL-SCNC: 25 MMOL/L (ref 22–29)
HCT VFR BLD AUTO: 34.9 % (ref 40–53)
HGB BLD-MCNC: 11.3 G/DL (ref 13.3–17.7)
IMM GRANULOCYTES # BLD: 0.1 10E3/UL
IMM GRANULOCYTES NFR BLD: 3 %
LYMPHOCYTES # BLD AUTO: 0.8 10E3/UL (ref 0.8–5.3)
LYMPHOCYTES NFR BLD AUTO: 18 %
MCH RBC QN AUTO: 31.7 PG (ref 26.5–33)
MCHC RBC AUTO-ENTMCNC: 32.4 G/DL (ref 31.5–36.5)
MCV RBC AUTO: 98 FL (ref 78–100)
MONOCYTES # BLD AUTO: 1 10E3/UL (ref 0–1.3)
MONOCYTES NFR BLD AUTO: 22 %
NEUTROPHILS # BLD AUTO: 2.5 10E3/UL (ref 1.6–8.3)
NEUTROPHILS NFR BLD AUTO: 54 %
NRBC # BLD AUTO: 0 10E3/UL
NRBC BLD AUTO-RTO: 0 /100
PLATELET # BLD AUTO: 330 10E3/UL (ref 150–450)
POTASSIUM SERPL-SCNC: 3.7 MMOL/L (ref 3.4–5.3)
PROT SERPL-MCNC: 6.7 G/DL (ref 6.4–8.3)
RBC # BLD AUTO: 3.56 10E6/UL (ref 4.4–5.9)
SODIUM SERPL-SCNC: 134 MMOL/L (ref 135–145)
TSH SERPL DL<=0.005 MIU/L-ACNC: 0.99 UIU/ML (ref 0.3–4.2)
WBC # BLD AUTO: 4.6 10E3/UL (ref 4–11)

## 2024-12-11 PROCEDURE — 84443 ASSAY THYROID STIM HORMONE: CPT | Performed by: NURSE PRACTITIONER

## 2024-12-11 PROCEDURE — 36591 DRAW BLOOD OFF VENOUS DEVICE: CPT | Performed by: NURSE PRACTITIONER

## 2024-12-11 PROCEDURE — G0463 HOSPITAL OUTPT CLINIC VISIT: HCPCS | Performed by: INTERNAL MEDICINE

## 2024-12-11 PROCEDURE — 80053 COMPREHEN METABOLIC PANEL: CPT | Performed by: NURSE PRACTITIONER

## 2024-12-11 PROCEDURE — 258N000003 HC RX IP 258 OP 636: Performed by: INTERNAL MEDICINE

## 2024-12-11 PROCEDURE — 96413 CHEMO IV INFUSION 1 HR: CPT

## 2024-12-11 PROCEDURE — 250N000011 HC RX IP 250 OP 636: Performed by: NURSE PRACTITIONER

## 2024-12-11 PROCEDURE — 258N000003 HC RX IP 258 OP 636: Performed by: NURSE PRACTITIONER

## 2024-12-11 PROCEDURE — 85025 COMPLETE CBC W/AUTO DIFF WBC: CPT | Performed by: NURSE PRACTITIONER

## 2024-12-11 PROCEDURE — 250N000011 HC RX IP 250 OP 636: Mod: JZ | Performed by: INTERNAL MEDICINE

## 2024-12-11 RX ORDER — LORAZEPAM 2 MG/ML
0.5 INJECTION INTRAMUSCULAR EVERY 4 HOURS PRN
Status: CANCELLED | OUTPATIENT
Start: 2024-12-11

## 2024-12-11 RX ORDER — ALBUTEROL SULFATE 90 UG/1
1-2 INHALANT RESPIRATORY (INHALATION)
Status: CANCELLED
Start: 2024-12-11

## 2024-12-11 RX ORDER — HEPARIN SODIUM (PORCINE) LOCK FLUSH IV SOLN 100 UNIT/ML 100 UNIT/ML
5 SOLUTION INTRAVENOUS
Status: DISCONTINUED | OUTPATIENT
Start: 2024-12-11 | End: 2024-12-11 | Stop reason: HOSPADM

## 2024-12-11 RX ORDER — HEPARIN SODIUM,PORCINE 10 UNIT/ML
5-20 VIAL (ML) INTRAVENOUS DAILY PRN
Status: CANCELLED | OUTPATIENT
Start: 2024-12-11

## 2024-12-11 RX ORDER — DIPHENHYDRAMINE HYDROCHLORIDE 50 MG/ML
50 INJECTION INTRAMUSCULAR; INTRAVENOUS
Status: CANCELLED
Start: 2024-12-11

## 2024-12-11 RX ORDER — METHYLPREDNISOLONE SODIUM SUCCINATE 40 MG/ML
40 INJECTION INTRAMUSCULAR; INTRAVENOUS
Status: CANCELLED
Start: 2024-12-11

## 2024-12-11 RX ORDER — EPINEPHRINE 1 MG/ML
0.3 INJECTION, SOLUTION INTRAMUSCULAR; SUBCUTANEOUS EVERY 5 MIN PRN
Status: CANCELLED | OUTPATIENT
Start: 2024-12-11

## 2024-12-11 RX ORDER — PALONOSETRON 0.05 MG/ML
0.25 INJECTION, SOLUTION INTRAVENOUS ONCE
Status: DISCONTINUED | OUTPATIENT
Start: 2024-12-11 | End: 2024-12-11

## 2024-12-11 RX ORDER — ALBUTEROL SULFATE 0.83 MG/ML
2.5 SOLUTION RESPIRATORY (INHALATION)
Status: CANCELLED | OUTPATIENT
Start: 2024-12-11

## 2024-12-11 RX ORDER — DIPHENHYDRAMINE HYDROCHLORIDE 50 MG/ML
25 INJECTION INTRAMUSCULAR; INTRAVENOUS
Status: CANCELLED
Start: 2024-12-11

## 2024-12-11 RX ORDER — MEPERIDINE HYDROCHLORIDE 25 MG/ML
25 INJECTION INTRAMUSCULAR; INTRAVENOUS; SUBCUTANEOUS
Status: CANCELLED | OUTPATIENT
Start: 2024-12-11

## 2024-12-11 RX ADMIN — Medication 5 ML: at 12:31

## 2024-12-11 RX ADMIN — ATEZOLIZUMAB 1200 MG: 1200 INJECTION, SOLUTION INTRAVENOUS at 11:58

## 2024-12-11 RX ADMIN — SODIUM CHLORIDE 250 ML: 9 INJECTION, SOLUTION INTRAVENOUS at 11:58

## 2024-12-11 ASSESSMENT — PAIN SCALES - GENERAL: PAINLEVEL_OUTOF10: NO PAIN (0)

## 2024-12-11 NOTE — PROGRESS NOTES
"Oncology Rooming Note    December 11, 2024 9:46 AM   Jonas Hyman is a 79 year old male who presents for:    Chief Complaint   Patient presents with    Oncology Clinic Visit     Return visit 3 weeks with lab and infusion. PET 12/09/24. Malignant neoplasm of upper lobe of right lung.     Initial Vitals: /62 (BP Location: Left arm, Patient Position: Sitting, Cuff Size: Adult Large)   Pulse 101   Temp 99.3  F (37.4  C) (Tympanic)   Resp 16   Wt 113.2 kg (249 lb 8 oz)   SpO2 96%   BMI 37.38 kg/m   Estimated body mass index is 37.38 kg/m  as calculated from the following:    Height as of 10/29/24: 1.74 m (5' 8.5\").    Weight as of this encounter: 113.2 kg (249 lb 8 oz). Body surface area is 2.34 meters squared.  No Pain (0) Comment: Data Unavailable   No LMP for male patient.  Allergies reviewed: Yes  Medications reviewed: Yes    Medications: Medication refills not needed today.  Pharmacy name entered into [facility]: [id number] - [place] [id number]    Frailty Screening:   Is the patient here for a new oncology consult visit in cancer care? 2. No      Clinical concerns: fatigue  Dr Madera was notified.      [place] [facility]            "

## 2024-12-11 NOTE — PROGRESS NOTES
Infusion Nursing Note:  Jonas Hyman presents today for atezolizumab infusion.    Patient seen by provider today: Yes: Santy Singh   present during visit today: Not Applicable.    Note: N/A.      Intravenous Access:  Implanted Port.    Treatment Conditions:  Lab Results   Component Value Date    HGB 11.3 (L) 12/11/2024    WBC 4.6 12/11/2024    ANEUTAUTO 2.5 12/11/2024     12/11/2024        Lab Results   Component Value Date     (L) 12/11/2024    POTASSIUM 3.7 12/11/2024    MAG 2.0 07/16/2024    CR 1.26 (H) 12/11/2024    MACIE 8.9 12/11/2024    BILITOTAL 0.4 12/11/2024    ALBUMIN 3.8 12/11/2024    ALT 12 12/11/2024    AST 16 12/11/2024       Results reviewed, labs MET treatment parameters, ok to proceed with treatment.      Post Infusion Assessment:  Patient tolerated infusion without incident.  Blood return noted pre and post infusion.  Site patent and intact, free from redness, edema or discomfort.  No evidence of extravasations.  Access discontinued per protocol.       Discharge Plan:   Discharge instructions reviewed with: Patient.  Patient and/or family verbalized understanding of discharge instructions and all questions answered.  Patient discharged in stable condition accompanied by: son.  Departure Mode: Ambulatory.      Radha Roberts RN

## 2024-12-11 NOTE — LETTER
"12/11/2024      Jonas Hyman  [address]  [facility] [place] [facility] [address]      Dear Colleague,    Thank you for referring your patient, Jonas Hyman, to the [facility]. Please see a copy of my visit note below.    Oncology Rooming Note    December 11, 2024 9:46 AM   Jonas Hyman is a 79 year old male who presents for:    Chief Complaint   Patient presents with     Oncology Clinic Visit     Return visit 3 weeks with lab and infusion. PET 12/09/24. Malignant neoplasm of upper lobe of right lung.     Initial Vitals: /62 (BP Location: Left arm, Patient Position: Sitting, Cuff Size: Adult Large)   Pulse 101   Temp 99.3  F (37.4  C) (Tympanic)   Resp 16   Wt 113.2 kg (249 lb 8 oz)   SpO2 96%   BMI 37.38 kg/m   Estimated body mass index is 37.38 kg/m  as calculated from the following:    Height as of 10/29/24: 1.74 m (5' 8.5\").    Weight as of this encounter: 113.2 kg (249 lb 8 oz). Body surface area is 2.34 meters squared.  No Pain (0) Comment: Data Unavailable   No LMP for male patient.  Allergies reviewed: Yes  Medications reviewed: Yes    Medications: Medication refills not needed today.  Pharmacy name entered into [facility]: [id number] - [place] [id number] [place]     Frailty Screening:   Is the patient here for a new oncology consult visit in cancer care? 2. No      Clinical concerns: fatigue  Dr Madera was notified.      [place], [facility] Hematology and Oncology Progress Note    Patient: Jonas Hyman  MRN: [id number]  Date of Service: Dec 11, 2024        Assessment and Plan:    1.  Small cell lung cancer: He has completed four cycles of chemoimmunotherapy.  He has generally tolerated this quite well.  Having some more fatigue now.  No neuropathy or any other acute symptoms.  He recently had a restaging PET scan.  I personally reviewed the images and shared them with Jonas and his son.  He has had an " excellent response compared to his pretreatment imaging.  I do not think more chemotherapy is going to benefit him therefore we will switch to maintenance immunotherapy.  The rationale for this was explained.  Questions were answered.  We will continue with atezolizumab every   3 weeks.  Will see him in 6 weeks and repeat his CT scan after 4 maintenance cycles.    Data review:  TSH today 0.99, normal  CMP shows a GFR of 58, sodium 134, alkaline phosphatase 78 AST 16 ALT 12  CMP from today is reviewed and shows a white count of 4.6, hemoglobin 11.3, meeds of my 198 and platelets 330,000.    Medical decision Making:  I spent 55 minutes in the care of this patient today, which included time necessary for preparation for the visit, face to face time with the patient, communication of recommendations to the care team, and documentation time.  Today's visit was centered around a cancer diagnosis in the setting of additional medical comorbidities. Complex medical decision making was required. The risk of additional morbidity without further treatment is high.     ECOG Performance  1    Diagnosis:    1.  Small cell lung cancer: Diagnosed August 2024.  Endobronchial ultrasound was performed 8/26/2024 and biopsies were taken of the  following hira stations: 4R, 4L, 7.  Pathology showed poorly differentiated small cell lung cancer in stations 4L and 4R.  PET scan imaging showed a primary right-sided lung cancer involving the right upper and middle lobes with metastases involving lymph nodes above and below the diaphragm and several sites of the skeleton.  NGS: No pathologic mutations noted.  No fusion events noted.    Treatment:    Carboplatin/etoposide/atezolizumab initiated September 17, 2024.  Cycle 4 completed November 22, 2024.    Interim History:    Jonas returns today for follow-up visit. Overall he is feeling okay.  Having some more fatigue and decreased stamina.  He has completed 4 cycles of treatment now.  No  cough or shortness of breath.  No chest pain.  No bowel or bladder problems.    Review of Systems:  As above in the history.     Review of Systems otherwise Negative for:  General: chills, fever or night sweats  Psychological: anxiety or depression  Ophthalmic: blurry vision, double vision or loss of vision, vision change  ENT: epistaxis, oral lesions, hearing changes  Hematological and Lymphatic: bleeding, bruising, jaundice, swollen lymph nodes  Endocrine: hot flashes, unexpected weight changes  Respiratory: cough, hemoptysis, orthopnea or shortness of breath/JACKSON  Cardiovascular: chest pain, edema, palpitations or PND  Gastrointestinal: abdominal pain, blood in stools, change in bowel habits, constipation, diarrhea or nausea/vomiting  Genito-Urinary: change in urinary stream, incontinence, frequency/urgency  Musculoskeletal: joint pain, stiffness, swelling, muscle pain  Neurological: dizziness, headaches, numbness/tingling  Dermatological: lumps and rash    Past History:    Past Medical History:   Diagnosis Date     Acute MI (H) 2009     Bronchitis      Cardiac arrest (H) 2009     Cardiac arrest (H)      Chemical dependency (H)     Has been in recovery 49 years     Coronary artery disease      Diabetes mellitus (H)     type II     Diabetes mellitus type 2, noninsulin dependent (H)      Diverticula of colon      Diverticulosis of large intestine      Hemorrhoids      Hemorrhoids      History of alcohol dependence (H) 01/12/1975    Created by [facility] Annotation: May 29 2009  5:52PM - Dillon Goldsmith: dry since  1975, also used drugs and marijuana  Replacement Utility updated for latest IMO load     Hypertension      Hypertension      Macular degeneration of right eye      Mixed hyperlipidemia      Myocardial infarction (H)      Retinal detachment 06/23/2014    Vitrectomy Posterior 23 guage, scleral buckle, membrane peel, endolaser gase     Stage 3b chronic kidney disease (H) 11/30/2020     Stented  coronary artery 2009    stints times 2     Vitamin B12 deficiency      Vitamin D deficiency      Physical Exam:    /62 (BP Location: Left arm, Patient Position: Sitting, Cuff Size: Adult Large)   Pulse 101   Temp 99.3  F (37.4  C) (Tympanic)   Resp 16   Wt 113.2 kg (249 lb 8 oz)   SpO2 96%   BMI 37.38 kg/m      General: patient appears stated age of 79 year old. Nontoxic and in no distress.   HEENT: Head: atraumatic, normocephalic. Sclerae anicteric.  Chest:  Normal respiratory effort  Cardiac:  No edema.   Abdomen: abdomen is non-distended  Extremities: normal tone and muscle bulk.  Skin: no lesions or rash on visible skin. Warm and dry.   CNS: alert and oriented. Grossly non-focal.   Psychiatric: normal mood and affect.     Lab Results:    Recent Results (from the past week)   Glucose by meter   Result Value Ref Range    GLUCOSE BY METER POCT 110 (H) 70 - 99 mg/dL   Comprehensive metabolic panel   Result Value Ref Range    Sodium 134 (L) 135 - 145 mmol/L    Potassium 3.7 3.4 - 5.3 mmol/L    Carbon Dioxide (CO2) 25 22 - 29 mmol/L    Anion Gap 10 7 - 15 mmol/L    Urea Nitrogen 23.9 (H) 8.0 - 23.0 mg/dL    Creatinine 1.26 (H) 0.67 - 1.17 mg/dL    GFR Estimate 58 (L) >60 mL/min/1.73m2    Calcium 8.9 8.8 - 10.4 mg/dL    Chloride 99 98 - 107 mmol/L    Glucose 140 (H) 70 - 99 mg/dL    Alkaline Phosphatase 78 40 - 150 U/L    AST 16 0 - 45 U/L    ALT 12 0 - 70 U/L    Protein Total 6.7 6.4 - 8.3 g/dL    Albumin 3.8 3.5 - 5.2 g/dL    Bilirubin Total 0.4 <=1.2 mg/dL   TSH with free T4 reflex   Result Value Ref Range    TSH 0.99 0.30 - 4.20 uIU/mL   CBC with platelets and differential   Result Value Ref Range    WBC Count 4.6 4.0 - 11.0 10e3/uL    RBC Count 3.56 (L) 4.40 - 5.90 10e6/uL    Hemoglobin 11.3 (L) 13.3 - 17.7 g/dL    Hematocrit 34.9 (L) 40.0 - 53.0 %    MCV 98 78 - 100 fL    MCH 31.7 26.5 - 33.0 pg    MCHC 32.4 31.5 - 36.5 g/dL    RDW 15.8 (H) 10.0 - 15.0 %    Platelet Count 330 150 - 450 10e3/uL    %  Neutrophils 54 %    % Lymphocytes 18 %    % Monocytes 22 %    % Eosinophils 2 %    % Basophils 1 %    % Immature Granulocytes 3 %    NRBCs per 100 WBC 0 <1 /100    Absolute Neutrophils 2.5 1.6 - 8.3 10e3/uL    Absolute Lymphocytes 0.8 0.8 - 5.3 10e3/uL    Absolute Monocytes 1.0 0.0 - 1.3 10e3/uL    Absolute Eosinophils 0.1 0.0 - 0.7 10e3/uL    Absolute Basophils 0.1 0.0 - 0.2 10e3/uL    Absolute Immature Granulocytes 0.1 <=0.4 10e3/uL    Absolute NRBCs 0.0 10e3/uL     Imaging:    PET Oncology Whole Body    Result Date: 12/10/2024  EXAM: PET ONCOLOGY WHOLE BODY LOCATION: [facility] DATE: 12/9/2024 INDICATION: Subsequent treatment planning and restaging for malignant neoplasm of middle lobe, right bronchus or lung. Poorly differentiated neuroendocrine small cell carcinoma. COMPARISON: 8/30/2024. CONTRAST: None. TECHNIQUE: Serum glucose level 110 mg/dL. One hour post intravenous administration of 13.8 mCi F-18 FDG, PET imaging was performed from the skull vertex to feet, utilizing attenuation correction with concurrent axial CT and PET/CT image fusion. Dose reduction techniques were used. PET/CT FINDINGS: Compared to 8/30/2024 subjective decrease in size of the right middle lobe nodule, now measuring 2.4 x 2.2 cm, previously 7.7 x 5.9 cm (SUV max 8.2, previously 19.5). A previously seen large anterior mediastinal hira metastasis is also substantially decreased, now measuring 2.9 x 1.9 cm, previously 8.2 x 6.0 cm (SUV max 7.1, previously 22.0). There is also remaining subcarinal station 7 lymph node (SUV max 10.9, previously 15.2). Uptake associated with other previously seen sites of thoracic lymphadenopathy has resolved. Some mild residual uptake associated with the metastases in the left proximal femur (SUV max 3.6, previously 14.3), medial left ilium (SUV max 3.6, previously 9.6), and medial right clavicle (SUV max 2.9, previously  15.5). Uptake associated with other sites of osseous  metastatic disease has resolved. Degenerative synovitis in the shoulders. CT FINDINGS: Mild intracranial senescent changes. Right lens implant. Mild to moderate carotid calcifications. Left chest port catheter tip terminating near the cavoatrial junction. Coronary artery stents. Trace pericardial fluid. Hepatic cysts. Pancreatic parenchymal calcifications. Colonic diverticulosis. Infrarenal abdominal aortic aneurysm measuring up to 7.4 cm, unchanged per size, status post aortobiiliac endograft repair. Multilevel degenerative disease in the spine. Lower extremities unremarkable.     IMPRESSION: Findings of partial treatment response with substantially decreased size and uptake of the dominant right middle lobe pulmonary mass, thoracic lymphadenopathy, and osseous metastatic disease.       Signed by: Dino Madera MD      Again, thank you for allowing me to participate in the care of your patient.        Sincerely,        Dino Madera MD

## 2024-12-11 NOTE — PROGRESS NOTES
[facility] Hematology and Oncology Progress Note    Patient: Jonas Hyman  MRN: [id number]  Date of Service: Dec 11, 2024        Assessment and Plan:    1.  Small cell lung cancer: He has completed four cycles of chemoimmunotherapy.  He has generally tolerated this quite well.  Having some more fatigue now.  No neuropathy or any other acute symptoms.  He recently had a restaging PET scan.  I personally reviewed the images and shared them with Jonas and his son.  He has had an excellent response compared to his pretreatment imaging.  I do not think more chemotherapy is going to benefit him therefore we will switch to maintenance immunotherapy.  The rationale for this was explained.  Questions were answered.  We will continue with atezolizumab every   3 weeks.  Will see him in 6 weeks and repeat his CT scan after 4 maintenance cycles.    Data review:  TSH today 0.99, normal  CMP shows a GFR of 58, sodium 134, alkaline phosphatase 78 AST 16 ALT 12  CMP from today is reviewed and shows a white count of 4.6, hemoglobin 11.3, meeds of my 198 and platelets 330,000.    Medical decision Making:  I spent 55 minutes in the care of this patient today, which included time necessary for preparation for the visit, face to face time with the patient, communication of recommendations to the care team, and documentation time.  Today's visit was centered around a cancer diagnosis in the setting of additional medical comorbidities. Complex medical decision making was required. The risk of additional morbidity without further treatment is high.     ECOG Performance  1    Diagnosis:    1.  Small cell lung cancer: Diagnosed August 2024.  Endobronchial ultrasound was performed 8/26/2024 and biopsies were taken of the  following hira stations: 4R, 4L, 7.  Pathology showed poorly differentiated small cell lung cancer in stations 4L and 4R.  PET scan imaging showed a primary right-sided lung cancer involving the right upper and  middle lobes with metastases involving lymph nodes above and below the diaphragm and several sites of the skeleton.  NGS: No pathologic mutations noted.  No fusion events noted.    Treatment:    Carboplatin/etoposide/atezolizumab initiated September 17, 2024.  Cycle 4 completed November 22, 2024.    Interim History:    Jonas returns today for follow-up visit. Overall he is feeling okay.  Having some more fatigue and decreased stamina.  He has completed 4 cycles of treatment now.  No cough or shortness of breath.  No chest pain.  No bowel or bladder problems.    Review of Systems:  As above in the history.     Review of Systems otherwise Negative for:  General: chills, fever or night sweats  Psychological: anxiety or depression  Ophthalmic: blurry vision, double vision or loss of vision, vision change  ENT: epistaxis, oral lesions, hearing changes  Hematological and Lymphatic: bleeding, bruising, jaundice, swollen lymph nodes  Endocrine: hot flashes, unexpected weight changes  Respiratory: cough, hemoptysis, orthopnea or shortness of breath/JACKSON  Cardiovascular: chest pain, edema, palpitations or PND  Gastrointestinal: abdominal pain, blood in stools, change in bowel habits, constipation, diarrhea or nausea/vomiting  Genito-Urinary: change in urinary stream, incontinence, frequency/urgency  Musculoskeletal: joint pain, stiffness, swelling, muscle pain  Neurological: dizziness, headaches, numbness/tingling  Dermatological: lumps and rash    Past History:    Past Medical History:   Diagnosis Date    Acute MI (H) 2009    Bronchitis     Cardiac arrest (H) 2009    Cardiac arrest (H)     Chemical dependency (H)     Has been in recovery 49 years    Coronary artery disease     Diabetes mellitus (H)     type II    Diabetes mellitus type 2, noninsulin dependent (H)     Diverticula of colon     Diverticulosis of large intestine     Hemorrhoids     Hemorrhoids     History of alcohol dependence (H) 01/12/1975    Created by  [facility] Annotation: May 29 2009  5:52PM - Dillon Goldsmith: dry since  1975, also used drugs and marijuana  Replacement Utility updated for latest IMO load    Hypertension     Hypertension     Macular degeneration of right eye     Mixed hyperlipidemia     Myocardial infarction (H)     Retinal detachment 06/23/2014    Vitrectomy Posterior 23 guage, scleral buckle, membrane peel, endolaser gase    Stage 3b chronic kidney disease (H) 11/30/2020    Stented coronary artery 2009    stints times 2    Vitamin B12 deficiency     Vitamin D deficiency      Physical Exam:    /62 (BP Location: Left arm, Patient Position: Sitting, Cuff Size: Adult Large)   Pulse 101   Temp 99.3  F (37.4  C) (Tympanic)   Resp 16   Wt 113.2 kg (249 lb 8 oz)   SpO2 96%   BMI 37.38 kg/m      General: patient appears stated age of 79 year old. Nontoxic and in no distress.   HEENT: Head: atraumatic, normocephalic. Sclerae anicteric.  Chest:  Normal respiratory effort  Cardiac:  No edema.   Abdomen: abdomen is non-distended  Extremities: normal tone and muscle bulk.  Skin: no lesions or rash on visible skin. Warm and dry.   CNS: alert and oriented. Grossly non-focal.   Psychiatric: normal mood and affect.     Lab Results:    Recent Results (from the past week)   Glucose by meter   Result Value Ref Range    GLUCOSE BY METER POCT 110 (H) 70 - 99 mg/dL   Comprehensive metabolic panel   Result Value Ref Range    Sodium 134 (L) 135 - 145 mmol/L    Potassium 3.7 3.4 - 5.3 mmol/L    Carbon Dioxide (CO2) 25 22 - 29 mmol/L    Anion Gap 10 7 - 15 mmol/L    Urea Nitrogen 23.9 (H) 8.0 - 23.0 mg/dL    Creatinine 1.26 (H) 0.67 - 1.17 mg/dL    GFR Estimate 58 (L) >60 mL/min/1.73m2    Calcium 8.9 8.8 - 10.4 mg/dL    Chloride 99 98 - 107 mmol/L    Glucose 140 (H) 70 - 99 mg/dL    Alkaline Phosphatase 78 40 - 150 U/L    AST 16 0 - 45 U/L    ALT 12 0 - 70 U/L    Protein Total 6.7 6.4 - 8.3 g/dL    Albumin 3.8 3.5 - 5.2 g/dL    Bilirubin Total  0.4 <=1.2 mg/dL   TSH with free T4 reflex   Result Value Ref Range    TSH 0.99 0.30 - 4.20 uIU/mL   CBC with platelets and differential   Result Value Ref Range    WBC Count 4.6 4.0 - 11.0 10e3/uL    RBC Count 3.56 (L) 4.40 - 5.90 10e6/uL    Hemoglobin 11.3 (L) 13.3 - 17.7 g/dL    Hematocrit 34.9 (L) 40.0 - 53.0 %    MCV 98 78 - 100 fL    MCH 31.7 26.5 - 33.0 pg    MCHC 32.4 31.5 - 36.5 g/dL    RDW 15.8 (H) 10.0 - 15.0 %    Platelet Count 330 150 - 450 10e3/uL    % Neutrophils 54 %    % Lymphocytes 18 %    % Monocytes 22 %    % Eosinophils 2 %    % Basophils 1 %    % Immature Granulocytes 3 %    NRBCs per 100 WBC 0 <1 /100    Absolute Neutrophils 2.5 1.6 - 8.3 10e3/uL    Absolute Lymphocytes 0.8 0.8 - 5.3 10e3/uL    Absolute Monocytes 1.0 0.0 - 1.3 10e3/uL    Absolute Eosinophils 0.1 0.0 - 0.7 10e3/uL    Absolute Basophils 0.1 0.0 - 0.2 10e3/uL    Absolute Immature Granulocytes 0.1 <=0.4 10e3/uL    Absolute NRBCs 0.0 10e3/uL     Imaging:    PET Oncology Whole Body    Result Date: 12/10/2024  EXAM: PET ONCOLOGY WHOLE BODY LOCATION: [facility] DATE: 12/9/2024 INDICATION: Subsequent treatment planning and restaging for malignant neoplasm of middle lobe, right bronchus or lung. Poorly differentiated neuroendocrine small cell carcinoma. COMPARISON: 8/30/2024. CONTRAST: None. TECHNIQUE: Serum glucose level 110 mg/dL. One hour post intravenous administration of 13.8 mCi F-18 FDG, PET imaging was performed from the skull vertex to feet, utilizing attenuation correction with concurrent axial CT and PET/CT image fusion. Dose reduction techniques were used. PET/CT FINDINGS: Compared to 8/30/2024 subjective decrease in size of the right middle lobe nodule, now measuring 2.4 x 2.2 cm, previously 7.7 x 5.9 cm (SUV max 8.2, previously 19.5). A previously seen large anterior mediastinal hira metastasis is also substantially decreased, now measuring 2.9 x 1.9 cm, previously 8.2 x 6.0 cm (SUV max 7.1,  previously 22.0). There is also remaining subcarinal station 7 lymph node (SUV max 10.9, previously 15.2). Uptake associated with other previously seen sites of thoracic lymphadenopathy has resolved. Some mild residual uptake associated with the metastases in the left proximal femur (SUV max 3.6, previously 14.3), medial left ilium (SUV max 3.6, previously 9.6), and medial right clavicle (SUV max 2.9, previously  15.5). Uptake associated with other sites of osseous metastatic disease has resolved. Degenerative synovitis in the shoulders. CT FINDINGS: Mild intracranial senescent changes. Right lens implant. Mild to moderate carotid calcifications. Left chest port catheter tip terminating near the cavoatrial junction. Coronary artery stents. Trace pericardial fluid. Hepatic cysts. Pancreatic parenchymal calcifications. Colonic diverticulosis. Infrarenal abdominal aortic aneurysm measuring up to 7.4 cm, unchanged per size, status post aortobiiliac endograft repair. Multilevel degenerative disease in the spine. Lower extremities unremarkable.     IMPRESSION: Findings of partial treatment response with substantially decreased size and uptake of the dominant right middle lobe pulmonary mass, thoracic lymphadenopathy, and osseous metastatic disease.       Signed by: Dino Madera MD

## 2024-12-15 DIAGNOSIS — F41.0 ANXIETY ATTACK: ICD-10-CM

## 2024-12-16 RX ORDER — BUSPIRONE HYDROCHLORIDE 10 MG/1
10 TABLET ORAL 3 TIMES DAILY PRN
Qty: 90 TABLET | Refills: 1 | Status: SHIPPED | OUTPATIENT
Start: 2024-12-16

## 2024-12-31 RX ORDER — DIPHENHYDRAMINE HYDROCHLORIDE 50 MG/ML
50 INJECTION INTRAMUSCULAR; INTRAVENOUS
Status: CANCELLED
Start: 2025-01-01

## 2024-12-31 RX ORDER — DIPHENHYDRAMINE HYDROCHLORIDE 50 MG/ML
50 INJECTION INTRAMUSCULAR; INTRAVENOUS
Start: 2025-01-22

## 2024-12-31 RX ORDER — ALBUTEROL SULFATE 0.83 MG/ML
2.5 SOLUTION RESPIRATORY (INHALATION)
OUTPATIENT
Start: 2025-01-22

## 2024-12-31 RX ORDER — LORAZEPAM 2 MG/ML
0.5 INJECTION INTRAMUSCULAR EVERY 4 HOURS PRN
OUTPATIENT
Start: 2025-01-22

## 2024-12-31 RX ORDER — MEPERIDINE HYDROCHLORIDE 25 MG/ML
25 INJECTION INTRAMUSCULAR; INTRAVENOUS; SUBCUTANEOUS
OUTPATIENT
Start: 2025-01-22

## 2024-12-31 RX ORDER — HEPARIN SODIUM,PORCINE 10 UNIT/ML
5-20 VIAL (ML) INTRAVENOUS DAILY PRN
OUTPATIENT
Start: 2025-01-22

## 2024-12-31 RX ORDER — ALBUTEROL SULFATE 0.83 MG/ML
2.5 SOLUTION RESPIRATORY (INHALATION)
Status: CANCELLED | OUTPATIENT
Start: 2025-01-01

## 2024-12-31 RX ORDER — ALBUTEROL SULFATE 90 UG/1
1-2 INHALANT RESPIRATORY (INHALATION)
Start: 2025-01-22

## 2024-12-31 RX ORDER — ALBUTEROL SULFATE 90 UG/1
1-2 INHALANT RESPIRATORY (INHALATION)
Status: CANCELLED
Start: 2025-01-01

## 2024-12-31 RX ORDER — DIPHENHYDRAMINE HYDROCHLORIDE 50 MG/ML
25 INJECTION INTRAMUSCULAR; INTRAVENOUS
Status: CANCELLED
Start: 2025-01-01

## 2024-12-31 RX ORDER — HEPARIN SODIUM,PORCINE 10 UNIT/ML
5-20 VIAL (ML) INTRAVENOUS DAILY PRN
Status: CANCELLED | OUTPATIENT
Start: 2025-01-01

## 2024-12-31 RX ORDER — METHYLPREDNISOLONE SODIUM SUCCINATE 40 MG/ML
40 INJECTION INTRAMUSCULAR; INTRAVENOUS
Start: 2025-01-22

## 2024-12-31 RX ORDER — METHYLPREDNISOLONE SODIUM SUCCINATE 40 MG/ML
40 INJECTION INTRAMUSCULAR; INTRAVENOUS
Status: CANCELLED
Start: 2025-01-01

## 2024-12-31 RX ORDER — PALONOSETRON 0.05 MG/ML
0.25 INJECTION, SOLUTION INTRAVENOUS ONCE
OUTPATIENT
Start: 2025-01-22

## 2024-12-31 RX ORDER — HEPARIN SODIUM (PORCINE) LOCK FLUSH IV SOLN 100 UNIT/ML 100 UNIT/ML
5 SOLUTION INTRAVENOUS
OUTPATIENT
Start: 2025-01-22

## 2024-12-31 RX ORDER — LORAZEPAM 2 MG/ML
0.5 INJECTION INTRAMUSCULAR EVERY 4 HOURS PRN
Status: CANCELLED | OUTPATIENT
Start: 2025-01-01

## 2024-12-31 RX ORDER — DIPHENHYDRAMINE HYDROCHLORIDE 50 MG/ML
25 INJECTION INTRAMUSCULAR; INTRAVENOUS
Start: 2025-01-22

## 2024-12-31 RX ORDER — EPINEPHRINE 1 MG/ML
0.3 INJECTION, SOLUTION INTRAMUSCULAR; SUBCUTANEOUS EVERY 5 MIN PRN
OUTPATIENT
Start: 2025-01-22

## 2024-12-31 RX ORDER — HEPARIN SODIUM (PORCINE) LOCK FLUSH IV SOLN 100 UNIT/ML 100 UNIT/ML
5 SOLUTION INTRAVENOUS
Status: CANCELLED | OUTPATIENT
Start: 2025-01-01

## 2024-12-31 RX ORDER — MEPERIDINE HYDROCHLORIDE 25 MG/ML
25 INJECTION INTRAMUSCULAR; INTRAVENOUS; SUBCUTANEOUS
Status: CANCELLED | OUTPATIENT
Start: 2025-01-01

## 2024-12-31 RX ORDER — PALONOSETRON 0.05 MG/ML
0.25 INJECTION, SOLUTION INTRAVENOUS ONCE
Status: CANCELLED | OUTPATIENT
Start: 2025-01-01

## 2024-12-31 RX ORDER — EPINEPHRINE 1 MG/ML
0.3 INJECTION, SOLUTION INTRAMUSCULAR; SUBCUTANEOUS EVERY 5 MIN PRN
Status: CANCELLED | OUTPATIENT
Start: 2025-01-01

## 2025-01-02 ENCOUNTER — INFUSION THERAPY VISIT (OUTPATIENT)
Dept: INFUSION THERAPY | Facility: HOSPITAL | Age: 80
End: 2025-01-02
Payer: MEDICARE

## 2025-01-02 VITALS
OXYGEN SATURATION: 96 % | RESPIRATION RATE: 18 BRPM | HEART RATE: 74 BPM | DIASTOLIC BLOOD PRESSURE: 76 MMHG | SYSTOLIC BLOOD PRESSURE: 159 MMHG | TEMPERATURE: 97.8 F

## 2025-01-02 DIAGNOSIS — D70.1 CHEMOTHERAPY-INDUCED NEUTROPENIA (H): Primary | ICD-10-CM

## 2025-01-02 DIAGNOSIS — C34.31 SMALL CELL LUNG CANCER, RIGHT LOWER LOBE (H): Primary | ICD-10-CM

## 2025-01-02 DIAGNOSIS — T45.1X5A CHEMOTHERAPY-INDUCED NEUTROPENIA (H): ICD-10-CM

## 2025-01-02 DIAGNOSIS — C34.31 SMALL CELL LUNG CANCER, RIGHT LOWER LOBE (H): ICD-10-CM

## 2025-01-02 DIAGNOSIS — T45.1X5A CHEMOTHERAPY-INDUCED NEUTROPENIA (H): Primary | ICD-10-CM

## 2025-01-02 DIAGNOSIS — D70.1 CHEMOTHERAPY-INDUCED NEUTROPENIA (H): ICD-10-CM

## 2025-01-02 LAB
ALBUMIN SERPL BCG-MCNC: 3.8 G/DL (ref 3.5–5.2)
ALP SERPL-CCNC: 80 U/L (ref 40–150)
ALT SERPL W P-5'-P-CCNC: 9 U/L (ref 0–70)
ANION GAP SERPL CALCULATED.3IONS-SCNC: 10 MMOL/L (ref 7–15)
AST SERPL W P-5'-P-CCNC: 15 U/L (ref 0–45)
BASOPHILS # BLD AUTO: 0 10E3/UL (ref 0–0.2)
BASOPHILS NFR BLD AUTO: 1 %
BILIRUB SERPL-MCNC: 0.3 MG/DL
BUN SERPL-MCNC: 16.2 MG/DL (ref 8–23)
CALCIUM SERPL-MCNC: 9.2 MG/DL (ref 8.8–10.4)
CHLORIDE SERPL-SCNC: 104 MMOL/L (ref 98–107)
CREAT SERPL-MCNC: 1.38 MG/DL (ref 0.67–1.17)
EGFRCR SERPLBLD CKD-EPI 2021: 52 ML/MIN/1.73M2
EOSINOPHIL # BLD AUTO: 0.3 10E3/UL (ref 0–0.7)
EOSINOPHIL NFR BLD AUTO: 4 %
ERYTHROCYTE [DISTWIDTH] IN BLOOD BY AUTOMATED COUNT: 15.7 % (ref 10–15)
GLUCOSE SERPL-MCNC: 114 MG/DL (ref 70–99)
HCO3 SERPL-SCNC: 24 MMOL/L (ref 22–29)
HCT VFR BLD AUTO: 36.8 % (ref 40–53)
HGB BLD-MCNC: 11.9 G/DL (ref 13.3–17.7)
IMM GRANULOCYTES # BLD: 0.1 10E3/UL
IMM GRANULOCYTES NFR BLD: 1 %
LYMPHOCYTES # BLD AUTO: 1.2 10E3/UL (ref 0.8–5.3)
LYMPHOCYTES NFR BLD AUTO: 17 %
MCH RBC QN AUTO: 31.6 PG (ref 26.5–33)
MCHC RBC AUTO-ENTMCNC: 32.3 G/DL (ref 31.5–36.5)
MCV RBC AUTO: 98 FL (ref 78–100)
MONOCYTES # BLD AUTO: 0.8 10E3/UL (ref 0–1.3)
MONOCYTES NFR BLD AUTO: 11 %
NEUTROPHILS # BLD AUTO: 4.7 10E3/UL (ref 1.6–8.3)
NEUTROPHILS NFR BLD AUTO: 67 %
NRBC # BLD AUTO: 0 10E3/UL
NRBC BLD AUTO-RTO: 0 /100
PLATELET # BLD AUTO: 274 10E3/UL (ref 150–450)
POTASSIUM SERPL-SCNC: 3.7 MMOL/L (ref 3.4–5.3)
PROT SERPL-MCNC: 6.4 G/DL (ref 6.4–8.3)
RBC # BLD AUTO: 3.76 10E6/UL (ref 4.4–5.9)
SODIUM SERPL-SCNC: 138 MMOL/L (ref 135–145)
TSH SERPL DL<=0.005 MIU/L-ACNC: 1.37 UIU/ML (ref 0.3–4.2)
WBC # BLD AUTO: 6.9 10E3/UL (ref 4–11)

## 2025-01-02 PROCEDURE — 85004 AUTOMATED DIFF WBC COUNT: CPT | Performed by: NURSE PRACTITIONER

## 2025-01-02 PROCEDURE — 36591 DRAW BLOOD OFF VENOUS DEVICE: CPT | Performed by: NURSE PRACTITIONER

## 2025-01-02 PROCEDURE — 258N000003 HC RX IP 258 OP 636: Performed by: NURSE PRACTITIONER

## 2025-01-02 PROCEDURE — 250N000011 HC RX IP 250 OP 636: Performed by: NURSE PRACTITIONER

## 2025-01-02 PROCEDURE — 84443 ASSAY THYROID STIM HORMONE: CPT | Performed by: NURSE PRACTITIONER

## 2025-01-02 PROCEDURE — 80053 COMPREHEN METABOLIC PANEL: CPT | Performed by: NURSE PRACTITIONER

## 2025-01-02 RX ORDER — DIPHENHYDRAMINE HYDROCHLORIDE 50 MG/ML
25 INJECTION INTRAMUSCULAR; INTRAVENOUS
Status: DISCONTINUED | OUTPATIENT
Start: 2025-01-02 | End: 2025-01-02 | Stop reason: HOSPADM

## 2025-01-02 RX ORDER — DIPHENHYDRAMINE HYDROCHLORIDE 50 MG/ML
50 INJECTION INTRAMUSCULAR; INTRAVENOUS
Status: DISCONTINUED | OUTPATIENT
Start: 2025-01-02 | End: 2025-01-02 | Stop reason: HOSPADM

## 2025-01-02 RX ORDER — EPINEPHRINE 1 MG/ML
0.3 INJECTION, SOLUTION INTRAMUSCULAR; SUBCUTANEOUS EVERY 5 MIN PRN
Status: DISCONTINUED | OUTPATIENT
Start: 2025-01-02 | End: 2025-01-02 | Stop reason: HOSPADM

## 2025-01-02 RX ORDER — PALONOSETRON 0.05 MG/ML
0.25 INJECTION, SOLUTION INTRAVENOUS ONCE
Status: COMPLETED | OUTPATIENT
Start: 2025-01-02 | End: 2025-01-02

## 2025-01-02 RX ORDER — HEPARIN SODIUM (PORCINE) LOCK FLUSH IV SOLN 100 UNIT/ML 100 UNIT/ML
5 SOLUTION INTRAVENOUS
Status: DISCONTINUED | OUTPATIENT
Start: 2025-01-02 | End: 2025-01-02 | Stop reason: HOSPADM

## 2025-01-02 RX ADMIN — SODIUM CHLORIDE 250 ML: 9 INJECTION, SOLUTION INTRAVENOUS at 10:21

## 2025-01-02 RX ADMIN — HEPARIN SODIUM (PORCINE) LOCK FLUSH IV SOLN 100 UNIT/ML 5 ML: 100 SOLUTION at 11:55

## 2025-01-02 RX ADMIN — DEXAMETHASONE SODIUM PHOSPHATE: 10 INJECTION, SOLUTION INTRAMUSCULAR; INTRAVENOUS at 10:26

## 2025-01-02 RX ADMIN — ATEZOLIZUMAB 1200 MG: 1200 INJECTION, SOLUTION INTRAVENOUS at 11:14

## 2025-01-02 RX ADMIN — PALONOSETRON 0.25 MG: 0.05 INJECTION, SOLUTION INTRAVENOUS at 10:21

## 2025-01-02 NOTE — PROGRESS NOTES
Infusion Nursing Note:  Jonas Hyman presents today for cycle 6 day 1 atezolizumab  Patient seen by provider today: No   present during visit today: Not Applicable.    Note: Jonas arrived ambulatory and in stable condition. Reports fatigue and mild lightheadedness today. Port accessed using sterile technique, good blood return noted, and labs collected. He was premedicated and treatment administered per orders.      Intravenous Access:  Labs drawn without difficulty.  Implanted Port.    Treatment Conditions:  Lab Results   Component Value Date    HGB 11.9 (L) 01/02/2025    WBC 6.9 01/02/2025    ANEUTAUTO 4.7 01/02/2025     01/02/2025        Lab Results   Component Value Date     01/02/2025    POTASSIUM 3.7 01/02/2025    MAG 2.0 07/16/2024    CR 1.38 (H) 01/02/2025    MACIE 9.2 01/02/2025    BILITOTAL 0.3 01/02/2025    ALBUMIN 3.8 01/02/2025    ALT 9 01/02/2025    AST 15 01/02/2025       Results reviewed, labs MET treatment parameters, ok to proceed with treatment.      Post Infusion Assessment:  Patient tolerated infusion without incident.  Blood return noted pre and post infusion.  Site patent and intact, free from redness, edema or discomfort.  No evidence of extravasations.  Access discontinued per protocol.       Discharge Plan:   Patient and/or family verbalized understanding of discharge instructions and all questions answered.  AVS to patient via [employer].  Patient will return 1/22 for next appointment.   Patient discharged in stable condition accompanied by: self.  Departure Mode: Ambulatory.      Nell Momin RN

## 2025-01-04 ENCOUNTER — ANCILLARY PROCEDURE (OUTPATIENT)
Dept: ULTRASOUND IMAGING | Facility: HOSPITAL | Age: 80
DRG: 287 | End: 2025-01-04
Attending: EMERGENCY MEDICINE
Payer: MEDICARE

## 2025-01-04 ENCOUNTER — APPOINTMENT (OUTPATIENT)
Dept: CT IMAGING | Facility: HOSPITAL | Age: 80
DRG: 287 | End: 2025-01-04
Attending: EMERGENCY MEDICINE
Payer: MEDICARE

## 2025-01-04 ENCOUNTER — APPOINTMENT (OUTPATIENT)
Dept: RADIOLOGY | Facility: HOSPITAL | Age: 80
DRG: 287 | End: 2025-01-04
Attending: EMERGENCY MEDICINE
Payer: MEDICARE

## 2025-01-04 ENCOUNTER — HOSPITAL ENCOUNTER (INPATIENT)
Facility: HOSPITAL | Age: 80
LOS: 4 days | Discharge: HOME | DRG: 287 | End: 2025-01-08
Attending: EMERGENCY MEDICINE | Admitting: INTERNAL MEDICINE
Payer: MEDICARE

## 2025-01-04 DIAGNOSIS — R07.9 CHEST PAIN IN ADULT: ICD-10-CM

## 2025-01-04 DIAGNOSIS — R06.02 SHORTNESS OF BREATH: ICD-10-CM

## 2025-01-04 DIAGNOSIS — R07.9 CHEST PAIN, UNSPECIFIED TYPE: ICD-10-CM

## 2025-01-04 DIAGNOSIS — I31.39 PERICARDIAL EFFUSION: ICD-10-CM

## 2025-01-04 DIAGNOSIS — I10 HYPERTENSION, UNSPECIFIED TYPE: ICD-10-CM

## 2025-01-04 DIAGNOSIS — I30.0 ACUTE IDIOPATHIC PERICARDITIS: ICD-10-CM

## 2025-01-04 DIAGNOSIS — I30.9 ACUTE PERICARDITIS, UNSPECIFIED TYPE: Primary | ICD-10-CM

## 2025-01-04 LAB
ANION GAP SERPL CALCULATED.3IONS-SCNC: 15 MMOL/L (ref 7–15)
BUN SERPL-MCNC: 22.5 MG/DL (ref 8–23)
CALCIUM SERPL-MCNC: 9.2 MG/DL (ref 8.8–10.4)
CHLORIDE SERPL-SCNC: 100 MMOL/L (ref 98–107)
CREAT SERPL-MCNC: 1.42 MG/DL (ref 0.67–1.17)
D DIMER PPP FEU-MCNC: 3.13 UG/ML FEU (ref 0–0.5)
EGFRCR SERPLBLD CKD-EPI 2021: 50 ML/MIN/1.73M2
ERYTHROCYTE [DISTWIDTH] IN BLOOD BY AUTOMATED COUNT: 16.2 % (ref 10–15)
GLUCOSE SERPL-MCNC: 266 MG/DL (ref 70–99)
HCO3 SERPL-SCNC: 20 MMOL/L (ref 22–29)
HCT VFR BLD AUTO: 40.5 % (ref 40–53)
HGB BLD-MCNC: 13.2 G/DL (ref 13.3–17.7)
HOLD SPECIMEN: NORMAL
HOLD SPECIMEN: NORMAL
MAGNESIUM SERPL-MCNC: 1.9 MG/DL (ref 1.7–2.3)
MCH RBC QN AUTO: 32.2 PG (ref 26.5–33)
MCHC RBC AUTO-ENTMCNC: 32.6 G/DL (ref 31.5–36.5)
MCV RBC AUTO: 99 FL (ref 78–100)
NT-PROBNP SERPL-MCNC: 693 PG/ML (ref 0–1800)
PLATELET # BLD AUTO: 302 10E3/UL (ref 150–450)
POTASSIUM SERPL-SCNC: 3.7 MMOL/L (ref 3.4–5.3)
RBC # BLD AUTO: 4.1 10E6/UL (ref 4.4–5.9)
SODIUM SERPL-SCNC: 135 MMOL/L (ref 135–145)
TROPONIN T SERPL HS-MCNC: 22 NG/L
TROPONIN T SERPL HS-MCNC: 23 NG/L
WBC # BLD AUTO: 13.3 10E3/UL (ref 4–11)

## 2025-01-04 PROCEDURE — 71045 X-RAY EXAM CHEST 1 VIEW: CPT

## 2025-01-04 PROCEDURE — 99223 1ST HOSP IP/OBS HIGH 75: CPT | Mod: AI | Performed by: INTERNAL MEDICINE

## 2025-01-04 PROCEDURE — 83880 ASSAY OF NATRIURETIC PEPTIDE: CPT | Performed by: EMERGENCY MEDICINE

## 2025-01-04 PROCEDURE — 36415 COLL VENOUS BLD VENIPUNCTURE: CPT | Performed by: EMERGENCY MEDICINE

## 2025-01-04 PROCEDURE — 84484 ASSAY OF TROPONIN QUANT: CPT | Performed by: EMERGENCY MEDICINE

## 2025-01-04 PROCEDURE — 93308 TTE F-UP OR LMTD: CPT

## 2025-01-04 PROCEDURE — 99285 EMERGENCY DEPT VISIT HI MDM: CPT | Mod: 25

## 2025-01-04 PROCEDURE — 80048 BASIC METABOLIC PNL TOTAL CA: CPT | Performed by: EMERGENCY MEDICINE

## 2025-01-04 PROCEDURE — 250N000013 HC RX MED GY IP 250 OP 250 PS 637: Performed by: EMERGENCY MEDICINE

## 2025-01-04 PROCEDURE — 93005 ELECTROCARDIOGRAM TRACING: CPT | Performed by: EMERGENCY MEDICINE

## 2025-01-04 PROCEDURE — 250N000011 HC RX IP 250 OP 636: Mod: JZ | Performed by: EMERGENCY MEDICINE

## 2025-01-04 PROCEDURE — 250N000011 HC RX IP 250 OP 636: Performed by: EMERGENCY MEDICINE

## 2025-01-04 PROCEDURE — 96374 THER/PROPH/DIAG INJ IV PUSH: CPT | Mod: 59

## 2025-01-04 PROCEDURE — 120N000004 HC R&B MS OVERFLOW

## 2025-01-04 PROCEDURE — 85027 COMPLETE CBC AUTOMATED: CPT | Performed by: EMERGENCY MEDICINE

## 2025-01-04 PROCEDURE — 99418 PROLNG IP/OBS E/M EA 15 MIN: CPT | Performed by: INTERNAL MEDICINE

## 2025-01-04 PROCEDURE — 85379 FIBRIN DEGRADATION QUANT: CPT | Performed by: EMERGENCY MEDICINE

## 2025-01-04 PROCEDURE — 71275 CT ANGIOGRAPHY CHEST: CPT

## 2025-01-04 PROCEDURE — 83735 ASSAY OF MAGNESIUM: CPT | Performed by: EMERGENCY MEDICINE

## 2025-01-04 RX ORDER — DEXTROSE MONOHYDRATE 25 G/50ML
25-50 INJECTION, SOLUTION INTRAVENOUS
Status: DISCONTINUED | OUTPATIENT
Start: 2025-01-04 | End: 2025-01-08 | Stop reason: HOSPADM

## 2025-01-04 RX ORDER — CALCIUM CARBONATE 500 MG/1
1000 TABLET, CHEWABLE ORAL 4 TIMES DAILY PRN
Status: DISCONTINUED | OUTPATIENT
Start: 2025-01-04 | End: 2025-01-08 | Stop reason: HOSPADM

## 2025-01-04 RX ORDER — ONDANSETRON 2 MG/ML
4 INJECTION INTRAMUSCULAR; INTRAVENOUS EVERY 6 HOURS PRN
Status: DISCONTINUED | OUTPATIENT
Start: 2025-01-05 | End: 2025-01-08 | Stop reason: HOSPADM

## 2025-01-04 RX ORDER — ONDANSETRON 4 MG/1
4 TABLET, ORALLY DISINTEGRATING ORAL EVERY 6 HOURS PRN
Status: DISCONTINUED | OUTPATIENT
Start: 2025-01-05 | End: 2025-01-08 | Stop reason: HOSPADM

## 2025-01-04 RX ORDER — ROPINIROLE 1 MG/1
2 TABLET, FILM COATED ORAL ONCE
Status: COMPLETED | OUTPATIENT
Start: 2025-01-04 | End: 2025-01-04

## 2025-01-04 RX ORDER — IOPAMIDOL 755 MG/ML
75 INJECTION, SOLUTION INTRAVASCULAR ONCE
Status: COMPLETED | OUTPATIENT
Start: 2025-01-04 | End: 2025-01-04

## 2025-01-04 RX ORDER — NICOTINE POLACRILEX 4 MG
15-30 LOZENGE BUCCAL
Status: DISCONTINUED | OUTPATIENT
Start: 2025-01-04 | End: 2025-01-08 | Stop reason: HOSPADM

## 2025-01-04 RX ORDER — ONDANSETRON 4 MG/1
4 TABLET, ORALLY DISINTEGRATING ORAL EVERY 6 HOURS PRN
Status: DISCONTINUED | OUTPATIENT
Start: 2025-01-04 | End: 2025-01-04

## 2025-01-04 RX ORDER — ONDANSETRON 2 MG/ML
4 INJECTION INTRAMUSCULAR; INTRAVENOUS EVERY 6 HOURS PRN
Status: DISCONTINUED | OUTPATIENT
Start: 2025-01-04 | End: 2025-01-04

## 2025-01-04 RX ORDER — LIDOCAINE 40 MG/G
CREAM TOPICAL
Status: DISCONTINUED | OUTPATIENT
Start: 2025-01-04 | End: 2025-01-08 | Stop reason: HOSPADM

## 2025-01-04 RX ORDER — MORPHINE SULFATE 4 MG/ML
4 INJECTION, SOLUTION INTRAMUSCULAR; INTRAVENOUS ONCE
Status: COMPLETED | OUTPATIENT
Start: 2025-01-04 | End: 2025-01-04

## 2025-01-04 RX ADMIN — ROPINIROLE HYDROCHLORIDE 2 MG: 1 TABLET, FILM COATED ORAL at 22:44

## 2025-01-04 RX ADMIN — IOPAMIDOL 75 ML: 755 INJECTION, SOLUTION INTRAVENOUS at 20:36

## 2025-01-04 RX ADMIN — MORPHINE SULFATE 4 MG: 4 INJECTION, SOLUTION INTRAMUSCULAR; INTRAVENOUS at 19:34

## 2025-01-04 ASSESSMENT — COLUMBIA-SUICIDE SEVERITY RATING SCALE - C-SSRS
2. HAVE YOU ACTUALLY HAD ANY THOUGHTS OF KILLING YOURSELF IN THE PAST MONTH?: NO
1. IN THE PAST MONTH, HAVE YOU WISHED YOU WERE DEAD OR WISHED YOU COULD GO TO SLEEP AND NOT WAKE UP?: NO
6. HAVE YOU EVER DONE ANYTHING, STARTED TO DO ANYTHING, OR PREPARED TO DO ANYTHING TO END YOUR LIFE?: NO

## 2025-01-04 ASSESSMENT — ACTIVITIES OF DAILY LIVING (ADL)
ADLS_ACUITY_SCORE: 41

## 2025-01-04 NOTE — Clinical Note
Prepped: groin and right radial. Prepped with: ChloraPrep. The patient was draped. .Pre-procedure site marking:N/AAnd subxyphoid

## 2025-01-05 ENCOUNTER — APPOINTMENT (OUTPATIENT)
Dept: CARDIOLOGY | Facility: HOSPITAL | Age: 80
DRG: 287 | End: 2025-01-05
Attending: INTERNAL MEDICINE
Payer: MEDICARE

## 2025-01-05 LAB
ABO + RH BLD: NORMAL
ALBUMIN SERPL BCG-MCNC: 3.9 G/DL (ref 3.5–5.2)
ALBUMIN UR-MCNC: 10 MG/DL
ALP SERPL-CCNC: 78 U/L (ref 40–150)
ALT SERPL W P-5'-P-CCNC: 9 U/L (ref 0–70)
ANION GAP SERPL CALCULATED.3IONS-SCNC: 10 MMOL/L (ref 7–15)
APPEARANCE UR: CLEAR
AST SERPL W P-5'-P-CCNC: 12 U/L (ref 0–45)
BASOPHILS # BLD AUTO: 0.1 10E3/UL (ref 0–0.2)
BASOPHILS NFR BLD AUTO: 0 %
BILIRUB SERPL-MCNC: 0.4 MG/DL
BILIRUB UR QL STRIP: NEGATIVE
BLD GP AB SCN SERPL QL: NEGATIVE
BUN SERPL-MCNC: 17.5 MG/DL (ref 8–23)
CALCIUM SERPL-MCNC: 9.3 MG/DL (ref 8.8–10.4)
CHLORIDE SERPL-SCNC: 101 MMOL/L (ref 98–107)
COLOR UR AUTO: ABNORMAL
CREAT SERPL-MCNC: 1.26 MG/DL (ref 0.67–1.17)
CRP SERPL-MCNC: 102.9 MG/L
EGFRCR SERPLBLD CKD-EPI 2021: 58 ML/MIN/1.73M2
EOSINOPHIL # BLD AUTO: 0.2 10E3/UL (ref 0–0.7)
EOSINOPHIL NFR BLD AUTO: 2 %
ERYTHROCYTE [DISTWIDTH] IN BLOOD BY AUTOMATED COUNT: 16.1 % (ref 10–15)
GLUCOSE SERPL-MCNC: 114 MG/DL (ref 70–99)
GLUCOSE UR STRIP-MCNC: NEGATIVE MG/DL
HCO3 SERPL-SCNC: 26 MMOL/L (ref 22–29)
HCT VFR BLD AUTO: 38.2 % (ref 40–53)
HGB BLD-MCNC: 12.5 G/DL (ref 13.3–17.7)
HGB UR QL STRIP: NEGATIVE
IMM GRANULOCYTES # BLD: 0.1 10E3/UL
IMM GRANULOCYTES NFR BLD: 1 %
KETONES UR STRIP-MCNC: NEGATIVE MG/DL
LEUKOCYTE ESTERASE UR QL STRIP: NEGATIVE
LVEF ECHO: NORMAL
LYMPHOCYTES # BLD AUTO: 1.3 10E3/UL (ref 0.8–5.3)
LYMPHOCYTES NFR BLD AUTO: 11 %
MAGNESIUM SERPL-MCNC: 2 MG/DL (ref 1.7–2.3)
MCH RBC QN AUTO: 31.6 PG (ref 26.5–33)
MCHC RBC AUTO-ENTMCNC: 32.7 G/DL (ref 31.5–36.5)
MCV RBC AUTO: 97 FL (ref 78–100)
MONOCYTES # BLD AUTO: 1.6 10E3/UL (ref 0–1.3)
MONOCYTES NFR BLD AUTO: 14 %
MUCOUS THREADS #/AREA URNS LPF: PRESENT /LPF
NEUTROPHILS # BLD AUTO: 8.2 10E3/UL (ref 1.6–8.3)
NEUTROPHILS NFR BLD AUTO: 72 %
NITRATE UR QL: NEGATIVE
NRBC # BLD AUTO: 0 10E3/UL
NRBC BLD AUTO-RTO: 0 /100
PH UR STRIP: 5.5 [PH] (ref 5–7)
PLAT MORPH BLD: NORMAL
PLATELET # BLD AUTO: 272 10E3/UL (ref 150–450)
POTASSIUM SERPL-SCNC: 3.7 MMOL/L (ref 3.4–5.3)
PROT SERPL-MCNC: 6.7 G/DL (ref 6.4–8.3)
RBC # BLD AUTO: 3.96 10E6/UL (ref 4.4–5.9)
RBC MORPH BLD: NORMAL
RBC URINE: 2 /HPF
SODIUM SERPL-SCNC: 137 MMOL/L (ref 135–145)
SP GR UR STRIP: 1.03 (ref 1–1.03)
SPECIMEN EXP DATE BLD: NORMAL
TROPONIN T SERPL HS-MCNC: 22 NG/L
TROPONIN T SERPL HS-MCNC: 24 NG/L
TROPONIN T SERPL HS-MCNC: 24 NG/L
TROPONIN T SERPL HS-MCNC: 25 NG/L
UROBILINOGEN UR STRIP-MCNC: <2 MG/DL
WBC # BLD AUTO: 11.5 10E3/UL (ref 4–11)
WBC URINE: 6 /HPF

## 2025-01-05 PROCEDURE — 86140 C-REACTIVE PROTEIN: CPT | Performed by: INTERNAL MEDICINE

## 2025-01-05 PROCEDURE — 81001 URINALYSIS AUTO W/SCOPE: CPT | Performed by: STUDENT IN AN ORGANIZED HEALTH CARE EDUCATION/TRAINING PROGRAM

## 2025-01-05 PROCEDURE — 82040 ASSAY OF SERUM ALBUMIN: CPT | Performed by: INTERNAL MEDICINE

## 2025-01-05 PROCEDURE — 86850 RBC ANTIBODY SCREEN: CPT | Performed by: INTERNAL MEDICINE

## 2025-01-05 PROCEDURE — 93005 ELECTROCARDIOGRAM TRACING: CPT | Performed by: INTERNAL MEDICINE

## 2025-01-05 PROCEDURE — 93306 TTE W/DOPPLER COMPLETE: CPT | Mod: 26 | Performed by: INTERNAL MEDICINE

## 2025-01-05 PROCEDURE — 250N000013 HC RX MED GY IP 250 OP 250 PS 637: Performed by: INTERNAL MEDICINE

## 2025-01-05 PROCEDURE — 36415 COLL VENOUS BLD VENIPUNCTURE: CPT | Performed by: INTERNAL MEDICINE

## 2025-01-05 PROCEDURE — 84484 ASSAY OF TROPONIN QUANT: CPT | Performed by: INTERNAL MEDICINE

## 2025-01-05 PROCEDURE — 250N000013 HC RX MED GY IP 250 OP 250 PS 637: Performed by: STUDENT IN AN ORGANIZED HEALTH CARE EDUCATION/TRAINING PROGRAM

## 2025-01-05 PROCEDURE — 80051 ELECTROLYTE PANEL: CPT | Performed by: INTERNAL MEDICINE

## 2025-01-05 PROCEDURE — 85004 AUTOMATED DIFF WBC COUNT: CPT | Performed by: INTERNAL MEDICINE

## 2025-01-05 PROCEDURE — 250N000011 HC RX IP 250 OP 636: Mod: JW | Performed by: INTERNAL MEDICINE

## 2025-01-05 PROCEDURE — 99233 SBSQ HOSP IP/OBS HIGH 50: CPT | Performed by: STUDENT IN AN ORGANIZED HEALTH CARE EDUCATION/TRAINING PROGRAM

## 2025-01-05 PROCEDURE — 120N000004 HC R&B MS OVERFLOW

## 2025-01-05 PROCEDURE — 86900 BLOOD TYPING SEROLOGIC ABO: CPT | Performed by: INTERNAL MEDICINE

## 2025-01-05 PROCEDURE — 85014 HEMATOCRIT: CPT | Performed by: INTERNAL MEDICINE

## 2025-01-05 PROCEDURE — 99223 1ST HOSP IP/OBS HIGH 75: CPT | Performed by: INTERNAL MEDICINE

## 2025-01-05 PROCEDURE — 93306 TTE W/DOPPLER COMPLETE: CPT

## 2025-01-05 PROCEDURE — 83735 ASSAY OF MAGNESIUM: CPT | Performed by: STUDENT IN AN ORGANIZED HEALTH CARE EDUCATION/TRAINING PROGRAM

## 2025-01-05 PROCEDURE — 258N000003 HC RX IP 258 OP 636: Performed by: INTERNAL MEDICINE

## 2025-01-05 RX ORDER — HYDROMORPHONE HYDROCHLORIDE 1 MG/ML
0.3 INJECTION, SOLUTION INTRAMUSCULAR; INTRAVENOUS; SUBCUTANEOUS
Status: DISCONTINUED | OUTPATIENT
Start: 2025-01-05 | End: 2025-01-08 | Stop reason: HOSPADM

## 2025-01-05 RX ORDER — NALOXONE HYDROCHLORIDE 0.4 MG/ML
0.4 INJECTION, SOLUTION INTRAMUSCULAR; INTRAVENOUS; SUBCUTANEOUS
Status: DISCONTINUED | OUTPATIENT
Start: 2025-01-05 | End: 2025-01-06

## 2025-01-05 RX ORDER — ACETAMINOPHEN 325 MG/1
975 TABLET ORAL 3 TIMES DAILY PRN
Status: DISCONTINUED | OUTPATIENT
Start: 2025-01-05 | End: 2025-01-05

## 2025-01-05 RX ORDER — HYDROMORPHONE HYDROCHLORIDE 1 MG/ML
0.5 INJECTION, SOLUTION INTRAMUSCULAR; INTRAVENOUS; SUBCUTANEOUS
Status: DISCONTINUED | OUTPATIENT
Start: 2025-01-05 | End: 2025-01-08 | Stop reason: HOSPADM

## 2025-01-05 RX ORDER — GABAPENTIN 100 MG/1
100 CAPSULE ORAL AT BEDTIME
Status: DISCONTINUED | OUTPATIENT
Start: 2025-01-05 | End: 2025-01-08 | Stop reason: HOSPADM

## 2025-01-05 RX ORDER — OXYCODONE HYDROCHLORIDE 5 MG/1
5 TABLET ORAL EVERY 4 HOURS PRN
Status: DISCONTINUED | OUTPATIENT
Start: 2025-01-05 | End: 2025-01-07

## 2025-01-05 RX ORDER — PRAVASTATIN SODIUM 20 MG
40 TABLET ORAL DAILY
Status: DISCONTINUED | OUTPATIENT
Start: 2025-01-06 | End: 2025-01-08 | Stop reason: HOSPADM

## 2025-01-05 RX ORDER — MAGNESIUM OXIDE 400 MG/1
400 TABLET ORAL DAILY
Status: DISCONTINUED | OUTPATIENT
Start: 2025-01-05 | End: 2025-01-08 | Stop reason: HOSPADM

## 2025-01-05 RX ORDER — SODIUM CHLORIDE 9 MG/ML
INJECTION, SOLUTION INTRAVENOUS CONTINUOUS
Status: DISCONTINUED | OUTPATIENT
Start: 2025-01-05 | End: 2025-01-05

## 2025-01-05 RX ORDER — ROPINIROLE 1 MG/1
2 TABLET, FILM COATED ORAL AT BEDTIME
Status: DISCONTINUED | OUTPATIENT
Start: 2025-01-05 | End: 2025-01-08 | Stop reason: HOSPADM

## 2025-01-05 RX ORDER — OXYCODONE HYDROCHLORIDE 5 MG/1
10 TABLET ORAL EVERY 4 HOURS PRN
Status: DISCONTINUED | OUTPATIENT
Start: 2025-01-05 | End: 2025-01-07

## 2025-01-05 RX ORDER — NALOXONE HYDROCHLORIDE 0.4 MG/ML
0.2 INJECTION, SOLUTION INTRAMUSCULAR; INTRAVENOUS; SUBCUTANEOUS
Status: DISCONTINUED | OUTPATIENT
Start: 2025-01-05 | End: 2025-01-06

## 2025-01-05 RX ORDER — ASPIRIN 81 MG/1
81 TABLET ORAL DAILY
Status: DISCONTINUED | OUTPATIENT
Start: 2025-01-06 | End: 2025-01-08 | Stop reason: HOSPADM

## 2025-01-05 RX ORDER — HYDROXYZINE HYDROCHLORIDE 25 MG/1
25 TABLET, FILM COATED ORAL 3 TIMES DAILY PRN
Status: DISCONTINUED | OUTPATIENT
Start: 2025-01-05 | End: 2025-01-06

## 2025-01-05 RX ORDER — BUSPIRONE HYDROCHLORIDE 10 MG/1
10 TABLET ORAL 3 TIMES DAILY PRN
Status: DISCONTINUED | OUTPATIENT
Start: 2025-01-05 | End: 2025-01-08 | Stop reason: HOSPADM

## 2025-01-05 RX ORDER — ASPIRIN 81 MG/1
81 TABLET ORAL DAILY
Status: CANCELLED | OUTPATIENT
Start: 2025-01-06

## 2025-01-05 RX ORDER — ACETAMINOPHEN 325 MG/1
975 TABLET ORAL 3 TIMES DAILY
Status: DISCONTINUED | OUTPATIENT
Start: 2025-01-05 | End: 2025-01-08 | Stop reason: HOSPADM

## 2025-01-05 RX ADMIN — NITROGLYCERIN 7.5 MG: 20 OINTMENT TOPICAL at 06:51

## 2025-01-05 RX ADMIN — SODIUM CHLORIDE, PRESERVATIVE FREE: 5 INJECTION INTRAVENOUS at 06:55

## 2025-01-05 RX ADMIN — HYDROMORPHONE HYDROCHLORIDE 0.5 MG: 1 INJECTION, SOLUTION INTRAMUSCULAR; INTRAVENOUS; SUBCUTANEOUS at 14:25

## 2025-01-05 RX ADMIN — BUSPIRONE HYDROCHLORIDE 10 MG: 10 TABLET ORAL at 22:24

## 2025-01-05 RX ADMIN — OXYCODONE HYDROCHLORIDE 5 MG: 5 TABLET ORAL at 22:23

## 2025-01-05 RX ADMIN — MAGNESIUM OXIDE TAB 400 MG (241.3 MG ELEMENTAL MG) 400 MG: 400 (241.3 MG) TAB at 15:27

## 2025-01-05 RX ADMIN — OXYCODONE HYDROCHLORIDE 10 MG: 5 TABLET ORAL at 15:28

## 2025-01-05 RX ADMIN — ACETAMINOPHEN 975 MG: 325 TABLET ORAL at 20:23

## 2025-01-05 RX ADMIN — ACETAMINOPHEN 975 MG: 325 TABLET ORAL at 17:10

## 2025-01-05 RX ADMIN — ROPINIROLE HYDROCHLORIDE 2 MG: 1 TABLET, FILM COATED ORAL at 22:24

## 2025-01-05 RX ADMIN — HYDROMORPHONE HYDROCHLORIDE 0.3 MG: 1 INJECTION, SOLUTION INTRAMUSCULAR; INTRAVENOUS; SUBCUTANEOUS at 07:50

## 2025-01-05 RX ADMIN — GABAPENTIN 100 MG: 100 CAPSULE ORAL at 22:23

## 2025-01-05 ASSESSMENT — ACTIVITIES OF DAILY LIVING (ADL)
ADLS_ACUITY_SCORE: 42
ADLS_ACUITY_SCORE: 42
ADLS_ACUITY_SCORE: 35
DRESSING/BATHING_DIFFICULTY: OTHER (SEE COMMENTS)
ADLS_ACUITY_SCORE: 42
ADLS_ACUITY_SCORE: 35
ADLS_ACUITY_SCORE: 41
ADLS_ACUITY_SCORE: 31
ADLS_ACUITY_SCORE: 42
ADLS_ACUITY_SCORE: 42
ADLS_ACUITY_SCORE: 31
ADLS_ACUITY_SCORE: 42
ADLS_ACUITY_SCORE: 41
ADLS_ACUITY_SCORE: 42
ADLS_ACUITY_SCORE: 29
ADLS_ACUITY_SCORE: 42
ADLS_ACUITY_SCORE: 42
ADLS_ACUITY_SCORE: 35
ADLS_ACUITY_SCORE: 41

## 2025-01-05 NOTE — ED TRIAGE NOTES
Pt arrives through triage for chest pain that was present upon waking.  Pt states around 0900. No n/v.  Worse with activity.  Pt had similar pain 2 months ago.  Worse with laying down     Triage Assessment (Adult)       Row Name 01/04/25 1923          Triage Assessment    Airway WDL WDL        Respiratory WDL    Respiratory WDL WDL        Skin Circulation/Temperature WDL    Skin Circulation/Temperature WDL WDL        Cardiac WDL    Cardiac WDL X;chest pain        Chest Pain Assessment    Chest Pain Location midsternal     Precipitating Factors activity        Peripheral/Neurovascular WDL    Peripheral Neurovascular WDL WDL        Cognitive/Neuro/Behavioral WDL    Cognitive/Neuro/Behavioral WDL WDL

## 2025-01-05 NOTE — H&P
[facility]    History and Physical - Hospitalist Service       Date of Admission:  1/4/2025    Assessment & Plan      Jonas Hyman is a 79 year old male medical history significant for type II DM, hypertension, hyperlipidemia, prior history of MI status post stent placement x 2, history of cardiac arrest in the past, history of lung cancer on chemotherapy and other medical comorbidities who is admitted with chest pain.  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)    Atherosclerosis of native coronary artery of native heart with angina pectoris (H)    Chronic coronary artery disease    Cataract    Cystoid macular edema of right eye    Diverticulosis of large intestine without hemorrhage    Dyslipidemia    Essential hypertension    Exudative age-related macular degeneration of right eye (H)    History of acute anterior wall MI    History of alcohol dependence (H)    Low vitamin B12 level    Smoker    Status post coronary artery stent placement    Total retinal detachment    Type 2 diabetes mellitus with stage 3b chronic kidney disease, without long-term current use of insulin (H)    History of ventricular fibrillation    Vitamin D deficiency    Myelolipoma    S/P repair of abdominal aortic aneurysm using bifurcation graft    Thoracoabdominal aortic aneurysm (TAAA) (H)    Small cell lung cancer, right lower lobe (H)    Chemotherapy-induced neutropenia (H)    Chest pain in adult    Shortness of breath    Pericardial effusion    Chest pain, unspecified type      Plan of Care    1. Chest Pain    Assessment and Monitoring:  Admit to the  [facility] for cardiac telemetry observation to closely monitor chest pain and shortness of breath.  Conduct serial troponin tests to assess for any acute cardiac injury.  Perform a formal echocardiogram to evaluate the pericardial effusion and assess for any changes in cardiac function.  Differential Diagnosis:  Consider acute coronary syndrome (ACS) versus pericarditis.  Unable to give NSAIDs, holding off on colchicine.  Low-dose Dilaudid given for pain control.  Nitro-Bid to chest did not help    Medications and Pain Management:  Consider trial of colchicine versus prednisone for potential pericarditis.  Appreciate cardiology input  Administer Dilaudid PRN for pain management.  Provide Tylenol as needed for additional pain control.  Consult cardiology for further management.  Consider pericardiocentesis if clinically indicated.  Monitor vital signs and oxygen saturation closely.  Provide supplemental oxygen as needed.  Reassess condition regularly to determine the need for further interventions.    2. Type II Diabetes Mellitus  Perform Accu-Cheks regularly.  Administer sliding scale insulin to maintain blood sugar levels within the target range of 100-140 mg/dL.  Monitor for signs of hypo- or hyperglycemia and adjust insulin dosing as necessary.    3. Hypertension    Continue current antihypertensive medications as blood pressure is currently well-controlled.  Monitor blood pressure regularly to ensure continued control.    4. Hyperlipidemia  Continue home medication regimen for lipid management.  Monitor lipid levels periodically to assess efficacy of treatment.    5. Small Cell Lung Cancer  Monitor for and manage any potential side effects related to recent chemotherapy treatment.  Review recent CT findings showing a slightly smaller spiculated mass with adjacent scarring in the right middle lobe.  Coordinate with oncology for ongoing cancer management and follow-up imaging as needed.  General Follow-Up and  Monitoring    Regularly assess patient s overall condition and adjust plans based on clinical findings and test results.  Ensure interdisciplinary communication for comprehensive care coordination.        Diet:  N.p.o. after midnight  DVT Prophylaxis: Pneumatic Compression Devices  Camilo Catheter: Not present  Lines: PRESENT             Cardiac Monitoring: None  Code Status:  Full code    Clinically Significant Risk Factors Present on Admission                 # Drug Induced Platelet Defect: home medication list includes an antiplatelet medication   # Hypertension: Noted on problem list                      Disposition Plan     Medically Ready for Discharge: Anticipated Tomorrow           Liliam Sanders MD  Hospitalist Service  [facility]  Securely message with [employer] (more info)  Text page via [facility] Paging/Directory     ______________________________________________________________________    Chief Complaint   Chest pain        History of Present Illness   Jonas Hyman is a 79 year old male medical history significant for type II DM, hypertension, hyperlipidemia, prior history of MI status post stent placement x 2, history of cardiac arrest in the past, history of lung cancer on chemotherapy and other medical comorbidities who is admitted with chest pain.  Patient found to have pericardial effusion that requires further evaluation.    Per history, patient presented to the emergency department with chest pain and shortness of breath. The chest pain and dyspnea were exacerbated by lying down and ambulation, and these symptoms began earlier that morning around 9:00 AM. Mr. Hyman denied any cough, fever, nausea, or vomiting, but noted that coughing aggravated the chest pain. He had experienced similar symptoms a few months prior, which improved after burping, although he was unable to achieve relief in that manner on this occasion. On examination, he appeared slightly short of breath with  decreased breath sounds bilaterally, more pronounced on the left side.      Initial evaluation in the emergency department included a chest x-ray, EKG, and laboratory tests. The chest x-ray revealed an enlarged cardiac silhouette indicative of a moderate-sized pericardial effusion, and a CT scan ruled out pulmonary embolism but confirmed the effusion. Laboratory tests showed an elevated D-dimer at 3.13  g/mL FEU, a slightly elevated troponin T level, and a glucose level of 266 mg/dL. A bedside ultrasound confirmed the presence of a small pericardial effusion without signs of tamponade. Mr. Hyman's blood pressure was stable at 134/55 mmHg, and his heart rate was 89 beats per minute.    ER intervention included given morphine for pain control and Requip for restless leg syndrome.  Case was discussed with cardiologist on-call and the recommendation was to admit for a formal echocardiogram.    Mr. Hyman is being  admitted for further evaluation and management of a moderate-sized pericardial effusion, which required observation and possible intervention. The pericardial effusion was concerning for pericarditis, potentially related to his recent chemotherapy or underlying cardiac conditions.    He remains a full code at this time.      Past Medical History    Past Medical History:   Diagnosis Date    Acute MI (H) 2009    Bronchitis     Cardiac arrest (H) 2009    Cardiac arrest (H)     Chemical dependency (H)     Has been in recovery 49 years    Coronary artery disease     Diabetes mellitus (H)     type II    Diabetes mellitus type 2, noninsulin dependent (H)     Diverticula of colon     Diverticulosis of large intestine     Hemorrhoids     Hemorrhoids     History of alcohol dependence (H) 01/12/1975    Created by [facility] Annotation: May 29 2009  5:52PM - Dillon Goldsmith: dry since  1975, also used drugs and marijuana  Replacement Utility updated for latest IMO load    Hypertension     Hypertension      Macular degeneration of right eye     Mixed hyperlipidemia     Myocardial infarction (H)     Retinal detachment 06/23/2014    Vitrectomy Posterior 23 guage, scleral buckle, membrane peel, endolaser gase    Stage 3b chronic kidney disease (H) 11/30/2020    Stented coronary artery 2009    stints times 2    Vitamin B12 deficiency     Vitamin D deficiency        Past Surgical History   Past Surgical History:   Procedure Laterality Date    BRONCHOSCOPY RIDID OR FLEXIBLE W/ENDOBRONCHIAL ULTRASOUND GUIDED 1 OR 2 NODE STATIONS N/A 8/26/2024    Procedure: BRONCHOSCOPY, WITH BIOPSY OF 1 OR 2 LYMPH NODE STATIONS WITH ENDOBRONCHIAL ULTRASOUND GUIDANCE;  Surgeon: Bernarda Morrison MD;  Location: [facility]    CATARACT IOL, RT/LT Bilateral     EYE SURGERY      FRACTURE SURGERY      HC REMOVE TONSILS/ADENOIDS,<13 Y/O      Description: Tonsillectomy With Adenoidectomy;  Recorded: 05/29/2009;    HEART CATH STENT COR W/WO PTCA  05/07/2009    Proximal Left Anterior Descending Artery, Proximal Circumflex      HERNIA REPAIR      INGUINAL HERNIA REPAIR      IR CHEST PORT PLACEMENT > 5 YRS OF AGE  9/5/2024    LASER YAG IRIDOTOMY  9/6/2013    Procedure: LASER YAG IRIDOTOMY;  peripheral irridotomy ;  Surgeon: Doroteo Andres MD;  Location: [facility]    OTHER SURGICAL HISTORY      Cath Stent 1 Proximal Left Anterior Descending Artery     PHACOEMULSIFICATION CLEAR CORNEA WITH STANDARD INTRAOCULAR LENS IMPLANT  9/5/2013    Procedure: PHACOEMULSIFICATION CLEAR CORNEA WITH STANDARD INTRAOCULAR LENS IMPLANT;   COMPLEX RIGHT PHACOEMULSIFICATION CLEAR CORNEA WITH ANTERIOR CHAMBER INTRAOCULAR LENS IMPLANT, ANTERIOR VITRECTOMY;  Surgeon: Doroteo Andres MD;  Location: [facility]    PHACOEMULSIFICATION WITH STANDARD INTRAOCULAR LENS IMPLANT Left 12/3/2018    Procedure: Left Eye Phacoemulsification with Intraocular Lens;  Surgeon: Suhail Johnson MD;  Location: [facility]    RETINAL DETACHMENT SURGERY      TONSILLECTOMY  5/24/200/9    VASECTOMY      VITRECTOMY  ANTERIOR  9/5/2013    Procedure: VITRECTOMY ANTERIOR;;  Surgeon: Doroteo Andres MD;  Location: [facility] MUSCLE-SKIN FLAP,LEG         Prior to Admission Medications   Prior to Admission Medications   Prescriptions Last Dose Informant Patient Reported? Taking?   Pyridoxine HCl (VITAMIN B-6 PO)   Yes No   Sig: Take 1 tablet by mouth daily.   VITAMIN D, CHOLECALCIFEROL, PO   Yes No   Sig: Take 2,000 Units by mouth daily   acetaminophen (TYLENOL) 500 MG tablet   Yes No   Sig: Take 1,000-1,500 mg by mouth 2 times daily.   aspirin (ASA) 81 MG tablet   Yes No   Sig: Take 81 mg by mouth daily   busPIRone (BUSPAR) 10 MG tablet   No No   Sig: Take 1 tablet (10 mg) by mouth 3 times daily as needed for anxiety.   cyanocobalamin (VITAMIN B-12) 100 MCG tablet   Yes No   Sig: Take 2,000 mcg by mouth daily   furosemide (LASIX) 40 MG tablet   No No   Sig: Take 1 tablet (40 mg) by mouth daily as needed (Leg swelling)   gabapentin (NEURONTIN) 100 MG capsule   No No   Sig: Take 1 capsule (100 mg) by mouth at bedtime.   hydrOXYzine HCl (ATARAX) 25 MG tablet   No No   Sig: Take 1 tablet (25 mg) by mouth 3 times daily as needed for itching.   ibuprofen (ADVIL/MOTRIN) 200 MG tablet   Yes No   Sig: Take 600 mg by mouth 2 times daily.   magnesium oxide (MAG-OX) 400 MG tablet   No No   Sig: Take 1 tablet (400 mg) by mouth daily   nitroGLYcerin (NITROSTAT) 0.4 MG sublingual tablet   No No   Sig: Place 1 tablet (0.4 mg) under the tongue every 5 minutes as needed for chest pain   ondansetron (ZOFRAN) 8 MG tablet   No No   Sig: Take 1 tablet (8 mg) by mouth every 6 hours as needed for nausea (vomiting). Do not start before September 9, 2024.   Patient not taking: Reported on 9/17/2024   pravastatin (PRAVACHOL) 40 MG tablet   No No   Sig: Take 1 tablet (40 mg) by mouth daily   rOPINIRole (REQUIP) 2 MG tablet   No No   Sig: Take 1 tablet (2 mg) by mouth at bedtime   triamterene-HCTZ (DYAZIDE) 37.5-25 MG capsule   Yes No   Sig: Take by mouth  every morning.   Patient not taking: Reported on 2024      Facility-Administered Medications Last Administration Doses Remaining   aflibercept (EYLEA) injection prefilled syringe 2 mg None recorded 13   cyanocobalamin injection 1,000 mcg 2022  2:49 PM            Review of Systems    The 10 point Review of Systems is negative other than noted in the HPI or here.     Social History   I have reviewed this patient's social history and updated it with pertinent information if needed.  Social History     Tobacco Use    Smoking status: Former     Current packs/day: 0.00     Average packs/day: 1 pack/day for 50.6 years (50.6 ttl pk-yrs)     Types: Cigarettes     Start date: 1956     Quit date: 2024     Years since quittin.0     Passive exposure: Past (son smoked smoking in home)    Smokeless tobacco: Never    Tobacco comments:     20 yrs ago-mid 80's, quit again , started again , quit 2024   Vaping Use    Vaping status: Never Used   Substance Use Topics    Alcohol use: No     Comment: from 1975    Drug use: No         Family History   I have reviewed this patient's family history and updated it with pertinent information if needed.  Family History   Problem Relation Age of Onset    Obesity Father     Glaucoma No family hx of     Macular Degeneration No family hx of     Retinal detachment No family hx of     Kidney Cancer Father     Heart Disease Brother          Allergies   No Known Allergies     Physical Exam   Vital Signs: Temp: 98.1  F (36.7  C) Temp src: Oral BP: 138/65 Pulse: 104   Resp: (!) 35 SpO2: 95 % O2 Device: None (Room air)    Weight: 246 lbs 0 oz      General Aox3, appropriate affect, NAD, on RA  Sitting upright on his bed  HEENT decreased at lung bases   No wheezing  Heart RRR, No M/R/G  Abd- Soft, NT, BS+  - Deferred,   Extremity- Moving all extremities, No digital clubbing,   No edema  Neuro- Aox3, CN II- XII intact, No peripheral vision loss  P5/5, T-N, reflexes  2+, plantar reflex downwards,  gait not checked  Skin  Has no tattoo, No skin rash     Medical Decision Making       90 MINUTES SPENT BY ME on the date of service doing chart review, history, exam, documentation & further activities per the note.      ------------------ MEDICAL DECISION MAKING ------------------------------------------------------------------------------------------------------  MANAGEMENT DISCUSSED with the following over the past 24 hours: patient and care team       Data   ------------------------- PAST 24 HR DATA REVIEWED -----------------------------------------------    I have personally reviewed the following data over the past 24 hrs:    11.5 (H)  \   12.5 (L)   / 272     137 101 17.5 /  114 (H)   3.7 26 1.26 (H) \     ALT: 9 AST: 12 AP: 78 TBILI: 0.4   ALB: 3.9 TOT PROTEIN: 6.7 LIPASE: N/A     Trop: 24 (H) BNP: 693     INR:  N/A PTT:  N/A   D-dimer:  3.13 (H) Fibrinogen:  N/A       Imaging results reviewed over the past 24 hrs:   Recent Results (from the past 24 hours)   XR Chest Port 1 View    Narrative    EXAM: XR CHEST PORT 1 VIEW  LOCATION: [facility]  DATE: 1/4/2025    INDICATION: chest pain  COMPARISON: CT chest 01/04/2025      Impression    IMPRESSION: Enlarged cardiac silhouette representing a moderate-sized pericardial effusion as seen on CT. Pulmonary vascularity normal. Subsegmental atelectasis left lung base. Lungs otherwise are clear. Left IJ central venous catheter tip at the   cavoatrial junction.   CT Chest Pulmonary Embolism w Contrast    Narrative    EXAM: CT CHEST PULMONARY EMBOLISM W CONTRAST  LOCATION: [facility]  DATE: 1/4/2025    INDICATION: Chest pain  COMPARISON: CT 10/25/2024  TECHNIQUE: CT chest pulmonary angiogram during arterial phase injection of IV contrast. Multiplanar reformats and MIP reconstructions were performed. Dose reduction techniques were used.   CONTRAST: Isovue 370 75ml    FINDINGS:  ANGIOGRAM  CHEST: Pulmonary arteries are normal caliber and negative for pulmonary emboli. Normal caliber thoracic aorta. No CT evidence of right heart strain.    LUNGS AND PLEURA: Spiculated masslike density with irregular margins right middle lobe is again noted. This appears to be slightly smaller in size compared to the previous exam. Using the same region of measurement, this measures 4.0 x 1.5 cm compared to   5.1 x 2.3 cm previously. There is adjacent scarring and volume loss extending superiorly similar to the prior study. There are new interstitial and groundglass changes in both lower lobes which may be due to volume loss or less likely pneumonitis. No   pleural fluid.    MEDIASTINUM/AXILLAE: Moderate-sized pericardial effusion has increased since the prior examination. Pericarditis cannot be excluded. Right subcarinal lymph node has a short axis diameter 1.6 cm, this had measured 1.3 cm previously showing slight interval   increase in size. The right anterior mediastinal lymph node or mass is smaller in size measuring 1.6 cm in short axis diameter as compared to 2.4 cm. This partially obscured by the pericardial effusion. No other adenopathy.    CORONARY ARTERY CALCIFICATION: Coronary artery stent.    UPPER ABDOMEN: An endograft stent involving the upper abdominal aorta with stents extending into the origin of the celiac trunk, SMA and right renal artery.    MUSCULOSKELETAL: Unchanged sclerosis within the mid sternum.      Impression    IMPRESSION:  1.  No CT evidence of pulmonary embolism.  2.  Moderate-sized pericardial effusion has increased since the previous study; pericarditis cannot be excluded.  3.  Spiculated mass with adjacent scarring right middle lobe measures slightly smaller on today's examination. This likely represents the patient's known treated lung cancer.  4.  Mildly prominent right subcarinal lymph node measures slightly larger on today's examination. Given the change, this should be monitored  on future surveillance examinations. The right anterior mediastinal mass or lymph node seen on the previous study   is partially obscured by the pericardial effusion but does appear to be smaller with a short axis diameter 1.6 cm as compared to 2.4 cm previously.   POC US ECHO LIMITED    Impression    PROCEDURE:    Emergency Department Limited Bedside Screening Cardiac Ultrasound  INDICATIONS: chest pain  PROCEDURE PROVIDER: Marilee Banerjee   WINDOW AND FINDINGS:   SUB-XYPHOID     :      Grossly normal/symmetric wall motion  Pericardial effusion: Small  Signs of Tamponade physiology: Absent  PARASTERNAL    :  Grossly normal/symmetric wall motion  Pericardial effusion: Small  Signs of Tamponade physiology: Absent    IMAGES SAVED AND STORED FOR ARCHIVE AND REVIEW: Yes

## 2025-01-05 NOTE — CONSULTS
[facility]  Cardiac Electrophysiology  [address]  [place] [address]   Office: [phone]  Fax: [phone]     Cardiology Consultation    Patient: Jonas Hyman   : 1945     REASON FOR CONSULTATION  Moderate pericardial effusion      Assessment/Recommendations     Moderate pericardial effusion - increased in size since 10/25/2024 CT.  No tamponade physiology by 2025 TTE, with sinus rates 70-90s before onset of AF 2024 0261-4102.  - check ESR, CRP  - diagnostic and therapeutic pericardiocentesis, plan for 2025, NPO after midnight.     Newly diagnosed atrial fibrillation - asymptomatic, onset of AF 2024 [id number].  [id number]  - anticoagulation initiation is indicated, though will defer until after pericardiocentesis  - follow-up inflammatory markers, pericardial fluid studies    Positive hs-Al - mild elevation, stable trend.  No ischemic ECG changes. Doubt plaque rupture/ACS    CAD with remote anterior MI and PCI with rahsid-MI VF cardiac arrest - ~. LVEF 60-65% by 2025 TTE.  - continue pravastatin 40mg daily    RBBB           History of Present Illness   Jonas Hyman is a 79 year old male with pericardial effusion, CAD with prior anterior MI and PCI around , rashid-MI VF arrest, RBBB, thoracic and abdominal aortic aneurysm with prior repair, small cell lung cancer on chemotherapy, HTN, T2DM, obesity, admitted for further evaluation of chest pain, dyspnea.      Mr. Hyman notes intermittent chest discomfort, dyspnea and lightheadedness over the past 2 weeks.  He reports slightly worse symptoms with laying supine.  He had been known to have a pericardial effusion by 10/25/2024 CT chest.  He presented to the ER 2025 for further evaluation.  He underwent CT PE showing moderate pericardial effusion, larger cf 10/25/2024, no PE.  TTE showed moderate pericardial effusion without tamponade physiology.  HR has been 80-90's  since admission, -173/55-106mmHg.      Of note, he was off telemetry from 0300 to 1000.  He appeared to be in SR 70-80bpm by time of TTE 1/5/2025.  By time of reconnection to telemetry, he was observed to be in atrial fibrillation with ventricular rates 100-120s bpm.       Physical Examination  Review of Systems   VITALS: /67 (BP Location: Right arm, Patient Position: Sitting, Cuff Size: Adult Regular)   Pulse 93   Temp 98.2  F (36.8  C) (Oral)   Resp 18   Wt 111.6 kg (246 lb)   SpO2 91%   BMI 36.86 kg/m    Wt Readings from Last 3 Encounters:   01/04/25 111.6 kg (246 lb)   12/11/24 113.2 kg (249 lb 8 oz)   11/20/24 115.2 kg (253 lb 14.4 oz)     CONSTITUTIONAL: well nourished, comfortable, no distress  EYES:  Conjunctivae pink, sclerae clear.    E/N/T:  Oral mucosa pink  RESPIRATORY:  Respiratory effort is normal  CARDIOVASCULAR:  irregular, tachycardic, normal S1 and S2  GASTROINTESTINAL:  Abdomen without masses or tenderness  EXTREMITIES:  No clubbing or cyanosis.    MUSCULOSKELETAL:  Overall grossly normal muscle strength  SKIN:  Overall, skin warm and dry, no lesions.  NEURO/PSYCH:  Oriented x 3 with normal affect.   Constitutional:  No weight loss or loss of appetite    Eyes:  No difficulty with vision, no double vision, no dry eyes  ENT:  No sore throat, difficulty swallowing; changes in hearing or tinnitus  Cardiovascular: As detailed above  Respiratory:  No cough  Musculoskeletal  No joint pain, muscle aches  Neurologic:  No syncope, lightheadedness, fainting spells   Hematologic: No easy bruising, excessive bleeding tendency   Gastrointestinal:  No jaundice, abdominal pain or abdominal bloating  Genitourinary: No changes in urinary habits, no trouble urinating    Psychiatric: No anxiety or depression      Medical History  Surgical History   Past Medical History:   Diagnosis Date    Acute MI (H) 2009    Bronchitis     Cardiac arrest (H) 2009    Cardiac arrest (H)     Chemical dependency (H)      Has been in recovery 49 years    Coronary artery disease     Diabetes mellitus (H)     type II    Diabetes mellitus type 2, noninsulin dependent (H)     Diverticula of colon     Diverticulosis of large intestine     Hemorrhoids     Hemorrhoids     History of alcohol dependence (H) 01/12/1975    Created by [employer] [facility] Annotation: May 29 2009  5:52PM - Dillon Goldsmith: dry since  1975, also used drugs and marijuana  Replacement Utility updated for latest IMO load    Hypertension     Hypertension     Macular degeneration of right eye     Mixed hyperlipidemia     Myocardial infarction (H)     Retinal detachment 06/23/2014    Vitrectomy Posterior 23 guage, scleral buckle, membrane peel, endolaser gase    Stage 3b chronic kidney disease (H) 11/30/2020    Stented coronary artery 2009    stints times 2    Vitamin B12 deficiency     Vitamin D deficiency     Past Surgical History:   Procedure Laterality Date    BRONCHOSCOPY RIDID OR FLEXIBLE W/ENDOBRONCHIAL ULTRASOUND GUIDED 1 OR 2 NODE STATIONS N/A 8/26/2024    Procedure: BRONCHOSCOPY, WITH BIOPSY OF 1 OR 2 LYMPH NODE STATIONS WITH ENDOBRONCHIAL ULTRASOUND GUIDANCE;  Surgeon: Bernarda Morrison MD;  Location:  GI    CATARACT IOL, RT/LT Bilateral     EYE SURGERY      FRACTURE SURGERY      HC REMOVE TONSILS/ADENOIDS,<11 Y/O      Description: Tonsillectomy With Adenoidectomy;  Recorded: 05/29/2009;    HEART CATH STENT COR W/WO PTCA  05/07/2009    Proximal Left Anterior Descending Artery, Proximal Circumflex      HERNIA REPAIR      INGUINAL HERNIA REPAIR      IR CHEST PORT PLACEMENT > 5 YRS OF AGE  9/5/2024    LASER YAG IRIDOTOMY  9/6/2013    Procedure: LASER YAG IRIDOTOMY;  peripheral irridotomy ;  Surgeon: Doroteo Andres MD;  Location: [facility]    OTHER SURGICAL HISTORY      Cath Stent 1 Proximal Left Anterior Descending Artery     PHACOEMULSIFICATION CLEAR CORNEA WITH STANDARD INTRAOCULAR LENS IMPLANT  9/5/2013    Procedure: PHACOEMULSIFICATION CLEAR CORNEA  WITH STANDARD INTRAOCULAR LENS IMPLANT;   COMPLEX RIGHT PHACOEMULSIFICATION CLEAR CORNEA WITH ANTERIOR CHAMBER INTRAOCULAR LENS IMPLANT, ANTERIOR VITRECTOMY;  Surgeon: Doroteo Andres MD;  Location: [facility]    PHACOEMULSIFICATION WITH STANDARD INTRAOCULAR LENS IMPLANT Left 12/3/2018    Procedure: Left Eye Phacoemulsification with Intraocular Lens;  Surgeon: Suhail Johnson MD;  Location: [facility] OR    RETINAL DETACHMENT SURGERY      TONSILLECTOMY      VASECTOMY      VITRECTOMY ANTERIOR  2013    Procedure: VITRECTOMY ANTERIOR;;  Surgeon: Doroteo Andres MD;  Location: [facility]    ZZC MUSCLE-SKIN FLAP,LEG           Family History Social History   Family History   Problem Relation Age of Onset    Obesity Father     Glaucoma No family hx of     Macular Degeneration No family hx of     Retinal detachment No family hx of     Kidney Cancer Father     Heart Disease Brother         Social History     Tobacco Use    Smoking status: Former     Current packs/day: 0.00     Average packs/day: 1 pack/day for 50.6 years (50.6 ttl pk-yrs)     Types: Cigarettes     Start date: 1956     Quit date: 2024     Years since quittin.0     Passive exposure: Past (son smoked smoking in home)    Smokeless tobacco: Never    Tobacco comments:     20 yrs ago-mid 80's, quit again , started again , quit 2024   Vaping Use    Vaping status: Never Used   Substance Use Topics    Alcohol use: No     Comment: from 1975    Drug use: No         Medications  Allergies     Current Facility-Administered Medications:     aflibercept (EYLEA) injection prefilled syringe 2 mg, 2 mg, Intravitreal, Q28 Days, Anu Wilson MD    calcium carbonate (TUMS) chewable tablet 1,000 mg, 1,000 mg, Oral, 4x Daily PRN, Liliam Sanders MD    cyanocobalamin injection 1,000 mcg, 1,000 mcg, Intramuscular, Q30 Days, Dillon Goldsmith MD, 1,000 mcg at 22 1449    glucose gel 15-30 g, 15-30 g, Oral, Q15 Min PRN **OR**  dextrose 50 % injection 25-50 mL, 25-50 mL, Intravenous, Q15 Min PRN **OR** glucagon injection 1 mg, 1 mg, Subcutaneous, Q15 Min PRN, Liliam Sanders MD    HYDROmorphone (PF) (DILAUDID) injection 0.3 mg, 0.3 mg, Intravenous, Q3H PRN, Liliam Sanders MD, 0.3 mg at 01/05/25 0750    HYDROmorphone (PF) (DILAUDID) injection 0.5 mg, 0.5 mg, Intravenous, Q3H PRN, Liliam Sanders MD    lidocaine (LMX4) cream, , Topical, Q1H PRN, Liliam Sanders MD    lidocaine 1 % 0.1-1 mL, 0.1-1 mL, Other, Q1H PRN, Liliam Sanders MD    nitroGLYcerin (NITRO-BID) 2 % ointment 7.5 mg, 0.5 inch, Transdermal, Once, Liliam Sanders MD, 7.5 mg at 01/05/25 0651    ondansetron (ZOFRAN ODT) ODT tab 4 mg, 4 mg, Oral, Q6H PRN **OR** ondansetron (ZOFRAN) injection 4 mg, 4 mg, Intravenous, Q6H PRN, Liliam Sanders MD    sodium chloride (PF) 0.9% PF flush 3 mL, 3 mL, Intracatheter, Q8H, Liliam Sanders MD, 3 mL at 01/05/25 0656    sodium chloride (PF) 0.9% PF flush 3 mL, 3 mL, Intracatheter, q1 min prn, Liliam Sanders MD    sodium chloride 0.9 % infusion, , Intravenous, Continuous, Liliam Sanders MD, Last Rate: 50 mL/hr at 01/05/25 0655, New Bag at 01/05/25 0655    Current Outpatient Medications:     acetaminophen (TYLENOL) 500 MG tablet, Take 1,000-1,500 mg by mouth 2 times daily., Disp: , Rfl:     aspirin (ASA) 81 MG tablet, Take 81 mg by mouth daily, Disp: , Rfl:     busPIRone (BUSPAR) 10 MG tablet, Take 1 tablet (10 mg) by mouth 3 times daily as needed for anxiety., Disp: 90 tablet, Rfl: 1    cyanocobalamin (VITAMIN B-12) 100 MCG tablet, Take 2,000 mcg by mouth daily, Disp: , Rfl:     furosemide (LASIX) 40 MG tablet, Take 1 tablet (40 mg) by mouth daily as needed (Leg swelling), Disp: 30 tablet, Rfl: 3    gabapentin (NEURONTIN) 100 MG capsule, Take 1 capsule (100 mg) by mouth at bedtime., Disp: 60 capsule, Rfl: 3    hydrOXYzine HCl (ATARAX) 25 MG tablet, Take 1 tablet (25 mg) by mouth 3 times daily as needed for itching., Disp: 40 tablet, Rfl: 2    " ibuprofen (ADVIL/MOTRIN) 200 MG tablet, Take 600 mg by mouth 2 times daily., Disp: , Rfl:     magnesium oxide (MAG-OX) 400 MG tablet, Take 1 tablet (400 mg) by mouth daily, Disp: , Rfl:     nitroGLYcerin (NITROSTAT) 0.4 MG sublingual tablet, Place 1 tablet (0.4 mg) under the tongue every 5 minutes as needed for chest pain, Disp: 30 tablet, Rfl: 5    ondansetron (ZOFRAN) 8 MG tablet, Take 1 tablet (8 mg) by mouth every 6 hours as needed for nausea (vomiting). Do not start before September 9, 2024., Disp: 30 tablet, Rfl: 2    pravastatin (PRAVACHOL) 40 MG tablet, Take 1 tablet (40 mg) by mouth daily, Disp: 90 tablet, Rfl: 3    Pyridoxine HCl (VITAMIN B-6 PO), Take 1 tablet by mouth daily., Disp: , Rfl:     rOPINIRole (REQUIP) 2 MG tablet, Take 1 tablet (2 mg) by mouth at bedtime, Disp: 90 tablet, Rfl: 3    VITAMIN D, CHOLECALCIFEROL, PO, Take 2,000 Units by mouth daily, Disp: , Rfl:    No Known Allergies       Lab Results    Chemistry CBC Cardiac Enzymes/BNP/TSH/INR   Recent Labs   Lab Test 01/05/25  0727      POTASSIUM 3.7   CHLORIDE 101   CO2 26   *   BUN 17.5   CR 1.26*   GFRESTIMATED 58*   MACIE 9.3     Recent Labs   Lab Test 01/05/25 0727 01/04/25 1926 01/02/25  0916   CR 1.26* 1.42* 1.38*          Recent Labs   Lab Test 01/05/25  0727   WBC 11.5*   HGB 12.5*   HCT 38.2*   MCV 97        Recent Labs   Lab Test 01/05/25  0727 01/04/25 1926 01/02/25  0916   HGB 12.5* 13.2* 11.9*    No results for input(s): \"TROPONINI\" in the last 23482 hours.  Recent Labs   Lab Test 01/04/25 1926   NTBNPI [id number]     Recent Labs   Lab Test 01/02/25  0916   TSH 1.37     Recent Labs   Lab Test 09/14/23 2026 04/01/19  1455   INR 1.04 1.10         Data Review    ECGs (tracings independently reviewed)  1/4/2025 - ST 105bpm, RBBB  8/26/2024 - SR 68bpm, RBBB    1/4/2025 TTE  1. The left ventricle is normal in size. Left ventricular function is  normal.The ejection fraction is 60-65%. There is normal left ventricular " wall  thickness. No regional wall motion abnormalities noted.  2. Normal right ventricle size and systolic function.  3. Normal left atrial size. Right atrial size is normal.  4. Moderate pericardial effusion There are no echocardiographic indications of  cardiac tamponade.IVC diameter <2.1 cm collapsing >50% with sniff suggests a  normal RA pressure of 3 mmHg.  Clinical correlation is recommended. There is no comparison study available.    1/4/2025 CT PE  1.  No CT evidence of pulmonary embolism.  2.  Moderate-sized pericardial effusion has increased since the previous study; pericarditis cannot be excluded.  3.  Spiculated mass with adjacent scarring right middle lobe measures slightly smaller on today's examination. This likely represents the patient's known treated lung cancer.  4.  Mildly prominent right subcarinal lymph node measures slightly larger on today's examination. Given the change, this should be monitored on future surveillance examinations. The right anterior mediastinal mass or lymph node seen on the previous study   is partially obscured by the pericardial effusion but does appear to be smaller with a short axis diameter 1.6 cm as compared to 2.4 cm previously.       Clinically Significant Risk Factors Present on Admission                 # Drug Induced Platelet Defect: home medication list includes an antiplatelet medication   # Hypertension: Noted on problem list                 Cardiac Arrhythmia: Atrial fibrillation: Unspecified  Pericardial Effusion: Pericardial effusion    Small cell lung cancer    CKD POA List: Stage 3a (GFR 45-59)      Anna Carranza MD  1/5/2025  12:02 PM

## 2025-01-05 NOTE — PROGRESS NOTES
[facility]    Medicine Progress Note - Hospitalist Service    Date of Admission:  1/4/2025    Assessment & Plan   Jonas Hyman is a 79 year old male medical history significant for type II DM, hypertension, hyperlipidemia, prior history of MI status post stent placement x 2, history of lung cancer on immunotherapy who presents with acute chest pain.    #Acute chest pain  #Probable pericarditis  #Non-ischemic myocardial injury  The patient reports about 2 days of acute onset chest pain but some intermittent pain like this for a couple of weeks. It is sharp, non-radiating, but is worse with all movements, especially when moving his arms/chest. A little worse when lying flat. CT PE showed no PE but a moderate-sized pericardial effusion. TTE 1/5 shows normal EF and biventricular function/size, no WMA, normal biatrial size, demonstrates moderate pericardial effusion without echo evidence of tamponade. No recent URI symptoms. No alcohol in 50 years. Current smoker. HS troponin ~22-25 and flat over several re-checks. EKG with new diagnosis of atrial fibrillation.  - Cardiology consulted  - Plan for pericardiocentesis 1/6/25, NPO at [facility]  - Holding all AC procedure  - Tele  - Check inflammatory markers (), ESR ordered  - Pain control ordered with Tylenol, oxycodone PRN, Dilaudid IV PRN  - Nitroglycerin paste placed on admission; would not continue after removal unless patient finds this helps significantly    #New onset of atrial fibrillation  Noted in the ED. CHADSVASC 6. Likely triggered by pericardial pathology.  - Cardiology consulted  - AC indicated, but waiting until after pericardiocentesis    #Pre-DM  A1C 5.8.    #Primary hypertension  Noted in chart, not on blood pressure meds.    #CAD with remote anterior MI and PCI with rashid-MI VF cardiac arrest   #RBBB  - Continue PTA ASA 81mg daily  - Continue PTA pravastatin 40mg daily    #Small Cell Lung Cancer  Follows with Dr. Madera.  Completed 4 cycles of chemoimmunotherapy. Historically had had good response to this treatment, though cancer remains present. Currently on maintenance immunotherapy.  - Continue atezolizumab q3wks as OP    #Anxiety  - Continue PTA Buspar 10mg TID PRN    #RLS  - Continue PTA gabapentin 100mg at bedtime  - Continue PTA ropinirole 2mg QHS    #B12 deficiency  #Vitamin D deficiency  - Hold home supplements here          Diet: Regular Diet Adult  NPO per Anesthesia Guidelines for Procedure/Surgery Except for: Meds  NPO for Medical/Clinical Reasons Except for: Meds    DVT Prophylaxis: Pneumatic Compression Devices  Camilo Catheter: Not present  Lines: PRESENT             Cardiac Monitoring: ACTIVE order. Indication: Tachyarrhythmias, acute (48 hours)  Code Status: Full Code      Clinically Significant Risk Factors Present on Admission                 # Drug Induced Platelet Defect: home medication list includes an antiplatelet medication   # Hypertension: Noted on problem list                      Social Drivers of Health    Tobacco Use: Medium Risk (12/11/2024)    Patient History     Smoking Tobacco Use: Former     Smokeless Tobacco Use: Never     Passive Exposure: Past          Disposition Plan     Medically Ready for Discharge: Anticipated Tomorrow             JONATAN MORENO MD  Hospitalist Service  [facility]  Securely message with [employer] (more info)  Text page via [employer] Paging/Directory   ______________________________________________________________________    Interval History   Still with intermittent, sharp central chest pain. Worse with movements and changing position. Hasn't walked much because of the pain so hard to say if exertional.    Physical Exam   Vital Signs: Temp: 98.2  F (36.8  C) Temp src: Oral BP: 136/67 Pulse: 93   Resp: 18 SpO2: 91 % O2 Device: None (Room air)    Weight: 246 lbs 0 oz    General: No overt distress, conversational, non-toxic appearing  HEENT:  MMM  Pulmonary: CTAB; no crackles, wheezing, or rhonchi, normal effort  Cardiac: RRR. No m/r/g  Abdomen: Soft. NT, ND.  Extremities: No bilateral LE edema  Neuro: alert and awake, Ox4    Medical Decision Making       55 MINUTES SPENT BY ME on the date of service doing chart review, history, exam, documentation & further activities per the note.      Data     I have personally reviewed the following data over the past 24 hrs:    11.5 (H)  \   12.5 (L)   / 272     137 101 17.5 /  114 (H)   3.7 26 1.26 (H) \     ALT: 9 AST: 12 AP: 78 TBILI: 0.4   ALB: 3.9 TOT PROTEIN: 6.7 LIPASE: N/A     Trop: 24 (H) BNP: 693     Procal: N/A CRP: 102.90 (H) Lactic Acid: N/A       INR:  N/A PTT:  N/A   D-dimer:  3.13 (H) Fibrinogen:  N/A       Imaging results reviewed over the past 24 hrs:   Recent Results (from the past 24 hours)   XR Chest Port 1 View    Narrative    EXAM: XR CHEST PORT 1 VIEW  LOCATION: [facility]  DATE: 1/4/2025    INDICATION: chest pain  COMPARISON: CT chest 01/04/2025      Impression    IMPRESSION: Enlarged cardiac silhouette representing a moderate-sized pericardial effusion as seen on CT. Pulmonary vascularity normal. Subsegmental atelectasis left lung base. Lungs otherwise are clear. Left IJ central venous catheter tip at the   cavoatrial junction.   CT Chest Pulmonary Embolism w Contrast    Narrative    EXAM: CT CHEST PULMONARY EMBOLISM W CONTRAST  LOCATION: [facility]  DATE: 1/4/2025    INDICATION: Chest pain  COMPARISON: CT 10/25/2024  TECHNIQUE: CT chest pulmonary angiogram during arterial phase injection of IV contrast. Multiplanar reformats and MIP reconstructions were performed. Dose reduction techniques were used.   CONTRAST: Isovue 370 75ml    FINDINGS:  ANGIOGRAM CHEST: Pulmonary arteries are normal caliber and negative for pulmonary emboli. Normal caliber thoracic aorta. No CT evidence of right heart strain.    LUNGS AND PLEURA: Spiculated masslike  density with irregular margins right middle lobe is again noted. This appears to be slightly smaller in size compared to the previous exam. Using the same region of measurement, this measures 4.0 x 1.5 cm compared to   5.1 x 2.3 cm previously. There is adjacent scarring and volume loss extending superiorly similar to the prior study. There are new interstitial and groundglass changes in both lower lobes which may be due to volume loss or less likely pneumonitis. No   pleural fluid.    MEDIASTINUM/AXILLAE: Moderate-sized pericardial effusion has increased since the prior examination. Pericarditis cannot be excluded. Right subcarinal lymph node has a short axis diameter 1.6 cm, this had measured 1.3 cm previously showing slight interval   increase in size. The right anterior mediastinal lymph node or mass is smaller in size measuring 1.6 cm in short axis diameter as compared to 2.4 cm. This partially obscured by the pericardial effusion. No other adenopathy.    CORONARY ARTERY CALCIFICATION: Coronary artery stent.    UPPER ABDOMEN: An endograft stent involving the upper abdominal aorta with stents extending into the origin of the celiac trunk, SMA and right renal artery.    MUSCULOSKELETAL: Unchanged sclerosis within the mid sternum.      Impression    IMPRESSION:  1.  No CT evidence of pulmonary embolism.  2.  Moderate-sized pericardial effusion has increased since the previous study; pericarditis cannot be excluded.  3.  Spiculated mass with adjacent scarring right middle lobe measures slightly smaller on today's examination. This likely represents the patient's known treated lung cancer.  4.  Mildly prominent right subcarinal lymph node measures slightly larger on today's examination. Given the change, this should be monitored on future surveillance examinations. The right anterior mediastinal mass or lymph node seen on the previous study   is partially obscured by the pericardial effusion but does appear to be  smaller with a short axis diameter 1.6 cm as compared to 2.4 cm previously.   POC US ECHO LIMITED    Impression    PROCEDURE:    Emergency Department Limited Bedside Screening Cardiac Ultrasound  INDICATIONS: chest pain  PROCEDURE PROVIDER: Marilee Banerjee   WINDOW AND FINDINGS:   SUB-XYPHOID     :      Grossly normal/symmetric wall motion  Pericardial effusion: Small  Signs of Tamponade physiology: Absent  PARASTERNAL    :  Grossly normal/symmetric wall motion  Pericardial effusion: Small  Signs of Tamponade physiology: Absent    IMAGES SAVED AND STORED FOR ARCHIVE AND REVIEW: Yes        Echocardiogram Complete   Result Value    LVEF  60-65%    [facility]    [id number]  [id number]  [id number]  [id number]^MARIO^KESHAV^GANESH     [place] [address]     Name: DEEPAK OLIVARES  MRN: [id number]  : 1945  Study Date: 2025 08:12 AM  Age: 79 yrs  Gender: Male  Patient Location: [facility]  Reason For Study: Pericardial Effusion  History: CAD- stents x2; Lung Cancer  Ordering Physician: KESHAV MARTIN  Performed By: ALFRED     BSA: 2.3 m2  Height: 69 in  Weight: 246 lb  BP: 175/77 mmHg  ______________________________________________________________________________  Procedure  Echocardiogram with two-dimensional, color and spectral Doppler.  ______________________________________________________________________________  Interpretation Summary     1. The left ventricle is normal in size. Left ventricular function is  normal.The ejection fraction is 60-65%. There is normal left ventricular wall  thickness. No regional wall motion abnormalities noted.  2. Normal right ventricle size and systolic function.  3. Normal left atrial size. Right atrial size is normal.  4. Moderate pericardial effusion There are no echocardiographic indications of  cardiac tamponade.IVC diameter <2.1 cm collapsing >50% with sniff suggests a  normal RA pressure of 3 mmHg.  Clinical correlation is recommended. There is no  comparison study available.  ______________________________________________________________________________  Left Ventricle  The left ventricle is normal in size. Left ventricular function is normal.The  ejection fraction is 60-65%. There is normal left ventricular wall thickness.  No regional wall motion abnormalities noted.     Right Ventricle  Normal right ventricle size and systolic function.     Atria  Normal left atrial size. Right atrial size is normal.     Mitral Valve  Mitral valve leaflets appear normal. There is no evidence of mitral stenosis  or clinically significant mitral regurgitation.     Tricuspid Valve  Tricuspid valve leaflets appear normal. There is no evidence of tricuspid  stenosis or clinically significant tricuspid regurgitation. Right ventricular  systolic pressure could not be approximated due to inadequate tricuspid  regurgitation.     Aortic Valve  The aortic valve is trileaflet. Aortic valve leaflets appear normal. There is  no evidence of aortic stenosis or clinically significant aortic regurgitation.     Pulmonic Valve  The pulmonic valve is not well seen, but is grossly normal. This degree of  valvular regurgitation is within normal limits. There is trace pulmonic  valvular regurgitation.     Vessels  The aorta root is normal. IVC diameter <2.1 cm collapsing >50% with sniff  suggests a normal RA pressure of 3 mmHg.     Pericardium  Moderate pericardial effusion. There are no echocardiographic indications of  cardiac tamponade.     ______________________________________________________________________________  MMode/2D Measurements & Calculations  IVSd: 1.6 cm  LVIDd: 4.5 cm  LVIDs: 3.2 cm  LVPWd: 1.3 cm     FS: 28.5 %  LV mass(C)d: 263.7 grams  LV mass(C)dI: 116.9 grams/m2  Ao root diam: 3.6 cm  LA dimension: 4.7 cm  asc Aorta Diam: 3.5 cm  LA/Ao: 1.3  LVOT diam: 2.4 cm  LVOT area: 4.4 cm2  Ao root diam index Ht(cm/m): 2.1  Ao root diam index BSA (cm/m2): 1.6  Asc Ao diam index BSA  (cm/m2): 1.5  Asc Ao diam index Ht(cm/m): 2.0  EF Biplane: 60.6 %     LA Volume Indexed (AL/bp): 28.4 ml/m2  RV Base: 3.3 cm  RWT: 0.58  TAPSE: 1.9 cm     Time Measurements  Aortic HR: 74.0 BPM  MM HR: 83.0 BPM     Doppler Measurements & Calculations  MV E max camron: 88.8 cm/sec  MV A max camron: 119.8 cm/sec  MV E/A: 0.74  MV dec slope: 289.8 cm/sec2  MV dec time: 0.31 sec  Ao V2 max: 185.1 cm/sec  Ao max P.0 mmHg  Ao V2 mean: 135.6 cm/sec  Ao mean P.4 mmHg  Ao V2 VTI: 43.0 cm  BHUPINDER(I,D): 2.5 cm2  BHUPINDER(V,D): 2.1 cm2  LV V1 max PG: 3.2 mmHg  LV V1 max: 89.5 cm/sec  LV V1 VTI: 23.8 cm  CO(LVOT): 7.8 l/min  CI(LVOT): 3.5 l/min/m2  SV(LVOT): 105.6 ml  SI(LVOT): 46.8 ml/m2  PA acc time: 0.10 sec     AV Camron Ratio (DI): 0.48  BHUPINDER Index (cm2/m2): 1.1  E/E': 13.1  E/E' av.1  Lateral E/e': 13.1  Medial E/e': 15.1  Peak E' Camron: 6.8 cm/sec  RV S Camron: 8.8 cm/sec     ______________________________________________________________________________  Report approved by: Mariano Grover MD on 2025 11:28 AM

## 2025-01-05 NOTE — ED PROVIDER NOTES
EMERGENCY DEPARTMENT ENCOUNTER      NAME: Jonas Hyman  AGE: 79 year old male  YOB: 1945  MRN: [id number]  EVALUATION DATE & TIME: 1/4/2025  7:15 PM    PCP: Dillon Goldsmith    ED PROVIDER: Marilee Banerjee M.D.      Chief Complaint   Patient presents with    Chest Pain     FINAL IMPRESSION:  1. Chest pain, unspecified type    2. Shortness of breath    3. Pericardial effusion      ED COURSE & MEDICAL DECISION MAKING:    Pertinent Labs & Imaging studies reviewed. (See chart for details)  ED Course as of 01/04/25 2220   Sat Jan 04, 2025 1927 Patient is a pleasant 79-year-old male comes in today for evaluation of shortness of breath and some centralized chest pain.  He said it started this morning around 9:00 this morning.  Denies any cough but if he does cough he gets a lot of pain with it.  He denies any fevers.  He feels more short of breath with laying down and with walking.  He said he had something similar a few months ago but burped and then it felt better.  He has not been able to burp today.  He denies any nausea or vomiting.  He does appear little short of breath and has decreased breath sounds bilaterally that seem a little worse on the left.  He does have a known history of coronary disease, small cell lung cancer, and got his second dose of immunotherapy yesterday.  Will check some labs and chest x-ray on the patient.  Will get an EKG.  Will see if we can find a cause for his symptoms.  Certainly sounds concerning for pleural effusion versus pneumonia versus pulmonary edema.  PE is also on the differential.  Patient is in agreement with the current plan.   2014 D-dimer was elevated at 3.13 so I did CT imaging of his chest.   2047 Patient has pericardial effusion.  Blood pressure is stable at 134/55 with a heart rate of 89.   2107 I did a bedside ultrasound on the patient.  He had a small pericardial effusion but no signs of tamponade.   2145 Updated patient on my conversation with  cardiology and plan for admission the hospital.  He is in agreement.   2220 I discussed the case with Dr. Sanders with the hospitalist service.  She agrees with a cardiac telemetry observation admission for chest pain and shortness of breath with a pericardial effusion.       Medical Decision Making    History:  Supplemental history from: None   external Record(s) reviewed: I reviewed patient's oncology visit from 12/11/2024.  Patient has history of small cell lung cancer and has completed 4 cycles of chemoimmunotherapy and tolerated it well.  He was complaining of some more fatigue.  Patient had an excellent response compared to his pretreatment imaging.  Plan was to switch him to maintenance immunotherapy.    Work Up:  Emergent/Severe conditions considered and evaluated for: Electrolyte abnormality, hyperglycemia, hypoglycemia, acidosis, anemia, thrombocytopenia, renal failure, dehydration, ACS, pericarditis, myocarditis, pneumothorax, pneumonia, esophageal rupture  I independently reviewed and interpreted EKG and CT chest and bedside ultrasound which did show a pericardial effusion. See full radiology report for all details. Rhythm strip shows sinus tachycardia 102 bpm.  In addition to work up documented, I considered the following work up: None  Medications given that require intensive monitoring for toxicity: IV morphine    External consultation:  Discussion of management with another provider: Hospitalist, brief conversation with cardiology as well    Complicating factors:  Care impacted by chronic illness: Small cell lung cancer, coronary artery disease, hyperlipidemia, diabetes, hypertension, chronic kidney disease    Disposition considerations: Admission    CT Pulmonary Angiogram:The patient had an abnormal d-dimer.    At the conclusion of the encounter I discussed  the results of all of the tests and the disposition with patient.   All questions were answered.  The patient acknowledged understanding and was  involved in the decision making regarding the overall care plan.      MEDICATIONS GIVEN IN THE EMERGENCY:  Medications   rOPINIRole (REQUIP) tablet 2 mg (has no administration in time range)   morphine (PF) injection 4 mg (4 mg Intravenous $Given 1/4/25 1934)   iopamidol (ISOVUE-370) solution 75 mL (75 mLs Intravenous $Given 1/4/25 2036)     =================================================================    HPI    Triage Note:      Use of :       Jonas Hyman is a 79 year old male who presents for evaluation of some chest pain and shortness of breath that is worse with laying down and walking that all started today.    PAST MEDICAL HISTORY:  Past Medical History:   Diagnosis Date    Acute MI (H) 2009    Bronchitis     Cardiac arrest (H) 2009    Cardiac arrest (H)     Chemical dependency (H)     Has been in recovery 49 years    Coronary artery disease     Diabetes mellitus (H)     type II    Diabetes mellitus type 2, noninsulin dependent (H)     Diverticula of colon     Diverticulosis of large intestine     Hemorrhoids     Hemorrhoids     History of alcohol dependence (H) 01/12/1975    Created by [employer] [facility] Annotation: May 29 2009  5:52PM - Dillon Goldsmith: dry since  1975, also used drugs and marijuana  Replacement Utility updated for latest IMO load    Hypertension     Hypertension     Macular degeneration of right eye     Mixed hyperlipidemia     Myocardial infarction (H)     Retinal detachment 06/23/2014    Vitrectomy Posterior 23 guage, scleral buckle, membrane peel, endolaser gase    Stage 3b chronic kidney disease (H) 11/30/2020    Stented coronary artery 2009    stints times 2    Vitamin B12 deficiency     Vitamin D deficiency        PAST SURGICAL HISTORY:  Past Surgical History:   Procedure Laterality Date    BRONCHOSCOPY RIDID OR FLEXIBLE W/ENDOBRONCHIAL ULTRASOUND GUIDED 1 OR 2 NODE STATIONS N/A 8/26/2024    Procedure: BRONCHOSCOPY, WITH BIOPSY OF 1 OR 2 LYMPH NODE STATIONS  WITH ENDOBRONCHIAL ULTRASOUND GUIDANCE;  Surgeon: Bernarda Morrison MD;  Location: [facility]    CATARACT IOL, RT/LT Bilateral     EYE SURGERY      FRACTURE SURGERY      HC REMOVE TONSILS/ADENOIDS,<13 Y/O      Description: Tonsillectomy With Adenoidectomy;  Recorded: 05/29/2009;    HEART CATH STENT COR W/WO PTCA  05/07/2009    Proximal Left Anterior Descending Artery, Proximal Circumflex      HERNIA REPAIR      INGUINAL HERNIA REPAIR      IR CHEST PORT PLACEMENT > 5 YRS OF AGE  9/5/2024    LASER YAG IRIDOTOMY  9/6/2013    Procedure: LASER YAG IRIDOTOMY;  peripheral irridotomy ;  Surgeon: Doroteo Andres MD;  Location: [facility]    OTHER SURGICAL HISTORY      Cath Stent 1 Proximal Left Anterior Descending Artery     PHACOEMULSIFICATION CLEAR CORNEA WITH STANDARD INTRAOCULAR LENS IMPLANT  9/5/2013    Procedure: PHACOEMULSIFICATION CLEAR CORNEA WITH STANDARD INTRAOCULAR LENS IMPLANT;   COMPLEX RIGHT PHACOEMULSIFICATION CLEAR CORNEA WITH ANTERIOR CHAMBER INTRAOCULAR LENS IMPLANT, ANTERIOR VITRECTOMY;  Surgeon: Doroteo Andres MD;  Location: [facility]    PHACOEMULSIFICATION WITH STANDARD INTRAOCULAR LENS IMPLANT Left 12/3/2018    Procedure: Left Eye Phacoemulsification with Intraocular Lens;  Surgeon: Suhail Johnson MD;  Location: [facility]    RETINAL DETACHMENT SURGERY      TONSILLECTOMY  5/24/200/9    VASECTOMY      VITRECTOMY ANTERIOR  9/5/2013    Procedure: VITRECTOMY ANTERIOR;;  Surgeon: Doroteo Andres MD;  Location: [facility]    ZZC MUSCLE-SKIN FLAP,LEG         CURRENT MEDICATIONS:      Current Facility-Administered Medications:     aflibercept (EYLEA) injection prefilled syringe 2 mg, 2 mg, Intravitreal, Q28 Days, Anu Wilson MD    cyanocobalamin injection 1,000 mcg, 1,000 mcg, Intramuscular, Q30 Days, Dillon Goldsmith MD, 1,000 mcg at 04/08/22 1449    rOPINIRole (REQUIP) tablet 2 mg, 2 mg, Oral, Once, Marilee Banerjee MD    Current Outpatient Medications:     acetaminophen (TYLENOL) 500 MG  tablet, Take 1,000-1,500 mg by mouth 2 times daily., Disp: , Rfl:     aspirin (ASA) 81 MG tablet, Take 81 mg by mouth daily, Disp: , Rfl:     busPIRone (BUSPAR) 10 MG tablet, Take 1 tablet (10 mg) by mouth 3 times daily as needed for anxiety., Disp: 90 tablet, Rfl: 1    cyanocobalamin (VITAMIN B-12) 100 MCG tablet, Take 2,000 mcg by mouth daily, Disp: , Rfl:     furosemide (LASIX) 40 MG tablet, Take 1 tablet (40 mg) by mouth daily as needed (Leg swelling), Disp: 30 tablet, Rfl: 3    gabapentin (NEURONTIN) 100 MG capsule, Take 1 capsule (100 mg) by mouth at bedtime., Disp: 60 capsule, Rfl: 3    hydrOXYzine HCl (ATARAX) 25 MG tablet, Take 1 tablet (25 mg) by mouth 3 times daily as needed for itching., Disp: 40 tablet, Rfl: 2    ibuprofen (ADVIL/MOTRIN) 200 MG tablet, Take 600 mg by mouth 2 times daily., Disp: , Rfl:     magnesium oxide (MAG-OX) 400 MG tablet, Take 1 tablet (400 mg) by mouth daily, Disp: , Rfl:     nitroGLYcerin (NITROSTAT) 0.4 MG sublingual tablet, Place 1 tablet (0.4 mg) under the tongue every 5 minutes as needed for chest pain, Disp: 30 tablet, Rfl: 5    ondansetron (ZOFRAN) 8 MG tablet, Take 1 tablet (8 mg) by mouth every 6 hours as needed for nausea (vomiting). Do not start before September 9, 2024. (Patient not taking: Reported on 9/17/2024), Disp: 30 tablet, Rfl: 2    pravastatin (PRAVACHOL) 40 MG tablet, Take 1 tablet (40 mg) by mouth daily, Disp: 90 tablet, Rfl: 3    Pyridoxine HCl (VITAMIN B-6 PO), Take 1 tablet by mouth daily., Disp: , Rfl:     rOPINIRole (REQUIP) 2 MG tablet, Take 1 tablet (2 mg) by mouth at bedtime, Disp: 90 tablet, Rfl: 3    triamterene-HCTZ (DYAZIDE) 37.5-25 MG capsule, Take by mouth every morning. (Patient not taking: Reported on 9/17/2024), Disp: , Rfl:     VITAMIN D, CHOLECALCIFEROL, PO, Take 2,000 Units by mouth daily, Disp: , Rfl:     ALLERGIES:  No Known Allergies    FAMILY HISTORY:  Family History   Problem Relation Age of Onset    Obesity Father     Glaucoma No  family hx of     Macular Degeneration No family hx of     Retinal detachment No family hx of     Kidney Cancer Father     Heart Disease Brother        SOCIAL HISTORY:   Social History     Socioeconomic History    Marital status:    Tobacco Use    Smoking status: Former     Current packs/day: 0.00     Average packs/day: 1 pack/day for 50.6 years (50.6 ttl pk-yrs)     Types: Cigarettes     Start date: 1956     Quit date: 2024     Years since quittin.0     Passive exposure: Past (son smoked smoking in home)    Smokeless tobacco: Never    Tobacco comments:     20 yrs ago-mid 80's, quit again , started again , quit 2024   Vaping Use    Vaping status: Never Used   Substance and Sexual Activity    Alcohol use: No     Comment: from 1975    Drug use: No   Social History Narrative    2 children,         Quit drinking alcohol         Son has diabetes and lives in basement        Lives with grandchild who is 11, born      Social Drivers of Health     Interpersonal Safety: Low Risk  (2024)    Interpersonal Safety     Do you feel physically and emotionally safe where you currently live?: Yes     Within the past 12 months, have you been hit, slapped, kicked or otherwise physically hurt by someone?: No     Within the past 12 months, have you been humiliated or emotionally abused in other ways by your partner or ex-partner?: No       PHYSICAL EXAM    VITAL SIGNS: /65   Pulse 104   Temp 98.1  F (36.7  C) (Oral)   Resp (!) 35   Wt 111.6 kg (246 lb)   SpO2 95%   BMI 36.86 kg/m     GENERAL: Awake, alert, answering questions appropriately, appears mildly uncomfortable  SPEECH:  Easy to understand speech, Normal volume and doreen  PULMONARY: Mild respiratory distress with decreased breath sounds bilaterally.  Worse on the left.    CARDIOVASCULAR: Regular rate and rhythm, Distal pulses present and normal.  ABDOMINAL: Soft, Nondistended, Nontender, No rebound  or guarding, No palpable masses  EXTREMITIES: No lower extremity edema.  PSYCH: Normal mood and affect     LAB:  All pertinent labs reviewed and interpreted.  Results for orders placed or performed during the hospital encounter of 01/04/25   XR Chest Port 1 View    Impression    IMPRESSION: Enlarged cardiac silhouette representing a moderate-sized pericardial effusion as seen on CT. Pulmonary vascularity normal. Subsegmental atelectasis left lung base. Lungs otherwise are clear. Left IJ central venous catheter tip at the   cavoatrial junction.   CT Chest Pulmonary Embolism w Contrast    Impression    IMPRESSION:  1.  No CT evidence of pulmonary embolism.  2.  Moderate-sized pericardial effusion has increased since the previous study; pericarditis cannot be excluded.  3.  Spiculated mass with adjacent scarring right middle lobe measures slightly smaller on today's examination. This likely represents the patient's known treated lung cancer.  4.  Mildly prominent right subcarinal lymph node measures slightly larger on today's examination. Given the change, this should be monitored on future surveillance examinations. The right anterior mediastinal mass or lymph node seen on the previous study   is partially obscured by the pericardial effusion but does appear to be smaller with a short axis diameter 1.6 cm as compared to 2.4 cm previously.   POC US ECHO LIMITED    Impression    PROCEDURE:    Emergency Department Limited Bedside Screening Cardiac Ultrasound  INDICATIONS: chest pain  PROCEDURE PROVIDER: Marilee Banerjee   WINDOW AND FINDINGS:   SUB-XYPHOID     :      Grossly normal/symmetric wall motion  Pericardial effusion: Small  Signs of Tamponade physiology: Absent  PARASTERNAL    :  Grossly normal/symmetric wall motion  Pericardial effusion: Small  Signs of Tamponade physiology: Absent    IMAGES SAVED AND STORED FOR ARCHIVE AND REVIEW: Yes        CBC with platelets   Result Value Ref Range    WBC Count 13.3 (H) 4.0  - 11.0 10e3/uL    RBC Count 4.10 (L) 4.40 - 5.90 10e6/uL    Hemoglobin 13.2 (L) 13.3 - 17.7 g/dL    Hematocrit 40.5 40.0 - 53.0 %    MCV 99 78 - 100 fL    MCH 32.2 26.5 - 33.0 pg    MCHC 32.6 31.5 - 36.5 g/dL    RDW 16.2 (H) 10.0 - 15.0 %    Platelet Count 302 150 - 450 10e3/uL   Basic metabolic panel   Result Value Ref Range    Sodium 135 135 - 145 mmol/L    Potassium 3.7 3.4 - 5.3 mmol/L    Chloride 100 98 - 107 mmol/L    Carbon Dioxide (CO2) 20 (L) 22 - 29 mmol/L    Anion Gap 15 7 - 15 mmol/L    Urea Nitrogen 22.5 8.0 - 23.0 mg/dL    Creatinine 1.42 (H) 0.67 - 1.17 mg/dL    GFR Estimate 50 (L) >60 mL/min/1.73m2    Calcium 9.2 8.8 - 10.4 mg/dL    Glucose 266 (H) 70 - 99 mg/dL   Result Value Ref Range    Magnesium 1.9 1.7 - 2.3 mg/dL   Nt probnp inpatient   Result Value Ref Range    N terminal Pro BNP Inpatient 693 0 - 1,800 pg/mL   D dimer quantitative   Result Value Ref Range    D-Dimer Quantitative 3.13 (H) 0.00 - 0.50 ug/mL FEU   Result Value Ref Range    Troponin T, High Sensitivity 23 (H) <=22 ng/L   Extra Red Top Tube   Result Value Ref Range    Hold Specimen JIC    Extra Green Top (Lithium Heparin) ON ICE   Result Value Ref Range    Hold Specimen JIC    Result Value Ref Range    Troponin T, High Sensitivity 22 <=22 ng/L       RADIOLOGY:  POC US ECHO LIMITED   ED Interpretation   PROCEDURE:    Emergency Department Limited Bedside Screening Cardiac Ultrasound  INDICATIONS: chest pain  PROCEDURE PROVIDER: Marilee Banerjee   WINDOW AND FINDINGS:   SUB-XYPHOID     :      Grossly normal/symmetric wall motion  Pericardial effusion: Small  Signs of Tamponade physiology: Absent  PARASTERNAL    :  Grossly normal/symmetric wall motion  Pericardial effusion: Small  Signs of Tamponade physiology: Absent    IMAGES SAVED AND STORED FOR ARCHIVE AND REVIEW: Yes           CT Chest Pulmonary Embolism w Contrast   Final Result   IMPRESSION:   1.  No CT evidence of pulmonary embolism.   2.  Moderate-sized pericardial effusion  has increased since the previous study; pericarditis cannot be excluded.   3.  Spiculated mass with adjacent scarring right middle lobe measures slightly smaller on today's examination. This likely represents the patient's known treated lung cancer.   4.  Mildly prominent right subcarinal lymph node measures slightly larger on today's examination. Given the change, this should be monitored on future surveillance examinations. The right anterior mediastinal mass or lymph node seen on the previous study    is partially obscured by the pericardial effusion but does appear to be smaller with a short axis diameter 1.6 cm as compared to 2.4 cm previously.      XR Chest Port 1 View   Final Result   IMPRESSION: Enlarged cardiac silhouette representing a moderate-sized pericardial effusion as seen on CT. Pulmonary vascularity normal. Subsegmental atelectasis left lung base. Lungs otherwise are clear. Left IJ central venous catheter tip at the    cavoatrial junction.            EKG:    Date and time: January 4, 2025 at 1926  Rate: 105 bpm  Rhythm: Sinus tachycardia  OK interval: 174 ms  QRS interval: 144 ms  QT/QTc: 394/520 ms  ST changes or T wave changes: No acute ST or T wave abnormalities  Change from prior ECG: No significant change from prior  I have independently reviewed and interpreted this EKG.     PROCEDURES:   PROCEDURE:    Emergency Department Limited Bedside Screening Cardiac Ultrasound   INDICATIONS: shortness of breath   PROCEDURE PROVIDER: Marilee Banerjee    WINDOW AND FINDINGS:    SUB-XYPHOID     :      Grossly normal/symmetric wall motion  Pericardial effusion: Small  Signs of Tamponade physiology: Absent   PARASTERNAL    :  Grossly normal/symmetric wall motion  Pericardial effusion: Small  Signs of Tamponade physiology: Absent     IMAGES SAVED AND STORED FOR ARCHIVE AND REVIEW: Yes        Marilee Banerjee M.D.  Emergency Medicine  [facility] EMERGENCY  DEPARTMENT  [address] [place] [id number]  [phone]  Dept: [phone]       Marilee Banerjee MD  01/04/25 2220       Marilee Banerjee MD  01/04/25 2228

## 2025-01-05 NOTE — MEDICATION SCRIBE - ADMISSION MEDICATION HISTORY
Medication Scribe Admission Medication History    Admission medication history is complete. The information provided in this note is only as accurate as the sources available at the time of the update.    Information Source(s): Patient via in-person    Pertinent Information: Patient states that he ran out of Ibuprofen couple days ago.    Changes made to PTA medication list:  Added: None  Deleted: Triamterene-hydrochlorothiazide(per patient)  Changed: None    Allergies reviewed with patient and updates made in EHR: yes    Medication History Completed By: Patricio Li 1/4/2025 11:03 PM    Current Facility-Administered Medications for the 1/4/25 encounter (Hospital Encounter)   Medication    aflibercept (EYLEA) injection prefilled syringe 2 mg    cyanocobalamin injection 1,000 mcg     PTA Med List   Medication Sig Last Dose/Taking    acetaminophen (TYLENOL) 500 MG tablet Take 1,000-1,500 mg by mouth 2 times daily. 1/4/2025 Evening    aspirin (ASA) 81 MG tablet Take 81 mg by mouth daily 1/4/2025 Morning    busPIRone (BUSPAR) 10 MG tablet Take 1 tablet (10 mg) by mouth 3 times daily as needed for anxiety. Taking As Needed    cyanocobalamin (VITAMIN B-12) 100 MCG tablet Take 2,000 mcg by mouth daily 1/4/2025 Morning    furosemide (LASIX) 40 MG tablet Take 1 tablet (40 mg) by mouth daily as needed (Leg swelling) Taking As Needed    gabapentin (NEURONTIN) 100 MG capsule Take 1 capsule (100 mg) by mouth at bedtime. 1/3/2025 Bedtime    hydrOXYzine HCl (ATARAX) 25 MG tablet Take 1 tablet (25 mg) by mouth 3 times daily as needed for itching. Taking As Needed    ibuprofen (ADVIL/MOTRIN) 200 MG tablet Take 600 mg by mouth 2 times daily. 1/1/2025 Evening    magnesium oxide (MAG-OX) 400 MG tablet Take 1 tablet (400 mg) by mouth daily 1/4/2025 Morning    nitroGLYcerin (NITROSTAT) 0.4 MG sublingual tablet Place 1 tablet (0.4 mg) under the tongue every 5 minutes as needed for chest pain Taking As Needed    ondansetron (ZOFRAN)  8 MG tablet Take 1 tablet (8 mg) by mouth every 6 hours as needed for nausea (vomiting). Do not start before September 9, 2024. Taking As Needed    pravastatin (PRAVACHOL) 40 MG tablet Take 1 tablet (40 mg) by mouth daily 1/4/2025 Morning    Pyridoxine HCl (VITAMIN B-6 PO) Take 1 tablet by mouth daily. 1/4/2025 Morning    rOPINIRole (REQUIP) 2 MG tablet Take 1 tablet (2 mg) by mouth at bedtime 1/3/2025 Bedtime    VITAMIN D, CHOLECALCIFEROL, PO Take 2,000 Units by mouth daily 1/4/2025 Morning

## 2025-01-06 ENCOUNTER — APPOINTMENT (OUTPATIENT)
Dept: CARDIOLOGY | Facility: HOSPITAL | Age: 80
DRG: 287 | End: 2025-01-06
Attending: INTERNAL MEDICINE
Payer: MEDICARE

## 2025-01-06 LAB
ATRIAL RATE - MUSE: 105 BPM
ATRIAL RATE - MUSE: 136 BPM
ATRIAL RATE - MUSE: 88 BPM
DIASTOLIC BLOOD PRESSURE - MUSE: 60 MMHG
DIASTOLIC BLOOD PRESSURE - MUSE: NORMAL MMHG
DIASTOLIC BLOOD PRESSURE - MUSE: NORMAL MMHG
ERYTHROCYTE [DISTWIDTH] IN BLOOD BY AUTOMATED COUNT: 15.7 % (ref 10–15)
ERYTHROCYTE [SEDIMENTATION RATE] IN BLOOD BY WESTERGREN METHOD: 26 MM/HR (ref 0–20)
HCT VFR BLD AUTO: 35.1 % (ref 40–53)
HGB BLD-MCNC: 11.8 G/DL (ref 13.3–17.7)
INTERPRETATION ECG - MUSE: NORMAL
LVEF ECHO: NORMAL
MAGNESIUM SERPL-MCNC: 2 MG/DL (ref 1.7–2.3)
MCH RBC QN AUTO: 32.2 PG (ref 26.5–33)
MCHC RBC AUTO-ENTMCNC: 33.6 G/DL (ref 31.5–36.5)
MCV RBC AUTO: 96 FL (ref 78–100)
P AXIS - MUSE: 66 DEGREES
P AXIS - MUSE: 69 DEGREES
P AXIS - MUSE: NORMAL DEGREES
PLATELET # BLD AUTO: 229 10E3/UL (ref 150–450)
POTASSIUM SERPL-SCNC: 3.6 MMOL/L (ref 3.4–5.3)
PR INTERVAL - MUSE: 174 MS
PR INTERVAL - MUSE: 186 MS
PR INTERVAL - MUSE: NORMAL MS
QRS DURATION - MUSE: 144 MS
QRS DURATION - MUSE: 150 MS
QRS DURATION - MUSE: 154 MS
QT - MUSE: 324 MS
QT - MUSE: 394 MS
QT - MUSE: 420 MS
QTC - MUSE: 454 MS
QTC - MUSE: 508 MS
QTC - MUSE: 520 MS
R AXIS - MUSE: 19 DEGREES
R AXIS - MUSE: 26 DEGREES
R AXIS - MUSE: 48 DEGREES
RBC # BLD AUTO: 3.66 10E6/UL (ref 4.4–5.9)
SYSTOLIC BLOOD PRESSURE - MUSE: 133 MMHG
SYSTOLIC BLOOD PRESSURE - MUSE: NORMAL MMHG
SYSTOLIC BLOOD PRESSURE - MUSE: NORMAL MMHG
T AXIS - MUSE: 26 DEGREES
T AXIS - MUSE: 27 DEGREES
T AXIS - MUSE: 38 DEGREES
VENTRICULAR RATE- MUSE: 105 BPM
VENTRICULAR RATE- MUSE: 118 BPM
VENTRICULAR RATE- MUSE: 88 BPM
WBC # BLD AUTO: 9.6 10E3/UL (ref 4–11)

## 2025-01-06 PROCEDURE — 120N000004 HC R&B MS OVERFLOW

## 2025-01-06 PROCEDURE — 85027 COMPLETE CBC AUTOMATED: CPT | Performed by: STUDENT IN AN ORGANIZED HEALTH CARE EDUCATION/TRAINING PROGRAM

## 2025-01-06 PROCEDURE — 93321 DOPPLER ECHO F-UP/LMTD STD: CPT | Mod: 26 | Performed by: INTERNAL MEDICINE

## 2025-01-06 PROCEDURE — C1887 CATHETER, GUIDING: HCPCS | Performed by: INTERNAL MEDICINE

## 2025-01-06 PROCEDURE — 93010 ELECTROCARDIOGRAM REPORT: CPT | Mod: HIP | Performed by: INTERNAL MEDICINE

## 2025-01-06 PROCEDURE — 99232 SBSQ HOSP IP/OBS MODERATE 35: CPT | Mod: FS | Performed by: INTERNAL MEDICINE

## 2025-01-06 PROCEDURE — 93325 DOPPLER ECHO COLOR FLOW MAPG: CPT

## 2025-01-06 PROCEDURE — 272N000001 HC OR GENERAL SUPPLY STERILE: Performed by: INTERNAL MEDICINE

## 2025-01-06 PROCEDURE — 93458 L HRT ARTERY/VENTRICLE ANGIO: CPT | Performed by: INTERNAL MEDICINE

## 2025-01-06 PROCEDURE — 250N000013 HC RX MED GY IP 250 OP 250 PS 637: Performed by: STUDENT IN AN ORGANIZED HEALTH CARE EDUCATION/TRAINING PROGRAM

## 2025-01-06 PROCEDURE — 84132 ASSAY OF SERUM POTASSIUM: CPT | Performed by: STUDENT IN AN ORGANIZED HEALTH CARE EDUCATION/TRAINING PROGRAM

## 2025-01-06 PROCEDURE — 4A023N7 MEASUREMENT OF CARDIAC SAMPLING AND PRESSURE, LEFT HEART, PERCUTANEOUS APPROACH: ICD-10-PCS | Performed by: INTERNAL MEDICINE

## 2025-01-06 PROCEDURE — 93325 DOPPLER ECHO COLOR FLOW MAPG: CPT | Mod: 26 | Performed by: INTERNAL MEDICINE

## 2025-01-06 PROCEDURE — 93005 ELECTROCARDIOGRAM TRACING: CPT

## 2025-01-06 PROCEDURE — 999N000099 HC STATISTIC MODERATE SEDATION < 10 MIN: Performed by: INTERNAL MEDICINE

## 2025-01-06 PROCEDURE — 250N000013 HC RX MED GY IP 250 OP 250 PS 637: Performed by: NURSE PRACTITIONER

## 2025-01-06 PROCEDURE — 93308 TTE F-UP OR LMTD: CPT | Mod: 26 | Performed by: INTERNAL MEDICINE

## 2025-01-06 PROCEDURE — 250N000013 HC RX MED GY IP 250 OP 250 PS 637: Performed by: INTERNAL MEDICINE

## 2025-01-06 PROCEDURE — 250N000013 HC RX MED GY IP 250 OP 250 PS 637: Performed by: FAMILY MEDICINE

## 2025-01-06 PROCEDURE — 36415 COLL VENOUS BLD VENIPUNCTURE: CPT | Performed by: STUDENT IN AN ORGANIZED HEALTH CARE EDUCATION/TRAINING PROGRAM

## 2025-01-06 PROCEDURE — 85652 RBC SED RATE AUTOMATED: CPT | Performed by: INTERNAL MEDICINE

## 2025-01-06 PROCEDURE — 255N000002 HC RX 255 OP 636: Performed by: INTERNAL MEDICINE

## 2025-01-06 PROCEDURE — 83735 ASSAY OF MAGNESIUM: CPT | Performed by: STUDENT IN AN ORGANIZED HEALTH CARE EDUCATION/TRAINING PROGRAM

## 2025-01-06 PROCEDURE — 99232 SBSQ HOSP IP/OBS MODERATE 35: CPT | Performed by: FAMILY MEDICINE

## 2025-01-06 PROCEDURE — 250N000009 HC RX 250: Performed by: INTERNAL MEDICINE

## 2025-01-06 PROCEDURE — 250N000013 HC RX MED GY IP 250 OP 250 PS 637

## 2025-01-06 PROCEDURE — 93458 L HRT ARTERY/VENTRICLE ANGIO: CPT | Mod: 26 | Performed by: INTERNAL MEDICINE

## 2025-01-06 PROCEDURE — B211YZZ FLUOROSCOPY OF MULTIPLE CORONARY ARTERIES USING OTHER CONTRAST: ICD-10-PCS | Performed by: INTERNAL MEDICINE

## 2025-01-06 PROCEDURE — 258N000003 HC RX IP 258 OP 636: Performed by: INTERNAL MEDICINE

## 2025-01-06 PROCEDURE — 250N000011 HC RX IP 250 OP 636: Performed by: INTERNAL MEDICINE

## 2025-01-06 PROCEDURE — 99207 PR APP CREDIT; MD BILLING SHARED VISIT: CPT

## 2025-01-06 PROCEDURE — C1894 INTRO/SHEATH, NON-LASER: HCPCS | Performed by: INTERNAL MEDICINE

## 2025-01-06 DEVICE — KIT PERICARDIOCENTESIS 8FR PC801: Type: IMPLANTABLE DEVICE | Status: FUNCTIONAL

## 2025-01-06 RX ORDER — OXYCODONE HYDROCHLORIDE 5 MG/1
10 TABLET ORAL EVERY 4 HOURS PRN
Status: DISCONTINUED | OUTPATIENT
Start: 2025-01-06 | End: 2025-01-08 | Stop reason: HOSPADM

## 2025-01-06 RX ORDER — HYDRALAZINE HYDROCHLORIDE 20 MG/ML
10 INJECTION INTRAMUSCULAR; INTRAVENOUS
Status: DISCONTINUED | OUTPATIENT
Start: 2025-01-06 | End: 2025-01-06

## 2025-01-06 RX ORDER — IBUPROFEN 600 MG/1
600 TABLET, FILM COATED ORAL 3 TIMES DAILY
Status: DISCONTINUED | OUTPATIENT
Start: 2025-01-06 | End: 2025-01-08 | Stop reason: HOSPADM

## 2025-01-06 RX ORDER — FENTANYL CITRATE 50 UG/ML
INJECTION, SOLUTION INTRAMUSCULAR; INTRAVENOUS
Status: DISCONTINUED | OUTPATIENT
Start: 2025-01-06 | End: 2025-01-06

## 2025-01-06 RX ORDER — SODIUM CHLORIDE 9 MG/ML
75 INJECTION, SOLUTION INTRAVENOUS CONTINUOUS
Status: ACTIVE | OUTPATIENT
Start: 2025-01-06 | End: 2025-01-06

## 2025-01-06 RX ORDER — FENTANYL CITRATE 50 UG/ML
25 INJECTION, SOLUTION INTRAMUSCULAR; INTRAVENOUS
Status: DISCONTINUED | OUTPATIENT
Start: 2025-01-06 | End: 2025-01-06

## 2025-01-06 RX ORDER — HYDRALAZINE HYDROCHLORIDE 20 MG/ML
10 INJECTION INTRAMUSCULAR; INTRAVENOUS EVERY 6 HOURS PRN
Status: DISCONTINUED | OUTPATIENT
Start: 2025-01-06 | End: 2025-01-07

## 2025-01-06 RX ORDER — HYDROXYZINE HYDROCHLORIDE 25 MG/1
25-50 TABLET, FILM COATED ORAL EVERY 6 HOURS PRN
Status: DISCONTINUED | OUTPATIENT
Start: 2025-01-06 | End: 2025-01-08 | Stop reason: HOSPADM

## 2025-01-06 RX ORDER — ACETAMINOPHEN 325 MG/1
650 TABLET ORAL EVERY 4 HOURS PRN
Status: DISCONTINUED | OUTPATIENT
Start: 2025-01-06 | End: 2025-01-08 | Stop reason: HOSPADM

## 2025-01-06 RX ORDER — NALOXONE HYDROCHLORIDE 0.4 MG/ML
0.4 INJECTION, SOLUTION INTRAMUSCULAR; INTRAVENOUS; SUBCUTANEOUS
Status: ACTIVE | OUTPATIENT
Start: 2025-01-06 | End: 2025-01-06

## 2025-01-06 RX ORDER — FLUMAZENIL 0.1 MG/ML
0.2 INJECTION, SOLUTION INTRAVENOUS
Status: ACTIVE | OUTPATIENT
Start: 2025-01-06 | End: 2025-01-06

## 2025-01-06 RX ORDER — IODIXANOL 320 MG/ML
INJECTION, SOLUTION INTRAVASCULAR
Status: DISCONTINUED | OUTPATIENT
Start: 2025-01-06 | End: 2025-01-06 | Stop reason: HOSPADM

## 2025-01-06 RX ORDER — ATROPINE SULFATE 0.1 MG/ML
0.5 INJECTION INTRAVENOUS
Status: ACTIVE | OUTPATIENT
Start: 2025-01-06 | End: 2025-01-06

## 2025-01-06 RX ORDER — DIAZEPAM 5 MG/1
5 TABLET ORAL ONCE
Status: COMPLETED | OUTPATIENT
Start: 2025-01-06 | End: 2025-01-06

## 2025-01-06 RX ORDER — NALOXONE HYDROCHLORIDE 0.4 MG/ML
0.2 INJECTION, SOLUTION INTRAMUSCULAR; INTRAVENOUS; SUBCUTANEOUS
Status: ACTIVE | OUTPATIENT
Start: 2025-01-06 | End: 2025-01-06

## 2025-01-06 RX ORDER — PANTOPRAZOLE SODIUM 40 MG/1
40 TABLET, DELAYED RELEASE ORAL
Status: DISCONTINUED | OUTPATIENT
Start: 2025-01-07 | End: 2025-01-08 | Stop reason: HOSPADM

## 2025-01-06 RX ORDER — COLCHICINE 0.6 MG/1
0.6 TABLET ORAL DAILY
Status: DISCONTINUED | OUTPATIENT
Start: 2025-01-06 | End: 2025-01-08 | Stop reason: HOSPADM

## 2025-01-06 RX ORDER — OXYCODONE HYDROCHLORIDE 5 MG/1
5 TABLET ORAL EVERY 4 HOURS PRN
Status: DISCONTINUED | OUTPATIENT
Start: 2025-01-06 | End: 2025-01-08 | Stop reason: HOSPADM

## 2025-01-06 RX ORDER — HEPARIN SODIUM 200 [USP'U]/100ML
INJECTION, SOLUTION INTRAVENOUS
Status: DISCONTINUED | OUTPATIENT
Start: 2025-01-06 | End: 2025-01-06

## 2025-01-06 RX ADMIN — ACETAMINOPHEN 975 MG: 325 TABLET ORAL at 13:07

## 2025-01-06 RX ADMIN — IBUPROFEN 600 MG: 600 TABLET, FILM COATED ORAL at 13:07

## 2025-01-06 RX ADMIN — MAGNESIUM OXIDE TAB 400 MG (241.3 MG ELEMENTAL MG) 400 MG: 400 (241.3 MG) TAB at 09:41

## 2025-01-06 RX ADMIN — ACETAMINOPHEN 975 MG: 325 TABLET ORAL at 09:41

## 2025-01-06 RX ADMIN — IBUPROFEN 600 MG: 600 TABLET, FILM COATED ORAL at 20:14

## 2025-01-06 RX ADMIN — HYDROXYZINE HYDROCHLORIDE 25 MG: 25 TABLET ORAL at 12:44

## 2025-01-06 RX ADMIN — DIAZEPAM 5 MG: 5 TABLET ORAL at 08:11

## 2025-01-06 RX ADMIN — COLCHICINE 0.6 MG: 0.6 TABLET, FILM COATED ORAL at 12:44

## 2025-01-06 RX ADMIN — PRAVASTATIN SODIUM 40 MG: 20 TABLET ORAL at 09:41

## 2025-01-06 RX ADMIN — ACETAMINOPHEN 975 MG: 325 TABLET ORAL at 20:14

## 2025-01-06 RX ADMIN — GABAPENTIN 100 MG: 100 CAPSULE ORAL at 21:13

## 2025-01-06 RX ADMIN — ASPIRIN 81 MG: 81 TABLET, COATED ORAL at 09:41

## 2025-01-06 RX ADMIN — ROPINIROLE HYDROCHLORIDE 2 MG: 1 TABLET, FILM COATED ORAL at 21:13

## 2025-01-06 RX ADMIN — HYDROXYZINE HYDROCHLORIDE 50 MG: 25 TABLET ORAL at 20:23

## 2025-01-06 RX ADMIN — HYDROXYZINE HYDROCHLORIDE 25 MG: 25 TABLET ORAL at 01:24

## 2025-01-06 RX ADMIN — SODIUM CHLORIDE 75 ML/HR: 9 INJECTION, SOLUTION INTRAVENOUS at 09:42

## 2025-01-06 ASSESSMENT — ACTIVITIES OF DAILY LIVING (ADL)
ADLS_ACUITY_SCORE: 39
ADLS_ACUITY_SCORE: 35
ADLS_ACUITY_SCORE: 38
ADLS_ACUITY_SCORE: 39
ADLS_ACUITY_SCORE: 39
ADLS_ACUITY_SCORE: 35
ADLS_ACUITY_SCORE: 35
ADLS_ACUITY_SCORE: 39
ADLS_ACUITY_SCORE: 35
ADLS_ACUITY_SCORE: 38
ADLS_ACUITY_SCORE: 38
ADLS_ACUITY_SCORE: 35
ADLS_ACUITY_SCORE: 38
ADLS_ACUITY_SCORE: 39
ADLS_ACUITY_SCORE: 35
ADLS_ACUITY_SCORE: 38

## 2025-01-06 ASSESSMENT — EJECTION FRACTION: EF_VALUE: .22

## 2025-01-06 NOTE — PLAN OF CARE
Problem: Anxiety  Goal: Anxiety Reduction or Resolution  Outcome: Progressing     Problem: Cardiac Catheterization (Diagnostic/Interventional)  Goal: Absence of Bleeding  Outcome: Progressing  Goal: Stable Heart Rate and Rhythm  Outcome: Progressing   Goal Outcome Evaluation:    Patient left for CS @ 0730. Returned to unit @ 0924. TR band to R wrist w/13cc air. Patient was found trying to remove board from wrist. States that it is driving him crazy. Patient has a lot of anxiety, Hydroxyzine given. He was requesting 2 tabs, says that his home Rx says 1-2 tabs PRN. Order is only for 1 25mg tab. MD made aware of patient's request and states that he will update order.   Patient was A-Fib on telemetry when he left and when he returned and then converted to NSR @ 1010.

## 2025-01-06 NOTE — PRE-PROCEDURE
GENERAL PRE-PROCEDURE:   Procedure:  Pericardiocentesis, coronary angiogram with possible PCI  Date/Time:  1/6/2025 8:08 AM    Written consent obtained?: Yes    Risks and benefits: Risks, benefits and alternatives were discussed    Consent given by:  Patient  Patient states understanding of procedure being performed: Yes    Patient's understanding of procedure matches consent: Yes    Procedure consent matches procedure scheduled: Yes    Expected level of sedation:  Moderate  Appropriately NPO:  Yes  ASA Class:  3 (moderate pericardial effusion, newly diagnosed atrial fibrillation, elevated troponin, CAD with hx of anterior MI; s/p PCI, s/p rashid infarct VF arrest in 2000, small cell lung CA, DM Type II, Class II obesity; BMI 35.66kg/m2)  Mallampati  :  Grade 3- soft palate visible, posterior pharyngeal wall not visible  Lungs:  Lungs clear with good breath sounds bilaterally  Heart:  Normal heart sounds and rate  History & Physical reviewed:  History and physical reviewed and updates made (see comment)  H&P Comments:  Clinically Significant Risk Factors Present on Admission    Cardiovascular : moderate pericardial effusion, newly diagnosed atrial fibrillation, elevated troponin, CAD with hx of anterior MI; s/p PCI, s/p rashid infarct VF arrest in 2000, small cell lung CA, DM Type II, Class II obesity; BMI 35.66kg/m2    Fluid & Electrolyte Disorders : Not present on admission    Gastroenterology : Not present on admission    Hematology/Oncology : Not present on admission    Nephrology : Not present on admission    Neurology : Not present on admission    Pulmonology : Not present on admission    Systemic : Class II obesity; BMI 35.66kg/m2  Statement of review:  I have reviewed the lab findings, diagnostic data, medications, and the plan for sedation

## 2025-01-06 NOTE — PROGRESS NOTES
[facility] HEART [facility]   [address]  [place] [address]   [email or web address]   OFFICE: [phone]     CARDIOLOGY FOLLOW-UP NOTE      Impression and Plan     ASSESSMENT  Moderate pericardial effusion  Pericarditis   Increase in size compared to CT on 10/25/2024  No tamponade physiology on 1/5/2025, preserved LVEF, no wall motion normality.  Small to medium sized circumferential pericardial effusion is noted, 14 mm anterior and 13 mm posterior  Less likely malignant effusion  ESR elevated at 26, CRP significantly elevated to 102  Underwent left heart cath with plan for pericardiocentesis on 1/6/25 and found nonobstructive CAD, low normal left-sided filling pressures and insufficient amount of fluid for pericardiocentesis (will need a window to evacuate organized material but deferred by CTS at this time)  Still endorsing pleuritic, positional pain and some shortness of breath.  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   type 2 diabetes    PLAN  EKG to confirm rhythm, appears normal sinus rhythm today  Continue to hold AC if NSR, the AF may be addressed with treatment of pericarditis   Initiate tx for pericarditis with colchicine 0.6mg daily and Ibuprofen 600mg TID  Will need repeat echo in a few days to monitor effusion     Follow up: TBD      Primary Cardiologist: None    Subjective     Patient is an 79-year-old male with a  history of CAD and her anterior MI right bundle-branch block, thoracic and abdominal aortic aneurysm with prior repair, small cell lung cancer on chemotherapy, hypertension, diabetes mellitus who presented for chest pain and dyspnea.  He had a known pericardial effusion by CT scan on October which was larger upon presentation.    There was insufficient fluid for pericardiocentesis today  Patient still endorsing chest pain that is positional and pleuritic.  Also feels a little bit of shortness of breath.    Cardiac Diagnostics   Telemetry (personally reviewed): Appears normal sinus rhythm, rates in the 80s    ECG (personally reviewed and interpreted): EC25: Atrial fibrillation with rate of 118, right bundle branch block    Echocardiogram (results reviewed):   Limited TTE, 2025  1.Left ventricular size, wall motion and function are normal. The ejection  fraction is 60-65%.  2.Normal right ventricle size and systolic function.  3.No hemodynamically significant valvular abnormalities on 2D or color flow  imaging although this was a limited study with complete Doppler.  4.A small to medium sized circumferential pericardial effusion is noted, 14 mm  anterior and 13 mm posterior.There are no echocardiographic indications of  cardiac tamponade.  Compared to the prior study dated 2025, there have been no changes.    TTE, 2025  1. The left ventricle is normal in size. Left ventricular function is  normal.The ejection fraction is 60-65%. There is normal left ventricular wall  thickness. No regional wall motion abnormalities noted.  2. Normal right ventricle size and systolic function.  3. Normal left atrial size. Right atrial size is normal.  4. Moderate pericardial effusion There are no echocardiographic indications of  cardiac tamponade.IVC diameter <2.1 cm collapsing >50% with sniff suggests a  normal RA pressure of 3 mmHg.  Clinical correlation is recommended. There is no comparison study  "available.      Cardiac Cath (results reviewed):   1/6/25  - non-obstructive CAD; low-normal L-sided filling pressures  - no good window/not enough fluid for pericardiocentesis - will need a window to evacuate organized material  - continue aggressive risk factor modification      Physical Examination       BP (!) 139/91   Pulse 50   Temp 98.5  F (36.9  C) (Oral)   Resp 20   Ht 1.753 m (5' 9\")   Wt 109.5 kg (241 lb 8 oz)   SpO2 96%   BMI 35.66 kg/m          Intake/Output Summary (Last 24 hours) at 1/6/2025 1006  Last data filed at 1/6/2025 0900  Gross per 24 hour   Intake 0 ml   Output 1675 ml   Net -1675 ml       General: pleasant male. No acute distress.   HENT: external ears normal. Nares patent. Mucous membranes moist.  Eyes: perrla, extraocular muscles intact. No scleral icterus.   Neck: No JVD  Lungs: clear to auscultation  COR:  regular rate and rhythm, No murmurs, rubs, or gallops  Abd: nondistended, BS present  Extrem: No edema         Imaging      CT chest, 1/4/2025  IMPRESSION:  1.  No CT evidence of pulmonary embolism.  2.  Moderate-sized pericardial effusion has increased since the previous study; pericarditis cannot be excluded.  3.  Spiculated mass with adjacent scarring right middle lobe measures slightly smaller on today's examination. This likely represents the patient's known treated lung cancer.  4.  Mildly prominent right subcarinal lymph node measures slightly larger on today's examination. Given the change, this should be monitored on future surveillance examinations. The right anterior mediastinal mass or lymph node seen on the previous study   is partially obscured by the pericardial effusion but does appear to be smaller with a short axis diameter 1.6 cm as compared to 2.4 cm previously.    Lab Results   Lab Results   Component Value Date    CHOL 168 07/16/2024    HDL 36 (L) 07/16/2024    TRIG 278 (H) 07/16/2024    BUN 17.5 01/05/2025     01/05/2025    CO2 26 01/05/2025 "       Lab Results   Component Value Date    WBC 9.6 01/06/2025    HGB 11.8 (L) 01/06/2025    HCT 35.1 (L) 01/06/2025    MCV 96 01/06/2025     01/06/2025       Lab Results   Component Value Date    TSH 1.37 01/02/2025    INR 1.04 09/14/2023               Current Inpatient Scheduled Medications   Scheduled Meds:  Current Facility-Administered Medications   Medication Dose Route Frequency Provider Last Rate Last Admin    acetaminophen (TYLENOL) tablet 975 mg  975 mg Oral TID Vinicio Aparicio MD   975 mg at 01/06/25 0941    aspirin EC tablet 81 mg  81 mg Oral Daily Vinicio Aparicio MD   81 mg at 01/06/25 0941    gabapentin (NEURONTIN) capsule 100 mg  100 mg Oral At Bedtime Vinicio Aparicio MD   100 mg at 01/05/25 2223    magnesium oxide (MAG-OX) tablet 400 mg  400 mg Oral Daily Vinicio Aparicio MD   400 mg at 01/06/25 0941    pravastatin (PRAVACHOL) tablet 40 mg  40 mg Oral Daily Vinicio Aparicio MD   40 mg at 01/06/25 0941    rOPINIRole (REQUIP) tablet 2 mg  2 mg Oral At Bedtime Vinicio Aparicio MD   2 mg at 01/05/25 2224    sodium chloride (PF) 0.9% PF flush 3 mL  3 mL Intracatheter Q8H Vinicio Aparicio MD   3 mL at 01/05/25 2228     Continuous Infusions:  Current Facility-Administered Medications   Medication Dose Route Frequency Provider Last Rate Last Admin    sodium chloride 0.9 % infusion  75 mL/hr Intravenous Continuous Vinicio Aparicio MD 75 mL/hr at 01/06/25 0942 75 mL/hr at 01/06/25 0942            Medications Prior to Admission   Prior to Admission medications    Medication Sig Start Date End Date Taking? Authorizing Provider   acetaminophen (TYLENOL) 500 MG tablet Take 1,000-1,500 mg by mouth 2 times daily.   Yes Reported, Patient   aspirin (ASA) 81 MG tablet Take 81 mg by mouth daily   Yes Reported, Patient   busPIRone (BUSPAR) 10 MG tablet Take 1 tablet (10 mg) by mouth 3 times daily as needed for anxiety. 12/16/24  Yes Dillon Goldsmith MD   cyanocobalamin (VITAMIN B-12) 100 MCG  tablet Take 2,000 mcg by mouth daily   Yes Reported, Patient   furosemide (LASIX) 40 MG tablet Take 1 tablet (40 mg) by mouth daily as needed (Leg swelling) 7/10/24  Yes Dillon Goldsmith MD   gabapentin (NEURONTIN) 100 MG capsule Take 1 capsule (100 mg) by mouth at bedtime. 11/20/24  Yes Edie Carlin APRN CNP   hydrOXYzine HCl (ATARAX) 25 MG tablet Take 1 tablet (25 mg) by mouth 3 times daily as needed for itching. 9/4/24  Yes Dillon Goldsmith MD   ibuprofen (ADVIL/MOTRIN) 200 MG tablet Take 600 mg by mouth 2 times daily.   Yes Reported, Patient   magnesium oxide (MAG-OX) 400 MG tablet Take 1 tablet (400 mg) by mouth daily 8/16/24 8/16/25 Yes Dillon Goldsmith MD   nitroGLYcerin (NITROSTAT) 0.4 MG sublingual tablet Place 1 tablet (0.4 mg) under the tongue every 5 minutes as needed for chest pain 7/24/23  Yes Dillon Goldsmith MD   ondansetron (ZOFRAN) 8 MG tablet Take 1 tablet (8 mg) by mouth every 6 hours as needed for nausea (vomiting). Do not start before September 9, 2024. 9/9/24  Yes Dino Madera MD   pravastatin (PRAVACHOL) 40 MG tablet Take 1 tablet (40 mg) by mouth daily 8/16/24  Yes Dillon Goldsmith MD   Pyridoxine HCl (VITAMIN B-6 PO) Take 1 tablet by mouth daily.   Yes Reported, Patient   rOPINIRole (REQUIP) 2 MG tablet Take 1 tablet (2 mg) by mouth at bedtime 8/16/24 8/16/25 Yes Dillon Goldsmith MD   VITAMIN D, CHOLECALCIFEROL, PO Take 2,000 Units by mouth daily   Yes Reported, Patient          Gene Cavazos PA-C

## 2025-01-06 NOTE — CONSULTS
Cardiothoracic Surgery Brief Note    Patient is an 79 year old male with T2DM, HTN, HLD, remote MI s/p PCI, and small cell lung cancer on immunotherapy who presented with chest pain.  Workup for his chest pain was significant for a troponin of 23 which peaked at 24 and an echocardiogram with pericardial effusion.  Patient underwent coronary angiogram today without any significant lesion.    On evaluation of the echocardiogram, there is a small pericardial effusion. With his current lung cancer on immunotherapy and clean coronary angiogram, it is possible the effusion could be related to a malignant pericardial effusion.  Without tamponade physiology, we would avoid surgical intervention at this time and continue to evaluate for other causes of chest pain.  Cardiothoracic Surgery will continue to follow along.     Patient and imaging reviewed and discussed with Dr. Harley.     Ashwin Baird MD  Cardiothoracic Surgery Fellow

## 2025-01-06 NOTE — PROGRESS NOTES
"CLINICAL NUTRITION SERVICES - ASSESSMENT NOTE    RECOMMENDATIONS FOR MDs/PROVIDERS TO ORDER:  Consider bowel meds.     Malnutrition Status:    Unable to determine at this time.     Registered Dietitian Interventions:  Patient declined oral supplements.    Future/Additional Recommendations:  Monitor patient weight, labs, intake. Consider again offering oral nutrition supplements should patient be amenable and unable to meet energy and protein needs via meal trays.      REASON FOR ASSESSMENT  At nutrition risk - screening positive for lost weight without trying, 2-13 pounds, and eating poorly due to decreased appetite.     Patient admitted on 1/4 with chest pain. PMH of T2dm, HTN, hyperlipidemia, hx of MI with stent placement x2, cardiac arrest; hx of lung chancer on chemotherapy.     SUBJECTIVE INFORMATION  Assessed patient in room.    NUTRITION HISTORY  Patient reports he does not follow any specific diet at home. Had previously been attempting weight loss due to another provider's recommendations - unclear when weight loss recommendations had been made.     CURRENT NUTRITION ORDERS  Diet:   Orders Placed This Encounter      Regular Diet Adult    CURRENT INTAKE/TOLERANCE  Flowsheets indicate good appetite and 100% intake for breakfast and lunch today; limited available info in flowsheet for days prior to today. Per patient, reports that his appetite has been \"fine\". Reports that he was eating less over the last week and a half, but not due to attempting weight loss or low appetite. Intake and appetite prior to admission somewhat unclear from self-report.     Patient indicates no issues with chewing, swallowing, nausea, constipation or diarrhea. Last BM was 1/2 (4 days ago). Is not receiving any meds for constipation or diarrhea.     LABS  Nutrition-relevant labs: Reviewed  Triglycerides 278 (H)    MEDICATIONS  Nutrition-relevant medications: Reviewed  Protonix  Metformin being held  Glucagon, glucose and dextrose " "PRN    ANTHROPOMETRICS  Height: 175.3 cm (5' 9\")  Most Recent Weight: 109.5 kg (241 lb 8 oz)  IBW: 70.7 kg  % IBW: 155 %  BMI (kg/m ): Obesity Class II BMI 35-39.9  Weight History:   01/06 0322  109.5 kg (241 lb 8 oz)   01/05 1720  110.5 kg (243 lb 8 oz)   01/04 1928  111.6 kg (246 lb)     Wt Readings from Last 6 Encounters:   01/06/25 109.5 kg (241 lb 8 oz)   12/11/24 113.2 kg (249 lb 8 oz)   11/20/24 115.2 kg (253 lb 14.4 oz)   10/29/24 113 kg (249 lb 3.2 oz)   10/09/24 114.8 kg (253 lb)   09/24/24 113.9 kg (251 lb)   Weight loss of 4.6% in last 3 months, 3.3% in last 1 month, 1.9% since admission 2 days ago. Unclear if weight loss is due to fluid shifts - patient diuresing.     Dosing Weight: 80.4 kg, based on adjusted wt    ASSESSED NUTRITION NEEDS  Estimated Energy Needs: 4550-4138 kcals/day (25 - 30 kcals/kg)  Justification: Maintenance  Estimated Protein Needs:  grams protein/day (1.2 - 1.5 grams of pro/kg)  Justification: Increased needs - cancer  Estimated Fluid Needs: 9582-5209 mL/day (1 mL/kcal)  Justification: Maintenance    SYSTEM FINDINGS    Skin/wounds: None noted in flowsheets  GI symptoms: None noted in flowsheets. Nausea noted in nursing notes. Patient reports last BM was 4 days ago on 1/2    MALNUTRITION  % Intake: Unable to assess  % Weight Loss: 1-2% in 1 week (moderate) but likely fluid loss  Subcutaneous Fat Loss: None observed - unable to complete full NFPE due to patient preference and comfort  Muscle Loss: None observed - unable to complete full NFPE due to patient preference and comfort  Fluid Accumulation/Edema: per flowsheet, trace to mild in L and R feet, ankles and legs  Malnutrition Diagnosis: Unable to determine due to unclear weight trends, unable to complete full NFPE  Malnutrition Present on Admission: Unable to assess    NUTRITION DIAGNOSIS  Increased nutrient needs related to lung cancer and atrial fibrillation as evidenced by fluid accumulation and possible weight loss of " >1% within last week    INTERVENTIONS  Patient declined oral nutrition supplements at this time.     Goals  Patient to consume % of nutritionally adequate meal trays TID, or the equivalent with supplements/snacks.     Monitoring/Evaluation  Progress toward goals will be monitored and evaluated per policy.

## 2025-01-06 NOTE — PLAN OF CARE
Problem: Comorbidity Management  Goal: Blood Pressure in Desired Range  Outcome: Progressing  Intervention: Maintain Blood Pressure Management  Recent Flowsheet Documentation  Taken 1/6/2025 0010 by Alcides Saldana RN  Medication Review/Management: medications reviewed     Problem: Dysrhythmia  Goal: Normalized Cardiac Rhythm  Outcome: Progressing     Problem: Chest Pain  Goal: Resolution of Chest Pain Symptoms  Outcome: Progressing     Problem: Anxiety  Goal: Anxiety Reduction or Resolution  Outcome: Progressing   Goal Outcome Evaluation:  Patient denied pain, SOB, nausea or dizziness overnight. He did have anxiety. Hydroxyzine given with some improvement. Voiding well with assist X 1 with cane to bathroom.  Remains in Afib BBB, HR 90-110s. With activity up to 120s.  NPO since midnight for pericardiocentesis this AM. Patient aware of POC.

## 2025-01-06 NOTE — CONSULTS
1/6 Test claim for DOAC'S- Eliquis $86 for 30 days supply, Xarelto $81 for 30 days supply, first 30 day vouchers avail in the discharge pharmacy if needed. Thank you for allowing me to help with your patient  Jessica Weaver [facility]  Pharmacy Discharge Liaison   [facility]

## 2025-01-06 NOTE — PROGRESS NOTES
Prepped in [facility].  No questions.  Left voice message for son about change in procedure time.

## 2025-01-06 NOTE — PLAN OF CARE
Goal Outcome Evaluation:      Plan of Care Reviewed With: patient, family    Overall Patient Progress: no changeOverall Patient Progress: no change        Received report from ED at 1630, pt arrived on unit and assumed care at 1720.     Pt Afib w/ BBB, 2L NC for comfort due to pt feeling short of breath. Pt c/o left-sided chest pain, prn oxycodone and scheduled Tylenol effective. Pt reported feeling anxious, buspirone administered. K and Mag protocol rechecks tomorrow morning.      Plan for pericardiocentesis tomorrow, NPO at midnight.     Cesilia Jackman RN

## 2025-01-06 NOTE — PROGRESS NOTES
[facility]    Medicine Progress Note - Hospitalist Service    Date of Admission:  1/4/2025    Assessment & Plan   Jonas Hyman is a 79 year old male medical history significant for type II DM, hypertension, hyperlipidemia, prior history of MI status post stent placement x 2, history of lung cancer on immunotherapy who presents with acute chest pain.    #Acute chest pain  #Probable pericarditis  #Non-ischemic myocardial injury  The patient reports about 2 days of acute onset chest pain but some intermittent pain like this for a couple of weeks. It is sharp, non-radiating, but is worse with all movements, especially when moving his arms/chest. A little worse when lying flat. CT PE showed no PE but a moderate-sized pericardial effusion. TTE 1/5 shows normal EF and biventricular function/size, no WMA, normal biatrial size, demonstrates moderate pericardial effusion without echo evidence of tamponade. No recent URI symptoms. No alcohol in 50 years. Current smoker. HS troponin ~22-25 and flat over several re-checks. EKG with new diagnosis of atrial fibrillation.  - Cardiology consulted  - Pericardiocentesis 1/6/25  - Holding all AC procedure  - Tele  - Track inflammatory markers repeat ESR ordered  - Pain control ordered with Tylenol, oxycodone PRN, Dilaudid IV PRN  - colchicine and ibuprofen started by cardiology.    Repeat Echo in a few days to assess effusion.    #New onset of atrial fibrillation  Noted in the ED. CHADSVASC 6. Likely triggered by pericardial pathology.  - Cardiology consulted  - Seems to be resolved today.    #Pre-DM  A1C 5.8.    #Primary hypertension  Noted in chart, not on blood pressure meds.    #CAD with remote anterior MI and PCI with rashid-MI VF cardiac arrest   #RBBB  - Continue PTA ASA 81mg daily  - Continue PTA pravastatin 40mg daily    #Small Cell Lung Cancer  Follows with Dr. Madera. Completed 4 cycles of chemoimmunotherapy. Historically had had good response to  "this treatment, though cancer remains present. Currently on maintenance immunotherapy.  - Continue atezolizumab q3wks as OP    #Anxiety  - Continue PTA Buspar 10mg TID PRN  - Continue PTA Hydroxyzine    #RLS  - Continue PTA gabapentin 100mg at bedtime  - Continue PTA ropinirole 2mg QHS    #B12 deficiency  #Vitamin D deficiency  - Hold home supplements here          Diet: Regular Diet Adult    DVT Prophylaxis: Pneumatic Compression Devices  Camilo Catheter: Not present  Lines: None       Cardiac Monitoring: ACTIVE order. Indication: Post- PCI/Angiogram (24 hours)  Code Status: Full Code      Clinically Significant Risk Factors                   # Hypertension: Noted on problem list            # Obesity: Estimated body mass index is 35.66 kg/m  as calculated from the following:    Height as of this encounter: 1.753 m (5' 9\").    Weight as of this encounter: 109.5 kg (241 lb 8 oz)., PRESENT ON ADMISSION            Social Drivers of Health    Tobacco Use: Medium Risk (12/11/2024)    Patient History     Smoking Tobacco Use: Former     Smokeless Tobacco Use: Never     Passive Exposure: Past   Financial Resource Strain: Low Risk  (1/5/2025)    Financial Resource Strain     Within the past 12 months, have you or your family members you live with been unable to get utilities (heat, electricity) when it was really needed?: No   Recent Concern: Financial Resource Strain - High Risk (1/5/2025)    Financial Resource Strain     Within the past 12 months, have you or your family members you live with been unable to get utilities (heat, electricity) when it was really needed?: Yes          Disposition Plan     Medically Ready for Discharge: Anticipated Tomorrow             Dillon Gupta MD  Hospitalist Service  [facility]  Securely message with scroll kit (more info)  Text page via [facility] Paging/Directory   ______________________________________________________________________    Interval History   Patient " feels much better.  He is very anxious to discharge tomorrow.    Physical Exam   Vital Signs: Temp: 97.9  F (36.6  C) Temp src: Oral BP: (!) 152/107 Pulse: 91   Resp: 20 SpO2: 98 % O2 Device: None (Room air) Oxygen Delivery: 2 LPM  Weight: 241 lbs 8 oz    General: No overt distress, conversational, non-toxic appearing  HEENT: MMM  Pulmonary: CTAB; no crackles, wheezing, or rhonchi, normal effort  Cardiac: RRR. No m/r/g  Abdomen: Soft. NT, ND.  Extremities: No bilateral LE edema  Neuro: alert and awake, Ox4    Medical Decision Making       55 MINUTES SPENT BY ME on the date of service doing chart review, history, exam, documentation & further activities per the note.      Data     I have personally reviewed the following data over the past 24 hrs:    9.6  \   11.8 (L)   / 229     N/A N/A N/A /  N/A   3.6 N/A N/A \       Imaging results reviewed over the past 24 hrs:   Recent Results (from the past 24 hours)   Echocardiogram Limited   Result Value    LVEF  60-65%    Narrative    [id number]  [id number]  [id number]  [id number]^ALDAIR^[place] [address]     Name: DEEPAK OLIVARES  MRN: [id number]  : 1945  Study Date: 2025 08:34 AM  Age: 79 yrs  Gender: Male  Patient Location: [facility]  Reason For Study: Pericardial Effusion  Ordering Physician: RONY QUINTEROS  Referring Physician: CARLOS BARDALES  Performed By: ALFRED     BSA: 2.2 m2  Height: 69 in  Weight: 241 lb  HR: 102  BP: 120/77 mmHg  ______________________________________________________________________________  Procedure  Limited Echocardiogram with two-dimensional imaging. No hemodynamically  significant valvular abnormalities on 2D or color flow imaging. Compared to  the prior study dated 2025, there have been no changes.  ______________________________________________________________________________  Interpretation Summary     1.Left ventricular size, wall motion and function are normal. The  ejection  fraction is 60-65%.  2.Normal right ventricle size and systolic function.  3.No hemodynamically significant valvular abnormalities on 2D or color flow  imaging although this was a limited study with complete Doppler.  4.A small to medium sized circumferential pericardial effusion is noted, 14 mm  anterior and 13 mm posterior.There are no echocardiographic indications of  cardiac tamponade.  Compared to the prior study dated 1/5/2025, there have been no changes.  ______________________________________________________________________________  I      WMSI = 1.00     % Normal = 100     X - Cannot   0 -                      (2) - Mildly 2 -          Segments  Size  Interpret    Hyperkinetic 1 - Normal  Hypokinetic  Hypokinetic  1-2     small                                                     7 -          3-5      moderate  3 - Akinetic 4 -          5 -         6 - Akinetic Dyskinetic   6-14    large               Dyskinetic   Aneurysmal  w/scar       w/scar       15-16   diffuse     Left Ventricle  Left ventricular size, wall motion and function are normal. The ejection  fraction is 60-65%. There is normal left ventricular wall thickness. Left  ventricular diastolic function is not assessable. No regional wall motion  abnormalities noted.     Right Ventricle  Normal right ventricle size and systolic function.     Atria  Normal left atrial size. Right atrial size is normal. There is no color  Doppler evidence of an atrial shunt.     Mitral Valve  Mitral valve leaflets appear normal. There is no evidence of mitral stenosis  or clinically significant mitral regurgitation. There is no mitral valve  stenosis.     Tricuspid Valve  The tricuspid valve is not well visualized, but is grossly normal. There is no  tricuspid stenosis.     Aortic Valve  The aortic valve is not well visualized.     Pulmonic Valve  The pulmonic valve is not well visualized.     Vessels  The aorta root is normal. Normal size ascending aorta.  IVC diameter <2.1 cm  collapsing >50% with sniff suggests a normal RA pressure of 3 mmHg.     Pericardium  A circumferential pericardial effusion is noted. There are no  echocardiographic indications of cardiac tamponade.     Rhythm  Sinus rhythm was noted.     ______________________________________________________________________________  Report approved by: Dima Almanza MD on 01/06/2025 09:48 AM     ______________________________________________________________________________      Cardiac Catheterization    Narrative    Images from the original result were not included.  Jonas Hyman is a 79 year old old male with moderate pericardial   effusion, newly diagnosed atrial fibrillation, elevated troponin, CAD with   hx of anterior MI; s/p PCI, s/p rashid infarct VF arrest in 2000, small cell   lung CA, DM Type II, Class II obesity; BMI 35.66kg/m2 who is here for   angio and consideration of pericardiocentesis.     - non-obstructive CAD; low-normal L-sided filling pressures  - no good window/not enough fluid for pericardiocentesis - will need a   window to evacuate organized material  - continue aggressive risk factor modification          Findings:  LM:no obstruction  LAD:mid-vessel moderate 30-40% irregularity, distal D2 w/ ostial 50%   narrowing  Lcx:co-dominant, mildly irregular  RCA:small, non-dominant or co-dominant, mid-vessel 30-40% narrowing    LVEDP:5    Access:  R Radial artery    Closure:   Vasc Band

## 2025-01-07 DIAGNOSIS — I31.39 PERICARDIAL EFFUSION: ICD-10-CM

## 2025-01-07 DIAGNOSIS — I30.9 ACUTE PERICARDITIS, UNSPECIFIED TYPE: Primary | ICD-10-CM

## 2025-01-07 PROBLEM — I30.0 ACUTE IDIOPATHIC PERICARDITIS: Status: ACTIVE | Noted: 2025-01-07

## 2025-01-07 LAB
ANION GAP SERPL CALCULATED.3IONS-SCNC: 9 MMOL/L (ref 7–15)
BUN SERPL-MCNC: 20.7 MG/DL (ref 8–23)
CALCIUM SERPL-MCNC: 8.9 MG/DL (ref 8.8–10.4)
CHLORIDE SERPL-SCNC: 102 MMOL/L (ref 98–107)
CREAT SERPL-MCNC: 1.21 MG/DL (ref 0.67–1.17)
EGFRCR SERPLBLD CKD-EPI 2021: 61 ML/MIN/1.73M2
ERYTHROCYTE [SEDIMENTATION RATE] IN BLOOD BY WESTERGREN METHOD: 39 MM/HR (ref 0–20)
GLUCOSE SERPL-MCNC: 96 MG/DL (ref 70–99)
HCO3 SERPL-SCNC: 25 MMOL/L (ref 22–29)
MAGNESIUM SERPL-MCNC: 2 MG/DL (ref 1.7–2.3)
POTASSIUM SERPL-SCNC: 3.6 MMOL/L (ref 3.4–5.3)
SODIUM SERPL-SCNC: 136 MMOL/L (ref 135–145)

## 2025-01-07 PROCEDURE — 99233 SBSQ HOSP IP/OBS HIGH 50: CPT | Performed by: INTERNAL MEDICINE

## 2025-01-07 PROCEDURE — 250N000013 HC RX MED GY IP 250 OP 250 PS 637: Performed by: FAMILY MEDICINE

## 2025-01-07 PROCEDURE — 82565 ASSAY OF CREATININE: CPT | Performed by: FAMILY MEDICINE

## 2025-01-07 PROCEDURE — 82435 ASSAY OF BLOOD CHLORIDE: CPT | Performed by: FAMILY MEDICINE

## 2025-01-07 PROCEDURE — 120N000004 HC R&B MS OVERFLOW

## 2025-01-07 PROCEDURE — 36415 COLL VENOUS BLD VENIPUNCTURE: CPT | Performed by: FAMILY MEDICINE

## 2025-01-07 PROCEDURE — 85652 RBC SED RATE AUTOMATED: CPT | Performed by: FAMILY MEDICINE

## 2025-01-07 PROCEDURE — 250N000013 HC RX MED GY IP 250 OP 250 PS 637: Performed by: INTERNAL MEDICINE

## 2025-01-07 PROCEDURE — 250N000011 HC RX IP 250 OP 636: Performed by: INTERNAL MEDICINE

## 2025-01-07 PROCEDURE — 99207 PR APP CREDIT; MD BILLING SHARED VISIT: CPT

## 2025-01-07 PROCEDURE — 99232 SBSQ HOSP IP/OBS MODERATE 35: CPT | Mod: FS | Performed by: INTERNAL MEDICINE

## 2025-01-07 PROCEDURE — 83735 ASSAY OF MAGNESIUM: CPT | Performed by: INTERNAL MEDICINE

## 2025-01-07 PROCEDURE — 250N000013 HC RX MED GY IP 250 OP 250 PS 637

## 2025-01-07 RX ORDER — HYDRALAZINE HYDROCHLORIDE 20 MG/ML
10 INJECTION INTRAMUSCULAR; INTRAVENOUS EVERY 4 HOURS PRN
Status: DISCONTINUED | OUTPATIENT
Start: 2025-01-07 | End: 2025-01-08 | Stop reason: HOSPADM

## 2025-01-07 RX ORDER — NALOXONE HYDROCHLORIDE 0.4 MG/ML
0.2 INJECTION, SOLUTION INTRAMUSCULAR; INTRAVENOUS; SUBCUTANEOUS
Status: DISCONTINUED | OUTPATIENT
Start: 2025-01-07 | End: 2025-01-08 | Stop reason: HOSPADM

## 2025-01-07 RX ORDER — NALOXONE HYDROCHLORIDE 0.4 MG/ML
0.4 INJECTION, SOLUTION INTRAMUSCULAR; INTRAVENOUS; SUBCUTANEOUS
Status: DISCONTINUED | OUTPATIENT
Start: 2025-01-07 | End: 2025-01-08 | Stop reason: HOSPADM

## 2025-01-07 RX ORDER — METOPROLOL SUCCINATE 25 MG/1
25 TABLET, EXTENDED RELEASE ORAL DAILY
Status: DISCONTINUED | OUTPATIENT
Start: 2025-01-07 | End: 2025-01-08 | Stop reason: HOSPADM

## 2025-01-07 RX ADMIN — COLCHICINE 0.6 MG: 0.6 TABLET, FILM COATED ORAL at 08:32

## 2025-01-07 RX ADMIN — GABAPENTIN 100 MG: 100 CAPSULE ORAL at 21:50

## 2025-01-07 RX ADMIN — ROPINIROLE HYDROCHLORIDE 2 MG: 1 TABLET, FILM COATED ORAL at 21:50

## 2025-01-07 RX ADMIN — IBUPROFEN 600 MG: 600 TABLET, FILM COATED ORAL at 19:54

## 2025-01-07 RX ADMIN — PRAVASTATIN SODIUM 40 MG: 20 TABLET ORAL at 08:32

## 2025-01-07 RX ADMIN — ACETAMINOPHEN 975 MG: 325 TABLET ORAL at 19:54

## 2025-01-07 RX ADMIN — HYDROXYZINE HYDROCHLORIDE 50 MG: 25 TABLET ORAL at 21:50

## 2025-01-07 RX ADMIN — PANTOPRAZOLE SODIUM 40 MG: 40 TABLET, DELAYED RELEASE ORAL at 08:32

## 2025-01-07 RX ADMIN — HYDRALAZINE HYDROCHLORIDE 10 MG: 20 INJECTION INTRAMUSCULAR; INTRAVENOUS at 00:00

## 2025-01-07 RX ADMIN — ACETAMINOPHEN 975 MG: 325 TABLET ORAL at 08:32

## 2025-01-07 RX ADMIN — ACETAMINOPHEN 975 MG: 325 TABLET ORAL at 13:37

## 2025-01-07 RX ADMIN — METOPROLOL SUCCINATE 25 MG: 25 TABLET, EXTENDED RELEASE ORAL at 11:21

## 2025-01-07 RX ADMIN — MAGNESIUM OXIDE TAB 400 MG (241.3 MG ELEMENTAL MG) 400 MG: 400 (241.3 MG) TAB at 08:32

## 2025-01-07 RX ADMIN — IBUPROFEN 600 MG: 600 TABLET, FILM COATED ORAL at 13:37

## 2025-01-07 RX ADMIN — ASPIRIN 81 MG: 81 TABLET, COATED ORAL at 08:32

## 2025-01-07 RX ADMIN — IBUPROFEN 600 MG: 600 TABLET, FILM COATED ORAL at 08:33

## 2025-01-07 ASSESSMENT — ACTIVITIES OF DAILY LIVING (ADL)
ADLS_ACUITY_SCORE: 39
ADLS_ACUITY_SCORE: 38
ADLS_ACUITY_SCORE: 39
ADLS_ACUITY_SCORE: 38
ADLS_ACUITY_SCORE: 38
ADLS_ACUITY_SCORE: 39
ADLS_ACUITY_SCORE: 39
ADLS_ACUITY_SCORE: 38
ADLS_ACUITY_SCORE: 39
ADLS_ACUITY_SCORE: 38
ADLS_ACUITY_SCORE: 39
ADLS_ACUITY_SCORE: 38
ADLS_ACUITY_SCORE: 39
ADLS_ACUITY_SCORE: 38
ADLS_ACUITY_SCORE: 39
ADLS_ACUITY_SCORE: 38
ADLS_ACUITY_SCORE: 39
ADLS_ACUITY_SCORE: 39

## 2025-01-07 NOTE — PLAN OF CARE
Problem: Comorbidity Management  Goal: Blood Pressure in Desired Range  Intervention: Maintain Blood Pressure Management  Recent Flowsheet Documentation  Taken 1/6/2025 1705 by Prema Cruz RN  Medication Review/Management: medications reviewed     Problem: Dysrhythmia  Goal: Normalized Cardiac Rhythm  Outcome: Progressing     Problem: Chest Pain  Goal: Resolution of Chest Pain Symptoms  Outcome: Progressing     Problem: Cardiac Catheterization (Diagnostic/Interventional)  Goal: Absence of Bleeding  Outcome: Progressing  Goal: Stable Heart Rate and Rhythm  Outcome: Progressing  Goal: Absence of Embolism Signs and Symptoms  Outcome: Progressing  Goal: Anesthesia/Sedation Recovery  Intervention: Optimize Anesthesia Recovery  Recent Flowsheet Documentation  Taken 1/6/2025 1705 by Prema Cruz RN  Safety Promotion/Fall Prevention:   activity supervised   assistive device/personal items within reach   clutter free environment maintained   nonskid shoes/slippers when out of bed   room near nurse's station  Reorientation Measures:   calendar in view   clock in view  Goal: Absence of Vascular Access Complication  Intervention: Prevent and Manage Access Complications  Recent Flowsheet Documentation  Taken 1/6/2025 1705 by Prema Cruz RN  Activity Management: up in chair   Goal Outcome Evaluation:      A&O x4. No c/o pain. Son at bedside until after supper- very attentive and supportive of pt. The pt was feeling anxious and requested his PRN Hydroxyzine. Per pt this helped but didn't completely take away his anxiety. Call-light within reach.

## 2025-01-07 NOTE — PLAN OF CARE
Problem: Dysrhythmia  Goal: Normalized Cardiac Rhythm  Outcome: Progressing     Problem: Anxiety  Goal: Anxiety Reduction or Resolution  Outcome: Progressing   Goal Outcome Evaluation:    Patient still anxious today. Was hoping to go home today but understands that the MD's would like him to stay overnight due to new medications.   Patient dressed in home clothes walking around unit.   Telemetry remains in NSR w/1st degree AVB & BBB.

## 2025-01-07 NOTE — PLAN OF CARE
Problem: Dysrhythmia  Goal: Normalized Cardiac Rhythm  Outcome: Progressing     Problem: Chest Pain  Goal: Resolution of Chest Pain Symptoms  Outcome: Progressing     Problem: Anxiety  Goal: Anxiety Reduction or Resolution  Outcome: Progressing   Goal Outcome Evaluation:      A&O x4. No c/o pain but does endorse SOB (not new). He refuses all alarms (bed, chair). He won't use the urinal. Ind. With cane. Call-light within reach.

## 2025-01-07 NOTE — PROVIDER NOTIFICATION
High blood pressure 183/81.     Vocera page to Dr. Garcia.     Orders for PRN hydralazine with parameters obtained.

## 2025-01-07 NOTE — PROGRESS NOTES
echoHEART CARE ENCOUNTER NOTE      [facility]  [phone]    Primary Care: Dillon Goldsmith  Primary Cardiologist: Dr. Walter    Assessment/Recommendations   Assessment:  Small pericardial effusion  Pericarditis   First noted on CT on 10/25/2024  No tamponade physiology on 1/9/2025, preserved LVEF, no wall motion normality.  Small pericardial effusion, 14 mm anterior  Less likely malignant effusion, may be due to atezolizumab treatment vs covid vaccination   ESR elevated at 26, CRP significantly elevated to 102  Underwent left heart cath with plan for pericardiocentesis on 1/6/25 and found nonobstructive CAD, low normal left-sided filling pressures and insufficient amount of fluid for pericardiocentesis (will need a window to evacuate organized material but deferred by CTS)  Chest pain improved on colchicine and ibuprofen  No evidence of tamponade on exam  Paroxysmal atrial fibrillation  Asymptomatic, onset 1/5/2024, self converted on 1/6/2024  [id number]  Anticoagulation indicated but deferred currently given need for high dose NSAID use that may worsen bleeding risk  CAD   Known history of anterior MI and PCI with rashid-MI VF cardiac arrest in 2000  Right bundle branch block  Small cell lung cancer   primary right-sided lung cancer involving the right upper and middle lobes with metastases involving lymph nodes above and below the diaphragm and several sites of the skeleton  Follows with Dr. Santy albertsated 4 cycles of immunotherapy with Carboplatin/etoposide/atezolizumab   Good response to chemo immuno therapy and now on maintenance immunotherapy with atezolizumab (next appointment 1/22) which could be accounting for his pericarditis     Other active problems:  type 2 diabetes    Plan:  BMP today   Reduce ibuprofen to 300mg TID 1 week.  Start Eliquis 5 mg twice daily after finishing Ibuprofen   Continue colchicine for 3 months  Limited Echo and ESR/CRP in 3 months to guide  continuation of colchicine therapy  Switch metoprolol to coreg for elevated blood pressures in the office as well as elevated resting heart rate    Follow up with Dr. Walter in 3 months with limited echo and ESR/CRP.    The longitudinal plan of care for the diagnosis(es)/condition(s) as documented were addressed during this visit. Due to the added complexity in care, I will continue to support Jonas in the subsequent management and with ongoing continuity of care.      History of Present Illness/Subjective     Jonas Hyman is a 79 year old male with PMHx of pericarditis, pericardial effusion, lung cancer, paroxysmal atrial fibrillation, CAD, right bundle alem block, type 2 diabetes presenting for hospital follow-up.      Patient was admitted for chest pain and shortness of breath CT PE showed moderate-sized pericardial effusion.  Patient was treated for pericarditis and repeat limited echo prior to discharge showed small pericardial effusion without tamponade.  Pericardiocentesis was initially attempted however there was insufficient amount of fluid and a window was deferred by CTS.  Angiogram that eventually showed no obstructive CAD.  His course was complicated by new onset atrial fibrillation which patient self converted 1 day later.    Since discharge he reports no recurrence of his chest pain.  He still feels some shortness of breath that has been stable but feels it is better compared to when he was first admitted.   He has been taking ibuprofen and colchicine as directed.  Still unclear what was the cause of the pericarditis but could be from atezolizumab treatment versus COVID-vaccine.      Cardiac Testing     EC25: Atrial fibrillation with rate of 118, right bundle branch block  ECG 2025: Sinus rhythm with right bundle branch block      Echo:    Limited TTE, 2025  Small pericardial effusion, 14mm anterior which contains echodense material.  No evidence of tamponade.  The left  "ventricle is normal in size.  The visual ejection fraction is 60-65%.  Regional wall motion is grossly normal but endocardial border definition is  limited  Compared to the prior study dated 1/6/2025 (direct [id number] comparison of  images) no significant change    Limited TTE 1/6/2025  1.Left ventricular size, wall motion and function are normal. The ejection  fraction is 60-65%.  2.Normal right ventricle size and systolic function.  3.No hemodynamically significant valvular abnormalities on 2D or color flow  imaging although this was a limited study with complete Doppler.  4.A small to medium sized circumferential pericardial effusion is noted, 14 mm  anterior and 13 mm posterior.There are no echocardiographic indications of  cardiac tamponade.  Compared to the prior study dated 1/5/2025, there have been no changes.    TTE, 1/5/2025  1. The left ventricle is normal in size. Left ventricular function is  normal.The ejection fraction is 60-65%. There is normal left ventricular wall  thickness. No regional wall motion abnormalities noted.  2. Normal right ventricle size and systolic function.  3. Normal left atrial size. Right atrial size is normal.  4. Moderate pericardial effusion There are no echocardiographic indications of  cardiac tamponade.IVC diameter <2.1 cm collapsing >50% with sniff suggests a  normal RA pressure of 3 mmHg.  Clinical correlation is recommended. There is no comparison study available.    Cardiac Cath 1/6/2025:  - non-obstructive CAD; low-normal L-sided filling pressures  - no good window/not enough fluid for pericardiocentesis - will need a window to evacuate organized material  - continue aggressive risk factor modification      Labs:  LDL 76  Creatinine 1.21  Potassium 4.2   Hemoglobin 11.8  Platelet 229        Physical Examination    Vitals: BP (!) 156/72 (BP Location: Right arm, Patient Position: Sitting, Cuff Size: Adult Large)   Pulse 108   Resp 16   Ht 1.765 m (5' 9.5\")   Wt " 109.8 kg (242 lb)   BMI 35.22 kg/m    BMI= Body mass index is 35.22 kg/m .    General: Well appearing, in no acute distress. Resting comfortably in exam chair  Skin: No clubbing, no cyanosis.  Eyes: Extra ocular movements intact  Neck: No jugular venous distention, no carotid bruits, carotids have a normal upstroke  Lungs: Clear to auscultation bilaterally, no wheezing or rhonchi.  Heart: Tachycardic, regular rhythm, PMI not displaced, S1, S2 normal, no S3, no S4, no heaves, no rub and no murmur.  Abdomen: Soft, nontender, bowel sounds normal, no palpable organomegaly, no bruits.  Extremities: No peripheral edema . Grade 2/4 distal pulses bilaterally.  Neuro: Oriented to person, place and time, alert, cooperative, gait coordinated.    BP Readings from Last 3 Encounters:   01/14/25 (!) 156/72   01/08/25 135/63   01/02/25 (!) 159/76       Pulse Readings from Last 3 Encounters:   01/14/25 108   01/08/25 64   01/02/25 74       Wt Readings from Last 3 Encounters:   01/14/25 109.8 kg (242 lb)   01/08/25 110 kg (242 lb 6.4 oz)   12/11/24 113.2 kg (249 lb 8 oz)         Review of Systems      Please refer above for cardiac ROS details.       History     MEDICAL HISTORY:  Past Medical History:   Diagnosis Date    Acute MI (H) 2009    Bronchitis     Cardiac arrest (H) 2009    Cardiac arrest (H)     Chemical dependency (H)     Has been in recovery 49 years    Coronary artery disease     Diabetes mellitus (H)     type II    Diabetes mellitus type 2, noninsulin dependent (H)     Diverticula of colon     Diverticulosis of large intestine     Hemorrhoids     Hemorrhoids     History of alcohol dependence (H) 01/12/1975    Created by [facility] Annotation: May 29 2009  5:52PM - Dillon Goldsmith: dry since  1975, also used drugs and marijuana  Replacement Utility updated for latest IMO load    Hypertension     Hypertension     Macular degeneration of right eye     Mixed hyperlipidemia     Myocardial infarction (H)      Retinal detachment 06/23/2014    Vitrectomy Posterior 23 guage, scleral buckle, membrane peel, endolaser gase    Stage 3b chronic kidney disease (H) 11/30/2020    Stented coronary artery 2009    stints times 2    Vitamin B12 deficiency     Vitamin D deficiency      SURGICAL HISTORY  Past Surgical History:   Procedure Laterality Date    BRONCHOSCOPY RIDID OR FLEXIBLE W/ENDOBRONCHIAL ULTRASOUND GUIDED 1 OR 2 NODE STATIONS N/A 8/26/2024    Procedure: BRONCHOSCOPY, WITH BIOPSY OF 1 OR 2 LYMPH NODE STATIONS WITH ENDOBRONCHIAL ULTRASOUND GUIDANCE;  Surgeon: Bernarda Morrison MD;  Location:  GI    CATARACT IOL, RT/LT Bilateral     CV CORONARY ANGIOGRAM N/A 1/6/2025    Procedure: Coronary Angiogram;  Surgeon: Vinicio Aparicio MD;  Location: [facility] LAB CV    CV LEFT HEART CATH N/A 1/6/2025    Procedure: Left Heart Catheterization;  Surgeon: Vinicio Aparicio MD;  Location: [facility] CV    EYE SURGERY      FRACTURE SURGERY      HC REMOVE TONSILS/ADENOIDS,<13 Y/O      Description: Tonsillectomy With Adenoidectomy;  Recorded: 05/29/2009;    HEART CATH STENT COR W/WO PTCA  05/07/2009    Proximal Left Anterior Descending Artery, Proximal Circumflex      HERNIA REPAIR      INGUINAL HERNIA REPAIR      IR CHEST PORT PLACEMENT > 5 YRS OF AGE  9/5/2024    LASER YAG IRIDOTOMY  9/6/2013    Procedure: LASER YAG IRIDOTOMY;  peripheral irridotomy ;  Surgeon: Doroteo Andres MD;  Location: [facility]    OTHER SURGICAL HISTORY      Cath Stent 1 Proximal Left Anterior Descending Artery     PHACOEMULSIFICATION CLEAR CORNEA WITH STANDARD INTRAOCULAR LENS IMPLANT  9/5/2013    Procedure: PHACOEMULSIFICATION CLEAR CORNEA WITH STANDARD INTRAOCULAR LENS IMPLANT;   COMPLEX RIGHT PHACOEMULSIFICATION CLEAR CORNEA WITH ANTERIOR CHAMBER INTRAOCULAR LENS IMPLANT, ANTERIOR VITRECTOMY;  Surgeon: Doroteo Andres MD;  Location: [facility]    PHACOEMULSIFICATION WITH STANDARD INTRAOCULAR LENS IMPLANT Left 12/3/2018    Procedure: Left Eye  Phacoemulsification with Intraocular Lens;  Surgeon: Suhail Johnson MD;  Location: [facility] OR    RETINAL DETACHMENT SURGERY      TONSILLECTOMY      VASECTOMY      VITRECTOMY ANTERIOR  2013    Procedure: VITRECTOMY ANTERIOR;;  Surgeon: Doroteo Andres MD;  Location:  [facility]    ZZC MUSCLE-SKIN FLAP,LEG        FAMILY HISTORY:  Family History   Problem Relation Age of Onset    Obesity Father     Glaucoma No family hx of     Macular Degeneration No family hx of     Retinal detachment No family hx of     Kidney Cancer Father     Heart Disease Brother     FAMILY HISTORY:  Family History   Problem Relation Age of Onset    Obesity Father     Glaucoma No family hx of     Macular Degeneration No family hx of     Retinal detachment No family hx of     Kidney Cancer Father     Heart Disease Brother       SOCIAL HISTORY:  Social History     Socioeconomic History    Marital status:      Spouse name: Not on file    Number of children: Not on file    Years of education: Not on file    Highest education level: Not on file   Occupational History    Not on file   Tobacco Use    Smoking status: Former     Current packs/day: 0.00     Average packs/day: 1 pack/day for 50.6 years (50.6 ttl pk-yrs)     Types: Cigarettes     Start date: 1956     Quit date: 2024     Years since quittin.0     Passive exposure: Past (son smoked smoking in home)    Smokeless tobacco: Never    Tobacco comments:     20 yrs ago-mid 80's, quit again , started again , quit 2024   Vaping Use    Vaping status: Never Used   Substance and Sexual Activity    Alcohol use: No     Comment: from 1975    Drug use: No    Sexual activity: Not on file   Other Topics Concern    Parent/sibling w/ CABG, MI or angioplasty before 65F 55M? Not Asked   Social History Narrative    2 children,         Quit drinking alcohol         Son has diabetes and lives in basement        Lives with grandchild who is 11, born  2012     Social Drivers of Health     Financial Resource Strain: Low Risk  (1/5/2025)    Financial Resource Strain     Within the past 12 months, have you or your family members you live with been unable to get utilities (heat, electricity) when it was really needed?: No   Recent Concern: Financial Resource Strain - High Risk (1/5/2025)    Financial Resource Strain     Within the past 12 months, have you or your family members you live with been unable to get utilities (heat, electricity) when it was really needed?: Yes   Food Insecurity: Low Risk  (1/5/2025)    Food Insecurity     Within the past 12 months, did you worry that your food would run out before you got money to buy more?: No     Within the past 12 months, did the food you bought just not last and you didn t have money to get more?: No   Transportation Needs: Low Risk  (1/5/2025)    Transportation Needs     Within the past 12 months, has lack of transportation kept you from medical appointments, getting your medicines, non-medical meetings or appointments, work, or from getting things that you need?: No   Physical Activity: Not on file   Stress: Not on file   Social Connections: Not on file   Interpersonal Safety: Low Risk  (1/5/2025)    Interpersonal Safety     Do you feel physically and emotionally safe where you currently live?: Yes     Within the past 12 months, have you been hit, slapped, kicked or otherwise physically hurt by someone?: No     Within the past 12 months, have you been humiliated or emotionally abused in other ways by your partner or ex-partner?: No   Housing Stability: Low Risk  (1/5/2025)    Housing Stability     Do you have housing? : Yes     Are you worried about losing your housing?: No    ALLERGIES:  No Known Allergies         Medications:     No current outpatient medications on file.           Gene Cavazos PA-C  January 14, 2025    This note was partially generated using Dragon voice recognition system, and there may be  some incorrect words,  spellings, and punctuation that were not noted in checking the note before saving.

## 2025-01-07 NOTE — PROGRESS NOTES
[facility]    Medicine Progress Note - Hospitalist Service    Date of Admission:  1/4/2025    Assessment & Plan   79 year old male medical history significant for type II DM, hypertension, hyperlipidemia, prior history of MI status post stent placement x 2, history of lung cancer on immunotherapy who presents with acute chest pain.     Acute chest pain  Pericarditis, question malignant effusion vs related to atezolizumab   Non-ischemic myocardial injury  The patient reports about 2 days of acute onset chest pain but some intermittent pain like this for a couple of weeks. It is sharp, non-radiating, but is worse with all movements, especially when moving his arms/chest. A little worse when lying flat. CT PE showed no PE but a moderate-sized pericardial effusion. TTE 1/5 shows normal EF and biventricular function/size, no WMA, normal biatrial size, demonstrates moderate pericardial effusion without echo evidence of tamponade. No recent URI symptoms. No alcohol in 50 years. Current smoker. HS troponin ~22-25 and flat over several re-checks. EKG with new diagnosis of atrial fibrillation, now resolved.   - Cardiology consulted  - Pericardiocentesis attempted 1/6/25 however insufficient amount of fluid for pericardiocentesis so window was recommended however this was deferred by CTS  - coronary angiogram showed non-obstructive CAD   - Holding all AC for now  - Pain control ordered with Tylenol, oxycodone PRN, Dilaudid IV PRN  - colchicine and ibuprofen started by cardiology.  - starting metoprolol due to high heart rates 1/7  - Repeat Echo in a few days to assess effusion. This can be done as outpatient if patient is otherwise stable for discharge in the AM  - patient will need to discuss with his oncologist regarding continuation of atezolizumab due to small risk of this being the etiology of his pericarditis       New onset of atrial fibrillation  Noted in the ED. CHADSVASC 6. Likely triggered  "by pericardial pathology.  - Cardiology consulted  - now resolved  - starting metoprolol due to high heart rates 1/7  - hold further AC, continue ASA and ibuprofen for now    Pre-DM  A1C 5.8.       Primary hypertension  Noted in chart, not on blood pressure meds PTA.  - started metoprolol as above       CAD with remote anterior MI and PCI with rashid-MI VF cardiac arrest   RBBB  - Continue PTA ASA 81mg daily  - Continue PTA pravastatin 40mg daily  - start metoprolol as above       Small Cell Lung Cancer  Follows with Dr. Madera. Completed 4 cycles of chemoimmunotherapy. Historically had had good response to this treatment, though cancer remains present. Currently on maintenance immunotherapy.  - will need to discuss with his oncologist regarding continuation of atezolizumab due to small risk of this being the etiology of his pericarditis       Anxiety  - Continue PTA Buspar 10mg TID PRN  - Continue PTA Hydroxyzine       RLS  - Continue PTA gabapentin 100mg at bedtime  - Continue PTA ropinirole 2mg QHS                Diet: Regular Diet Adult    DVT Prophylaxis: Pneumatic Compression Devices  Camilo Catheter: Not present  Lines: None     Cardiac Monitoring: ACTIVE order. Indication: Post- PCI/Angiogram (24 hours)  Code Status: Full Code      Clinically Significant Risk Factors                   # Hypertension: Noted on problem list            # Obesity: Estimated body mass index is 35.99 kg/m  as calculated from the following:    Height as of this encounter: 1.753 m (5' 9\").    Weight as of this encounter: 110.5 kg (243 lb 11.2 oz)., PRESENT ON ADMISSION            Disposition Plan   Medically Ready for Discharge: Anticipated Tomorrow      J Luis Mauricio DO  Hospitalist Service  [facility]  Securely message with [employer] (more info)  Text page via [employer] Paging/Directory   ______________________________________________________________________    Interval History   NAD. Denies any current " complaints    Physical Exam   Vital Signs: Temp: 98.1  F (36.7  C) Temp src: Oral BP: (!) 149/80 Pulse: 86   Resp: 20 SpO2: 95 % O2 Device: None (Room air)    Weight: 243 lbs 11.2 oz  General: NAD  RESPIRATORY: Breathing nonlabored  CARDIOVASCULAR: No le edema bilat.   NEUROLOGIC: Alert, speech clear        >50 MINUTES SPENT BY ME on the date of service doing chart review, history, exam, documentation & further activities per the note.  Data

## 2025-01-07 NOTE — PLAN OF CARE
"  Problem: Adult Inpatient Plan of Care  Goal: Patient-Specific Goal (Individualized)  Description: You can add care plan individualizations to a care plan. Examples of Individualization might be:  \"Parent requests to be called daily at 9am for status\", \"I have a hard time hearing out of my right ear\", or \"Do not touch me to wake me up as it startles  me\".  Outcome: Progressing  Goal: Optimal Comfort and Wellbeing  Outcome: Progressing     Problem: Comorbidity Management  Goal: Blood Pressure in Desired Range  Outcome: Progressing  Intervention: Maintain Blood Pressure Management  Recent Flowsheet Documentation  Taken 1/6/2025 2346 by Yamilka Ham RN  Medication Review/Management: medications reviewed     Problem: Dysrhythmia  Goal: Normalized Cardiac Rhythm  Outcome: Progressing     Problem: Chest Pain  Goal: Resolution of Chest Pain Symptoms  Outcome: Progressing     Problem: Anxiety  Goal: Anxiety Reduction or Resolution  Outcome: Progressing     Problem: Cardiac Catheterization (Diagnostic/Interventional)  Goal: Absence of Bleeding  Outcome: Progressing  Goal: Stable Heart Rate and Rhythm  Outcome: Progressing  Goal: Optimal Pain Control and Function  Outcome: Progressing  Goal: Absence of Vascular Access Complication  Outcome: Progressing   Goal Outcome Evaluation:         Vitals:    01/07/25 0000 01/07/25 0005 01/07/25 0110 01/07/25 0300   BP: (!) 168/79 (!) 157/66 (!) 159/74 (!) 149/67   BP Location: Left arm  Left arm Right arm   Pulse:    87   Resp:    20   Temp:    98.4  F (36.9  C)   TempSrc:    Oral   SpO2:    91%   Weight:    110.5 kg (243 lb 11.2 oz)   Height:              Patient denied pain and was able to sleep for most of the night without concern. Right radial site WDL healing well, covered with band aid. Patient stubborn and refused bed alarm and use of measuring his urine. Patient is forgetful but does listen to staff reminding him to not push or use right wrist. Tele NSR 1AVB BBB with " PACs. Blood pressure was high, contacted provider who ordered IV hydralazine, given and improvement seen. Patient is steady on his feet, independent with his own cane. Potential for discharge today.

## 2025-01-08 VITALS
SYSTOLIC BLOOD PRESSURE: 135 MMHG | DIASTOLIC BLOOD PRESSURE: 63 MMHG | RESPIRATION RATE: 20 BRPM | HEART RATE: 64 BPM | HEIGHT: 69 IN | TEMPERATURE: 98.6 F | OXYGEN SATURATION: 93 % | BODY MASS INDEX: 35.9 KG/M2 | WEIGHT: 242.4 LBS

## 2025-01-08 LAB
ANION GAP SERPL CALCULATED.3IONS-SCNC: 10 MMOL/L (ref 7–15)
BUN SERPL-MCNC: 18.8 MG/DL (ref 8–23)
CALCIUM SERPL-MCNC: 9.3 MG/DL (ref 8.8–10.4)
CHLORIDE SERPL-SCNC: 104 MMOL/L (ref 98–107)
CREAT SERPL-MCNC: 1.21 MG/DL (ref 0.67–1.17)
EGFRCR SERPLBLD CKD-EPI 2021: 61 ML/MIN/1.73M2
GLUCOSE SERPL-MCNC: 92 MG/DL (ref 70–99)
HCO3 SERPL-SCNC: 25 MMOL/L (ref 22–29)
MAGNESIUM SERPL-MCNC: 2.1 MG/DL (ref 1.7–2.3)
POTASSIUM SERPL-SCNC: 4.2 MMOL/L (ref 3.4–5.3)
SODIUM SERPL-SCNC: 139 MMOL/L (ref 135–145)

## 2025-01-08 PROCEDURE — 83735 ASSAY OF MAGNESIUM: CPT | Performed by: FAMILY MEDICINE

## 2025-01-08 PROCEDURE — 250N000013 HC RX MED GY IP 250 OP 250 PS 637: Performed by: INTERNAL MEDICINE

## 2025-01-08 PROCEDURE — 99239 HOSP IP/OBS DSCHRG MGMT >30: CPT | Performed by: INTERNAL MEDICINE

## 2025-01-08 PROCEDURE — 250N000013 HC RX MED GY IP 250 OP 250 PS 637

## 2025-01-08 PROCEDURE — 99232 SBSQ HOSP IP/OBS MODERATE 35: CPT | Mod: FS | Performed by: INTERNAL MEDICINE

## 2025-01-08 PROCEDURE — 84295 ASSAY OF SERUM SODIUM: CPT | Performed by: INTERNAL MEDICINE

## 2025-01-08 PROCEDURE — 36415 COLL VENOUS BLD VENIPUNCTURE: CPT | Performed by: FAMILY MEDICINE

## 2025-01-08 PROCEDURE — 99207 PR APP CREDIT; MD BILLING SHARED VISIT: CPT

## 2025-01-08 PROCEDURE — 80048 BASIC METABOLIC PNL TOTAL CA: CPT | Performed by: INTERNAL MEDICINE

## 2025-01-08 PROCEDURE — 250N000011 HC RX IP 250 OP 636: Performed by: INTERNAL MEDICINE

## 2025-01-08 RX ORDER — METOPROLOL SUCCINATE 25 MG/1
25 TABLET, EXTENDED RELEASE ORAL DAILY
Qty: 30 TABLET | Refills: 0 | Status: SHIPPED | OUTPATIENT
Start: 2025-01-09 | End: 2025-01-14

## 2025-01-08 RX ORDER — PANTOPRAZOLE SODIUM 40 MG/1
40 TABLET, DELAYED RELEASE ORAL
Qty: 14 TABLET | Refills: 0 | Status: SHIPPED | OUTPATIENT
Start: 2025-01-09 | End: 2025-01-23

## 2025-01-08 RX ORDER — AMLODIPINE BESYLATE 5 MG/1
5 TABLET ORAL DAILY
Status: DISCONTINUED | OUTPATIENT
Start: 2025-01-08 | End: 2025-01-08 | Stop reason: HOSPADM

## 2025-01-08 RX ORDER — COLCHICINE 0.6 MG/1
0.6 TABLET ORAL DAILY
Qty: 90 TABLET | Refills: 0 | Status: SHIPPED | OUTPATIENT
Start: 2025-01-09 | End: 2025-04-09

## 2025-01-08 RX ORDER — IBUPROFEN 600 MG/1
600 TABLET, FILM COATED ORAL 3 TIMES DAILY
Qty: 21 TABLET | Refills: 0 | Status: SHIPPED | OUTPATIENT
Start: 2025-01-08 | End: 2025-01-15

## 2025-01-08 RX ORDER — AMLODIPINE BESYLATE 5 MG/1
5 TABLET ORAL DAILY
Qty: 30 TABLET | Refills: 0 | Status: SHIPPED | OUTPATIENT
Start: 2025-01-08

## 2025-01-08 RX ADMIN — COLCHICINE 0.6 MG: 0.6 TABLET, FILM COATED ORAL at 08:38

## 2025-01-08 RX ADMIN — IBUPROFEN 600 MG: 600 TABLET, FILM COATED ORAL at 13:50

## 2025-01-08 RX ADMIN — AMLODIPINE BESYLATE 5 MG: 5 TABLET ORAL at 11:20

## 2025-01-08 RX ADMIN — HYDRALAZINE HYDROCHLORIDE 10 MG: 20 INJECTION INTRAMUSCULAR; INTRAVENOUS at 11:20

## 2025-01-08 RX ADMIN — PRAVASTATIN SODIUM 40 MG: 20 TABLET ORAL at 08:38

## 2025-01-08 RX ADMIN — MAGNESIUM OXIDE TAB 400 MG (241.3 MG ELEMENTAL MG) 400 MG: 400 (241.3 MG) TAB at 08:38

## 2025-01-08 RX ADMIN — ASPIRIN 81 MG: 81 TABLET, COATED ORAL at 08:38

## 2025-01-08 RX ADMIN — ACETAMINOPHEN 975 MG: 325 TABLET ORAL at 08:48

## 2025-01-08 RX ADMIN — PANTOPRAZOLE SODIUM 40 MG: 40 TABLET, DELAYED RELEASE ORAL at 07:39

## 2025-01-08 RX ADMIN — HYDRALAZINE HYDROCHLORIDE 10 MG: 20 INJECTION INTRAMUSCULAR; INTRAVENOUS at 01:21

## 2025-01-08 RX ADMIN — METOPROLOL SUCCINATE 25 MG: 25 TABLET, EXTENDED RELEASE ORAL at 08:38

## 2025-01-08 RX ADMIN — IBUPROFEN 600 MG: 600 TABLET, FILM COATED ORAL at 08:38

## 2025-01-08 ASSESSMENT — ACTIVITIES OF DAILY LIVING (ADL)
ADLS_ACUITY_SCORE: 38

## 2025-01-08 NOTE — PLAN OF CARE
Goal Outcome Evaluation:      Plan of Care Reviewed With: patient    Overall Patient Progress: improvingOverall Patient Progress: improving     Pt. Alert, oriented x 4. Denied pain, nausea,dyspnea, or dizziness. Slept well overnight, no complaints. B/P elevated at 173/86 with recheck 173 systolic, so given prn hydralazine per prn order. B/P at 0430 down to 150's systolic. Continue to monitor.

## 2025-01-08 NOTE — DISCHARGE SUMMARY
[facility]  Hospitalist Discharge Summary      Date of Admission:  1/4/2025  Date of Discharge:  1/8/2025  2:55 PM  Discharging Provider: J Luis Mauricio,   Discharge Service: Hospitalist Service        Follow-ups Needed After Discharge   Follow-up Appointments       Hospital Follow-up with Existing Primary Care Provider (PCP)      Please see details below         Schedule Primary Care visit within: 7 Days   Recommended labs and Imaging (to be ordered by Primary Care Provider): BMP               Unresulted Labs Ordered in the Past 30 Days of this Admission       No orders found from 12/5/2024 to 1/5/2025.        These results will be followed up by Hospitalist results pool    Discharge Disposition   Discharged to home  Condition at discharge: Stable      Hospital Course   79 year old male medical history significant for type II DM, hypertension, hyperlipidemia, prior history of MI status post stent placement x 2, history of lung cancer on immunotherapy who presents with acute chest pain.     Acute chest pain  Pericarditis, question malignant effusion vs related to atezolizumab   Non-ischemic myocardial injury  The patient reports about 2 days of acute onset chest pain but some intermittent pain like this for a couple of weeks. It is sharp, non-radiating, but is worse with all movements, especially when moving his arms/chest. A little worse when lying flat. CT PE showed no PE but a moderate-sized pericardial effusion. TTE 1/5 shows normal EF and biventricular function/size, no WMA, normal biatrial size, demonstrates moderate pericardial effusion without echo evidence of tamponade. No recent URI symptoms. No alcohol in 50 years. Current smoker. HS troponin ~22-25 and flat over several re-checks. EKG with new diagnosis of atrial fibrillation, now resolved.   - Cardiology consulted  - Pericardiocentesis attempted 1/6/25 however insufficient amount of fluid for pericardiocentesis so window  was recommended however this was deferred by CTS  - coronary angiogram showed non-obstructive CAD   - continue colchicine for at least 3 months and ibuprofen for at least 1 week per cardiology.  - continue PPI for GI PPX  - continue metoprolol after discharge started due to high heart rates  - continue amlodipine started this admission due to HTN  - Repeat Echo tomorrow as outpatient to assess effusion.   - patient will need to discuss with his oncologist regarding continuation of atezolizumab due to small risk of this being the etiology of his pericarditis        New onset of atrial fibrillation  Noted in the ED. CHADSVASC 6. Likely triggered by pericardial pathology.  - Cardiology consulted  - now resolved  - continue metoprolol as above  - hold further systemic anticoagulation, continue ASA and ibuprofen for now       Pre-DM  A1C 5.8.        Primary hypertension  Noted in chart, not on blood pressure meds PTA.  - continue metoprolol and amlodipine as above        CAD with remote anterior MI and PCI with rashid-MI VF cardiac arrest   RBBB  - Continue PTA ASA 81mg daily  - Continue PTA pravastatin 40mg daily  - start metoprolol as above        Small Cell Lung Cancer  Follows with Dr. Madera. Completed 4 cycles of chemoimmunotherapy. Historically had had good response to this treatment, though cancer remains present. Currently on maintenance immunotherapy.  - will need to discuss with his oncologist regarding continuation of atezolizumab due to small risk of this being the etiology of his pericarditis        Anxiety  - Continue PTA Buspar 10mg TID PRN  - Continue PTA Hydroxyzine        RLS  - Continue PTA gabapentin 100mg at bedtime  - Continue PTA ropinirole 2mg QHS       Consultations This Hospital Stay   CARDIOLOGY IP CONSULT  PHARMACY IP CONSULT  CARDIOTHORACIC SURGERY IP CONSULT  PHARMACY LIAISON FOR MEDICATION COVERAGE CONSULT  SMOKING CESSATION PROGRAM IP CONSULT    Code Status   Full Code    Time Spent on this  Encounter   I, J Luis Mauricio DO, personally saw the patient today and spent greater than 30 minutes discharging this patient.       J Luis Mauricio DO  [facility]  [address] [place], [place] [id number]  Phone: [phone]  Fax: [phone]  ______________________________________________________________________    Physical Exam   Vital Signs: Temp: 98.6  F (37  C) Temp src: Oral BP: 135/63 (after hydralazine) Pulse: 64   Resp: 20 SpO2: 93 % O2 Device: None (Room air)    Weight: 242 lbs 6.4 oz    General: NAD  RESPIRATORY: Respirations nonlabored  CARDIOVASCULAR: No le edema bilat.  NEUROLOGIC: No focal arm or leg weakness, speech is clear    Primary Care Physician   Dillon Goldsmith    Discharge Orders      Follow-Up with Cardiology FLOYD      Follow-Up with Cardiology      Reason for your hospital stay    Pericarditis     Activity    Your activity upon discharge: activity as tolerated     Discharge Instructions    Follow up with cardiology as directed  Recommend discussing with oncologist regarding therapy with atezolizumab as there is ~1% incidence of pericarditis and may be the reason for his presentation.     Diet    Follow this diet upon discharge: Current Diet:Orders Placed This Encounter      Regular Diet Adult     Hospital Follow-up with Existing Primary Care Provider (PCP)    Please see details below          \    Discharge Medications   Discharge Medication List as of 1/8/2025  1:34 PM        START taking these medications    Details   amLODIPine (NORVASC) 5 MG tablet Take 1 tablet (5 mg) by mouth daily., Disp-30 tablet, R-0, E-Prescribe      colchicine (COLCRYS) 0.6 MG tablet Take 1 tablet (0.6 mg) by mouth daily., Disp-90 tablet, R-0, E-Prescribe      metoprolol succinate ER (TOPROL XL) 25 MG 24 hr tablet Take 1 tablet (25 mg) by mouth daily., Disp-30 tablet, R-0, E-Prescribe      pantoprazole (PROTONIX) 40 MG EC tablet Take 1 tablet (40 mg) by mouth  every morning (before breakfast) for 14 days., Disp-14 tablet, R-0, E-Prescribe           CONTINUE these medications which have CHANGED    Details   ibuprofen (ADVIL/MOTRIN) 600 MG tablet Take 1 tablet (600 mg) by mouth 3 times daily for 7 days., Disp-21 tablet, R-0, E-Prescribe           CONTINUE these medications which have NOT CHANGED    Details   acetaminophen (TYLENOL) 500 MG tablet Take 1,000-1,500 mg by mouth 2 times daily., Historical      aspirin (ASA) 81 MG tablet Take 81 mg by mouth daily, Historical      busPIRone (BUSPAR) 10 MG tablet Take 1 tablet (10 mg) by mouth 3 times daily as needed for anxiety., Disp-90 tablet, R-1, E-Prescribe      cyanocobalamin (VITAMIN B-12) 100 MCG tablet Take 2,000 mcg by mouth daily, Historical      furosemide (LASIX) 40 MG tablet Take 1 tablet (40 mg) by mouth daily as needed (Leg swelling), Disp-30 tablet, R-3, E-Prescribe      gabapentin (NEURONTIN) 100 MG capsule Take 1 capsule (100 mg) by mouth at bedtime., Disp-60 capsule, R-3, E-PrescribeTake 1 or 2 tablets at bedtime to help with neuropathic pain      hydrOXYzine HCl (ATARAX) 25 MG tablet Take 1 tablet (25 mg) by mouth 3 times daily as needed for itching., Disp-40 tablet, R-2, E-Prescribe      magnesium oxide (MAG-OX) 400 MG tablet Take 1 tablet (400 mg) by mouth daily, OTC      nitroGLYcerin (NITROSTAT) 0.4 MG sublingual tablet Place 1 tablet (0.4 mg) under the tongue every 5 minutes as needed for chest pain, Disp-30 tablet, R-5, E-Prescribe      ondansetron (ZOFRAN) 8 MG tablet Take 1 tablet (8 mg) by mouth every 6 hours as needed for nausea (vomiting). Do not start before September 9, 2024., Disp-30 tablet, R-2, E-Prescribe      pravastatin (PRAVACHOL) 40 MG tablet Take 1 tablet (40 mg) by mouth daily, Disp-90 tablet, R-3, E-Prescribe      Pyridoxine HCl (VITAMIN B-6 PO) Take 1 tablet by mouth daily., Historical      rOPINIRole (REQUIP) 2 MG tablet Take 1 tablet (2 mg) by mouth at bedtime, Disp-90 tablet, R-3,  E-Prescribe      VITAMIN D, CHOLECALCIFEROL, PO Take 2,000 Units by mouth daily, Historical           Allergies   No Known Allergies

## 2025-01-08 NOTE — PROGRESS NOTES
[facility]   [address]  [place] [address]   [email or web address]   OFFICE: [phone]     CARDIOLOGY FOLLOW-UP NOTE      Impression and Plan     ASSESSMENT  Moderate pericardial effusion  Pericarditis   Increase in size compared to CT on 10/25/2024  No tamponade physiology on 1/5/2025, preserved LVEF, no wall motion normality.  Small to medium sized circumferential pericardial effusion is noted, 14 mm anterior and 13 mm posterior  Less likely malignant effusion, may be due to atezolizumab treatment   ESR elevated at 26, CRP significantly elevated to 102  Underwent left heart cath with plan for pericardiocentesis on 1/6/25 and found nonobstructive CAD, low normal left-sided filling pressures and insufficient amount of fluid for pericardiocentesis (will need a window to evacuate organized material but deferred by CTS at this time)  Chest pain improved, still intermittent shortness of breath.  Newly diagnosed atrial fibrillation  Asymptomatic, onset 1/5/2024  [id number]  Anticoagulation indicated but deferred currently given need for high dose NSAID use that may worsen bleeding risk  Back in NSR on 1/6/24  Elevated troponin  Mild elevation, 24-25-24  No ischemic ECG changes, unlikely plaque rupture/ACS  Could be related to new onset atrial fibrillation and pericardial effusion  CAD   Known history of anterior MI and PCI with rashid-MI VF cardiac arrest in 2000  HTN  Elevated today to 180s systolic   Right bundle branch block  chronic  Small cell lung cancer   primary right-sided lung cancer involving the right upper and middle lobes with metastases involving lymph nodes above and below the diaphragm and several sites of the skeleton  Follows with Dr. Madera pleated 4 cycles of immunotherapy with Carboplatin/etoposide/atezolizumab   Good response to chemo immuno therapy and now on maintenance immunotherapy with atezolizumab (next appointment 1/22) which could be  accounting for his pericarditis    Other active problems:  type 2 diabetes    PLAN  Continue metoprolol for BP pericarditis  Add amlodipine 5 mg for BP at discharge, will see me next week for follow-up.  Continue to hold AC since restoration of NSR and need for high-dose ibuprofen the next 1 to 2 weeks.  Consider addition of DOAC after finishing ibuprofen prescription  Continue tx for pericarditis with colchicine 0.6mg daily for at least 3 months and Ibuprofen 600mg TID at least 1 week and Protonix 40 mg for GI prevention  Repeat echo scheduled for tomorrow as an outpatient.    Would recommend discussing with oncologist regarding therapy with atezolizumab as there is ~1% incidence of pericarditis and may be the reason for his presentation.    Okay to discharge today with outpatient echo tomorrow    Follow up: Myself in 1 week, Dr. Walter in 3 months       Primary Cardiologist: None    Subjective     Patient is an 79-year-old male with a history of CAD and her anterior MI right bundle-branch block, thoracic and abdominal aortic aneurysm with prior repair, small cell lung cancer on chemotherapy, hypertension, diabetes mellitus who presented for chest pain and dyspnea.  He had a known pericardial effusion by CT scan on October which was larger upon presentation.    Overall feeling better and feels ready for discharge.  Denies any recurrence of his chest pain but still having some intermittent shortness of breath that is not bothersome to him.    Cardiac Diagnostics   Telemetry (personally reviewed): No significant arrhythmias, VT on telemetry was sinus rhythm.    ECG (personally reviewed and interpreted): EC25: Atrial fibrillation with rate of 118, right bundle branch block  ECG 2025: Sinus rhythm with right bundle branch block    Echocardiogram (results reviewed):   Limited TTE, 2025  1.Left ventricular size, wall motion and function are normal. The ejection  fraction is 60-65%.  2.Normal right  "ventricle size and systolic function.  3.No hemodynamically significant valvular abnormalities on 2D or color flow  imaging although this was a limited study with complete Doppler.  4.A small to medium sized circumferential pericardial effusion is noted, 14 mm  anterior and 13 mm posterior.There are no echocardiographic indications of  cardiac tamponade.  Compared to the prior study dated 1/5/2025, there have been no changes.    TTE, 1/5/2025  1. The left ventricle is normal in size. Left ventricular function is  normal.The ejection fraction is 60-65%. There is normal left ventricular wall  thickness. No regional wall motion abnormalities noted.  2. Normal right ventricle size and systolic function.  3. Normal left atrial size. Right atrial size is normal.  4. Moderate pericardial effusion There are no echocardiographic indications of  cardiac tamponade.IVC diameter <2.1 cm collapsing >50% with sniff suggests a  normal RA pressure of 3 mmHg.  Clinical correlation is recommended. There is no comparison study available.      Cardiac Cath (results reviewed):   1/6/25  - non-obstructive CAD; low-normal L-sided filling pressures  - no good window/not enough fluid for pericardiocentesis - will need a window to evacuate organized material  - continue aggressive risk factor modification      Physical Examination       BP (!) 186/88 (BP Location: Left arm)   Pulse 85   Temp 97.6  F (36.4  C) (Oral)   Resp 18   Ht 1.753 m (5' 9\")   Wt 110 kg (242 lb 6.4 oz)   SpO2 94%   BMI 35.80 kg/m            Intake/Output Summary (Last 24 hours) at 1/7/2025 1114  Last data filed at 1/7/2025 0830  Gross per 24 hour   Intake 1115 ml   Output 400 ml   Net 715 ml           General: pleasant male. No acute distress.   HENT: external ears normal. Nares patent. Mucous membranes moist.  Eyes: perrla, extraocular muscles intact. No scleral icterus.   Neck: No JVD  Lungs: clear to auscultation  COR:  regular rate and rhythm, No murmurs, " rubs, or gallops  Abd: nondistended, BS present  Extrem: No edema         Imaging      CT chest, 1/4/2025  IMPRESSION:  1.  No CT evidence of pulmonary embolism.  2.  Moderate-sized pericardial effusion has increased since the previous study; pericarditis cannot be excluded.  3.  Spiculated mass with adjacent scarring right middle lobe measures slightly smaller on today's examination. This likely represents the patient's known treated lung cancer.  4.  Mildly prominent right subcarinal lymph node measures slightly larger on today's examination. Given the change, this should be monitored on future surveillance examinations. The right anterior mediastinal mass or lymph node seen on the previous study   is partially obscured by the pericardial effusion but does appear to be smaller with a short axis diameter 1.6 cm as compared to 2.4 cm previously.    Lab Results   Lab Results   Component Value Date    CHOL 168 07/16/2024    HDL 36 (L) 07/16/2024    TRIG 278 (H) 07/16/2024    BUN 18.8 01/08/2025     01/08/2025    CO2 25 01/08/2025       Lab Results   Component Value Date    WBC 9.6 01/06/2025    HGB 11.8 (L) 01/06/2025    HCT 35.1 (L) 01/06/2025    MCV 96 01/06/2025     01/06/2025       Lab Results   Component Value Date    TSH 1.37 01/02/2025    INR 1.04 09/14/2023               Current Inpatient Scheduled Medications   Scheduled Meds:  Current Facility-Administered Medications   Medication Dose Route Frequency Provider Last Rate Last Admin    acetaminophen (TYLENOL) tablet 975 mg  975 mg Oral TID Vinicio Aparicio MD   975 mg at 01/07/25 1954    aspirin EC tablet 81 mg  81 mg Oral Daily Vinicio Aparicio MD   81 mg at 01/07/25 0832    colchicine (COLCRYS) tablet 0.6 mg  0.6 mg Oral Daily Gene Cavazos PA-C   0.6 mg at 01/07/25 0832    gabapentin (NEURONTIN) capsule 100 mg  100 mg Oral At Bedtime Vinicio Aparicio MD   100 mg at 01/07/25 2150    ibuprofen (ADVIL/MOTRIN) tablet 600 mg  600 mg Oral  TID Gene Cavazos PA-C   600 mg at 01/07/25 1954    magnesium oxide (MAG-OX) tablet 400 mg  400 mg Oral Daily Vinicio Aparicio MD   400 mg at 01/07/25 0832    metoprolol succinate ER (TOPROL XL) 24 hr tablet 25 mg  25 mg Oral Daily Gene Cavazos PA-C   25 mg at 01/07/25 1121    pantoprazole (PROTONIX) EC tablet 40 mg  40 mg Oral QAM AC Gene Cavazos PA-C   40 mg at 01/07/25 0832    pravastatin (PRAVACHOL) tablet 40 mg  40 mg Oral Daily Vinicio Aparicio MD   40 mg at 01/07/25 0832    rOPINIRole (REQUIP) tablet 2 mg  2 mg Oral At Bedtime Vinicio Aparicio MD   2 mg at 01/07/25 2150    sodium chloride (PF) 0.9% PF flush 3 mL  3 mL Intracatheter Q8H Vinicio Aparicio MD   3 mL at 01/07/25 2150     Continuous Infusions:  Current Facility-Administered Medications   Medication Dose Route Frequency Provider Last Rate Last Admin            Medications Prior to Admission   Prior to Admission medications    Medication Sig Start Date End Date Taking? Authorizing Provider   acetaminophen (TYLENOL) 500 MG tablet Take 1,000-1,500 mg by mouth 2 times daily.   Yes Reported, Patient   aspirin (ASA) 81 MG tablet Take 81 mg by mouth daily   Yes Reported, Patient   busPIRone (BUSPAR) 10 MG tablet Take 1 tablet (10 mg) by mouth 3 times daily as needed for anxiety. 12/16/24  Yes Dillon Goldsmith MD   cyanocobalamin (VITAMIN B-12) 100 MCG tablet Take 2,000 mcg by mouth daily   Yes Reported, Patient   furosemide (LASIX) 40 MG tablet Take 1 tablet (40 mg) by mouth daily as needed (Leg swelling) 7/10/24  Yes Dillon Goldsmith MD   gabapentin (NEURONTIN) 100 MG capsule Take 1 capsule (100 mg) by mouth at bedtime. 11/20/24  Yes Edie Carlin APRN CNP   hydrOXYzine HCl (ATARAX) 25 MG tablet Take 1 tablet (25 mg) by mouth 3 times daily as needed for itching. 9/4/24  Yes Dillon Goldsmith MD   ibuprofen (ADVIL/MOTRIN) 200 MG tablet Take 600 mg by mouth 2 times daily.   Yes Reported, Patient   magnesium oxide (MAG-OX)  400 MG tablet Take 1 tablet (400 mg) by mouth daily 8/16/24 8/16/25 Yes Dillon Goldsmith MD   nitroGLYcerin (NITROSTAT) 0.4 MG sublingual tablet Place 1 tablet (0.4 mg) under the tongue every 5 minutes as needed for chest pain 7/24/23  Yes Dillon Goldsmith MD   ondansetron (ZOFRAN) 8 MG tablet Take 1 tablet (8 mg) by mouth every 6 hours as needed for nausea (vomiting). Do not start before September 9, 2024. 9/9/24  Yes Dino Madera MD   pravastatin (PRAVACHOL) 40 MG tablet Take 1 tablet (40 mg) by mouth daily 8/16/24  Yes Dillon Goldsmith MD   Pyridoxine HCl (VITAMIN B-6 PO) Take 1 tablet by mouth daily.   Yes Reported, Patient   rOPINIRole (REQUIP) 2 MG tablet Take 1 tablet (2 mg) by mouth at bedtime 8/16/24 8/16/25 Yes Dillon Goldmsith MD   VITAMIN D, CHOLECALCIFEROL, PO Take 2,000 Units by mouth daily   Yes Reported, Patient          Gene Cavazos PA-C

## 2025-01-08 NOTE — DISCHARGE INSTRUCTIONS

## 2025-01-09 ENCOUNTER — HOSPITAL ENCOUNTER (OUTPATIENT)
Dept: CARDIOLOGY | Facility: HOSPITAL | Age: 80
Discharge: HOME | End: 2025-01-09
Payer: MEDICARE

## 2025-01-09 DIAGNOSIS — I30.9 ACUTE PERICARDITIS, UNSPECIFIED TYPE: ICD-10-CM

## 2025-01-09 DIAGNOSIS — I31.39 PERICARDIAL EFFUSION: ICD-10-CM

## 2025-01-09 LAB — LVEF ECHO: NORMAL

## 2025-01-09 PROCEDURE — 93325 DOPPLER ECHO COLOR FLOW MAPG: CPT

## 2025-01-14 ENCOUNTER — OFFICE VISIT (OUTPATIENT)
Dept: CARDIOLOGY | Facility: CLINIC | Age: 80
End: 2025-01-14
Payer: MEDICARE

## 2025-01-14 VITALS
BODY MASS INDEX: 34.65 KG/M2 | SYSTOLIC BLOOD PRESSURE: 156 MMHG | WEIGHT: 242 LBS | RESPIRATION RATE: 16 BRPM | HEART RATE: 108 BPM | DIASTOLIC BLOOD PRESSURE: 72 MMHG | HEIGHT: 70 IN

## 2025-01-14 DIAGNOSIS — I30.0 ACUTE IDIOPATHIC PERICARDITIS: ICD-10-CM

## 2025-01-14 DIAGNOSIS — R07.9 CHEST PAIN IN ADULT: ICD-10-CM

## 2025-01-14 DIAGNOSIS — I31.39 PERICARDIAL EFFUSION: ICD-10-CM

## 2025-01-14 DIAGNOSIS — I48.0 PAROXYSMAL A-FIB (H): Primary | ICD-10-CM

## 2025-01-14 DIAGNOSIS — I10 PRIMARY HYPERTENSION: ICD-10-CM

## 2025-01-14 LAB
ANION GAP SERPL CALCULATED.3IONS-SCNC: 13 MMOL/L (ref 7–15)
BUN SERPL-MCNC: 19.6 MG/DL (ref 8–23)
CALCIUM SERPL-MCNC: 9.5 MG/DL (ref 8.8–10.4)
CHLORIDE SERPL-SCNC: 98 MMOL/L (ref 98–107)
CREAT SERPL-MCNC: 1.45 MG/DL (ref 0.67–1.17)
EGFRCR SERPLBLD CKD-EPI 2021: 49 ML/MIN/1.73M2
GLUCOSE SERPL-MCNC: 154 MG/DL (ref 70–99)
HCO3 SERPL-SCNC: 24 MMOL/L (ref 22–29)
POTASSIUM SERPL-SCNC: 5 MMOL/L (ref 3.4–5.3)
SODIUM SERPL-SCNC: 135 MMOL/L (ref 135–145)

## 2025-01-14 PROCEDURE — 36415 COLL VENOUS BLD VENIPUNCTURE: CPT

## 2025-01-14 PROCEDURE — 80048 BASIC METABOLIC PNL TOTAL CA: CPT

## 2025-01-14 RX ORDER — IBUPROFEN 400 MG/1
400 TABLET, FILM COATED ORAL EVERY 8 HOURS
Qty: 21 TABLET | Refills: 0 | Status: SHIPPED | OUTPATIENT
Start: 2025-01-14 | End: 2025-01-21

## 2025-01-14 RX ORDER — CARVEDILOL 6.25 MG/1
6.25 TABLET ORAL 2 TIMES DAILY WITH MEALS
Qty: 180 TABLET | Refills: 3 | Status: SHIPPED | OUTPATIENT
Start: 2025-01-14

## 2025-01-14 NOTE — PATIENT INSTRUCTIONS
It was great to see you today! Your care team includes myself and Dr. Walter.    Recommendations:  BMP today to check kidney function  Medication changes  Stop metoprolol and start coreg 6.25mg twice daily   Take ibuprofen 400mg three times per day for 1 week   Start Eliquis 5mg twice daily after finishing ibuprofen   Continue taking colchicine until your next follow up  Monitor your blood pressure at home, once per day, about 1-2 hours after taking your morning medications.  Please allow at least 5 minutes of rest prior to checking your blood pressure.  Keep a home blood pressure log  Notify the office if you are having worsening shortness of breath or chest pain.      Follow up with Dr. Ford in 3 months with an echo and labwork at that time.      If you have questions, concerns, or new concerning symptoms prior to your next appointment, please contact the Cardiology Nursing team at [phone].    For scheduling issues/changes, please call [phone].

## 2025-01-14 NOTE — Clinical Note
"Saba Brooks Dr,   This is the elfego we saw in the hospital for pericarditis and pericardial effusion we thought might be related to his cancer treatment.  His limited echo showed a stable small effusion but noted to be \"echodense material\" so I was not sure if that had any significance.  Otherwise my plan was to taper his ibuprofen this week then start Eliquis.  He'll get repeat ESR/CRP and a limited echo in 3 months to see if we should keep his colchicine a little longer or not.  Does that sound good to you, or any changes?    Gamaliel "

## 2025-01-14 NOTE — LETTER
1/14/2025    Dillon Goldsmith MD  [address] [facility] [address]    RE: Jonas Hyman       Dear Colleague,     I had the pleasure of seeing Jonas Hyman in the [facility].  echoHEART CARE ENCOUNTER NOTE      [facility]  [phone]    Primary Care: Dillon Goldsmith  Primary Cardiologist: Dr. Walter    Assessment/Recommendations   Assessment:  Small pericardial effusion  Pericarditis   First noted on CT on 10/25/2024  No tamponade physiology on 1/9/2025, preserved LVEF, no wall motion normality.  Small pericardial effusion, 14 mm anterior  Less likely malignant effusion, may be due to atezolizumab treatment vs covid vaccination   ESR elevated at 26, CRP significantly elevated to 102  Underwent left heart cath with plan for pericardiocentesis on 1/6/25 and found nonobstructive CAD, low normal left-sided filling pressures and insufficient amount of fluid for pericardiocentesis (will need a window to evacuate organized material but deferred by CTS)  Chest pain improved on colchicine and ibuprofen  No evidence of tamponade on exam  Paroxysmal atrial fibrillation  Asymptomatic, onset 1/5/2024, self converted on 1/6/2024  [id number]  Anticoagulation indicated but deferred currently given need for high dose NSAID use that may worsen bleeding risk  CAD   Known history of anterior MI and PCI with rashid-MI VF cardiac arrest in 2000  Right bundle branch block  Small cell lung cancer   primary right-sided lung cancer involving the right upper and middle lobes with metastases involving lymph nodes above and below the diaphragm and several sites of the skeleton  Follows with Dr. Madera pleated 4 cycles of immunotherapy with Carboplatin/etoposide/atezolizumab   Good response to chemo immuno therapy and now on maintenance immunotherapy with atezolizumab (next appointment 1/22) which could be accounting for his pericarditis     Other active problems:  type 2  diabetes    Plan:  [facility] today   Reduce ibuprofen to 300mg TID 1 week.  Start Eliquis 5 mg twice daily after finishing Ibuprofen   Continue colchicine for 3 months  Limited Echo and ESR/CRP in 3 months to guide continuation of colchicine therapy  Switch metoprolol to coreg for elevated blood pressures in the office as well as elevated resting heart rate    Follow up with Dr. Walter in 3 months with limited echo and ESR/CRP.    The longitudinal plan of care for the diagnosis(es)/condition(s) as documented were addressed during this visit. Due to the added complexity in care, I will continue to support Jonas in the subsequent management and with ongoing continuity of care.      History of Present Illness/Subjective     Jonas Hyman is a 79 year old male with PMHx of pericarditis, pericardial effusion, lung cancer, paroxysmal atrial fibrillation, CAD, right bundle alem block, type 2 diabetes presenting for hospital follow-up.      Patient was admitted for chest pain and shortness of breath CT PE showed moderate-sized pericardial effusion.  Patient was treated for pericarditis and repeat limited echo prior to discharge showed small pericardial effusion without tamponade.  Pericardiocentesis was initially attempted however there was insufficient amount of fluid and a window was deferred by CTS.  Angiogram that eventually showed no obstructive CAD.  His course was complicated by new onset atrial fibrillation which patient self converted 1 day later.    Since discharge he reports no recurrence of his chest pain.  He still feels some shortness of breath that has been stable but feels it is better compared to when he was first admitted.   He has been taking ibuprofen and colchicine as directed.  Still unclear what was the cause of the pericarditis but could be from atezolizumab treatment versus COVID-vaccine.      Cardiac Testing     EC25: Atrial fibrillation with rate of 118, right bundle branch block  ECG  1/6/2025: Sinus rhythm with right bundle branch block      Echo:    Limited TTE, 1/9/2025  Small pericardial effusion, 14mm anterior which contains echodense material.  No evidence of tamponade.  The left ventricle is normal in size.  The visual ejection fraction is 60-65%.  Regional wall motion is grossly normal but endocardial border definition is  limited  Compared to the prior study dated 1/6/2025 (direct [id number] comparison of  images) no significant change    Limited TTE 1/6/2025  1.Left ventricular size, wall motion and function are normal. The ejection  fraction is 60-65%.  2.Normal right ventricle size and systolic function.  3.No hemodynamically significant valvular abnormalities on 2D or color flow  imaging although this was a limited study with complete Doppler.  4.A small to medium sized circumferential pericardial effusion is noted, 14 mm  anterior and 13 mm posterior.There are no echocardiographic indications of  cardiac tamponade.  Compared to the prior study dated 1/5/2025, there have been no changes.    TTE, 1/5/2025  1. The left ventricle is normal in size. Left ventricular function is  normal.The ejection fraction is 60-65%. There is normal left ventricular wall  thickness. No regional wall motion abnormalities noted.  2. Normal right ventricle size and systolic function.  3. Normal left atrial size. Right atrial size is normal.  4. Moderate pericardial effusion There are no echocardiographic indications of  cardiac tamponade.IVC diameter <2.1 cm collapsing >50% with sniff suggests a  normal RA pressure of 3 mmHg.  Clinical correlation is recommended. There is no comparison study available.    Cardiac Cath 1/6/2025:  - non-obstructive CAD; low-normal L-sided filling pressures  - no good window/not enough fluid for pericardiocentesis - will need a window to evacuate organized material  - continue aggressive risk factor modification      Labs:  LDL 76  Creatinine 1.21  Potassium 4.2  "  Hemoglobin 11.8  Platelet 229        Physical Examination    Vitals: BP (!) 156/72 (BP Location: Right arm, Patient Position: Sitting, Cuff Size: Adult Large)   Pulse 108   Resp 16   Ht 1.765 m (5' 9.5\")   Wt 109.8 kg (242 lb)   BMI 35.22 kg/m    BMI= Body mass index is 35.22 kg/m .    General: Well appearing, in no acute distress. Resting comfortably in exam chair  Skin: No clubbing, no cyanosis.  Eyes: Extra ocular movements intact  Neck: No jugular venous distention, no carotid bruits, carotids have a normal upstroke  Lungs: Clear to auscultation bilaterally, no wheezing or rhonchi.  Heart: Tachycardic, regular rhythm, PMI not displaced, S1, S2 normal, no S3, no S4, no heaves, no rub and no murmur.  Abdomen: Soft, nontender, bowel sounds normal, no palpable organomegaly, no bruits.  Extremities: No peripheral edema . Grade 2/4 distal pulses bilaterally.  Neuro: Oriented to person, place and time, alert, cooperative, gait coordinated.    BP Readings from Last 3 Encounters:   01/14/25 (!) 156/72   01/08/25 135/63   01/02/25 (!) 159/76       Pulse Readings from Last 3 Encounters:   01/14/25 108   01/08/25 64   01/02/25 74       Wt Readings from Last 3 Encounters:   01/14/25 109.8 kg (242 lb)   01/08/25 110 kg (242 lb 6.4 oz)   12/11/24 113.2 kg (249 lb 8 oz)         Review of Systems      Please refer above for cardiac ROS details.       History     MEDICAL HISTORY:  Past Medical History:   Diagnosis Date     Acute MI (H) 2009     Bronchitis      Cardiac arrest (H) 2009     Cardiac arrest (H)      Chemical dependency (H)     Has been in recovery 49 years     Coronary artery disease      Diabetes mellitus (H)     type II     Diabetes mellitus type 2, noninsulin dependent (H)      Diverticula of colon      Diverticulosis of large intestine      Hemorrhoids      Hemorrhoids      History of alcohol dependence (H) 01/12/1975    Created by [facility] Annotation: May 29 2009  5:52PM - Dillon Goldsmith: " dry since  1975, also used drugs and marijuana  Replacement Utility updated for latest IMO load     Hypertension      Hypertension      Macular degeneration of right eye      Mixed hyperlipidemia      Myocardial infarction (H)      Retinal detachment 06/23/2014    Vitrectomy Posterior 23 guage, scleral buckle, membrane peel, endolaser gase     Stage 3b chronic kidney disease (H) 11/30/2020     Stented coronary artery 2009    stints times 2     Vitamin B12 deficiency      Vitamin D deficiency      SURGICAL HISTORY  Past Surgical History:   Procedure Laterality Date     BRONCHOSCOPY RIDID OR FLEXIBLE W/ENDOBRONCHIAL ULTRASOUND GUIDED 1 OR 2 NODE STATIONS N/A 8/26/2024    Procedure: BRONCHOSCOPY, WITH BIOPSY OF 1 OR 2 LYMPH NODE STATIONS WITH ENDOBRONCHIAL ULTRASOUND GUIDANCE;  Surgeon: Bernarda Morrison MD;  Location:  GI     CATARACT IOL, RT/LT Bilateral      CV CORONARY ANGIOGRAM N/A 1/6/2025    Procedure: Coronary Angiogram;  Surgeon: Vinicio Aparicio MD;  Location: [facility] LAB CV     CV LEFT HEART CATH N/A 1/6/2025    Procedure: Left Heart Catheterization;  Surgeon: Vinicio Aparicio MD;  Location: [facility] LAB CV     EYE SURGERY       FRACTURE SURGERY       HC REMOVE TONSILS/ADENOIDS,<11 Y/O      Description: Tonsillectomy With Adenoidectomy;  Recorded: 05/29/2009;     HEART CATH STENT COR W/WO PTCA  05/07/2009    Proximal Left Anterior Descending Artery, Proximal Circumflex       HERNIA REPAIR       INGUINAL HERNIA REPAIR       IR CHEST PORT PLACEMENT > 5 YRS OF AGE  9/5/2024     LASER YAG IRIDOTOMY  9/6/2013    Procedure: LASER YAG IRIDOTOMY;  peripheral irridotomy ;  Surgeon: Doroteo Andres MD;  [facility]     OTHER SURGICAL HISTORY      Cath Stent 1 Proximal Left Anterior Descending Artery      PHACOEMULSIFICATION CLEAR CORNEA WITH STANDARD INTRAOCULAR LENS IMPLANT  9/5/2013    Procedure: PHACOEMULSIFICATION CLEAR CORNEA WITH STANDARD INTRAOCULAR LENS IMPLANT;   COMPLEX RIGHT  PHACOEMULSIFICATION CLEAR CORNEA WITH ANTERIOR CHAMBER INTRAOCULAR LENS IMPLANT, ANTERIOR VITRECTOMY;  Surgeon: Doroteo Andres MD;  Location:  [facility]     PHACOEMULSIFICATION WITH STANDARD INTRAOCULAR LENS IMPLANT Left 12/3/2018    Procedure: Left Eye Phacoemulsification with Intraocular Lens;  Surgeon: Suhail Johnson MD;  Location: [facility]     RETINAL DETACHMENT SURGERY       TONSILLECTOMY       VASECTOMY       VITRECTOMY ANTERIOR  2013    Procedure: VITRECTOMY ANTERIOR;;  Surgeon: Doroteo Andres MD;  Location:  [facility]     ZZC MUSCLE-SKIN FLAP,LEG        FAMILY HISTORY:  Family History   Problem Relation Age of Onset     Obesity Father      Glaucoma No family hx of      Macular Degeneration No family hx of      Retinal detachment No family hx of      Kidney Cancer Father      Heart Disease Brother     FAMILY HISTORY:  Family History   Problem Relation Age of Onset     Obesity Father      Glaucoma No family hx of      Macular Degeneration No family hx of      Retinal detachment No family hx of      Kidney Cancer Father      Heart Disease Brother       SOCIAL HISTORY:  Social History     Socioeconomic History     Marital status:      Spouse name: Not on file     Number of children: Not on file     Years of education: Not on file     Highest education level: Not on file   Occupational History     Not on file   Tobacco Use     Smoking status: Former     Current packs/day: 0.00     Average packs/day: 1 pack/day for 50.6 years (50.6 ttl pk-yrs)     Types: Cigarettes     Start date: 1956     Quit date: 2024     Years since quittin.0     Passive exposure: Past (son smoked smoking in home)     Smokeless tobacco: Never     Tobacco comments:     20 yrs ago-mid 80's, quit again , started again , quit 2024   Vaping Use     Vaping status: Never Used   Substance and Sexual Activity     Alcohol use: No     Comment: from 1975     Drug use: No     Sexual activity: Not on file    Other Topics Concern     Parent/sibling w/ CABG, MI or angioplasty before 65F 55M? Not Asked   Social History Narrative    2 children,         Quit drinking alcohol 1975, jan 12        Son has diabetes and lives in basement        Lives with grandchild who is 11, born 2012     Social Drivers of Health     Financial Resource Strain: Low Risk  (1/5/2025)    Financial Resource Strain      Within the past 12 months, have you or your family members you live with been unable to get utilities (heat, electricity) when it was really needed?: No   Recent Concern: Financial Resource Strain - High Risk (1/5/2025)    Financial Resource Strain      Within the past 12 months, have you or your family members you live with been unable to get utilities (heat, electricity) when it was really needed?: Yes   Food Insecurity: Low Risk  (1/5/2025)    Food Insecurity      Within the past 12 months, did you worry that your food would run out before you got money to buy more?: No      Within the past 12 months, did the food you bought just not last and you didn t have money to get more?: No   Transportation Needs: Low Risk  (1/5/2025)    Transportation Needs      Within the past 12 months, has lack of transportation kept you from medical appointments, getting your medicines, non-medical meetings or appointments, work, or from getting things that you need?: No   Physical Activity: Not on file   Stress: Not on file   Social Connections: Not on file   Interpersonal Safety: Low Risk  (1/5/2025)    Interpersonal Safety      Do you feel physically and emotionally safe where you currently live?: Yes      Within the past 12 months, have you been hit, slapped, kicked or otherwise physically hurt by someone?: No      Within the past 12 months, have you been humiliated or emotionally abused in other ways by your partner or ex-partner?: No   Housing Stability: Low Risk  (1/5/2025)    Housing Stability      Do you have housing? : Yes      Are  you worried about losing your housing?: No    ALLERGIES:  No Known Allergies         Medications:     No current outpatient medications on file.           Gene Cavazos PA-C  January 14, 2025    This note was partially generated using Dragon voice recognition system, and there may be some incorrect words,  spellings, and punctuation that were not noted in checking the note before saving.      Thank you for allowing me to participate in the care of your patient.      Sincerely,     Gene Cavazos PA-C     [facility]  cc:   Gene Cavazos PA-C  [address] [facility], [address] [address]

## 2025-01-16 ENCOUNTER — TELEPHONE (OUTPATIENT)
Dept: CARDIOLOGY | Facility: CLINIC | Age: 80
End: 2025-01-16
Payer: MEDICARE

## 2025-01-16 DIAGNOSIS — I30.0 ACUTE IDIOPATHIC PERICARDITIS: Primary | ICD-10-CM

## 2025-01-16 NOTE — TELEPHONE ENCOUNTER
MTM referral from: [facility] visit (referral by provider)    MTM referral outreach attempt #2 on January 16, 2025 at 9:05 AM      Outcome: Patient not reachable after several attempts, routed to Pharmacist Team/Provider as an FYI    Use HB for the carrier/Plan on the flowsheet      [employer] Message Sent    ARTUR Torres

## 2025-01-22 ENCOUNTER — INFUSION THERAPY VISIT (OUTPATIENT)
Dept: INFUSION THERAPY | Facility: HOSPITAL | Age: 80
End: 2025-01-22
Payer: MEDICARE

## 2025-01-22 ENCOUNTER — ONCOLOGY VISIT (OUTPATIENT)
Dept: ONCOLOGY | Facility: HOSPITAL | Age: 80
End: 2025-01-22
Payer: MEDICARE

## 2025-01-22 VITALS
WEIGHT: 244.7 LBS | HEIGHT: 69 IN | OXYGEN SATURATION: 97 % | HEART RATE: 73 BPM | RESPIRATION RATE: 18 BRPM | BODY MASS INDEX: 36.24 KG/M2 | DIASTOLIC BLOOD PRESSURE: 85 MMHG | TEMPERATURE: 98.5 F | SYSTOLIC BLOOD PRESSURE: 156 MMHG

## 2025-01-22 DIAGNOSIS — C34.31 SMALL CELL LUNG CANCER, RIGHT LOWER LOBE (H): ICD-10-CM

## 2025-01-22 DIAGNOSIS — T45.1X5A CHEMOTHERAPY-INDUCED NEUTROPENIA: Primary | ICD-10-CM

## 2025-01-22 DIAGNOSIS — R41.0 CONFUSION: Primary | ICD-10-CM

## 2025-01-22 DIAGNOSIS — D70.1 CHEMOTHERAPY-INDUCED NEUTROPENIA: ICD-10-CM

## 2025-01-22 DIAGNOSIS — T45.1X5A CHEMOTHERAPY-INDUCED NEUTROPENIA: ICD-10-CM

## 2025-01-22 DIAGNOSIS — Z98.890 HISTORY OF VASCULAR ACCESS DEVICE: Primary | ICD-10-CM

## 2025-01-22 DIAGNOSIS — D70.1 CHEMOTHERAPY-INDUCED NEUTROPENIA: Primary | ICD-10-CM

## 2025-01-22 DIAGNOSIS — H53.9 VISION CHANGES: ICD-10-CM

## 2025-01-22 LAB
ALBUMIN SERPL BCG-MCNC: 3.8 G/DL (ref 3.5–5.2)
ALP SERPL-CCNC: 78 U/L (ref 40–150)
ALT SERPL W P-5'-P-CCNC: 15 U/L (ref 0–70)
ANION GAP SERPL CALCULATED.3IONS-SCNC: 11 MMOL/L (ref 7–15)
AST SERPL W P-5'-P-CCNC: 16 U/L (ref 0–45)
BASOPHILS # BLD AUTO: 0.1 10E3/UL (ref 0–0.2)
BASOPHILS NFR BLD AUTO: 1 %
BILIRUB SERPL-MCNC: 0.4 MG/DL
BUN SERPL-MCNC: 16.4 MG/DL (ref 8–23)
CALCIUM SERPL-MCNC: 9.3 MG/DL (ref 8.8–10.4)
CHLORIDE SERPL-SCNC: 101 MMOL/L (ref 98–107)
CREAT SERPL-MCNC: 1.23 MG/DL (ref 0.67–1.17)
EGFRCR SERPLBLD CKD-EPI 2021: 59 ML/MIN/1.73M2
EOSINOPHIL # BLD AUTO: 0.2 10E3/UL (ref 0–0.7)
EOSINOPHIL NFR BLD AUTO: 2 %
ERYTHROCYTE [DISTWIDTH] IN BLOOD BY AUTOMATED COUNT: 14.4 % (ref 10–15)
GLUCOSE SERPL-MCNC: 126 MG/DL (ref 70–99)
HCO3 SERPL-SCNC: 25 MMOL/L (ref 22–29)
HCT VFR BLD AUTO: 37 % (ref 40–53)
HGB BLD-MCNC: 11.9 G/DL (ref 13.3–17.7)
IMM GRANULOCYTES # BLD: 0 10E3/UL
IMM GRANULOCYTES NFR BLD: 0 %
LYMPHOCYTES # BLD AUTO: 0.8 10E3/UL (ref 0.8–5.3)
LYMPHOCYTES NFR BLD AUTO: 9 %
MCH RBC QN AUTO: 31.2 PG (ref 26.5–33)
MCHC RBC AUTO-ENTMCNC: 32.2 G/DL (ref 31.5–36.5)
MCV RBC AUTO: 97 FL (ref 78–100)
MONOCYTES # BLD AUTO: 0.6 10E3/UL (ref 0–1.3)
MONOCYTES NFR BLD AUTO: 7 %
NEUTROPHILS # BLD AUTO: 7.3 10E3/UL (ref 1.6–8.3)
NEUTROPHILS NFR BLD AUTO: 80 %
NRBC # BLD AUTO: 0 10E3/UL
NRBC BLD AUTO-RTO: 0 /100
PLATELET # BLD AUTO: 330 10E3/UL (ref 150–450)
POTASSIUM SERPL-SCNC: 4.3 MMOL/L (ref 3.4–5.3)
PROT SERPL-MCNC: 6.7 G/DL (ref 6.4–8.3)
RBC # BLD AUTO: 3.81 10E6/UL (ref 4.4–5.9)
SODIUM SERPL-SCNC: 137 MMOL/L (ref 135–145)
TSH SERPL DL<=0.005 MIU/L-ACNC: 1.18 UIU/ML (ref 0.3–4.2)
WBC # BLD AUTO: 9 10E3/UL (ref 4–11)

## 2025-01-22 PROCEDURE — 85041 AUTOMATED RBC COUNT: CPT | Performed by: NURSE PRACTITIONER

## 2025-01-22 PROCEDURE — 36591 DRAW BLOOD OFF VENOUS DEVICE: CPT | Performed by: NURSE PRACTITIONER

## 2025-01-22 PROCEDURE — 99215 OFFICE O/P EST HI 40 MIN: CPT | Performed by: NURSE PRACTITIONER

## 2025-01-22 PROCEDURE — 80053 COMPREHEN METABOLIC PANEL: CPT | Performed by: NURSE PRACTITIONER

## 2025-01-22 PROCEDURE — G0463 HOSPITAL OUTPT CLINIC VISIT: HCPCS | Performed by: NURSE PRACTITIONER

## 2025-01-22 PROCEDURE — 84443 ASSAY THYROID STIM HORMONE: CPT | Performed by: NURSE PRACTITIONER

## 2025-01-22 PROCEDURE — 250N000011 HC RX IP 250 OP 636

## 2025-01-22 PROCEDURE — 85004 AUTOMATED DIFF WBC COUNT: CPT | Performed by: NURSE PRACTITIONER

## 2025-01-22 PROCEDURE — G2211 COMPLEX E/M VISIT ADD ON: HCPCS | Performed by: NURSE PRACTITIONER

## 2025-01-22 RX ORDER — HEPARIN SODIUM (PORCINE) LOCK FLUSH IV SOLN 100 UNIT/ML 100 UNIT/ML
5 SOLUTION INTRAVENOUS
Status: DISCONTINUED | OUTPATIENT
Start: 2025-01-22 | End: 2025-01-22 | Stop reason: HOSPADM

## 2025-01-22 RX ORDER — HEPARIN SODIUM (PORCINE) LOCK FLUSH IV SOLN 100 UNIT/ML 100 UNIT/ML
5 SOLUTION INTRAVENOUS
OUTPATIENT
Start: 2025-01-22

## 2025-01-22 RX ORDER — HEPARIN SODIUM,PORCINE 10 UNIT/ML
5-20 VIAL (ML) INTRAVENOUS DAILY PRN
OUTPATIENT
Start: 2025-01-22

## 2025-01-22 RX ORDER — HEPARIN SODIUM (PORCINE) LOCK FLUSH IV SOLN 100 UNIT/ML 100 UNIT/ML
SOLUTION INTRAVENOUS
Status: COMPLETED
Start: 2025-01-22 | End: 2025-01-22

## 2025-01-22 RX ADMIN — HEPARIN 500 UNITS: 100 SYRINGE at 10:22

## 2025-01-22 ASSESSMENT — PAIN SCALES - GENERAL: PAINLEVEL_OUTOF10: MODERATE PAIN (6)

## 2025-01-22 NOTE — PROGRESS NOTES
[facility] Hematology and Oncology Progress Note    Patient: Jonas Hyman  MRN: [id number]  Date of Service: Jan 22, 2025          Reason for Visit    Chief Complaint   Patient presents with    Oncology Clinic Visit     Small cell lung cancer, right lower lobe (H)          Assessment and Plan     Cancer Staging   No matching staging information was found for the patient.    1.  Small cell lung cancer, extensive stage with bone mets, lymph node mets: Patient started on palliative treatment with carboplatin, etoposide and Atezolizumab. He had a good response after 2 cycles and after 4 cycles.  He is now getting maintenance Atezolizumab. We are going to hold today due to new issues. Will likely return next week to potentially restart.      2.  Mild renal insufficiency: This is a chronic issue for patient.  Was a little worse after recent hospitaization/ibuprofen. Better today. Push fluids. Meeting with pharmacist to minimize toxicity from meds.     3. New confusion, dizziness, blurry vision: unclear etiology. Worrisome for brain mets so we will get Brain MRI STAT. I will call son Marlo with results.     4. Recent hospitalization for chest pain (1/4-1/8): found to have pericarditis, A-fib, nonischemic myocardial injury.  Pain and shortness of breath got her while he was in the hospital.  Per patient's son it continues to slowly get better.  He did meet with the cardiology PA last week.  Unclear if the pericarditis was from immunotherapy which is a very rare side effect, or viral in nature or potentially even from COVID-vaccine.  And was to stop ibuprofen due to slightly worsening of his kidney function.  He is continuing to call [facility] for 3 months.  They were going to repeat an echo and ESR/CRP in April.  They were also tello switch metoprolol to Coreg for elevated blood pressure.  He would like to start Eliquis once kidney function is improved for the A-fib.  Patient's son is concerned about using steroids  because they were told with the pericarditis it can cause some rebound issues if we use steroids so we will discuss with cardiology if we feel like we want a restart the Atezolizumab and give prednisone 20 mg for 5 days after each dose which is what Dr. 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)    Atherosclerosis of native coronary artery of native heart with angina pectoris    Chronic coronary artery disease    Cataract    Cystoid macular edema of right eye    Diverticulosis of large intestine without hemorrhage    Dyslipidemia    Essential hypertension    Exudative age-related macular degeneration of right eye (H)    History of acute anterior wall MI    History of alcohol dependence (H)    Low vitamin B12 level    Smoker    Status post coronary artery stent placement    Total retinal detachment    Type 2 diabetes mellitus with stage 3b chronic kidney disease, without long-term current use of insulin (H)    History of ventricular fibrillation    Vitamin D deficiency    Myelolipoma    S/P repair of abdominal aortic aneurysm using bifurcation graft    Thoracoabdominal aortic aneurysm (TAAA)    Small cell lung cancer, right lower lobe (H)    Chemotherapy-induced neutropenia    Chest pain in adult    Shortness of breath    Pericardial effusion    Chest pain, unspecified type    Acute idiopathic pericarditis    History of vascular access device         ______________________________________________________________________________    History of Present Illness    Oncologist: Dr. Madera    Diagnosis: Lung cancer, Small cell. Found incidentally when getting imaging for a vascular procedure at the [facility].   -8/26/24: EBUS done and He had biopsies taken over the following hira stations: 4R, 4L, 7.  Pathology showed poorly differentiated small cell lung cancer in stations 4L and 4R   -8/27/24: CT done and shows Multiple small patchy groundglass opacities in the bilateral lower lobes, new from prior study, compatible with aspiration change. Mildly increased size of right middle lobe and right upper lobe pulmonary masses as well as mediastinal, right hilar and right internal mammary lymphadenopathy.  -8/30/24: PET shows Findings suspicious for right lung cancer involving the right upper and middle lobes with metastases involving several lymph nodes above the diaphragm and several sites in the skeleton. If biopsy of a distant metastatic site is desired, a lesion in the   medial left iliac bone is a good option for CT-guided biopsy  ~Brain MRI attempted but was canceled due to a stent and the fear that is may be incompatible with MRI    Treatment:   -9/17/2024: Patient started palliative chemo with carboplatin, etoposide and Atezolizumab.  Patient completed 4 cycles.  -12/11/2024: Patient started maintenance Atezolizumab.  1200 mg every 3 weeks.    Interim History:   Patient is here today to continue on treatment.  Patient was discharged from the hospital January 8.  He was admitted with chest pain, shortness of breath and pericarditis.  Unclear etiology.  He did meet with cardiology when he was in the hospital because he also had some A-fib.  Patient met with the PA last week and he is now been stopped on his ibuprofen and continuing on coca chain.  They are waiting to restart Eliquis once his kidney function improves.  Patient and son are concerned about some  "new issues.  They state that since he has been in the hospital and home he has been having more confusion and anxiety.  He started smoking again so he has a lot of anxiety that he get any cigarettes because his family's not letting him drive right now.  His son states that he is having issues with running his phone and is not sure if it is from confusion or maybe even some blurry vision.  Patient states that he is feeling dizzy the last couple of days and more blurry vision.  We are noticing more irritability and like I said the confusion with things around the house.  Mild headaches that are intermittent and not new.  Breath is gotten better since leaving the hospital but still present especially with activity.  No chest pain.    Review of Systems    Pertinent items are noted in HPI.    Past History    Past Medical History:   Diagnosis Date    Acute MI (H) 2009    Bronchitis     Cardiac arrest (H) 2009    Cardiac arrest (H)     Chemical dependency (H)     Has been in recovery 49 years    Coronary artery disease     Diabetes mellitus (H)     type II    Diabetes mellitus type 2, noninsulin dependent (H)     Diverticula of colon     Diverticulosis of large intestine     Hemorrhoids     Hemorrhoids     History of alcohol dependence (H) 01/12/1975    Created by [facility] Annotation: May 29 2009  5:52PM - Dillon Goldsmith: dry since  1975, also used drugs and marijuana  Replacement Utility updated for latest IMO load    Hypertension     Hypertension     Macular degeneration of right eye     Mixed hyperlipidemia     Myocardial infarction (H)     Retinal detachment 06/23/2014    Vitrectomy Posterior 23 guage, scleral buckle, membrane peel, endolaser gase    Stage 3b chronic kidney disease (H) 11/30/2020    Stented coronary artery 2009    stints times 2    Vitamin B12 deficiency     Vitamin D deficiency        PHYSICAL EXAM:  BP (!) 156/85   Pulse 73   Temp 98.5  F (36.9  C)   Resp 18   Ht 1.753 m (5' 9\")  "  Wt 111 kg (244 lb 11.2 oz)   SpO2 97%   BMI 36.14 kg/m    GENERAL: no acute distress. Cooperative in conversation. Here with son, Marlo who is RN  HEENT: pupils are equal, round and reactive. Oromucosa is clean and intact. No ulcerations or mucositis noted. No bleeding noted.  RESP: lungs are clear bilaterally per auscultation. Regular respiratory rate. No wheezes or rhonchi.  CV: Regular, rate and rhythm. No murmurs.  ABD: soft, nontender.   MUSCULOSKELETAL: No lower extremity swelling.   NEURO: non focal. Alert and oriented x3.   PSYCH: within normal limits. No depression or anxiety. Seems less sharp today with asking what the plan is a couple of times.   SKIN: warm dry intact         Lab Results    Recent Results (from the past week)   Comprehensive metabolic panel   Result Value Ref Range    Sodium 137 135 - 145 mmol/L    Potassium 4.3 3.4 - 5.3 mmol/L    Carbon Dioxide (CO2) 25 22 - 29 mmol/L    Anion Gap 11 7 - 15 mmol/L    Urea Nitrogen 16.4 8.0 - 23.0 mg/dL    Creatinine 1.23 (H) 0.67 - 1.17 mg/dL    GFR Estimate 59 (L) >60 mL/min/1.73m2    Calcium 9.3 8.8 - 10.4 mg/dL    Chloride 101 98 - 107 mmol/L    Glucose 126 (H) 70 - 99 mg/dL    Alkaline Phosphatase 78 40 - 150 U/L    AST 16 0 - 45 U/L    ALT 15 0 - 70 U/L    Protein Total 6.7 6.4 - 8.3 g/dL    Albumin 3.8 3.5 - 5.2 g/dL    Bilirubin Total 0.4 <=1.2 mg/dL   TSH with free T4 reflex   Result Value Ref Range    TSH 1.18 0.30 - 4.20 uIU/mL   CBC with platelets and differential   Result Value Ref Range    WBC Count 9.0 4.0 - 11.0 10e3/uL    RBC Count 3.81 (L) 4.40 - 5.90 10e6/uL    Hemoglobin 11.9 (L) 13.3 - 17.7 g/dL    Hematocrit 37.0 (L) 40.0 - 53.0 %    MCV 97 78 - 100 fL    MCH 31.2 26.5 - 33.0 pg    MCHC 32.2 31.5 - 36.5 g/dL    RDW 14.4 10.0 - 15.0 %    Platelet Count 330 150 - 450 10e3/uL    % Neutrophils 80 %    % Lymphocytes 9 %    % Monocytes 7 %    % Eosinophils 2 %    % Basophils 1 %    % Immature Granulocytes 0 %    NRBCs per 100 WBC 0  <1 /100    Absolute Neutrophils 7.3 1.6 - 8.3 10e3/uL    Absolute Lymphocytes 0.8 0.8 - 5.3 10e3/uL    Absolute Monocytes 0.6 0.0 - 1.3 10e3/uL    Absolute Eosinophils 0.2 0.0 - 0.7 10e3/uL    Absolute Basophils 0.1 0.0 - 0.2 10e3/uL    Absolute Immature Granulocytes 0.0 <=0.4 10e3/uL    Absolute NRBCs 0.0 10e3/uL           Imaging    Echocardiogram Limited    Result Date: 2025  [id number]^SCHUYLER^[place] [address]  Name: DEEPAK OLIVARES MRN: [id number] : 1945 Study Date: 2025 01:05 PM Age: 79 yrs Gender: Male Patient Location: [facility] Reason For Study: Acute pericarditis, unspecified type, Pericardial effusion Ordering Physician: CHRIS PAYAN Referring Physician: CHRIS PAYAN Performed By: IRASEMA  BSA: 2.2 m2 Height: 69 in Weight: 242 lb HR: 67 BP: 134/63 mmHg ______________________________________________________________________________ Procedure Limited Echocardiogram with two-dimensional & color Doppler. Compared to the prior study dated 2025 (direct [id number] comparison of images) no significant change. ______________________________________________________________________________ Interpretation Summary  Small pericardial effusion, 14mm anterior which contains echodense material. No evidence of tamponade. The left ventricle is normal in size. The visual ejection fraction is 60-65%. Regional wall motion is grossly normal but endocardial border definition is limited Compared to the prior study dated 2025 (direct [id number] comparison of images) no significant change ______________________________________________________________________________ Left Ventricle The left ventricle is normal in size. There is normal left ventricular wall thickness. The visual ejection fraction is 60-65%. Regional wall motion is grossly normal but endocardial border definition is limited.  Mitral Valve There is trace mitral  regurgitation.  Aortic Valve No aortic regurgitation is present.  Vessels IVC diameter <2.1 cm collapsing >50% with sniff suggests a normal RA pressure of 3 mmHg.  Pericardium Small pericardial effusion, 14mm anterior which contains echodense material. No evidence of tamponade. ______________________________________________________________________________ MMode/2D Measurements & Calculations  IVSd: 1.3 cm LVIDd: 4.9 cm LVIDs: 3.3 cm LVPWd: 1.1 cm FS: 32.0 % LV mass(C)d: 218.9 grams LV mass(C)dI: 97.7 grams/m2 EF Biplane: 48.3 % RWT: 0.44  Time Measurements MM HR: 67.0 BPM  ______________________________________________________________________________ Report approved by: Ban Braun MD on 2025 02:01 PM       Cardiac Catheterization    Result Date: 2025  Images from the original result were not included. Deepak Olivares is a 79 year old old male with moderate pericardial effusion, newly diagnosed atrial fibrillation, elevated troponin, CAD with hx of anterior MI; s/p PCI, s/p rashid infarct VF arrest in , small cell lung CA, DM Type II, Class II obesity; BMI 35.66kg/m2 who is here for angio and consideration of pericardiocentesis. - non-obstructive CAD; low-normal L-sided filling pressures - no good window/not enough fluid for pericardiocentesis - will need a window to evacuate organized material - continue aggressive risk factor modification Findings: LM:no obstruction LAD:mid-vessel moderate 30-40% irregularity, distal D2 w/ ostial 50% narrowing Lcx:co-dominant, mildly irregular RCA:small, non-dominant or co-dominant, mid-vessel 30-40% narrowing LVEDP:5 Access: R Radial artery Closure: Vasc Band     Echocardiogram Limited    Result Date: 2025  [id number]^[place] [address]  Name: DEEPAK OLIVARES MRN: [id number] : 1945 Study Date: 2025 08:34 AM Age: 79 yrs Gender: Male Patient Location: [facility] Reason  For Study: Pericardial Effusion Ordering Physician: RONY QUINTEROS Referring Physician: CARLOS BARDALES Performed By: ALFRED  BSA: 2.2 m2 Height: 69 in Weight: 241 lb HR: 102 BP: 120/77 mmHg ______________________________________________________________________________ Procedure Limited Echocardiogram with two-dimensional imaging. No hemodynamically significant valvular abnormalities on 2D or color flow imaging. Compared to the prior study dated 1/5/2025, there have been no changes. ______________________________________________________________________________ Interpretation Summary  1.Left ventricular size, wall motion and function are normal. The ejection fraction is 60-65%. 2.Normal right ventricle size and systolic function. 3.No hemodynamically significant valvular abnormalities on 2D or color flow imaging although this was a limited study with complete Doppler. 4.A small to medium sized circumferential pericardial effusion is noted, 14 mm anterior and 13 mm posterior.There are no echocardiographic indications of cardiac tamponade. Compared to the prior study dated 1/5/2025, there have been no changes. ______________________________________________________________________________ I      WMSI = 1.00     % Normal = 100  X - Cannot   0 -                      (2) - Mildly 2 -          Segments  Size Interpret    Hyperkinetic 1 - Normal  Hypokinetic  Hypokinetic  1-2     small                                                    7 -          3-5    moderate 3 - Akinetic 4 -          5 -         6 - Akinetic Dyskinetic   6-14    large              Dyskinetic   Aneurysmal  w/scar       w/scar       15-16   diffuse  Left Ventricle Left ventricular size, wall motion and function are normal. The ejection fraction is 60-65%. There is normal left ventricular wall thickness. Left ventricular diastolic function is not assessable. No regional wall motion abnormalities noted.  Right Ventricle Normal right ventricle size  and systolic function.  Atria Normal left atrial size. Right atrial size is normal. There is no color Doppler evidence of an atrial shunt.  Mitral Valve Mitral valve leaflets appear normal. There is no evidence of mitral stenosis or clinically significant mitral regurgitation. There is no mitral valve stenosis.  Tricuspid Valve The tricuspid valve is not well visualized, but is grossly normal. There is no tricuspid stenosis.  Aortic Valve The aortic valve is not well visualized.  Pulmonic Valve The pulmonic valve is not well visualized.  Vessels The aorta root is normal. Normal size ascending aorta. IVC diameter <2.1 cm collapsing >50% with sniff suggests a normal RA pressure of 3 mmHg.  Pericardium A circumferential pericardial effusion is noted. There are no echocardiographic indications of cardiac tamponade.  Rhythm Sinus rhythm was noted.  ______________________________________________________________________________ Report approved by: Dima Almanza MD on 2025 09:48 AM  ______________________________________________________________________________      Echocardiogram Complete    Result Date: 2025  [id number]^MARIO^KESHAV^[place] [address]  Name: DEEPAK OLIVARES MRN: [id number] : 1945 Study Date: 2025 08:12 AM Age: 79 yrs Gender: Male Patient Location: [facility] Reason For Study: Pericardial Effusion History: CAD- stents x2; Lung Cancer Ordering Physician: KESHAV MARTIN Performed By: ALFRED  BSA: 2.3 m2 Height: 69 in Weight: 246 lb BP: 175/77 mmHg ______________________________________________________________________________ Procedure Echocardiogram with two-dimensional, color and spectral Doppler. ______________________________________________________________________________ Interpretation Summary  1. The left ventricle is normal in size. Left ventricular function is normal.The ejection fraction is 60-65%. There is normal  left ventricular wall thickness. No regional wall motion abnormalities noted. 2. Normal right ventricle size and systolic function. 3. Normal left atrial size. Right atrial size is normal. 4. Moderate pericardial effusion There are no echocardiographic indications of cardiac tamponade.IVC diameter <2.1 cm collapsing >50% with sniff suggests a normal RA pressure of 3 mmHg. Clinical correlation is recommended. There is no comparison study available. ______________________________________________________________________________ Left Ventricle The left ventricle is normal in size. Left ventricular function is normal.The ejection fraction is 60-65%. There is normal left ventricular wall thickness. No regional wall motion abnormalities noted.  Right Ventricle Normal right ventricle size and systolic function.  Atria Normal left atrial size. Right atrial size is normal.  Mitral Valve Mitral valve leaflets appear normal. There is no evidence of mitral stenosis or clinically significant mitral regurgitation.  Tricuspid Valve Tricuspid valve leaflets appear normal. There is no evidence of tricuspid stenosis or clinically significant tricuspid regurgitation. Right ventricular systolic pressure could not be approximated due to inadequate tricuspid regurgitation.  Aortic Valve The aortic valve is trileaflet. Aortic valve leaflets appear normal. There is no evidence of aortic stenosis or clinically significant aortic regurgitation.  Pulmonic Valve The pulmonic valve is not well seen, but is grossly normal. This degree of valvular regurgitation is within normal limits. There is trace pulmonic valvular regurgitation.  Vessels The aorta root is normal. IVC diameter <2.1 cm collapsing >50% with sniff suggests a normal RA pressure of 3 mmHg.  Pericardium Moderate pericardial effusion. There are no echocardiographic indications of cardiac tamponade.  ______________________________________________________________________________  MMode/2D Measurements & Calculations IVSd: 1.6 cm LVIDd: 4.5 cm LVIDs: 3.2 cm LVPWd: 1.3 cm  FS: 28.5 % LV mass(C)d: 263.7 grams LV mass(C)dI: 116.9 grams/m2 Ao root diam: 3.6 cm LA dimension: 4.7 cm asc Aorta Diam: 3.5 cm LA/Ao: 1.3 LVOT diam: 2.4 cm LVOT area: 4.4 cm2 Ao root diam index Ht(cm/m): 2.1 Ao root diam index BSA (cm/m2): 1.6 Asc Ao diam index BSA (cm/m2): 1.5 Asc Ao diam index Ht(cm/m): 2.0 EF Biplane: 60.6 %  LA Volume Indexed (AL/bp): 28.4 ml/m2 RV Base: 3.3 cm RWT: 0.58 TAPSE: 1.9 cm  Time Measurements Aortic HR: 74.0 BPM MM HR: 83.0 BPM  Doppler Measurements & Calculations MV E max camron: 88.8 cm/sec MV A max camron: 119.8 cm/sec MV E/A: 0.74 MV dec slope: 289.8 cm/sec2 MV dec time: 0.31 sec Ao V2 max: 185.1 cm/sec Ao max P.0 mmHg Ao V2 mean: 135.6 cm/sec Ao mean P.4 mmHg Ao V2 VTI: 43.0 cm BHUPINDER(I,D): 2.5 cm2 BHUPINDER(V,D): 2.1 cm2 LV V1 max PG: 3.2 mmHg LV V1 max: 89.5 cm/sec LV V1 VTI: 23.8 cm CO(LVOT): 7.8 l/min CI(LVOT): 3.5 l/min/m2 SV(LVOT): 105.6 ml SI(LVOT): 46.8 ml/m2 PA acc time: 0.10 sec  AV Camron Ratio (DI): 0.48 BHUPINDER Index (cm2/m2): 1.1 E/E': 13.1 E/E' av.1 Lateral E/e': 13.1 Medial E/e': 15.1 Peak E' Camron: 6.8 cm/sec RV S Camron: 8.8 cm/sec  ______________________________________________________________________________ Report approved by: Mariano Grover MD on 2025 11:28 AM       CT Chest Pulmonary Embolism w Contrast    Result Date: 2025  EXAM: CT CHEST PULMONARY EMBOLISM W CONTRAST LOCATION: [facility] DATE: 2025 INDICATION: Chest pain COMPARISON: CT 10/25/2024 TECHNIQUE: CT chest pulmonary angiogram during arterial phase injection of IV contrast. Multiplanar reformats and MIP reconstructions were performed. Dose reduction techniques were used. CONTRAST: Isovue 370 75ml FINDINGS: ANGIOGRAM CHEST: Pulmonary arteries are normal caliber and negative for pulmonary emboli. Normal caliber thoracic aorta. No CT evidence of right heart strain. LUNGS AND  PLEURA: Spiculated masslike density with irregular margins right middle lobe is again noted. This appears to be slightly smaller in size compared to the previous exam. Using the same region of measurement, this measures 4.0 x 1.5 cm compared to  5.1 x 2.3 cm previously. There is adjacent scarring and volume loss extending superiorly similar to the prior study. There are new interstitial and groundglass changes in both lower lobes which may be due to volume loss or less likely pneumonitis. No pleural fluid. MEDIASTINUM/AXILLAE: Moderate-sized pericardial effusion has increased since the prior examination. Pericarditis cannot be excluded. Right subcarinal lymph node has a short axis diameter 1.6 cm, this had measured 1.3 cm previously showing slight interval  increase in size. The right anterior mediastinal lymph node or mass is smaller in size measuring 1.6 cm in short axis diameter as compared to 2.4 cm. This partially obscured by the pericardial effusion. No other adenopathy. CORONARY ARTERY CALCIFICATION: Coronary artery stent. UPPER ABDOMEN: An endograft stent involving the upper abdominal aorta with stents extending into the origin of the celiac trunk, SMA and right renal artery. MUSCULOSKELETAL: Unchanged sclerosis within the mid sternum.     IMPRESSION: 1.  No CT evidence of pulmonary embolism. 2.  Moderate-sized pericardial effusion has increased since the previous study; pericarditis cannot be excluded. 3.  Spiculated mass with adjacent scarring right middle lobe measures slightly smaller on today's examination. This likely represents the patient's known treated lung cancer. 4.  Mildly prominent right subcarinal lymph node measures slightly larger on today's examination. Given the change, this should be monitored on future surveillance examinations. The right anterior mediastinal mass or lymph node seen on the previous study  is partially obscured by the pericardial effusion but does appear to be smaller  with a short axis diameter 1.6 cm as compared to 2.4 cm previously.    POC US ECHO LIMITED    PROCEDURE:    Emergency Department Limited Bedside Screening Cardiac Ultrasound  INDICATIONS: chest pain  PROCEDURE PROVIDER: Marilee Banerjee   WINDOW AND FINDINGS:   SUB-XYPHOID     :      Grossly normal/symmetric wall motion  Pericardial effusion: Small  Signs of Tamponade physiology: Absent  PARASTERNAL    :  Grossly normal/symmetric wall motion  Pericardial effusion: Small  Signs of Tamponade physiology: Absent    IMAGES SAVED AND STORED FOR ARCHIVE AND REVIEW: Yes         XR Chest Port 1 View    Result Date: 1/4/2025  EXAM: XR CHEST PORT 1 VIEW LOCATION: [facility] DATE: 1/4/2025 INDICATION: chest pain COMPARISON: CT chest 01/04/2025     IMPRESSION: Enlarged cardiac silhouette representing a moderate-sized pericardial effusion as seen on CT. Pulmonary vascularity normal. Subsegmental atelectasis left lung base. Lungs otherwise are clear. Left IJ central venous catheter tip at the cavoatrial junction.         The longitudinal plan of care for the diagnosis(es)/condition(s) as documented were addressed during this visit. Due to the added complexity in care, I will continue to support Jonas in the subsequent management and with ongoing continuity of care.    Total time spent with patient in face to face time, chart review and documentation was 40 minutes.          Signed by: SANTINO Crespo CNP

## 2025-01-22 NOTE — PROGRESS NOTES
"Oncology Rooming Note    January 22, 2025 9:40 AM   Jonas Hyman is a 79 year old male who presents for:    Chief Complaint   Patient presents with    Oncology Clinic Visit     Small cell lung cancer, right lower lobe (H)        Initial Vitals: BP (!) 156/85   Pulse 73   Temp 98.5  F (36.9  C)   Resp 18   Ht 1.753 m (5' 9\")   Wt 111 kg (244 lb 11.2 oz)   SpO2 97%   BMI 36.14 kg/m   Estimated body mass index is 36.14 kg/m  as calculated from the following:    Height as of this encounter: 1.753 m (5' 9\").    Weight as of this encounter: 111 kg (244 lb 11.2 oz). Body surface area is 2.32 meters squared.  Moderate Pain (6) Comment: Data Unavailable   No LMP for male patient.  Allergies reviewed: Yes  Medications reviewed: Yes    Medications: Medication refills not needed today.  Pharmacy name entered into [employer]: [id number] - [place] [id number] [address]    Frailty Screening:   Is the patient here for a new oncology consult visit in cancer care? 2. No      Clinical concerns: Dizzy since last evening. Very confused lately.       Clara Rm LPN             "

## 2025-01-22 NOTE — LETTER
"1/22/2025      Jonas Hyman  [address] [place] [facility] [address]      Dear Colleague,    Thank you for referring your patient, Jonas Hyman, to the [facility]. Please see a copy of my visit note below.    Oncology Rooming Note    January 22, 2025 9:40 AM   Jonas Hyman is a 79 year old male who presents for:    Chief Complaint   Patient presents with     Oncology Clinic Visit     Small cell lung cancer, right lower lobe (H)        Initial Vitals: BP (!) 156/85   Pulse 73   Temp 98.5  F (36.9  C)   Resp 18   Ht 1.753 m (5' 9\")   Wt 111 kg (244 lb 11.2 oz)   SpO2 97%   BMI 36.14 kg/m   Estimated body mass index is 36.14 kg/m  as calculated from the following:    Height as of this encounter: 1.753 m (5' 9\").    Weight as of this encounter: 111 kg (244 lb 11.2 oz). Body surface area is 2.32 meters squared.  Moderate Pain (6) Comment: Data Unavailable   No LMP for male patient.  Allergies reviewed: Yes  Medications reviewed: Yes    Medications: Medication refills not needed today.  Pharmacy name entered into [facility]: [id number] [place] [address] [facility]     Frailty Screening:   Is the patient here for a new oncology consult visit in cancer care? 2. No      Clinical concerns: Dizzy since last evening. Very confused lately.       Clara Rm LPN               [facility] Hematology and Oncology Progress Note    Patient: Jonas Hyman  MRN: [id number]  Date of Service: Jan 22, 2025          Reason for Visit    Chief Complaint   Patient presents with     Oncology Clinic Visit     Small cell lung cancer, right lower lobe (H)          Assessment and Plan     Cancer Staging   No matching staging information was found for the patient.    1.  Small cell lung cancer, extensive stage with bone mets, lymph node mets: Patient started on palliative treatment with carboplatin, etoposide and Atezolizumab. He had a good response after 2 cycles and " after 4 cycles.  He is now getting maintenance Atezolizumab. We are going to hold today due to new issues. Will likely return next week to potentially restart.      2.  Mild renal insufficiency: This is a chronic issue for patient.  Was a little worse after recent hospitaization/ibuprofen. Better today. Push fluids. Meeting with pharmacist to minimize toxicity from meds.     3. New confusion, dizziness, blurry vision: unclear etiology. Worrisome for brain mets so we will get Brain MRI STAT. I will call son Marlo with results.     4. Recent hospitalization for chest pain (1/4-1/8): found to have pericarditis, A-fib, nonischemic myocardial injury.  Pain and shortness of breath got her while he was in the hospital.  Per patient's son it continues to slowly get better.  He did meet with the cardiology PA last week.  Unclear if the pericarditis was from immunotherapy which is a very rare side effect, or viral in nature or potentially even from COVID-vaccine.  And was to stop ibuprofen due to slightly worsening of his kidney function.  He is continuing to call [facility] for 3 months.  They were going to repeat an echo and ESR/CRP in April.  They were also tello switch metoprolol to Coreg for elevated blood pressure.  He would like to start Eliquis once kidney function is improved for the A-fib.  Patient's son is concerned about using steroids because they were told with the pericarditis it can cause some rebound issues if we use steroids so we will discuss with cardiology if we feel like we want a restart the Atezolizumab and give prednisone 20 mg for 5 days after each dose which is what Dr. Madera would prefer if organ to continue that he Atezolizumab      ECOG Performance    1 - Can't do physically strenuous work, but fully ambyulatory and can do light sedentary work    Distress Screening (within last 30 days)    No data recorded     Pain  Pain Score: Moderate Pain (6)    Problem List    Patient Active Problem List    Diagnosis     Pelvic mass     Abdominal aortic aneurysm (AAA) without rupture     Pancreatic cyst     Severe obesity with body mass index (BMI) of 35.0 to 39.9 with serious comorbidity (H)     Tobacco dependence syndrome     Right bundle branch block     Hepatic cyst     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)     Atherosclerosis of native coronary artery of native heart with angina pectoris     Chronic coronary artery disease     Cataract     Cystoid macular edema of right eye     Diverticulosis of large intestine without hemorrhage     Dyslipidemia     Essential hypertension     Exudative age-related macular degeneration of right eye (H)     History of acute anterior wall MI     History of alcohol dependence (H)     Low vitamin B12 level     Smoker     Status post coronary artery stent placement     Total retinal detachment     Type 2 diabetes mellitus with stage 3b chronic kidney disease, without long-term current use of insulin (H)     History of ventricular fibrillation     Vitamin D deficiency     Myelolipoma     S/P repair of abdominal aortic aneurysm using bifurcation graft     Thoracoabdominal aortic aneurysm (TAAA)     Small cell lung cancer, right lower lobe (H)     Chemotherapy-induced neutropenia     Chest pain in adult     Shortness of breath     Pericardial effusion     Chest pain, unspecified type     Acute idiopathic pericarditis     History of vascular access device        ______________________________________________________________________________    History of Present Illness    Oncologist: Dr. Madera    Diagnosis: Lung cancer, Small cell. Found incidentally when getting imaging for a vascular procedure at the [facility].   -8/26/24: EBUS done and He had biopsies taken over the following hira stations: 4R, 4L, 7.  Pathology showed poorly differentiated small cell lung cancer in stations 4L and 4R   -8/27/24: CT done and shows Multiple small patchy groundglass opacities in the bilateral lower lobes, new  from prior study, compatible with aspiration change. Mildly increased size of right middle lobe and right upper lobe pulmonary masses as well as mediastinal, right hilar and right internal mammary lymphadenopathy.  -8/30/24: PET shows Findings suspicious for right lung cancer involving the right upper and middle lobes with metastases involving several lymph nodes above the diaphragm and several sites in the skeleton. If biopsy of a distant metastatic site is desired, a lesion in the   medial left iliac bone is a good option for CT-guided biopsy  ~Brain MRI attempted but was canceled due to a stent and the fear that is may be incompatible with MRI    Treatment:   -9/17/2024: Patient started palliative chemo with carboplatin, etoposide and Atezolizumab.  Patient completed 4 cycles.  -12/11/2024: Patient started maintenance Atezolizumab.  1200 mg every 3 weeks.    Interim History:   Patient is here today to continue on treatment.  Patient was discharged from the hospital January 8.  He was admitted with chest pain, shortness of breath and pericarditis.  Unclear etiology.  He did meet with cardiology when he was in the hospital because he also had some A-fib.  Patient met with the PA last week and he is now been stopped on his ibuprofen and continuing on coca chain.  They are waiting to restart Eliquis once his kidney function improves.  Patient and son are concerned about some new issues.  They state that since he has been in the hospital and home he has been having more confusion and anxiety.  He started smoking again so he has a lot of anxiety that he get any cigarettes because his family's not letting him drive right now.  His son states that he is having issues with running his phone and is not sure if it is from confusion or maybe even some blurry vision.  Patient states that he is feeling dizzy the last couple of days and more blurry vision.  We are noticing more irritability and like I said the confusion with  "things around the house.  Mild headaches that are intermittent and not new.  Breath is gotten better since leaving the hospital but still present especially with activity.  No chest pain.    Review of Systems    Pertinent items are noted in HPI.    Past History    Past Medical History:   Diagnosis Date     Acute MI (H) 2009     Bronchitis      Cardiac arrest (H) 2009     Cardiac arrest (H)      Chemical dependency (H)     Has been in recovery 49 years     Coronary artery disease      Diabetes mellitus (H)     type II     Diabetes mellitus type 2, noninsulin dependent (H)      Diverticula of colon      Diverticulosis of large intestine      Hemorrhoids      Hemorrhoids      History of alcohol dependence (H) 01/12/1975    Created by [employer] Annotation: May 29 2009  5:52PM - Dillon Goldsmith: dry since  1975, also used drugs and marijuana  Replacement Utility updated for latest IMO load     Hypertension      Hypertension      Macular degeneration of right eye      Mixed hyperlipidemia      Myocardial infarction (H)      Retinal detachment 06/23/2014    Vitrectomy Posterior 23 guage, scleral buckle, membrane peel, endolaser gase     Stage 3b chronic kidney disease (H) 11/30/2020     Stented coronary artery 2009    stints times 2     Vitamin B12 deficiency      Vitamin D deficiency        PHYSICAL EXAM:  BP (!) 156/85   Pulse 73   Temp 98.5  F (36.9  C)   Resp 18   Ht 1.753 m (5' 9\")   Wt 111 kg (244 lb 11.2 oz)   SpO2 97%   BMI 36.14 kg/m    GENERAL: no acute distress. Cooperative in conversation. Here with son, Marlo who is RN  HEENT: pupils are equal, round and reactive. Oromucosa is clean and intact. No ulcerations or mucositis noted. No bleeding noted.  RESP: lungs are clear bilaterally per auscultation. Regular respiratory rate. No wheezes or rhonchi.  CV: Regular, rate and rhythm. No murmurs.  ABD: soft, nontender.   MUSCULOSKELETAL: No lower extremity swelling.   NEURO: non focal. Alert " and oriented x3.   PSYCH: within normal limits. No depression or anxiety. Seems less sharp today with asking what the plan is a couple of times.   SKIN: warm dry intact         Lab Results    Recent Results (from the past week)   Comprehensive metabolic panel   Result Value Ref Range    Sodium 137 135 - 145 mmol/L    Potassium 4.3 3.4 - 5.3 mmol/L    Carbon Dioxide (CO2) 25 22 - 29 mmol/L    Anion Gap 11 7 - 15 mmol/L    Urea Nitrogen 16.4 8.0 - 23.0 mg/dL    Creatinine 1.23 (H) 0.67 - 1.17 mg/dL    GFR Estimate 59 (L) >60 mL/min/1.73m2    Calcium 9.3 8.8 - 10.4 mg/dL    Chloride 101 98 - 107 mmol/L    Glucose 126 (H) 70 - 99 mg/dL    Alkaline Phosphatase 78 40 - 150 U/L    AST 16 0 - 45 U/L    ALT 15 0 - 70 U/L    Protein Total 6.7 6.4 - 8.3 g/dL    Albumin 3.8 3.5 - 5.2 g/dL    Bilirubin Total 0.4 <=1.2 mg/dL   TSH with free T4 reflex   Result Value Ref Range    TSH 1.18 0.30 - 4.20 uIU/mL   CBC with platelets and differential   Result Value Ref Range    WBC Count 9.0 4.0 - 11.0 10e3/uL    RBC Count 3.81 (L) 4.40 - 5.90 10e6/uL    Hemoglobin 11.9 (L) 13.3 - 17.7 g/dL    Hematocrit 37.0 (L) 40.0 - 53.0 %    MCV 97 78 - 100 fL    MCH 31.2 26.5 - 33.0 pg    MCHC 32.2 31.5 - 36.5 g/dL    RDW 14.4 10.0 - 15.0 %    Platelet Count 330 150 - 450 10e3/uL    % Neutrophils 80 %    % Lymphocytes 9 %    % Monocytes 7 %    % Eosinophils 2 %    % Basophils 1 %    % Immature Granulocytes 0 %    NRBCs per 100 WBC 0 <1 /100    Absolute Neutrophils 7.3 1.6 - 8.3 10e3/uL    Absolute Lymphocytes 0.8 0.8 - 5.3 10e3/uL    Absolute Monocytes 0.6 0.0 - 1.3 10e3/uL    Absolute Eosinophils 0.2 0.0 - 0.7 10e3/uL    Absolute Basophils 0.1 0.0 - 0.2 10e3/uL    Absolute Immature Granulocytes 0.0 <=0.4 10e3/uL    Absolute NRBCs 0.0 10e3/uL           Imaging    Echocardiogram Limited    Result Date: 1/9/2025  [id number]^ORESIERRA^CHRIS^L  [address] [address]  Name: DEEPAK OLIVARES MRN:  [id number] : 1945 Study Date: 2025 01:05 PM Age: 79 yrs Gender: Male Patient Location: [facility] Reason For Study: Acute pericarditis, unspecified type, Pericardial effusion Ordering Physician: CHRIS PAYAN Referring Physician: CHRIS PAYAN Performed By: IRASEMA  BSA: 2.2 m2 Height: 69 in Weight: 242 lb HR: 67 BP: 134/63 mmHg ______________________________________________________________________________ Procedure Limited Echocardiogram with two-dimensional & color Doppler. Compared to the prior study dated 2025 (direct [id number] comparison of images) no significant change. ______________________________________________________________________________ Interpretation Summary  Small pericardial effusion, 14mm anterior which contains echodense material. No evidence of tamponade. The left ventricle is normal in size. The visual ejection fraction is 60-65%. Regional wall motion is grossly normal but endocardial border definition is limited Compared to the prior study dated 2025 (direct [id number] comparison of images) no significant change ______________________________________________________________________________ Left Ventricle The left ventricle is normal in size. There is normal left ventricular wall thickness. The visual ejection fraction is 60-65%. Regional wall motion is grossly normal but endocardial border definition is limited.  Mitral Valve There is trace mitral regurgitation.  Aortic Valve No aortic regurgitation is present.  Vessels IVC diameter <2.1 cm collapsing >50% with sniff suggests a normal RA pressure of 3 mmHg.  Pericardium Small pericardial effusion, 14mm anterior which contains echodense material. No evidence of tamponade. ______________________________________________________________________________ MMode/2D Measurements & Calculations  IVSd: 1.3 cm LVIDd: 4.9 cm LVIDs: 3.3 cm LVPWd: 1.1 cm FS: 32.0 % LV mass(C)d: 218.9 grams LV mass(C)dI: 97.7 grams/m2 EF Biplane:  48.3 % RWT: 0.44  Time Measurements MM HR: 67.0 BPM  ______________________________________________________________________________ Report approved by: Ban Braun MD on 2025 02:01 PM       Cardiac Catheterization    Result Date: 2025  Images from the original result were not included. Jonas Hyman is a 79 year old old male with moderate pericardial effusion, newly diagnosed atrial fibrillation, elevated troponin, CAD with hx of anterior MI; s/p PCI, s/p rashid infarct VF arrest in , small cell lung CA, DM Type II, Class II obesity; BMI 35.66kg/m2 who is here for angio and consideration of pericardiocentesis. - non-obstructive CAD; low-normal L-sided filling pressures - no good window/not enough fluid for pericardiocentesis - will need a window to evacuate organized material - continue aggressive risk factor modification Findings: LM:no obstruction LAD:mid-vessel moderate 30-40% irregularity, distal D2 w/ ostial 50% narrowing Lcx:co-dominant, mildly irregular RCA:small, non-dominant or co-dominant, mid-vessel 30-40% narrowing LVEDP:5 Access: R Radial artery Closure: Vasc Band     Echocardiogram Limited    Result Date: 2025  [id number]^ALDAIR^[place] [address]  Name: JONAS HYMAN MRN: [id number] : 1945 Study Date: 2025 08:34 AM Age: 79 yrs Gender: Male Patient Location: [facility] Reason For Study: Pericardial Effusion Ordering Physician: RONY QUINTEROS Referring Physician: CARLOS BARDALES Performed By: ALFRED  BSA: 2.2 m2 Height: 69 in Weight: 241 lb HR: 102 BP: 120/77 mmHg ______________________________________________________________________________ Procedure Limited Echocardiogram with two-dimensional imaging. No hemodynamically significant valvular abnormalities on 2D or color flow imaging. Compared to the prior study dated 2025, there have been no changes.  ______________________________________________________________________________ Interpretation Summary  1.Left ventricular size, wall motion and function are normal. The ejection fraction is 60-65%. 2.Normal right ventricle size and systolic function. 3.No hemodynamically significant valvular abnormalities on 2D or color flow imaging although this was a limited study with complete Doppler. 4.A small to medium sized circumferential pericardial effusion is noted, 14 mm anterior and 13 mm posterior.There are no echocardiographic indications of cardiac tamponade. Compared to the prior study dated 1/5/2025, there have been no changes. ______________________________________________________________________________ I      WMSI = 1.00     % Normal = 100  X - Cannot   0 -                      (2) - Mildly 2 -          Segments  Size Interpret    Hyperkinetic 1 - Normal  Hypokinetic  Hypokinetic  1-2     small                                                    7 -          3-5    moderate 3 - Akinetic 4 -          5 -         6 - Akinetic Dyskinetic   6-14    large              Dyskinetic   Aneurysmal  w/scar       w/scar       15-16   diffuse  Left Ventricle Left ventricular size, wall motion and function are normal. The ejection fraction is 60-65%. There is normal left ventricular wall thickness. Left ventricular diastolic function is not assessable. No regional wall motion abnormalities noted.  Right Ventricle Normal right ventricle size and systolic function.  Atria Normal left atrial size. Right atrial size is normal. There is no color Doppler evidence of an atrial shunt.  Mitral Valve Mitral valve leaflets appear normal. There is no evidence of mitral stenosis or clinically significant mitral regurgitation. There is no mitral valve stenosis.  Tricuspid Valve The tricuspid valve is not well visualized, but is grossly normal. There is no tricuspid stenosis.  Aortic Valve The aortic valve is not well visualized.  Pulmonic  Valve The pulmonic valve is not well visualized.  Vessels The aorta root is normal. Normal size ascending aorta. IVC diameter <2.1 cm collapsing >50% with sniff suggests a normal RA pressure of 3 mmHg.  Pericardium A circumferential pericardial effusion is noted. There are no echocardiographic indications of cardiac tamponade.  Rhythm Sinus rhythm was noted.  ______________________________________________________________________________ Report approved by: Dima Almanza MD on 2025 09:48 AM  ______________________________________________________________________________      Echocardiogram Complete    Result Date: 2025  [id number]^MARIO^KESHAV^[place] [address]  Name: DEEPAK OLIVARES MRN: [id number] : 1945 Study Date: 2025 08:12 AM Age: 79 yrs Gender: Male Patient Location: [facility] Reason For Study: Pericardial Effusion History: CAD- stents x2; Lung Cancer Ordering Physician: KESHAV MARTIN Performed By: ALFRED  BSA: 2.3 m2 Height: 69 in Weight: 246 lb BP: 175/77 mmHg ______________________________________________________________________________ Procedure Echocardiogram with two-dimensional, color and spectral Doppler. ______________________________________________________________________________ Interpretation Summary  1. The left ventricle is normal in size. Left ventricular function is normal.The ejection fraction is 60-65%. There is normal left ventricular wall thickness. No regional wall motion abnormalities noted. 2. Normal right ventricle size and systolic function. 3. Normal left atrial size. Right atrial size is normal. 4. Moderate pericardial effusion There are no echocardiographic indications of cardiac tamponade.IVC diameter <2.1 cm collapsing >50% with sniff suggests a normal RA pressure of 3 mmHg. Clinical correlation is recommended. There is no comparison study available.  ______________________________________________________________________________ Left Ventricle The left ventricle is normal in size. Left ventricular function is normal.The ejection fraction is 60-65%. There is normal left ventricular wall thickness. No regional wall motion abnormalities noted.  Right Ventricle Normal right ventricle size and systolic function.  Atria Normal left atrial size. Right atrial size is normal.  Mitral Valve Mitral valve leaflets appear normal. There is no evidence of mitral stenosis or clinically significant mitral regurgitation.  Tricuspid Valve Tricuspid valve leaflets appear normal. There is no evidence of tricuspid stenosis or clinically significant tricuspid regurgitation. Right ventricular systolic pressure could not be approximated due to inadequate tricuspid regurgitation.  Aortic Valve The aortic valve is trileaflet. Aortic valve leaflets appear normal. There is no evidence of aortic stenosis or clinically significant aortic regurgitation.  Pulmonic Valve The pulmonic valve is not well seen, but is grossly normal. This degree of valvular regurgitation is within normal limits. There is trace pulmonic valvular regurgitation.  Vessels The aorta root is normal. IVC diameter <2.1 cm collapsing >50% with sniff suggests a normal RA pressure of 3 mmHg.  Pericardium Moderate pericardial effusion. There are no echocardiographic indications of cardiac tamponade.  ______________________________________________________________________________ MMode/2D Measurements & Calculations IVSd: 1.6 cm LVIDd: 4.5 cm LVIDs: 3.2 cm LVPWd: 1.3 cm  FS: 28.5 % LV mass(C)d: 263.7 grams LV mass(C)dI: 116.9 grams/m2 Ao root diam: 3.6 cm LA dimension: 4.7 cm asc Aorta Diam: 3.5 cm LA/Ao: 1.3 LVOT diam: 2.4 cm LVOT area: 4.4 cm2 Ao root diam index Ht(cm/m): 2.1 Ao root diam index BSA (cm/m2): 1.6 Asc Ao diam index BSA (cm/m2): 1.5 Asc Ao diam index Ht(cm/m): 2.0 EF Biplane: 60.6 %  LA Volume Indexed (AL/bp): 28.4  ml/m2 RV Base: 3.3 cm RWT: 0.58 TAPSE: 1.9 cm  Time Measurements Aortic HR: 74.0 BPM MM HR: 83.0 BPM  Doppler Measurements & Calculations MV E max camron: 88.8 cm/sec MV A max camron: 119.8 cm/sec MV E/A: 0.74 MV dec slope: 289.8 cm/sec2 MV dec time: 0.31 sec Ao V2 max: 185.1 cm/sec Ao max P.0 mmHg Ao V2 mean: 135.6 cm/sec Ao mean P.4 mmHg Ao V2 VTI: 43.0 cm BHUPINDER(I,D): 2.5 cm2 BHUPINDER(V,D): 2.1 cm2 LV V1 max PG: 3.2 mmHg LV V1 max: 89.5 cm/sec LV V1 VTI: 23.8 cm CO(LVOT): 7.8 l/min CI(LVOT): 3.5 l/min/m2 SV(LVOT): 105.6 ml SI(LVOT): 46.8 ml/m2 PA acc time: 0.10 sec  AV Camron Ratio (DI): 0.48 BHUPINDER Index (cm2/m2): 1.1 E/E': 13.1 E/E' av.1 Lateral E/e': 13.1 Medial E/e': 15.1 Peak E' Camron: 6.8 cm/sec RV S Camron: 8.8 cm/sec  ______________________________________________________________________________ Report approved by: Mariano Grover MD on 2025 11:28 AM       CT Chest Pulmonary Embolism w Contrast    Result Date: 2025  EXAM: CT CHEST PULMONARY EMBOLISM W CONTRAST LOCATION: [facility] DATE: 2025 INDICATION: Chest pain COMPARISON: CT 10/25/2024 TECHNIQUE: CT chest pulmonary angiogram during arterial phase injection of IV contrast. Multiplanar reformats and MIP reconstructions were performed. Dose reduction techniques were used. CONTRAST: Isovue 370 75ml FINDINGS: ANGIOGRAM CHEST: Pulmonary arteries are normal caliber and negative for pulmonary emboli. Normal caliber thoracic aorta. No CT evidence of right heart strain. LUNGS AND PLEURA: Spiculated masslike density with irregular margins right middle lobe is again noted. This appears to be slightly smaller in size compared to the previous exam. Using the same region of measurement, this measures 4.0 x 1.5 cm compared to  5.1 x 2.3 cm previously. There is adjacent scarring and volume loss extending superiorly similar to the prior study. There are new interstitial and groundglass changes in both lower lobes which may be due to  volume loss or less likely pneumonitis. No pleural fluid. MEDIASTINUM/AXILLAE: Moderate-sized pericardial effusion has increased since the prior examination. Pericarditis cannot be excluded. Right subcarinal lymph node has a short axis diameter 1.6 cm, this had measured 1.3 cm previously showing slight interval  increase in size. The right anterior mediastinal lymph node or mass is smaller in size measuring 1.6 cm in short axis diameter as compared to 2.4 cm. This partially obscured by the pericardial effusion. No other adenopathy. CORONARY ARTERY CALCIFICATION: Coronary artery stent. UPPER ABDOMEN: An endograft stent involving the upper abdominal aorta with stents extending into the origin of the celiac trunk, SMA and right renal artery. MUSCULOSKELETAL: Unchanged sclerosis within the mid sternum.     IMPRESSION: 1.  No CT evidence of pulmonary embolism. 2.  Moderate-sized pericardial effusion has increased since the previous study; pericarditis cannot be excluded. 3.  Spiculated mass with adjacent scarring right middle lobe measures slightly smaller on today's examination. This likely represents the patient's known treated lung cancer. 4.  Mildly prominent right subcarinal lymph node measures slightly larger on today's examination. Given the change, this should be monitored on future surveillance examinations. The right anterior mediastinal mass or lymph node seen on the previous study  is partially obscured by the pericardial effusion but does appear to be smaller with a short axis diameter 1.6 cm as compared to 2.4 cm previously.    POC US ECHO LIMITED    PROCEDURE:    Emergency Department Limited Bedside Screening Cardiac Ultrasound  INDICATIONS: chest pain  PROCEDURE PROVIDER: Marilee Banerjee   WINDOW AND FINDINGS:   SUB-XYPHOID     :      Grossly normal/symmetric wall motion  Pericardial effusion: Small  Signs of Tamponade physiology: Absent  PARASTERNAL    :  Grossly normal/symmetric wall motion  Pericardial  effusion: Small  Signs of Tamponade physiology: Absent    IMAGES SAVED AND STORED FOR ARCHIVE AND REVIEW: Yes         XR Chest Port 1 View    Result Date: 1/4/2025  EXAM: XR CHEST PORT 1 VIEW LOCATION: [facility] DATE: 1/4/2025 INDICATION: chest pain COMPARISON: CT chest 01/04/2025     IMPRESSION: Enlarged cardiac silhouette representing a moderate-sized pericardial effusion as seen on CT. Pulmonary vascularity normal. Subsegmental atelectasis left lung base. Lungs otherwise are clear. Left IJ central venous catheter tip at the cavoatrial junction.         The longitudinal plan of care for the diagnosis(es)/condition(s) as documented were addressed during this visit. Due to the added complexity in care, I will continue to support Jonas in the subsequent management and with ongoing continuity of care.    Total time spent with patient in face to face time, chart review and documentation was 40 minutes.          Signed by: SANTINO Crespo CNP      Again, thank you for allowing me to participate in the care of your patient.        Sincerely,        SANTINO Crespo CNP    Electronically signed

## 2025-01-25 ENCOUNTER — HEALTH MAINTENANCE LETTER (OUTPATIENT)
Age: 80
End: 2025-01-25

## 2025-01-27 ENCOUNTER — ALLIED HEALTH/NURSE VISIT (OUTPATIENT)
Dept: RADIATION ONCOLOGY | Facility: HOSPITAL | Age: 80
End: 2025-01-27
Payer: MEDICARE

## 2025-01-27 ENCOUNTER — APPOINTMENT (OUTPATIENT)
Dept: RADIATION ONCOLOGY | Facility: HOSPITAL | Age: 80
End: 2025-01-27
Attending: RADIOLOGY
Payer: MEDICARE

## 2025-01-27 ENCOUNTER — DOCUMENTATION ONLY (OUTPATIENT)
Dept: RADIATION ONCOLOGY | Facility: HOSPITAL | Age: 80
End: 2025-01-27
Payer: MEDICARE

## 2025-01-27 ENCOUNTER — OFFICE VISIT (OUTPATIENT)
Dept: RADIATION ONCOLOGY | Facility: HOSPITAL | Age: 80
End: 2025-01-27
Payer: MEDICARE

## 2025-01-27 VITALS
OXYGEN SATURATION: 98 % | HEART RATE: 86 BPM | SYSTOLIC BLOOD PRESSURE: 176 MMHG | DIASTOLIC BLOOD PRESSURE: 86 MMHG | RESPIRATION RATE: 20 BRPM

## 2025-01-27 DIAGNOSIS — C79.31 MALIGNANT NEOPLASM METASTATIC TO BRAIN (H): Primary | ICD-10-CM

## 2025-01-27 DIAGNOSIS — C34.31 SMALL CELL LUNG CANCER, RIGHT LOWER LOBE (H): Primary | ICD-10-CM

## 2025-01-27 DIAGNOSIS — T45.1X5A CHEMOTHERAPY-INDUCED NEUTROPENIA: ICD-10-CM

## 2025-01-27 DIAGNOSIS — D70.1 CHEMOTHERAPY-INDUCED NEUTROPENIA: ICD-10-CM

## 2025-01-27 PROCEDURE — 77334 RADIATION TREATMENT AID(S): CPT | Mod: 26 | Performed by: RADIOLOGY

## 2025-01-27 PROCEDURE — 77295 3-D RADIOTHERAPY PLAN: CPT | Performed by: RADIOLOGY

## 2025-01-27 PROCEDURE — 77334 RADIATION TREATMENT AID(S): CPT | Performed by: RADIOLOGY

## 2025-01-27 PROCEDURE — 77263 THER RADIOLOGY TX PLNG CPLX: CPT | Performed by: RADIOLOGY

## 2025-01-27 PROCEDURE — 77412 RADIATION TX DELIVERY LVL 3: CPT | Performed by: RADIOLOGY

## 2025-01-27 PROCEDURE — 99205 OFFICE O/P NEW HI 60 MIN: CPT | Mod: 25 | Performed by: RADIOLOGY

## 2025-01-27 PROCEDURE — 77290 THER RAD SIMULAJ FIELD CPLX: CPT | Performed by: RADIOLOGY

## 2025-01-27 PROCEDURE — 77300 RADIATION THERAPY DOSE PLAN: CPT | Mod: 26 | Performed by: RADIOLOGY

## 2025-01-27 PROCEDURE — 77300 RADIATION THERAPY DOSE PLAN: CPT | Performed by: RADIOLOGY

## 2025-01-27 PROCEDURE — 77295 3-D RADIOTHERAPY PLAN: CPT | Mod: 26 | Performed by: RADIOLOGY

## 2025-01-27 PROCEDURE — G0463 HOSPITAL OUTPT CLINIC VISIT: HCPCS | Performed by: RADIOLOGY

## 2025-01-27 RX ORDER — METHYLPREDNISOLONE SODIUM SUCCINATE 40 MG/ML
40 INJECTION INTRAMUSCULAR; INTRAVENOUS
Start: 2025-02-21

## 2025-01-27 RX ORDER — ALBUTEROL SULFATE 0.83 MG/ML
2.5 SOLUTION RESPIRATORY (INHALATION)
OUTPATIENT
Start: 2025-02-21

## 2025-01-27 RX ORDER — DIPHENHYDRAMINE HYDROCHLORIDE 50 MG/ML
50 INJECTION INTRAMUSCULAR; INTRAVENOUS
Start: 2025-02-21

## 2025-01-27 RX ORDER — LORAZEPAM 2 MG/ML
0.5 INJECTION INTRAMUSCULAR EVERY 4 HOURS PRN
OUTPATIENT
Start: 2025-02-21

## 2025-01-27 RX ORDER — HEPARIN SODIUM,PORCINE 10 UNIT/ML
5-20 VIAL (ML) INTRAVENOUS DAILY PRN
OUTPATIENT
Start: 2025-02-21

## 2025-01-27 RX ORDER — EPINEPHRINE 1 MG/ML
0.3 INJECTION, SOLUTION INTRAMUSCULAR; SUBCUTANEOUS EVERY 5 MIN PRN
OUTPATIENT
Start: 2025-02-21

## 2025-01-27 RX ORDER — HEPARIN SODIUM (PORCINE) LOCK FLUSH IV SOLN 100 UNIT/ML 100 UNIT/ML
5 SOLUTION INTRAVENOUS
OUTPATIENT
Start: 2025-02-21

## 2025-01-27 RX ORDER — PALONOSETRON 0.05 MG/ML
0.25 INJECTION, SOLUTION INTRAVENOUS ONCE
OUTPATIENT
Start: 2025-02-21

## 2025-01-27 RX ORDER — DIPHENHYDRAMINE HYDROCHLORIDE 50 MG/ML
25 INJECTION INTRAMUSCULAR; INTRAVENOUS
Start: 2025-02-21

## 2025-01-27 RX ORDER — MEPERIDINE HYDROCHLORIDE 25 MG/ML
25 INJECTION INTRAMUSCULAR; INTRAVENOUS; SUBCUTANEOUS
OUTPATIENT
Start: 2025-02-21

## 2025-01-27 RX ORDER — ALBUTEROL SULFATE 90 UG/1
1-2 INHALANT RESPIRATORY (INHALATION)
Start: 2025-02-21

## 2025-01-27 ASSESSMENT — PAIN SCALES - GENERAL: PAINLEVEL_OUTOF10: SEVERE PAIN (8)

## 2025-01-27 NOTE — PROGRESS NOTES
Radiation Oncology wanted to notify our insurance securing team that Jonas Hyman is scheduled to start radiation on Jan 27th 2025 at 2:45pm in Radiation Oncology and needs URGENT insurance authorization. If you need further information or have questions, please email [email or web address] AND [email or web address] for assistance.

## 2025-01-27 NOTE — LETTER
1/27/2025      Jonas Hyman  [address]  [place] [address]      Dear Colleague,    Thank you for referring your patient, Jonas Hyman, to the [facility] RADIATION ONCOLOGY [place]. Please see a copy of my visit note below.    [facility] Radiation Oncology Consult Note     Patient: Jnoas Hyman  MRN: [id number]  Date of Service: 01/27/2025          Dino Madera MD  [address] [place] [address]       Dear Dr. Madera:    Thank you very much for referring this patient for consideration of radiotherapy. As you know Mr. 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  The patient is referred to radiation oncology for evaluation and consideration of possible whole brain radiation therapy.    HISTORY OF PRESENT ILLNESS:   Mr. Hyman is a 79 year old male who is a retired  and was recently found to have evidence of metastatic small cell lung cancer.  He was being evaluated at [facility] by his vascular surgeons and had a CT of the body done on April 14, 2024.  This showed a right lower lobe mass with evidence of right hilar and mediastinal adenopathy.  He was referred to pulmonary.  They plan for an endobronchial ultrasound and PET scan.  The EBUS was done prior to the PET scan.  He had biopsies taken over the following hira stations: 4R, 4L, 7.  Pathology showed poorly differentiated small cell lung cancer in stations 4L and 4R.  Staging PET scan on 8/30/2024 showed evidence of multiple systemic metastases. Brain MRI attempted but was canceled due to a stent and the fear that is may be incompatible with MRI.  Patient proceed with palliative chemo with carboplatin, etoposide and Atezolizumab.  Patient presented with a new symptoms of confusion, dizziness, blurry vision: unclear etiology.  MRI brain was obtained on 1/24/2025 this unfortunately reviewed innumerable solid and  peripherally enhancing lesions  throughout the bilateral cerebral, cerebellar hemispheres, and brainstem concerning for metastatic disease in the setting of known small cell lung cancer. Some of the lesions have associated microhemorrhage. No significant mass effect or midline shift.  Patient was given Decadron 4 mg 3 times daily and is referred to radiation oncology for evaluation and consideration of possible palliative whole brain radiation therapy.    CHEMOTHERAPY HISTORY: Concurrent Chemotherapy: No    RADIATION THERAPY HISTORY: Prior Radiation: No    IMPLANTED CARDIAC DEVICE: none     Current Outpatient Medications   Medication Sig Dispense Refill     acetaminophen (TYLENOL) 500 MG tablet Take 1,000-1,500 mg by mouth 2 times daily.       amLODIPine (NORVASC) 5 MG tablet Take 1 tablet (5 mg) by mouth daily. 30 tablet 0     apixaban ANTICOAGULANT (ELIQUIS ANTICOAGULANT) 5 MG tablet Take 1 tablet (5 mg) by mouth 2 times daily. 180 tablet 3     aspirin (ASA) 81 MG tablet Take 81 mg by mouth daily       busPIRone (BUSPAR) 10 MG tablet Take 1 tablet (10 mg) by mouth 3 times daily as needed for anxiety. 90 tablet 1     carvedilol (COREG) 6.25 MG tablet Take 1 tablet (6.25 mg) by mouth 2 times daily (with meals). 180 tablet 3     colchicine (COLCRYS) 0.6 MG tablet Take 1 tablet (0.6 mg) by mouth daily. 90 tablet 0     cyanocobalamin (VITAMIN B-12) 100 MCG tablet Take 2,000 mcg by mouth daily       dexAMETHasone (DECADRON) 4 MG tablet Take 1 tablet (4 mg) by mouth 3 times daily. 90 tablet 0     furosemide (LASIX) 40 MG tablet Take 1 tablet (40 mg) by mouth daily as needed (Leg swelling) 30 tablet 3     gabapentin (NEURONTIN) 100 MG capsule Take 1 capsule (100 mg) by mouth at bedtime. 60 capsule 3     hydrOXYzine HCl (ATARAX) 25 MG tablet Take 1 tablet (25 mg) by mouth 3 times daily as needed for itching. 40 tablet 2     magnesium oxide (MAG-OX) 400 MG tablet Take 1 tablet (400 mg) by mouth daily       nitroGLYcerin  (NITROSTAT) 0.4 MG sublingual tablet Place 1 tablet (0.4 mg) under the tongue every 5 minutes as needed for chest pain 30 tablet 5     ondansetron (ZOFRAN) 8 MG tablet Take 1 tablet (8 mg) by mouth every 6 hours as needed for nausea (vomiting). Do not start before September 9, 2024. 30 tablet 2     pravastatin (PRAVACHOL) 40 MG tablet Take 1 tablet (40 mg) by mouth daily 90 tablet 3     Pyridoxine HCl (VITAMIN B-6 PO) Take 1 tablet by mouth daily.       rOPINIRole (REQUIP) 2 MG tablet Take 1 tablet (2 mg) by mouth at bedtime 90 tablet 3     VITAMIN D, CHOLECALCIFEROL, PO Take 2,000 Units by mouth daily       Past Medical History:   Diagnosis Date     Acute MI (H) 2009     Bronchitis      Cardiac arrest (H) 2009     Cardiac arrest (H)      Chemical dependency (H)     Has been in recovery 49 years     Coronary artery disease      Diabetes mellitus (H)     type II     Diabetes mellitus type 2, noninsulin dependent (H)      Diverticula of colon      Diverticulosis of large intestine      Hemorrhoids      Hemorrhoids      History of alcohol dependence (H) 01/12/1975    Created by [employer] [facility] Annotation: May 29 2009  5:52PM - Dillon Goldsmith: dry since  1975, also used drugs and marijuana  Replacement Utility updated for latest IMO load     Hypertension      Hypertension      Macular degeneration of right eye      Mixed hyperlipidemia      Myocardial infarction (H)      Retinal detachment 06/23/2014    Vitrectomy Posterior 23 guage, scleral buckle, membrane peel, endolaser gase     Stage 3b chronic kidney disease (H) 11/30/2020     Stented coronary artery 2009    stints times 2     Vitamin B12 deficiency      Vitamin D deficiency      Past Surgical History:   Procedure Laterality Date     BRONCHOSCOPY RIDID OR FLEXIBLE W/ENDOBRONCHIAL ULTRASOUND GUIDED 1 OR 2 NODE STATIONS N/A 8/26/2024    Procedure: BRONCHOSCOPY, WITH BIOPSY OF 1 OR 2 LYMPH NODE STATIONS WITH ENDOBRONCHIAL ULTRASOUND GUIDANCE;  Surgeon:  Bernarda Morrison MD;  [facility]: [facility]     CATARACT IOL, RT/LT Bilateral      CV CORONARY ANGIOGRAM N/A 1/6/2025    Procedure: Coronary Angiogram;  Surgeon: Vinicio Aparicio MD;  Location: [facility] CATH LAB CV     CV LEFT HEART CATH N/A 1/6/2025    Procedure: Left Heart Catheterization;  Surgeon: Vinicio Aparicio MD;  Location: [facility] CATH LAB CV     EYE SURGERY       FRACTURE SURGERY       HC REMOVE TONSILS/ADENOIDS,<13 Y/O      Description: Tonsillectomy With Adenoidectomy;  Recorded: 05/29/2009;     HEART CATH STENT COR W/WO PTCA  05/07/2009    Proximal Left Anterior Descending Artery, Proximal Circumflex       HERNIA REPAIR       INGUINAL HERNIA REPAIR       IR CHEST PORT PLACEMENT > 5 YRS OF AGE  9/5/2024     LASER YAG IRIDOTOMY  9/6/2013    Procedure: LASER YAG IRIDOTOMY;  peripheral irridotomy ;  Surgeon: Doroteo Andres MD;  Location: [facility]     OTHER SURGICAL HISTORY      Cath Stent 1 Proximal Left Anterior Descending Artery      PHACOEMULSIFICATION CLEAR CORNEA WITH STANDARD INTRAOCULAR LENS IMPLANT  9/5/2013    Procedure: PHACOEMULSIFICATION CLEAR CORNEA WITH STANDARD INTRAOCULAR LENS IMPLANT;   COMPLEX RIGHT PHACOEMULSIFICATION CLEAR CORNEA WITH ANTERIOR CHAMBER INTRAOCULAR LENS IMPLANT, ANTERIOR VITRECTOMY;  Surgeon: Doroteo Andres MD;  Location: [facility]     PHACOEMULSIFICATION WITH STANDARD INTRAOCULAR LENS IMPLANT Left 12/3/2018    Procedure: Left Eye Phacoemulsification with Intraocular Lens;  Surgeon: Suhail Johnson MD;  Location: [facility]     RETINAL DETACHMENT SURGERY       TONSILLECTOMY  5/24/200/9     VASECTOMY       VITRECTOMY ANTERIOR  9/5/2013    Procedure: VITRECTOMY ANTERIOR;;  Surgeon: Doroteo Andres MD;  Location: [facility]     ZZC MUSCLE-SKIN FLAP,LEG       No Known Allergies  Family History   Problem Relation Age of Onset     Obesity Father      Glaucoma No family hx of      Macular Degeneration No family hx of      Retinal detachment No family hx of      Kidney Cancer Father       Heart Disease Brother      Social History     Socioeconomic History     Marital status:      Spouse name: Not on file     Number of children: Not on file     Years of education: Not on file     Highest education level: Not on file   Occupational History     Not on file   Tobacco Use     Smoking status: Former     Current packs/day: 0.00     Average packs/day: 1 pack/day for 50.6 years (50.6 ttl pk-yrs)     Types: Cigarettes     Start date: 1956     Quit date: 2024     Years since quittin.0     Passive exposure: Past (son smoked smoking in home)     Smokeless tobacco: Never     Tobacco comments:     20 yrs ago-mid 80's, quit again , started again , quit 2024   Vaping Use     Vaping status: Never Used   Substance and Sexual Activity     Alcohol use: No     Comment: from 1975     Drug use: No     Sexual activity: Not on file   Other Topics Concern     Parent/sibling w/ CABG, MI or angioplasty before 65F 55M? Not Asked   Social History Narrative    2 children,         Quit drinking alcohol         Son has diabetes and lives in basement        Lives with grandchild who is 11, born      Social Drivers of Health     Financial Resource Strain: Low Risk  (2025)    Financial Resource Strain      Within the past 12 months, have you or your family members you live with been unable to get utilities (heat, electricity) when it was really needed?: No   Recent Concern: Financial Resource Strain - High Risk (2025)    Financial Resource Strain      Within the past 12 months, have you or your family members you live with been unable to get utilities (heat, electricity) when it was really needed?: Yes   Food Insecurity: Low Risk  (2025)    Food Insecurity      Within the past 12 months, did you worry that your food would run out before you got money to buy more?: No      Within the past 12 months, did the food you bought just not last and you didn t have  money to get more?: No   Transportation Needs: Low Risk  (1/5/2025)    Transportation Needs      Within the past 12 months, has lack of transportation kept you from medical appointments, getting your medicines, non-medical meetings or appointments, work, or from getting things that you need?: No   Physical Activity: Not on file   Stress: Not on file   Social Connections: Not on file   Interpersonal Safety: Low Risk  (1/5/2025)    Interpersonal Safety      Do you feel physically and emotionally safe where you currently live?: Yes      Within the past 12 months, have you been hit, slapped, kicked or otherwise physically hurt by someone?: No      Within the past 12 months, have you been humiliated or emotionally abused in other ways by your partner or ex-partner?: No   Housing Stability: Low Risk  (1/5/2025)    Housing Stability      Do you have housing? : Yes      Are you worried about losing your housing?: No        REVIEW OF SYMPTOMS:  A full 14-point review of systems was performed. Pertinent findings are noted in the HPI.    General  Constitutional  Constitutional (WDL): Exceptions to WDL  Fatigue: Fatigue relieved by rest  EENT  Eye Disorders  Eye Disorder (WDL): Exceptions to WDL  Blurred Vision: Symptomatic OR moderate decrease in visual acuity (best corrected visual acuity 20/40 and better or 3 lines or less decreased vision from known baseline) OR limiting instrumental ADL  Ear Disorders  Ear Disorder (WDL): Assessment not pertinent to visit ([place])  Respiratory  Respiratory  Respiratory (WDL): Exceptions to WDL  Cough: Mild symptoms OR nonprescription intervention indicated  Dyspnea: Shortness of breath with minimal exertion OR limiting instrumental ADL  Cardiovascular  Cardiovascular  Cardiovascular (WDL): Exceptions to WDL (no pacemaker)  Edema: Yes  Gastrointestinal  Gastrointestinal  Gastrointestinal (WDL): All gastrointestinal elements are within defined limits  Musculoskeletal  Musculoskeletal and  Connective Tissue Disorders  Musculoskeletal & Connective (WDL): Exceptions to WDL  Arthralgia: Mild pain  Bone Pain: Moderate pain OR limiting instrumental ADL  Generalized Muscle Weakness: Symptomatic OR evident on physical exam OR limiting instrumental ADL  Integumentary  Integumentary  Integumentary (WDL): All integumentary elements are within defined limits  Neurological  Neurosensory  Neurosensory (WDL): Exceptions to WDL  Ataxia: Asymptomatic OR clinical or diagnostic observations only OR intervention not indicated  Peripheral Sensory Neuropathy: Moderate symptoms OR limiting instrumental ADL (using 2 canes)  Confusion: Moderate disorientation OR limiting instrumental ADL  Genitourinary/Reproductive  Genitourinary  Genitourinary (WDL): All genitourinary elements are within defined limits  Lymphatic  Lymph System Disorders  Lymph (WDL): All lymph elements are within defined limits  Pain  Pain Score: Severe Pain (8)  AUA Assessment                                                              Accompanied by  Accompanied By: family    ECOG Status: 2    Imaging: Reviewed    Pathology: Reviewed    Objective:      PHYSICAL EXAMINATION:    BP (!) 176/86 (BP Location: Right arm, Patient Position: Sitting)   Pulse 86   Resp 20   SpO2 98%     Gen: Alert, in NAD  Eyes: PERRL, EOMI, sclera anicteric  HENT     Head: NC/AT     Ears: No external auricular lesions     Nose/sinus: No rhinorrhea or epistaxis     Oropharynx: MMM, no visible oral lesions  Neck: Supple, full ROM, no LAD  Pulm: No wheezing, stridor or respiratory distress  CV: Well-perfused, no cyanosis, no pedal edema  Back: No step-offs or pain to palpation along the thoracolumbar spine  Rectal: Deferred  : Deferred  Musculoskeletal: Normal muscle bulk and tone  Skin: Normal color and turgor  Neurologic: A/Ox3, CN II-XII intact, normal gait and station  Psychiatric: Appropriate mood and affect    Intent of Therapy: Palliative  Side effects that may occur  during or within weeks after Radiation Therapy    Fatigue and general weakness  Headache and scalp irritation  Loss of hair  Nausea, vomiting, and decrease in appetite  Darkening, irritation, itchiness, redness, dryness, peeling, thickening, scabbing, and ulceration of the scalp, neck and forehead    Side effects that may occur months or years after Radiation Therapy    Development of another tumor or cancer         Dizziness and balance problems  Decrease in hormone production  Brain inflammation or necrosis that may cause various neurologic symptoms  Seizures  Decrease in memory and thinking abilities  Decrease in hearing and feeling of ear congestion  Eye dryness and irritation  Loss of vision  Cataracts in the eyes  Poor healing after a trauma or surgery in the irradiated area  Facial numbness, pain and weakness    The risks, benefits and alternatives to radiation therapy were outlined with the patient. All questions were answered and a consent was signed.     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      Assessment & Plan:     I have personally reviewed his upcoming medical record today.  I have also reviewed his most recent radiology study including MRI brain.  The possible treatment options for brain metastasis including surgery, systemic therapy, and the radiation therapy has been discussed with the patient in detail and at great lengths.  The possible risks and side effects of radiation to patient's satisfaction.  I agree the patient is a good candidate and indicated for a whole brain radiation therapy for local control, symptom relief and possible a better survival.  The radiation therapy is offered to the patient and he is willing to proceed being aware of potential risks and side effects involved.  Patient is scheduled to return to radiation oncology later on today for simulation.  I plan to give radiation therapy to a  "total dose of 3000 cGy in 10 treatments targeted the whole brain.  He is radiation therapy is planned to start today.    Again, thank you very much for the referral and allowing me to participate in the care of this patient.  If you have any questions or concerns about this consultation, please do not hesitate to call.  I spent approximately 45 minutes today with the patient and 80% time was used for counseling.      Sincerely,          Yulia Valdez MD, PhD  Department of Radiation Oncology   [facility] Radiation Oncology  Tel: [phone]  Cell: [phone]    [facility]  [address] [place] [address]     [facility]   [address] [place] [address]    CC:  Patient Care Team:  Dillon Goldsmith MD as PCP - General (Internal Medicine)  Anu Wilson MD as Assigned Surgical Provider  Dillon Goldsmith MD as Assigned PCP  Delvin Hou DO as Pulmonologist (Pulmonary Disease)  Dino Madera MD (Internal Medicine)  Gisel Viveros RN as Specialty Care Coordinator (Hematology & Oncology)  Delvin Hou DO as Assigned Pulmonology Provider  Edie Carlin APRN CNP as Assigned Cancer Care Provider  Gene Cavazos PA-C as Assigned Heart and Vascular Provider  Miguelito Troy RPH as Pharmacist (Pharmacist)        Oncology Rooming Note    January 27, 2025 8:42 AM   Jonas Hyman is a 79 year old male who presents for:    Chief Complaint   Patient presents with     Oncology Clinic Visit     Radiation Therapy     New consult     Initial Vitals: BP (!) 176/86 (BP Location: Right arm, Patient Position: Sitting)   Pulse 86   Resp 20   SpO2 98%  Estimated body mass index is 36.14 kg/m  as calculated from the following:    Height as of 1/22/25: 1.753 m (5' 9\").    Weight as of 1/22/25: 111 kg (244 lb 11.2 oz). There is no height or weight on file to calculate BSA.  Severe Pain (8) Comment: Data Unavailable   No LMP for male patient.  Allergies reviewed: " Yes  Medications reviewed: Yes    Medications: Medication refills not needed today.  Pharmacy name entered into [facility]: [id number] - [place] [address] [address]    Frailty Screening:   Is the patient here for a new oncology consult visit in cancer care? 2. No      Clinical concerns: New consult for brain mets, accompanied by son. Having difficulty with balance, blurred vision and difficulty sleeping d/t pain in left groin  Dr. Valdez was notified.    Considerations for radiation treatment   Pregnancy status: Male   Implanted Cardiac Devices: No   Any previous radiation therapy: No    Radiation Therapy Patient Education    Person involved with teaching: Patient    Patient educational needs for self management of treatment-related side effects assessment completed.  [facility] Patient Ed tab contains Patient Learning Assessment    Education Materials Given  Managing Side Effects of Radiation Therapy: Care Instructions, Learning About External Beam Radiation Treatment, Dealing With Being Tired From Cancer Treatment: Care Instructions, Radiation Treatment For Cancer, Radiation Therapy to the Brain Guidelines, Oncology Supportive Care Services , Welcome Letter, Insurance PA Information, and Radiation Therapy and You    Educational Topics Discussed  Side effects expected, Pain management, Skin care, Activity, Nutrition and weight loss, and When to call MD/RN    Response To Teaching  More review necessary and Verbalizes understanding    GYN Only  Vaginal Dilator-given and educated: N/A    Referrals sent: None    Chemotherapy?  Yes: in past          Yanni Conroy, MARLENE                Again, thank you for allowing me to participate in the care of your patient.        Sincerely,        Yulia Valdez MD    Electronically signed

## 2025-01-27 NOTE — PROGRESS NOTES
[facility] Radiation Oncology Consult Note     Patient: Jonas Hyman  MRN: [id number]  Date of Service: 01/27/2025          Dino Madera MD  [address] [place] [address]       Dear Dr. Madera:    Thank you very much for referring this patient for consideration of radiotherapy. As you know Mr. 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  The patient is referred to radiation oncology for evaluation and consideration of possible whole brain radiation therapy.    HISTORY OF PRESENT ILLNESS:   Mr. Hyman is a 79 year old male who is a retired  and was recently found to have evidence of metastatic small cell lung cancer.  He was being evaluated at [facility] by his vascular surgeons and had a CT of the body done on April 14, 2024.  This showed a right lower lobe mass with evidence of right hilar and mediastinal adenopathy.  He was referred to pulmonary.  They plan for an endobronchial ultrasound and PET scan.  The EBUS was done prior to the PET scan.  He had biopsies taken over the following hira stations: 4R, 4L, 7.  Pathology showed poorly differentiated small cell lung cancer in stations 4L and 4R.  Staging PET scan on 8/30/2024 showed evidence of multiple systemic metastases. Brain MRI attempted but was canceled due to a stent and the fear that is may be incompatible with MRI.  Patient proceed with palliative chemo with carboplatin, etoposide and Atezolizumab.  Patient presented with a new symptoms of confusion, dizziness, blurry vision: unclear etiology.  MRI brain was obtained on 1/24/2025 this unfortunately reviewed innumerable solid and peripherally enhancing lesions  throughout the bilateral cerebral, cerebellar hemispheres, and brainstem concerning for metastatic disease in the setting of known small cell lung cancer. Some of the lesions have associated microhemorrhage. No significant  mass effect or midline shift.  Patient was given Decadron 4 mg 3 times daily and is referred to radiation oncology for evaluation and consideration of possible palliative whole brain radiation therapy.    CHEMOTHERAPY HISTORY: Concurrent Chemotherapy: No    RADIATION THERAPY HISTORY: Prior Radiation: No    IMPLANTED CARDIAC DEVICE: none     Current Outpatient Medications   Medication Sig Dispense Refill    acetaminophen (TYLENOL) 500 MG tablet Take 1,000-1,500 mg by mouth 2 times daily.      amLODIPine (NORVASC) 5 MG tablet Take 1 tablet (5 mg) by mouth daily. 30 tablet 0    apixaban ANTICOAGULANT (ELIQUIS ANTICOAGULANT) 5 MG tablet Take 1 tablet (5 mg) by mouth 2 times daily. 180 tablet 3    aspirin (ASA) 81 MG tablet Take 81 mg by mouth daily      busPIRone (BUSPAR) 10 MG tablet Take 1 tablet (10 mg) by mouth 3 times daily as needed for anxiety. 90 tablet 1    carvedilol (COREG) 6.25 MG tablet Take 1 tablet (6.25 mg) by mouth 2 times daily (with meals). 180 tablet 3    colchicine (COLCRYS) 0.6 MG tablet Take 1 tablet (0.6 mg) by mouth daily. 90 tablet 0    cyanocobalamin (VITAMIN B-12) 100 MCG tablet Take 2,000 mcg by mouth daily      dexAMETHasone (DECADRON) 4 MG tablet Take 1 tablet (4 mg) by mouth 3 times daily. 90 tablet 0    furosemide (LASIX) 40 MG tablet Take 1 tablet (40 mg) by mouth daily as needed (Leg swelling) 30 tablet 3    gabapentin (NEURONTIN) 100 MG capsule Take 1 capsule (100 mg) by mouth at bedtime. 60 capsule 3    hydrOXYzine HCl (ATARAX) 25 MG tablet Take 1 tablet (25 mg) by mouth 3 times daily as needed for itching. 40 tablet 2    magnesium oxide (MAG-OX) 400 MG tablet Take 1 tablet (400 mg) by mouth daily      nitroGLYcerin (NITROSTAT) 0.4 MG sublingual tablet Place 1 tablet (0.4 mg) under the tongue every 5 minutes as needed for chest pain 30 tablet 5    ondansetron (ZOFRAN) 8 MG tablet Take 1 tablet (8 mg) by mouth every 6 hours as needed for nausea (vomiting). Do not start before  September 9, 2024. 30 tablet 2    pravastatin (PRAVACHOL) 40 MG tablet Take 1 tablet (40 mg) by mouth daily 90 tablet 3    Pyridoxine HCl (VITAMIN B-6 PO) Take 1 tablet by mouth daily.      rOPINIRole (REQUIP) 2 MG tablet Take 1 tablet (2 mg) by mouth at bedtime 90 tablet 3    VITAMIN D, CHOLECALCIFEROL, PO Take 2,000 Units by mouth daily       Past Medical History:   Diagnosis Date    Acute MI (H) 2009    Bronchitis     Cardiac arrest (H) 2009    Cardiac arrest (H)     Chemical dependency (H)     Has been in recovery 49 years    Coronary artery disease     Diabetes mellitus (H)     type II    Diabetes mellitus type 2, noninsulin dependent (H)     Diverticula of colon     Diverticulosis of large intestine     Hemorrhoids     Hemorrhoids     History of alcohol dependence (H) 01/12/1975    Created by [employer] [facility] Annotation: May 29 2009  5:52PM - Dillon Goldsmith: dry since  1975, also used drugs and marijuana  Replacement Utility updated for latest IMO load    Hypertension     Hypertension     Macular degeneration of right eye     Mixed hyperlipidemia     Myocardial infarction (H)     Retinal detachment 06/23/2014    Vitrectomy Posterior 23 guage, scleral buckle, membrane peel, endolaser gase    Stage 3b chronic kidney disease (H) 11/30/2020    Stented coronary artery 2009    stints times 2    Vitamin B12 deficiency     Vitamin D deficiency      Past Surgical History:   Procedure Laterality Date    BRONCHOSCOPY RIDID OR FLEXIBLE W/ENDOBRONCHIAL ULTRASOUND GUIDED 1 OR 2 NODE STATIONS N/A 8/26/2024    Procedure: BRONCHOSCOPY, WITH BIOPSY OF 1 OR 2 LYMPH NODE STATIONS WITH ENDOBRONCHIAL ULTRASOUND GUIDANCE;  Surgeon: Bernarda Morrison MD;  Location:  GI    CATARACT IOL, RT/LT Bilateral     CV CORONARY ANGIOGRAM N/A 1/6/2025    Procedure: Coronary Angiogram;  Surgeon: Vinicio Aparicio MD;  Location: [facility] CATH LAB CV    CV LEFT HEART CATH N/A 1/6/2025    Procedure: Left Heart Catheterization;   Surgeon: Vinicio Aparicio MD;  Location: [facility] CATH LAB CV    EYE SURGERY      FRACTURE SURGERY      HC REMOVE TONSILS/ADENOIDS,<11 Y/O      Description: Tonsillectomy With Adenoidectomy;  Recorded: 05/29/2009;    HEART CATH STENT COR W/WO PTCA  05/07/2009    Proximal Left Anterior Descending Artery, Proximal Circumflex      HERNIA REPAIR      INGUINAL HERNIA REPAIR      IR CHEST PORT PLACEMENT > 5 YRS OF AGE  9/5/2024    LASER YAG IRIDOTOMY  9/6/2013    Procedure: LASER YAG IRIDOTOMY;  peripheral irridotomy ;  Surgeon: Doroteo Andres MD;  Location: [facility]    OTHER SURGICAL HISTORY      Cath Stent 1 Proximal Left Anterior Descending Artery     PHACOEMULSIFICATION CLEAR CORNEA WITH STANDARD INTRAOCULAR LENS IMPLANT  9/5/2013    Procedure: PHACOEMULSIFICATION CLEAR CORNEA WITH STANDARD INTRAOCULAR LENS IMPLANT;   COMPLEX RIGHT PHACOEMULSIFICATION CLEAR CORNEA WITH ANTERIOR CHAMBER INTRAOCULAR LENS IMPLANT, ANTERIOR VITRECTOMY;  Surgeon: Doroteo Andres MD;  Location: [facility]    PHACOEMULSIFICATION WITH STANDARD INTRAOCULAR LENS IMPLANT Left 12/3/2018    Procedure: Left Eye Phacoemulsification with Intraocular Lens;  Surgeon: Suhail Johnson MD;  [facility] OR    RETINAL DETACHMENT SURGERY      TONSILLECTOMY  5/24/200/9    VASECTOMY      VITRECTOMY ANTERIOR  9/5/2013    Procedure: VITRECTOMY ANTERIOR;;  Surgeon: Doroteo Andres MD;  Location: [facility]    ZZC MUSCLE-SKIN FLAP,LEG       No Known Allergies  Family History   Problem Relation Age of Onset    Obesity Father     Glaucoma No family hx of     Macular Degeneration No family hx of     Retinal detachment No family hx of     Kidney Cancer Father     Heart Disease Brother      Social History     Socioeconomic History    Marital status:      Spouse name: Not on file    Number of children: Not on file    Years of education: Not on file    Highest education level: Not on file   Occupational History    Not on file   Tobacco Use    Smoking status:  Former     Current packs/day: 0.00     Average packs/day: 1 pack/day for 50.6 years (50.6 ttl pk-yrs)     Types: Cigarettes     Start date: 1956     Quit date: 2024     Years since quittin.0     Passive exposure: Past (son smoked smoking in home)    Smokeless tobacco: Never    Tobacco comments:     20 yrs ago-mid 80's, quit again , started again , quit 2024   Vaping Use    Vaping status: Never Used   Substance and Sexual Activity    Alcohol use: No     Comment: from 1975    Drug use: No    Sexual activity: Not on file   Other Topics Concern    Parent/sibling w/ CABG, MI or angioplasty before 65F 55M? Not Asked   Social History Narrative    2 children,         Quit drinking alcohol         Son has diabetes and lives in basement        Lives with grandchild who is 11, born      Social Drivers of Health     Financial Resource Strain: Low Risk  (2025)    Financial Resource Strain     Within the past 12 months, have you or your family members you live with been unable to get utilities (heat, electricity) when it was really needed?: No   Recent Concern: Financial Resource Strain - High Risk (2025)    Financial Resource Strain     Within the past 12 months, have you or your family members you live with been unable to get utilities (heat, electricity) when it was really needed?: Yes   Food Insecurity: Low Risk  (2025)    Food Insecurity     Within the past 12 months, did you worry that your food would run out before you got money to buy more?: No     Within the past 12 months, did the food you bought just not last and you didn t have money to get more?: No   Transportation Needs: Low Risk  (2025)    Transportation Needs     Within the past 12 months, has lack of transportation kept you from medical appointments, getting your medicines, non-medical meetings or appointments, work, or from getting things that you need?: No   Physical Activity: Not on file    Stress: Not on file   Social Connections: Not on file   Interpersonal Safety: Low Risk  (1/5/2025)    Interpersonal Safety     Do you feel physically and emotionally safe where you currently live?: Yes     Within the past 12 months, have you been hit, slapped, kicked or otherwise physically hurt by someone?: No     Within the past 12 months, have you been humiliated or emotionally abused in other ways by your partner or ex-partner?: No   Housing Stability: Low Risk  (1/5/2025)    Housing Stability     Do you have housing? : Yes     Are you worried about losing your housing?: No        REVIEW OF SYMPTOMS:  A full 14-point review of systems was performed. Pertinent findings are noted in the HPI.    General  Constitutional  Constitutional (WDL): Exceptions to WDL  Fatigue: Fatigue relieved by rest  EENT  Eye Disorders  Eye Disorder (WDL): Exceptions to WDL  Blurred Vision: Symptomatic OR moderate decrease in visual acuity (best corrected visual acuity 20/40 and better or 3 lines or less decreased vision from known baseline) OR limiting instrumental ADL  Ear Disorders  Ear Disorder (WDL): Assessment not pertinent to visit ([place])  Respiratory  Respiratory  Respiratory (WDL): Exceptions to WDL  Cough: Mild symptoms OR nonprescription intervention indicated  Dyspnea: Shortness of breath with minimal exertion OR limiting instrumental ADL  Cardiovascular  Cardiovascular  Cardiovascular (WDL): Exceptions to WDL (no pacemaker)  Edema: Yes  Gastrointestinal  Gastrointestinal  Gastrointestinal (WDL): All gastrointestinal elements are within defined limits  Musculoskeletal  Musculoskeletal and Connective Tissue Disorders  Musculoskeletal & Connective (WDL): Exceptions to WDL  Arthralgia: Mild pain  Bone Pain: Moderate pain OR limiting instrumental ADL  Generalized Muscle Weakness: Symptomatic OR evident on physical exam OR limiting instrumental ADL  Integumentary  Integumentary  Integumentary (WDL): All integumentary elements  are within defined limits  Neurological  Neurosensory  Neurosensory (WDL): Exceptions to WDL  Ataxia: Asymptomatic OR clinical or diagnostic observations only OR intervention not indicated  Peripheral Sensory Neuropathy: Moderate symptoms OR limiting instrumental ADL (using 2 canes)  Confusion: Moderate disorientation OR limiting instrumental ADL  Genitourinary/Reproductive  Genitourinary  Genitourinary (WDL): All genitourinary elements are within defined limits  Lymphatic  Lymph System Disorders  Lymph (WDL): All lymph elements are within defined limits  Pain  Pain Score: Severe Pain (8)  AUA Assessment                                                              Accompanied by  Accompanied By: family    ECOG Status: 2    Imaging: Reviewed    Pathology: Reviewed    Objective:      PHYSICAL EXAMINATION:    BP (!) 176/86 (BP Location: Right arm, Patient Position: Sitting)   Pulse 86   Resp 20   SpO2 98%     Gen: Alert, in NAD  Eyes: PERRL, EOMI, sclera anicteric  HENT     Head: NC/AT     Ears: No external auricular lesions     Nose/sinus: No rhinorrhea or epistaxis     Oropharynx: MMM, no visible oral lesions  Neck: Supple, full ROM, no LAD  Pulm: No wheezing, stridor or respiratory distress  CV: Well-perfused, no cyanosis, no pedal edema  Back: No step-offs or pain to palpation along the thoracolumbar spine  Rectal: Deferred  : Deferred  Musculoskeletal: Normal muscle bulk and tone  Skin: Normal color and turgor  Neurologic: A/Ox3, CN II-XII intact, normal gait and station  Psychiatric: Appropriate mood and affect    Intent of Therapy: Palliative  Side effects that may occur during or within weeks after Radiation Therapy    Fatigue and general weakness  Headache and scalp irritation  Loss of hair  Nausea, vomiting, and decrease in appetite  Darkening, irritation, itchiness, redness, dryness, peeling, thickening, scabbing, and ulceration of the scalp, neck and forehead    Side effects that may occur months or  years after Radiation Therapy    Development of another tumor or cancer         Dizziness and balance problems  Decrease in hormone production  Brain inflammation or necrosis that may cause various neurologic symptoms  Seizures  Decrease in memory and thinking abilities  Decrease in hearing and feeling of ear congestion  Eye dryness and irritation  Loss of vision  Cataracts in the eyes  Poor healing after a trauma or surgery in the irradiated area  Facial numbness, pain and weakness    The risks, benefits and alternatives to radiation therapy were outlined with the patient. All questions were answered and a consent was signed.     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      Assessment & Plan:     I have personally reviewed his upcoming medical record today.  I have also reviewed his most recent radiology study including MRI brain.  The possible treatment options for brain metastasis including surgery, systemic therapy, and the radiation therapy has been discussed with the patient in detail and at great lengths.  The possible risks and side effects of radiation to patient's satisfaction.  I agree the patient is a good candidate and indicated for a whole brain radiation therapy for local control, symptom relief and possible a better survival.  The radiation therapy is offered to the patient and he is willing to proceed being aware of potential risks and side effects involved.  Patient is scheduled to return to radiation oncology later on today for simulation.  I plan to give radiation therapy to a total dose of 3000 cGy in 10 treatments targeted the whole brain.  He is radiation therapy is planned to start today.    Again, thank you very much for the referral and allowing me to participate in the care of this patient.  If you have any questions or concerns about this consultation, please do not hesitate to call.  I spent approximately  45 minutes today with the patient and 80% time was used for counseling.      Sincerely,          Yulia Valdez MD, PhD  Department of Radiation Oncology   [facility] Radiation Oncology  Tel: [phone]  Cell: [phone]    [facility]  [address] [place] [address]     [facility]   [address] [place] [address]    CC:  Patient Care Team:  Dillon Goldsmith MD as PCP - General (Internal Medicine)  Anu Wilson MD as Assigned Surgical Provider  Dillon Goldsmith MD as Assigned PCP  Devlin Hou DO as Pulmonologist (Pulmonary Disease)  Dino Madera MD (Internal Medicine)  Gisel Viveros RN as Specialty Care Coordinator (Hematology & Oncology)  Delvin Hou DO as Assigned Pulmonology Provider  Edie Carlin APRN CNP as Assigned Cancer Care Provider  Gene Cavazos PA-C as Assigned Heart and Vascular Provider  Miguelito Troy RPH as Pharmacist (Pharmacist)

## 2025-01-27 NOTE — PROCEDURES
SIMULATION NOTE:    DIAGNOSIS:  79 year old male with a diagnosis of extensive stage small cell lung cancer with multiple brain metastases.      INDICATION:  Palliative radiation therapy is recommended for patient for local control and symptom relief.      CONSENT:  The possible risks and side effects of radiation therapy have been discussed with the patient in detail and at great length.  Questions were answered to patient's satisfaction.  Written consent was obtained.      SIMULATION:  The patient is in the supine position with a headrest, facemask and under knee cushion to help keep same position during daily radiation therapy.  Tentative isocenter is set up in the center of the head region.  We will acquire CT information to help us better locate the target and design the radiation therapy field.      BLOCKS:  Custom blocks will be drawn to minimize radiation to normal tissues and to protect normal organs, including, but not limited to, eyes, cranial nerves, spinal cord, bone and soft tissue.      DOSAGE:  I plan to give him radiation therapy at 300 cGy each fraction to a total of 3000 cGy in 10 treatments targeted to the whole brain region only.    Yulia Valdez MD, PhD  Department of Radiation Oncology   [facility] Radiation Oncology  Cell: [phone]    [facility]  [address] [place] [address]     [place] [id number] [facility] [place] [address]

## 2025-01-27 NOTE — PROCEDURES
Clinical Treatment Planning Note    This is a 79-year-old male with diagnosis of Stage IV small cell lung cancer with clinically symptomatic multiple brain metastases.  The palliative radiation therapy is recommended to the patient. The patient is simulated today in the supine position with head rest, facemask and under knee cushion to help keep the same position during the daily radiation therapy. 2-3 field will be used to set up radiation therapy fields to include the whole brain with margin. I plan to give him radiation therapy at 300 cGy each fraction to a total of 3000 cGy in 10 treatments.        Yulia Valdez MD, PhD  Department of Radiation Oncology   [facility] Radiation Oncology  Cell: [phone]    [facility]  [address] [place] [address]     [address] [id number] [facility] [place] [address]

## 2025-01-27 NOTE — PROGRESS NOTES
"Oncology Rooming Note    January 27, 2025 8:42 AM   Jonas Hyman is a 79 year old male who presents for:    Chief Complaint   Patient presents with    Oncology Clinic Visit    Radiation Therapy     New consult     Initial Vitals: BP (!) 176/86 (BP Location: Right arm, Patient Position: Sitting)   Pulse 86   Resp 20   SpO2 98%  Estimated body mass index is 36.14 kg/m  as calculated from the following:    Height as of 1/22/25: 1.753 m (5' 9\").    Weight as of 1/22/25: 111 kg (244 lb 11.2 oz). There is no height or weight on file to calculate BSA.  Severe Pain (8) Comment: Data Unavailable   No LMP for male patient.  Allergies reviewed: Yes  Medications reviewed: Yes    Medications: Medication refills not needed today.  Pharmacy name entered into [facility]: [id number] [address]     Frailty Screening:   Is the patient here for a new oncology consult visit in cancer care? 2. No      Clinical concerns: New consult for brain mets, accompanied by son. Having difficulty with balance, blurred vision and difficulty sleeping d/t pain in left groin  Dr. Valdez was notified.    Considerations for radiation treatment   Pregnancy status: Male   Implanted Cardiac Devices: No   Any previous radiation therapy: No    Radiation Therapy Patient Education    Person involved with teaching: Patient    Patient educational needs for self management of treatment-related side effects assessment completed.  [facility] Patient Ed tab contains Patient Learning Assessment    Education Materials Given  Managing Side Effects of Radiation Therapy: Care Instructions, Learning About External Beam Radiation Treatment, Dealing With Being Tired From Cancer Treatment: Care Instructions, Radiation Treatment For Cancer, Radiation Therapy to the Brain Guidelines, Oncology Supportive Care Services , Welcome Letter, Insurance PA Information, and Radiation Therapy and You    Educational Topics Discussed  Side effects expected, " Pain management, Skin care, Activity, Nutrition and weight loss, and When to call MD/RN    Response To Teaching  More review necessary and Verbalizes understanding    GYN Only  Vaginal Dilator-given and educated: N/A    Referrals sent: None    Chemotherapy?  Yes: in past          Yanni Conroy RN

## 2025-01-28 ENCOUNTER — APPOINTMENT (OUTPATIENT)
Dept: RADIATION ONCOLOGY | Facility: HOSPITAL | Age: 80
End: 2025-01-28
Attending: RADIOLOGY
Payer: MEDICARE

## 2025-01-28 PROCEDURE — 77412 RADIATION TX DELIVERY LVL 3: CPT | Performed by: RADIOLOGY

## 2025-01-28 PROCEDURE — 77387 GUIDANCE FOR RADJ TX DLVR: CPT | Performed by: RADIOLOGY

## 2025-01-28 PROCEDURE — 77387 GUIDANCE FOR RADJ TX DLVR: CPT | Mod: 26 | Performed by: RADIOLOGY

## 2025-01-29 ENCOUNTER — APPOINTMENT (OUTPATIENT)
Dept: RADIATION ONCOLOGY | Facility: HOSPITAL | Age: 80
End: 2025-01-29
Attending: RADIOLOGY
Payer: MEDICARE

## 2025-01-29 PROCEDURE — 77412 RADIATION TX DELIVERY LVL 3: CPT | Performed by: RADIOLOGY

## 2025-01-29 PROCEDURE — 77387 GUIDANCE FOR RADJ TX DLVR: CPT | Performed by: RADIOLOGY

## 2025-01-29 PROCEDURE — 77387 GUIDANCE FOR RADJ TX DLVR: CPT | Mod: 26 | Performed by: RADIOLOGY

## 2025-01-30 ENCOUNTER — APPOINTMENT (OUTPATIENT)
Dept: RADIATION ONCOLOGY | Facility: HOSPITAL | Age: 80
End: 2025-01-30
Attending: RADIOLOGY
Payer: MEDICARE

## 2025-01-30 PROCEDURE — 77387 GUIDANCE FOR RADJ TX DLVR: CPT | Performed by: RADIOLOGY

## 2025-01-30 PROCEDURE — 77387 GUIDANCE FOR RADJ TX DLVR: CPT | Mod: 26 | Performed by: RADIOLOGY

## 2025-01-30 PROCEDURE — 77412 RADIATION TX DELIVERY LVL 3: CPT | Performed by: RADIOLOGY

## 2025-01-31 ENCOUNTER — APPOINTMENT (OUTPATIENT)
Dept: RADIATION ONCOLOGY | Facility: HOSPITAL | Age: 80
End: 2025-01-31
Attending: RADIOLOGY
Payer: MEDICARE

## 2025-01-31 PROCEDURE — 77387 GUIDANCE FOR RADJ TX DLVR: CPT | Performed by: RADIOLOGY

## 2025-01-31 PROCEDURE — 77427 RADIATION TX MANAGEMENT X5: CPT | Performed by: RADIOLOGY

## 2025-01-31 PROCEDURE — 77387 GUIDANCE FOR RADJ TX DLVR: CPT | Mod: 26 | Performed by: RADIOLOGY

## 2025-01-31 PROCEDURE — 77412 RADIATION TX DELIVERY LVL 3: CPT | Performed by: RADIOLOGY

## 2025-01-31 PROCEDURE — 77336 RADIATION PHYSICS CONSULT: CPT | Performed by: RADIOLOGY

## 2025-02-05 ENCOUNTER — MYC MEDICAL ADVICE (OUTPATIENT)
Facility: CLINIC | Age: 80
End: 2025-02-05
Payer: MEDICARE

## 2025-02-06 DIAGNOSIS — I10 HYPERTENSION, UNSPECIFIED TYPE: ICD-10-CM

## 2025-02-06 RX ORDER — AMLODIPINE BESYLATE 5 MG/1
5 TABLET ORAL DAILY
Qty: 90 TABLET | Refills: 3 | Status: SHIPPED | OUTPATIENT
Start: 2025-02-06

## 2025-02-11 ENCOUNTER — OFFICE VISIT (OUTPATIENT)
Dept: URGENT CARE | Facility: URGENT CARE | Age: 80
End: 2025-02-11
Payer: MEDICARE

## 2025-02-11 ENCOUNTER — HOSPITAL ENCOUNTER (EMERGENCY)
Facility: HOSPITAL | Age: 80
Discharge: HOME | End: 2025-02-11
Attending: EMERGENCY MEDICINE | Admitting: EMERGENCY MEDICINE
Payer: MEDICARE

## 2025-02-11 ENCOUNTER — APPOINTMENT (OUTPATIENT)
Dept: CT IMAGING | Facility: HOSPITAL | Age: 80
End: 2025-02-11
Attending: EMERGENCY MEDICINE
Payer: MEDICARE

## 2025-02-11 ENCOUNTER — TELEPHONE (OUTPATIENT)
Dept: ONCOLOGY | Facility: HOSPITAL | Age: 80
End: 2025-02-11

## 2025-02-11 VITALS
HEART RATE: 78 BPM | DIASTOLIC BLOOD PRESSURE: 83 MMHG | TEMPERATURE: 98 F | SYSTOLIC BLOOD PRESSURE: 162 MMHG | RESPIRATION RATE: 20 BRPM | OXYGEN SATURATION: 98 %

## 2025-02-11 VITALS
TEMPERATURE: 96.9 F | HEART RATE: 54 BPM | DIASTOLIC BLOOD PRESSURE: 95 MMHG | WEIGHT: 239 LBS | RESPIRATION RATE: 14 BRPM | SYSTOLIC BLOOD PRESSURE: 195 MMHG | BODY MASS INDEX: 35.29 KG/M2 | OXYGEN SATURATION: 98 %

## 2025-02-11 DIAGNOSIS — K40.90 UNILATERAL INGUINAL HERNIA WITHOUT OBSTRUCTION OR GANGRENE, RECURRENCE NOT SPECIFIED: Primary | ICD-10-CM

## 2025-02-11 DIAGNOSIS — C79.31 MALIGNANT NEOPLASM METASTATIC TO BRAIN (H): ICD-10-CM

## 2025-02-11 DIAGNOSIS — C34.31 SMALL CELL LUNG CANCER, RIGHT LOWER LOBE (H): ICD-10-CM

## 2025-02-11 DIAGNOSIS — K40.90 LEFT INGUINAL HERNIA: ICD-10-CM

## 2025-02-11 LAB
HOLD SPECIMEN: NORMAL

## 2025-02-11 PROCEDURE — 74176 CT ABD & PELVIS W/O CONTRAST: CPT

## 2025-02-11 PROCEDURE — 96374 THER/PROPH/DIAG INJ IV PUSH: CPT

## 2025-02-11 PROCEDURE — 99214 OFFICE O/P EST MOD 30 MIN: CPT

## 2025-02-11 PROCEDURE — 99285 EMERGENCY DEPT VISIT HI MDM: CPT | Mod: 25

## 2025-02-11 PROCEDURE — 250N000011 HC RX IP 250 OP 636: Mod: JZ | Performed by: EMERGENCY MEDICINE

## 2025-02-11 PROCEDURE — 250N000013 HC RX MED GY IP 250 OP 250 PS 637: Performed by: EMERGENCY MEDICINE

## 2025-02-11 RX ORDER — HEPARIN SODIUM (PORCINE) LOCK FLUSH IV SOLN 100 UNIT/ML 100 UNIT/ML
5-10 SOLUTION INTRAVENOUS
Status: DISCONTINUED | OUTPATIENT
Start: 2025-02-11 | End: 2025-02-11 | Stop reason: HOSPADM

## 2025-02-11 RX ORDER — HEPARIN SODIUM,PORCINE 10 UNIT/ML
5-10 VIAL (ML) INTRAVENOUS
Status: DISCONTINUED | OUTPATIENT
Start: 2025-02-11 | End: 2025-02-11 | Stop reason: HOSPADM

## 2025-02-11 RX ORDER — POLYETHYLENE GLYCOL 3350 17 G/17G
1 POWDER, FOR SOLUTION ORAL 2 TIMES DAILY PRN
Qty: 527 G | Refills: 0 | Status: SHIPPED | OUTPATIENT
Start: 2025-02-11 | End: 2025-03-13

## 2025-02-11 RX ORDER — SENNA AND DOCUSATE SODIUM 50; 8.6 MG/1; MG/1
1 TABLET, FILM COATED ORAL
Qty: 30 TABLET | Refills: 0 | Status: SHIPPED | OUTPATIENT
Start: 2025-02-11

## 2025-02-11 RX ORDER — HEPARIN SODIUM,PORCINE 10 UNIT/ML
5-10 VIAL (ML) INTRAVENOUS EVERY 24 HOURS
Status: DISCONTINUED | OUTPATIENT
Start: 2025-02-11 | End: 2025-02-11 | Stop reason: HOSPADM

## 2025-02-11 RX ORDER — OXYCODONE HYDROCHLORIDE 5 MG/1
5 TABLET ORAL EVERY 4 HOURS PRN
Qty: 12 TABLET | Refills: 0 | Status: SHIPPED | OUTPATIENT
Start: 2025-02-11 | End: 2025-02-15

## 2025-02-11 RX ORDER — OXYCODONE HYDROCHLORIDE 5 MG/1
5 TABLET ORAL ONCE
Status: COMPLETED | OUTPATIENT
Start: 2025-02-11 | End: 2025-02-11

## 2025-02-11 RX ORDER — HYDROMORPHONE HYDROCHLORIDE 1 MG/ML
0.5 INJECTION, SOLUTION INTRAMUSCULAR; INTRAVENOUS; SUBCUTANEOUS ONCE
Status: COMPLETED | OUTPATIENT
Start: 2025-02-11 | End: 2025-02-11

## 2025-02-11 RX ADMIN — HYDROMORPHONE HYDROCHLORIDE 0.5 MG: 1 INJECTION, SOLUTION INTRAMUSCULAR; INTRAVENOUS; SUBCUTANEOUS at 12:48

## 2025-02-11 RX ADMIN — OXYCODONE HYDROCHLORIDE 5 MG: 5 TABLET ORAL at 14:19

## 2025-02-11 ASSESSMENT — ACTIVITIES OF DAILY LIVING (ADL)
ADLS_ACUITY_SCORE: 50
ADLS_ACUITY_SCORE: 50

## 2025-02-11 NOTE — ED TRIAGE NOTES
The pt presents for L sided groin pain after straining to have a BM last night. He has a hx of stage IV lung cancer and is currently undergoing radiation. He has been constipation and straining severely and felt a bulge in the groin last night. He went to the walk-in clinic today and they sent him here due to his severe pain.

## 2025-02-11 NOTE — TELEPHONE ENCOUNTER
Patient is in the ED currently for groin pain, possible hernia. Patient's son Marlo leaves a message on triage voicemail. Patient has not seen a provider yet but it was mentioned that he may need a CT. Marlo is wondering if staff can coordinate with ED to have the CT chest/abdomen/pelvis done that is currently scheduled on 2/19. He does not want to see patient get two CT's done if not necessary. Also, Marlo is wondering about patient being seen sooner with Dr. Arevalo and possible hospice referral.    Message will be sent to care coordinator to follow up with patient and son.    Lia Garcia RN

## 2025-02-11 NOTE — DISCHARGE INSTRUCTIONS
Tylenol 1000 mg 4 times daily as needed for pain  Oxycodone as needed for breakthrough pain as prescribed.  This can cause some constipation.  Bowel regimen as prescribed as needed

## 2025-02-11 NOTE — ED NOTES
Expected Patient Referral to ED  11:26 AM    Referring Clinic/Provider:  [facility]    Reason for referral/Clinical facts:  Left inguinal hernia  after straining on toilet this morning.  Does have complex past medical history including metastatic cancer.  In a fair amount of discomfort at walk-in care, did not tolerate much pressure without analgesics.    Recommendations provided:  Send to ED for further evaluation        Aiden Aranda MD  [facility] EMERGENCY DEPARTMENT  [address] [place] [id number] [phone]       Aiden Aranda MD  02/11/25 [id number]

## 2025-02-11 NOTE — ED PROVIDER NOTES
EMERGENCY DEPARTMENT ENCOUNTER      NAME: Jonas Hyman  AGE: 79 year old male  YOB: 1945  MRN: [id number]  EVALUATION DATE & TIME: No admission date for patient encounter.    PCP: Dillon Goldsmith    ED PROVIDER: Dina Oliva MD    Chief Complaint   Patient presents with    Groin Pain                FINAL IMPRESSION:  1. Left inguinal hernia          ED COURSE & MEDICAL DECISION MAKIN:31 PM I met with the patient, obtained history, performed an initial exam, and discussed options and plan for diagnostics and treatment here in the ED.  2:08 PM Rechecked and updated the patient on his lab and imaging results. Discussed plan for discharge to home.    Pertinent Labs & Imaging studies reviewed. 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  Seen at urgent care prior to arrival and had concern for incarcerated hernia that they did not feel like they could reduce due to pain control.  Patient has an obese abdomen, has not had any nausea, vomiting.  Overall favor left inguinal hernia, incarcerated hernia, strangulated hernia, and less likely bowel obstruction based on his symptomatology.    Patient initially seen evaluate myself in triage area due to boarding crisis.  IV established, given Dilaudid for pain, on repeat examination I feel a palpable hernia defect in the left inguinal region but no definitive loop of bowel for incarcerated or strangulated hernia.  Because of his ongoing pain we will proceed with CT imaging to make sure that this is truly reduced.  CT abdomen pelvis without contrast (history of kidney insufficiency) sized fat-containing inguinal hernia that extends into the scrotum.  No loop of bowel or incarcerated hernia.  Given dose of oxycodone, small  prescription for oxycodone as well as bowel regimen for home.  Family would like referral for general surgery though I am not convinced he is the greatest operative candidate with metastatic lung cancer, but they can follow-up with surgery as desired, referral is provided      ED Course as of 02/11/25 1455   Tue Feb 11, 2025   1341 CT Abdomen Pelvis w/o Contrast  CT independently interpreted by myself without visualized incarcerated hernia       Medical Decision Making  Obtained supplemental history:Supplemental history obtained?: Family Member/Significant Other  Reviewed external records: External records reviewed?: Outpatient Record: Urgent care visit today  Care impacted by chronic illness:Cancer/Chemotherapy, Hyperlipidemia, and Hypertension  Did you consider but not order tests?: Work up considered but not performed and documented in chart, if applicable  Did you interpret images independently?: Independent interpretation of ECG and images noted in documentation, when applicable.  Consultation discussion with other provider:Did you involve another provider (consultant, , pharmacy, etc.)?: No  Discharge. I prescribed additional prescription strength medication(s) as charted. See documentation for any additional details.    MIPS: Not Applicable      At the conclusion of the encounter I discussed the results of all of the tests and the disposition. The questions were answered. The patient or family acknowledged understanding and was agreeable with the care plan.      MEDICATIONS GIVEN IN THE EMERGENCY:  Medications   sodium chloride (PF) 0.9% PF flush 10-20 mL (has no administration in time range)   sodium chloride (PF) 0.9% PF flush 10-20 mL (has no administration in time range)   sodium chloride (PF) 0.9% PF flush 10-20 mL (has no administration in time range)   heparin lock flush 10 unit/mL injection 5-10 mL (has no administration in time range)   heparin lock flush 10 unit/mL injection 5-10 mL (has no  administration in time range)   heparin lock flush 100 unit/mL injection 5-10 mL (has no administration in time range)   HYDROmorphone (PF) (DILAUDID) injection 0.5 mg (0.5 mg Intravenous $Given 2/11/25 1241)   oxyCODONE (ROXICODONE) tablet 5 mg (5 mg Oral $Given 2/11/25 1411)       NEW PRESCRIPTIONS STARTED AT TODAY'S ER VISIT  Discharge Medication List as of 2/11/2025  2:14 PM        START taking these medications    Details   oxyCODONE (ROXICODONE) 5 MG tablet Take 1 tablet (5 mg) by mouth every 4 hours as needed. If pain is not improved with acetaminophen and ibuprofen., Disp-12 tablet, R-0, Local Print      polyethylene glycol (MIRALAX) 17 GM/Dose powder Take 17 g (1 Capful) by mouth 2 times daily as needed for constipation., Disp-527 g, R-0, Local Print      SENNA-docusate sodium (SENNA S) 8.6-50 MG tablet Take 1 tablet by mouth nightly as needed., Disp-30 tablet, R-0, Local Print                =================================================================    HPI    Patient information was obtained from: Patient, his son    Use of Intrepreter: N/A       Jonas Hyman is a 79 year old male with pertinent medical history of AAA s/p repair of AAA, TAAA, atherosclerosis of native coronary artery s/p coronary artery stent placement, chronic coronary artery disease, HTN, T2DM, ventricular fibrillation history, malignant neoplasm metastatic to brain, and small cell lung cancer, who presents with groin and abdominal pain.    Patient reports he was sitting in the toilet and attempting to have a bowel movement this morning and felt a sharp pain in his left-sided groin region afterwards. He denies experiencing pain like this before in the past. He rates this groin pain a 10/10. The patient has a left inguinal hernia after straining while trying to have a BM on the toilet this morning. He presented to the  and the provider there attempted to reduce the hernia, but it was very tender. He has not had a BM since  then. Patient states that he is in a lot of pain right now. His son is concerned that the patient's cardiac stent has moved downwards because of the straining on the toilet this morning. He took 4 tablets of Tylenol yesterday and 2 tablets of Tylenol today. He denies experiencing nausea or vomiting.    He has a history of CKD at baseline and patient's son is concerned the patient would get a CT with contrast because he already has some upcoming scheduled CT scans. The patient's son is a RN. Mentions he has also has a history of stage IV lung cancer and has completed 10 days of chemotherapy.    PAST MEDICAL HISTORY:  Past Medical History:   Diagnosis Date    Acute MI (H) 2009    Bronchitis     Cardiac arrest (H) 2009    Cardiac arrest (H)     Chemical dependency (H)     Has been in recovery 49 years    Coronary artery disease     Diabetes mellitus (H)     type II    Diabetes mellitus type 2, noninsulin dependent (H)     Diverticula of colon     Diverticulosis of large intestine     Hemorrhoids     Hemorrhoids     History of alcohol dependence (H) 01/12/1975    Created by [employer] [facility] Annotation: May 29 2009  5:52PM - Dillon Goldsmith: dry since  1975, also used drugs and marijuana  Replacement Utility updated for latest IMO load    Hypertension     Hypertension     Macular degeneration of right eye     Mixed hyperlipidemia     Myocardial infarction (H)     Retinal detachment 06/23/2014    Vitrectomy Posterior 23 guage, scleral buckle, membrane peel, endolaser gase    Stage 3b chronic kidney disease (H) 11/30/2020    Stented coronary artery 2009    stints times 2    Vitamin B12 deficiency     Vitamin D deficiency        PAST SURGICAL HISTORY:  Past Surgical History:   Procedure Laterality Date    BRONCHOSCOPY RIDID OR FLEXIBLE W/ENDOBRONCHIAL ULTRASOUND GUIDED 1 OR 2 NODE STATIONS N/A 8/26/2024    Procedure: BRONCHOSCOPY, WITH BIOPSY OF 1 OR 2 LYMPH NODE STATIONS WITH ENDOBRONCHIAL ULTRASOUND GUIDANCE;   Surgeon: Bernarda Morrison MD;  Location: [facility] GI    CATARACT IOL, RT/LT Bilateral     CV CORONARY ANGIOGRAM N/A 1/6/2025    Procedure: Coronary Angiogram;  Surgeon: Vinicio Aparicio MD;  Location: [facility] CATH LAB CV    CV LEFT HEART CATH N/A 1/6/2025    Procedure: Left Heart Catheterization;  Surgeon: Vinicio Aparicio MD;  Location: [facility] CATH LAB CV    EYE SURGERY      FRACTURE SURGERY      HC REMOVE TONSILS/ADENOIDS,<11 Y/O      Description: Tonsillectomy With Adenoidectomy;  Recorded: 05/29/2009;    HEART CATH STENT COR W/WO PTCA  05/07/2009    Proximal Left Anterior Descending Artery, Proximal Circumflex      HERNIA REPAIR      INGUINAL HERNIA REPAIR      IR CHEST PORT PLACEMENT > 5 YRS OF AGE  9/5/2024    LASER YAG IRIDOTOMY  9/6/2013    Procedure: LASER YAG IRIDOTOMY;  peripheral irridotomy ;  Surgeon: Doroteo Andres MD;  Location: [facility]    OTHER SURGICAL HISTORY      Cath Stent 1 Proximal Left Anterior Descending Artery     PHACOEMULSIFICATION CLEAR CORNEA WITH STANDARD INTRAOCULAR LENS IMPLANT  9/5/2013    Procedure: PHACOEMULSIFICATION CLEAR CORNEA WITH STANDARD INTRAOCULAR LENS IMPLANT;   COMPLEX RIGHT PHACOEMULSIFICATION CLEAR CORNEA WITH ANTERIOR CHAMBER INTRAOCULAR LENS IMPLANT, ANTERIOR VITRECTOMY;  Surgeon: Doroteo Andres MD;  Location: [facility]    PHACOEMULSIFICATION WITH STANDARD INTRAOCULAR LENS IMPLANT Left 12/3/2018    Procedure: Left Eye Phacoemulsification with Intraocular Lens;  Surgeon: Suhail Johnson MD;  Location: [facility] OR    RETINAL DETACHMENT SURGERY      TONSILLECTOMY  5/24/200/9    VASECTOMY      VITRECTOMY ANTERIOR  9/5/2013    Procedure: VITRECTOMY ANTERIOR;;  Surgeon: Doroteo Andres MD;  Location: [facility]    ZZC MUSCLE-SKIN FLAP,LEG         CURRENT MEDICATIONS:    Prior to Admission Medications   Prescriptions Last Dose Informant Patient Reported? Taking?   Pyridoxine HCl (VITAMIN B-6 PO)  Self Yes No   Sig: Take 1 tablet by mouth daily.   VITAMIN D, CHOLECALCIFEROL,  PO  Self Yes No   Sig: Take 2,000 Units by mouth daily   acetaminophen (TYLENOL) 500 MG tablet  Self Yes No   Sig: Take 1,000-1,500 mg by mouth 2 times daily.   amLODIPine (NORVASC) 5 MG tablet   No No   Sig: Take 1 tablet (5 mg) by mouth daily.   apixaban ANTICOAGULANT (ELIQUIS ANTICOAGULANT) 5 MG tablet   No No   Sig: Take 1 tablet (5 mg) by mouth 2 times daily.   aspirin (ASA) 81 MG tablet  Self Yes No   Sig: Take 81 mg by mouth daily   busPIRone (BUSPAR) 10 MG tablet  Self No No   Sig: Take 1 tablet (10 mg) by mouth 3 times daily as needed for anxiety.   carvedilol (COREG) 6.25 MG tablet   No No   Sig: Take 1 tablet (6.25 mg) by mouth 2 times daily (with meals).   colchicine (COLCRYS) 0.6 MG tablet   No No   Sig: Take 1 tablet (0.6 mg) by mouth daily.   cyanocobalamin (VITAMIN B-12) 100 MCG tablet  Self Yes No   Sig: Take 2,000 mcg by mouth daily   dexAMETHasone (DECADRON) 4 MG tablet   No No   Sig: Take 1 tablet (4 mg) by mouth 3 times daily.   furosemide (LASIX) 40 MG tablet  Self No No   Sig: Take 1 tablet (40 mg) by mouth daily as needed (Leg swelling)   gabapentin (NEURONTIN) 100 MG capsule  Self No No   Sig: Take 1 capsule (100 mg) by mouth at bedtime.   hydrOXYzine HCl (ATARAX) 25 MG tablet  Self No No   Sig: Take 1 tablet (25 mg) by mouth 3 times daily as needed for itching.   magnesium oxide (MAG-OX) 400 MG tablet  Self No No   Sig: Take 1 tablet (400 mg) by mouth daily   nitroGLYcerin (NITROSTAT) 0.4 MG sublingual tablet  Self No No   Sig: Place 1 tablet (0.4 mg) under the tongue every 5 minutes as needed for chest pain   ondansetron (ZOFRAN) 8 MG tablet  Self No No   Sig: Take 1 tablet (8 mg) by mouth every 6 hours as needed for nausea (vomiting). Do not start before September 9, 2024.   pravastatin (PRAVACHOL) 40 MG tablet  Self No No   Sig: Take 1 tablet (40 mg) by mouth daily   rOPINIRole (REQUIP) 2 MG tablet  Self No No   Sig: Take 1 tablet (2 mg) by mouth at bedtime      Facility-Administered  Medications Last Administration Doses Remaining   cyanocobalamin injection 1,000 mcg 2022  2:49 PM           ALLERGIES:  No Known Allergies    FAMILY HISTORY:  Family History   Problem Relation Age of Onset    Obesity Father     Glaucoma No family hx of     Macular Degeneration No family hx of     Retinal detachment No family hx of     Kidney Cancer Father     Heart Disease Brother        SOCIAL HISTORY:  Social History     Tobacco Use    Smoking status: Former     Current packs/day: 0.00     Average packs/day: 1 pack/day for 50.6 years (50.6 ttl pk-yrs)     Types: Cigarettes     Start date: 1956     Quit date: 2024     Years since quittin.1     Passive exposure: Past (son smoked smoking in home)    Smokeless tobacco: Never    Tobacco comments:     20 yrs ago-mid 80's, quit again , started again , quit 2024   Vaping Use    Vaping status: Never Used   Substance Use Topics    Alcohol use: No     Comment: from 1975    Drug use: No        VITALS:  Patient Vitals for the past 24 hrs:   BP Temp Temp src Pulse Resp SpO2 Weight   25 1415 (!) 195/95 -- -- -- -- -- --   25 1400 (!) 188/88 -- -- 54 -- 98 % --   25 1345 (!) 188/81 -- -- 53 -- 97 % --   25 1335 (!) 190/87 -- -- 51 -- 97 % --   25 1315 -- -- -- 53 -- 97 % --   25 1151 (!) 215/126 96.9  F (36.1  C) Temporal 56 14 99 % 108.4 kg (239 lb)       PHYSICAL EXAM    General Appearance: Elderly-appearing, well-nourished, no acute distress. Hard of hearing.  Head:  Normocephalic, atraumatic  Eyes:  conjunctiva/corneas clear  ENT:  membranes are moist without pallor  Cardio:  Hypertensive. Regular rate and rhythm,   Pulm:  No respiratory distress, clear to auscultation bilaterally  Abdomen:  Obese, but not tender, soft, non distended,no rebound or guarding. No overlying erythema warmth or necrosis in the inguinal region. Left groin has a palpable hernia and factual defect, but I do not feel a loop of  bowel incarcerated.  Skin:  Skin warm, dry, no rashes  Neuro:  Alert and oriented ×3     RADIOLOGY/LABS:  Reviewed all pertinent imaging. Please see official radiology report. All pertinent labs reviewed and interpreted.    Results for orders placed or performed during the hospital encounter of 02/11/25   CT Abdomen Pelvis w/o Contrast    Impression    IMPRESSION:   1.  Moderate size fat-containing left inguinal hernia that extends into the upper scrotum has increased in diameter from 3 cm to 4 cm. Small fat-containing right inguinal hernia is unchanged.  2.  Borderline mild bladder distention.   3.  Stable abdominal aortic aneurysm treated with endograft.  4.  Pericardial effusion has resolved.  5.  Stable bone lesions.     Extra Blue Top Tube   Result Value Ref Range    Hold Specimen JIC    Extra Red Top Tube   Result Value Ref Range    Hold Specimen JIC    Extra Green Top (Lithium Heparin) Tube   Result Value Ref Range    Hold Specimen JIC    Extra Purple Top Tube   Result Value Ref Range    Hold Specimen JIC          PROCEDURES:  N/A    The creation of this record is based on the scribe s observations of the work being performed by Dina Oliva MD and the provider s statements to them. It was created on her behalf by Raquel Tolliver, a trained medical scribe. This document has been checked and approved by the attending provider.    Dina Oliva MD  Emergency Medicine  [facility] EMERGENCY DEPARTMENT  [facility] [facility] [id number] [phone]  Dept: [phone]     Dina Oliva MD  02/11/25 3969

## 2025-02-11 NOTE — PROGRESS NOTES
Assessment & Plan  Jonas was seen today for urgent care.    Diagnoses and all orders for this visit:    Unilateral inguinal hernia without obstruction or gangrene, recurrence not specified  Acute left groin pain for the past 2 days that started after straining with a hard bowel movement.  Unable to sleep or hardly walk due to significant pain.  Tylenol has not been helping.  On exam, I do feel a left inguinal hernia and was able to reduce this somewhat but not completely, limited secondary to pain. We are unable to provide pain relief beyond toradol at the urgent care setting.  Recommended being seen in the emergency department and discussed case with Dr. Aranda at at [facility].  Patient's son will bring him.    Malignant neoplasm metastatic to brain (H)  Follows with Dr. Madera.  Recently completed whole brain radiation.    Small cell lung cancer, right lower lobe (H)  Diagnosed in August 2024, completed palliative chemotherapy.       Zahraa Gonzalez MD  [facility] URGENT CARE    Subjective   Jonas is a 79 year old, presenting for the following health issues:  Urgent Care (Pain 10/10 /Hernia L groin pain and hard time walking due to the pain )    Chart review:    Has extensive stage small cell lung cancer, diagnosed in August 2024, underwent palliative chemotherapy, unfortunately was diagnosed with with multiple brain metastases a few weeks ago  Undergoing whole brain radiation over the past couple of weeks, last treatment was on 2/7  Recently hospitalized 1/4 to 1/8 for chest pain, found to have pericarditis, A-fib    Objective    BP (!) 162/83   Pulse 78   Temp 98  F (36.7  C)   Resp 20   SpO2 98%   There is no height or weight on file to calculate BMI.  GEN: Patient laying on the exam table, appears in significant pain.  HEEN: Head is atraumatic, normocephalic, eyes anicteric, mucous membranes moist.  CV: Extremities well-perfused. No obvious lower extremity edema.  PULM: Breathing comfortably  on room air. Speaking in full sentences.  NEURO: Alert and oriented x3.  No focal motor abnormalities.  Face symmetric.  ABD: Small soft tissue swelling in the left inguinal region, able to reduce somewhat but limited due to pain.  PSYCH: Depressed affect, vocalizing wanting to die.  SKIN: No rashes, bruising, or other lesions visible on exposed skin.

## 2025-02-12 ENCOUNTER — PATIENT OUTREACH (OUTPATIENT)
Dept: ONCOLOGY | Facility: HOSPITAL | Age: 80
End: 2025-02-12
Payer: MEDICARE

## 2025-02-12 ENCOUNTER — PATIENT OUTREACH (OUTPATIENT)
Dept: CARE COORDINATION | Facility: CLINIC | Age: 80
End: 2025-02-12
Payer: MEDICARE

## 2025-02-12 NOTE — PROGRESS NOTES
[facility]: Cancer Care                                                                                          Writer called patient to see if he would like to reschedule his appt with Dr. Madera to this Friday 2/14. Patient politely requested that I send him a mychart message since he was feeling tired during the phone conversation. Patient stated he is doing much better than yesterday and that his pain is under control with pain meds that he was prescribed. After the phone call, writer sent patient a mychart message regarding moving up his follow up appt with Dr. Madera.     Signature:  Gisel Viveros RN

## 2025-02-12 NOTE — PROGRESS NOTES
[facility]    Background: Primary Care-Care Coordination initial outreach identified per system criteria and reviewed by [facility] team.    Assessment: Upon chart review, [facility] Team member will not proceed with patient outreach related to this episode of Primary Care-Care Coordination program due to reason below:    Primary Care Clinic Care Coordination will defer follow-up outreach to specialty care team who are already closely following patient.     Plan: Primary Care-Care Coordination episode addressed appropriately per reason noted above.      Mago Leos MA  [facility] Care Resource [place], [facility]    *Connected Care Resource Team does NOT follow patient ongoing. Referrals are identified based on internal discharge reports and the outreach is to ensure patient has an understanding of their discharge instructions.

## 2025-02-17 ENCOUNTER — PATIENT OUTREACH (OUTPATIENT)
Dept: ONCOLOGY | Facility: HOSPITAL | Age: 80
End: 2025-02-17
Payer: MEDICARE

## 2025-02-17 ENCOUNTER — TELEPHONE (OUTPATIENT)
Dept: ONCOLOGY | Facility: HOSPITAL | Age: 80
End: 2025-02-17
Payer: MEDICARE

## 2025-02-17 DIAGNOSIS — C79.51 MALIGNANT NEOPLASM METASTATIC TO BONE (H): ICD-10-CM

## 2025-02-17 DIAGNOSIS — C34.31 SMALL CELL LUNG CANCER, RIGHT LOWER LOBE (H): Primary | ICD-10-CM

## 2025-02-17 DIAGNOSIS — C79.31 MALIGNANT NEOPLASM METASTATIC TO BRAIN (H): ICD-10-CM

## 2025-02-17 RX ORDER — OXYCODONE HYDROCHLORIDE 5 MG/1
5-10 TABLET ORAL EVERY 4 HOURS PRN
Qty: 60 TABLET | Refills: 0 | Status: SHIPPED | OUTPATIENT
Start: 2025-02-17

## 2025-02-17 NOTE — TELEPHONE ENCOUNTER
Patient's son called today requesting a refill of oxycodone for his dad.  He states his dad was given oxycodone on 2/11/2025 when he was seen in the ER.  He has been taking 1-2 tablets per day and this is helping with pain he has been having.  He would like this sent to [facility] pharmacy.  I let Marlo know I would send this request over to Dr. Madera to review and refill if appropriate.    Mey Sweet RN on 2/17/2025 at 11:31 AM

## 2025-02-17 NOTE — PROGRESS NOTES
[facility]: Cancer Care                                                                                          Writer called patient and spoke to patient and patient's son who was right next to the patient on speaker phone.  Writer explained to both the patient and patient's son that Dr. Madera wanted the patient to have a CT of the chest scheduled prior to the patient's follow-up appointment with Dr. Madera.  Writer told the patient and patient's son the date and time and location of the chest CT scan.  Patient and patient's son verbalized understanding    Signature:  Gisel Viveros RN

## 2025-02-17 NOTE — TELEPHONE ENCOUNTER
I received a phone call today from patient's son, Marlo.  He is calling for several reasons.  The first, his dad had a CT abdomen pelvis without contrast done while he was in the ER.  He is wondering if his dad needs to have an updated CT scan done this week of his chest or chest abdomen and pelvis with contrast prior to seeing Dr. Madera.  Per Marlo, they are hoping to talk to Dr. Madera regarding his prognosis and possible hospice and they would like to have as much information as possible regarding his current status.    Also, he states he has been waiting for a phone call from the clinic to discuss rescheduling his dad's infusion.  He states his dad did not get treated on 1/22 when he saw Edie Carlin CNP in the clinic.  He was told that he would return to the clinic a week later for infusion but this was not scheduled.  Marlo states he is okay waiting until 2/21 as scheduled if Dr. Madera feels this is appropriate.    Lastly, Jonas was given oxycodone 5 mg tablets when he was in the ER last for pain.  This was helping with pain in his abdomen and chest.  He will be running out of oxycodone over the next couple of days and Marlo is wondering if Dr. Madera would be willing to send [facility].    Of note, there is no consent to communicate on file with Marlo.  I let him know this is something we will need to get when they come into the clinic on 2/21.  Marlo states we can call his father back with the answers to his questions as they are together today and can be put on speaker phone.    Mey Sweet RN on 2/17/2025 at 11:25 AM

## 2025-02-18 ENCOUNTER — TELEPHONE (OUTPATIENT)
Dept: RADIATION ONCOLOGY | Facility: HOSPITAL | Age: 80
End: 2025-02-18
Payer: MEDICARE

## 2025-02-18 ENCOUNTER — TELEPHONE (OUTPATIENT)
Dept: RADIATION ONCOLOGY | Facility: CLINIC | Age: 80
End: 2025-02-18
Payer: MEDICARE

## 2025-02-18 DIAGNOSIS — Z53.9 ERRONEOUS ENCOUNTER--DISREGARD: Primary | ICD-10-CM

## 2025-02-18 NOTE — TELEPHONE ENCOUNTER
Courtesy called patient to check on him post Radiation Therapy. Jonas states that his mental capacity is diminished so he had his son lead the conversation. States that Jonas's voice is improved. Denies any seizure activity. They are weaning the steroids slowly, currently at 2mg 3x a day. Jonas moved in with Abad so mobility is a little tricky since he Abad has stairs but they are currently managing. They are going to talk with future plans with Dr Madera on Friday as far as if they need more care assistance such as a Long term facility or not.    Denies other questions or concerns. Reviewed next appointment with Dr Valdez and patient and son verbalized understanding.      Mago Szymanski, RN  Radiation Nurse

## (undated) DEVICE — EYE KNIFE SLIT XSTAR VISITEC 2.6MM 45DEG 373726

## (undated) DEVICE — EYE CANN IRR 25GA CYSTOTOME 581610

## (undated) DEVICE — EYE PACK CUSTOM ANTERIOR 30DEG TIP CENTURION PPK6682-04

## (undated) DEVICE — EYE SOL BSS 500ML BAG 0065-1795-04

## (undated) DEVICE — INTRO MICRO MINI STICK 4FR STD NITINOL

## (undated) DEVICE — KIT HAND CONTROL ACIST 014644 AR-P54

## (undated) DEVICE — SHTH INTRO 0.021IN ID 6FR DIA

## (undated) DEVICE — ELECTRODE DEFIB CADENCE 22550R

## (undated) DEVICE — GLOVE PROTEXIS MICRO 7.5  2D73PM75

## (undated) DEVICE — LINEN TOWEL PACK X5 5464

## (undated) DEVICE — CUSTOM PACK CORONARY SAN5BCRHEA

## (undated) DEVICE — MANIFOLD KIT ANGIO AUTOMATED 014613

## (undated) DEVICE — SLEEVE TR BAND RADIAL COMPRESSION DEVICE 29CM XX-RF06L

## (undated) DEVICE — EYE TIP IRRIGATION & ASPIRATION POLYMER CVD 0.3MM 8065751512

## (undated) DEVICE — SYR ANGIOGRAPHY MULTIUSE KIT ACIST 014612

## (undated) DEVICE — PACK CATARACT CUSTOM ASC SEY15CPUMC

## (undated) DEVICE — EYE SHIELD PLASTIC

## (undated) DEVICE — FASTENER STAY-FIX SMALL 680ME

## (undated) DEVICE — EYE KNIFE STILETTO VISITEC 1.1MM ANG 45DEG SIDEPORT 376620

## (undated) DEVICE — EYE CANN IRR 27GA ANTERIOR CHAMBER 581280

## (undated) DEVICE — CATH DIAGNOSTIC RADIAL 5FR TIG 4.0

## (undated) RX ORDER — LIDOCAINE HYDROCHLORIDE AND EPINEPHRINE 10; 10 MG/ML; UG/ML
INJECTION, SOLUTION INFILTRATION; PERINEURAL
Status: DISPENSED
Start: 2024-09-05

## (undated) RX ORDER — FENTANYL CITRATE 50 UG/ML
INJECTION, SOLUTION INTRAMUSCULAR; INTRAVENOUS
Status: DISPENSED
Start: 2024-08-26

## (undated) RX ORDER — ACETAMINOPHEN 325 MG/1
TABLET ORAL
Status: DISPENSED
Start: 2018-12-03

## (undated) RX ORDER — LIDOCAINE HYDROCHLORIDE 10 MG/ML
INJECTION, SOLUTION EPIDURAL; INFILTRATION; INTRACAUDAL; PERINEURAL
Status: DISPENSED
Start: 2019-04-05

## (undated) RX ORDER — FENTANYL CITRATE 50 UG/ML
INJECTION, SOLUTION INTRAMUSCULAR; INTRAVENOUS
Status: DISPENSED
Start: 2024-09-05

## (undated) RX ORDER — LIDOCAINE HYDROCHLORIDE 10 MG/ML
INJECTION, SOLUTION EPIDURAL; INFILTRATION; INTRACAUDAL; PERINEURAL
Status: DISPENSED
Start: 2024-08-26

## (undated) RX ORDER — HEPARIN SODIUM (PORCINE) LOCK FLUSH IV SOLN 100 UNIT/ML 100 UNIT/ML
SOLUTION INTRAVENOUS
Status: DISPENSED
Start: 2024-09-05

## (undated) RX ORDER — GABAPENTIN 300 MG/1
CAPSULE ORAL
Status: DISPENSED
Start: 2018-12-03

## (undated) RX ORDER — DIPHENHYDRAMINE HYDROCHLORIDE 50 MG/ML
INJECTION INTRAMUSCULAR; INTRAVENOUS
Status: DISPENSED
Start: 2024-08-26

## (undated) RX ORDER — DIAZEPAM 5 MG/1
TABLET ORAL
Status: DISPENSED
Start: 2025-01-06

## (undated) RX ORDER — HEPARIN SODIUM (PORCINE) LOCK FLUSH IV SOLN 100 UNIT/ML 100 UNIT/ML
SOLUTION INTRAVENOUS
Status: DISPENSED
Start: 2024-10-25

## (undated) RX ORDER — CEFAZOLIN SODIUM/WATER 2 G/20 ML
SYRINGE (ML) INTRAVENOUS
Status: DISPENSED
Start: 2024-09-05

## (undated) RX ORDER — FENTANYL CITRATE 50 UG/ML
INJECTION, SOLUTION INTRAMUSCULAR; INTRAVENOUS
Status: DISPENSED
Start: 2025-01-06

## (undated) RX ORDER — FENTANYL CITRATE 50 UG/ML
INJECTION, SOLUTION INTRAMUSCULAR; INTRAVENOUS
Status: DISPENSED
Start: 2019-04-05

## (undated) RX ORDER — LIDOCAINE HYDROCHLORIDE 10 MG/ML
INJECTION, SOLUTION INFILTRATION; PERINEURAL
Status: DISPENSED
Start: 2024-09-05